# Patient Record
Sex: FEMALE | Race: WHITE | Employment: OTHER | URBAN - METROPOLITAN AREA
[De-identification: names, ages, dates, MRNs, and addresses within clinical notes are randomized per-mention and may not be internally consistent; named-entity substitution may affect disease eponyms.]

---

## 2017-01-03 ENCOUNTER — TELEPHONE (OUTPATIENT)
Dept: INTERNAL MEDICINE | Age: 62
End: 2017-01-03

## 2017-01-13 ENCOUNTER — TELEPHONE (OUTPATIENT)
Dept: INTERNAL MEDICINE | Age: 62
End: 2017-01-13

## 2017-02-08 ENCOUNTER — HOSPITAL ENCOUNTER (OUTPATIENT)
Dept: ENDOSCOPY | Age: 62
Discharge: HOME | End: 2017-02-08
Attending: INTERNAL MEDICINE | Admitting: INTERNAL MEDICINE

## 2017-02-08 VITALS
RESPIRATION RATE: 16 BRPM | SYSTOLIC BLOOD PRESSURE: 106 MMHG | WEIGHT: 173 LBS | HEIGHT: 69 IN | DIASTOLIC BLOOD PRESSURE: 73 MMHG | TEMPERATURE: 97.5 F | OXYGEN SATURATION: 100 % | BODY MASS INDEX: 25.62 KG/M2 | HEART RATE: 72 BPM

## 2017-02-08 RX ORDER — TROSPIUM CHLORIDE ER 60 MG/1
60 CAPSULE ORAL DAILY
Status: ON HOLD | COMMUNITY
End: 2019-12-23 | Stop reason: ALTCHOICE

## 2017-11-03 ENCOUNTER — TELEPHONE (OUTPATIENT)
Dept: ORTHOPEDIC SURGERY | Age: 62
End: 2017-11-03

## 2017-11-03 ENCOUNTER — TELEPHONE (OUTPATIENT)
Dept: INTERNAL MEDICINE | Age: 62
End: 2017-11-03

## 2017-11-03 DIAGNOSIS — M19.079 ARTHRITIS, MIDFOOT: Primary | ICD-10-CM

## 2017-11-03 NOTE — TELEPHONE ENCOUNTER
Pt states that her old pair of orthotics are wearing out and she would like to get a new pair. Order was placed.

## 2017-11-07 ENCOUNTER — TELEPHONE (OUTPATIENT)
Dept: ORTHOPEDIC SURGERY | Age: 62
End: 2017-11-07

## 2017-11-08 ENCOUNTER — ORTHOTIC/BRACE ENCOUNTER (OUTPATIENT)
Dept: ORTHOPEDIC SURGERY | Age: 62
End: 2017-11-08

## 2017-11-08 NOTE — PROGRESS NOTES
Took Bilateral negative impressions for custom foot orthotics to support foot structure and redistribute pressures more appropriately. Advised patient to schedule  for one week.

## 2017-11-17 ENCOUNTER — OFFICE VISIT (OUTPATIENT)
Dept: ORTHOPEDIC SURGERY | Age: 62
End: 2017-11-17

## 2017-11-17 VITALS — WEIGHT: 173.06 LBS | BODY MASS INDEX: 25.63 KG/M2 | HEIGHT: 69 IN

## 2017-11-17 DIAGNOSIS — M25.511 BILATERAL SHOULDER PAIN, UNSPECIFIED CHRONICITY: Primary | ICD-10-CM

## 2017-11-17 DIAGNOSIS — M19.011 PRIMARY OSTEOARTHRITIS OF BOTH SHOULDERS: Chronic | ICD-10-CM

## 2017-11-17 DIAGNOSIS — M19.012 PRIMARY OSTEOARTHRITIS OF BOTH SHOULDERS: Chronic | ICD-10-CM

## 2017-11-17 DIAGNOSIS — M25.512 BILATERAL SHOULDER PAIN, UNSPECIFIED CHRONICITY: Primary | ICD-10-CM

## 2017-11-17 PROCEDURE — 99213 OFFICE O/P EST LOW 20 MIN: CPT | Performed by: ORTHOPAEDIC SURGERY

## 2017-11-17 PROCEDURE — G8484 FLU IMMUNIZE NO ADMIN: HCPCS | Performed by: ORTHOPAEDIC SURGERY

## 2017-11-17 PROCEDURE — G8428 CUR MEDS NOT DOCUMENT: HCPCS | Performed by: ORTHOPAEDIC SURGERY

## 2017-11-17 PROCEDURE — 3014F SCREEN MAMMO DOC REV: CPT | Performed by: ORTHOPAEDIC SURGERY

## 2017-11-17 PROCEDURE — 1036F TOBACCO NON-USER: CPT | Performed by: ORTHOPAEDIC SURGERY

## 2017-11-17 PROCEDURE — 3017F COLORECTAL CA SCREEN DOC REV: CPT | Performed by: ORTHOPAEDIC SURGERY

## 2017-11-17 PROCEDURE — G8419 CALC BMI OUT NRM PARAM NOF/U: HCPCS | Performed by: ORTHOPAEDIC SURGERY

## 2017-11-17 NOTE — PROGRESS NOTES
Subjective: Patient states that she is here for a new problem which is bilateral shoulder arthritis. She has seen Dr. William Noel in the past and was told that she may need shoulder replacement. Aren worse recently. I've seen her for ankle issues in the past and so she came in to see me. She currently states that the left shoulder is worse in the right she is left-hand dominant. She has no numbness or tingling in his had no recent trauma. She just notices that when she sleeps on one side or the other it tends to be more painful than sleeping on her back rates her pain at 5 out of 10. Driving working on the computer too long and not having support makes it worse supporting her arm with a pillow makes it better. She has some stomach issues so has to be careful with NSAIDs she is also been swimming in a pool and that does seem to help as long as she doesn't do too much shoulder motion  Objective: Physical exam shows approximately 120° of left shoulder flexion 90° abduction full internal rotation 0° of external rotation. On the right she has 140° of flexion 110° of abduction full internal rotation and 0° of external rotation. Strength is 3+ to 4 minus over 5 through the available range she has crepitus with motion no tenderness at the a.c. joint. Positive impingement sign bilaterally. Radial ulnar median nerves are intact motor and sensory  Imaging: 3 views of both shoulders shows severe glenohumeral arthritis  Assessment and plan: I would recommend that she see Dr. William Noel again and discuss surgical options.

## 2017-11-22 ENCOUNTER — ORTHOTIC/BRACE ENCOUNTER (OUTPATIENT)
Dept: ORTHOPEDIC SURGERY | Age: 62
End: 2017-11-22

## 2017-11-22 DIAGNOSIS — M19.079 ARTHRITIS, MIDFOOT: Primary | ICD-10-CM

## 2017-11-22 DIAGNOSIS — E11.9 TYPE 2 DIABETES MELLITUS WITHOUT COMPLICATION, WITHOUT LONG-TERM CURRENT USE OF INSULIN (HCC): Chronic | ICD-10-CM

## 2017-11-22 PROCEDURE — L3020 FOOT LONGITUD/METATARSAL SUP: HCPCS | Performed by: PEDORTHIST

## 2017-11-22 NOTE — PROGRESS NOTES
Dispensed bilateral foot orthotics. Wear instructions were given.  Patient was advised to follow up if having problems or if orthotics are not helping

## 2017-12-07 ENCOUNTER — TELEPHONE (OUTPATIENT)
Dept: ORTHOPEDIC SURGERY | Age: 62
End: 2017-12-07

## 2017-12-07 RX ORDER — TRAMADOL HYDROCHLORIDE 50 MG/1
50 TABLET ORAL EVERY 8 HOURS PRN
Qty: 21 TABLET | Refills: 0 | Status: SHIPPED | OUTPATIENT
Start: 2017-12-07 | End: 2017-12-17

## 2017-12-07 NOTE — TELEPHONE ENCOUNTER
Pt saw you last week for bilateral shoulder pain and now has an appt scheduled with PJF but she would like to know if you can call in something to help with the pain until she is seen by him? Her GI doctor would prefer she doesn't take ibuprofen and the tylenol she has been using doesn't help.

## 2017-12-08 RX ORDER — TRAMADOL HYDROCHLORIDE 50 MG/1
50 TABLET ORAL EVERY 8 HOURS PRN
Qty: 21 TABLET | Refills: 0 | Status: SHIPPED | OUTPATIENT
Start: 2017-12-08 | End: 2017-12-18

## 2017-12-08 RX ORDER — TRAMADOL HYDROCHLORIDE 50 MG/1
50 TABLET ORAL EVERY 8 HOURS PRN
Qty: 21 TABLET | Refills: 0 | Status: CANCELLED | OUTPATIENT
Start: 2017-12-08 | End: 2017-12-15

## 2017-12-08 NOTE — TELEPHONE ENCOUNTER
This was printed instead of called in . Could you please send it to the pharmacy? I have teed it up for you to approve.

## 2017-12-12 ENCOUNTER — OFFICE VISIT (OUTPATIENT)
Dept: ORTHOPEDIC SURGERY | Age: 62
End: 2017-12-12

## 2017-12-12 VITALS — WEIGHT: 173.06 LBS | HEIGHT: 69 IN | BODY MASS INDEX: 25.63 KG/M2

## 2017-12-12 DIAGNOSIS — M25.512 BILATERAL SHOULDER PAIN, UNSPECIFIED CHRONICITY: Primary | ICD-10-CM

## 2017-12-12 DIAGNOSIS — M25.511 BILATERAL SHOULDER PAIN, UNSPECIFIED CHRONICITY: Primary | ICD-10-CM

## 2017-12-12 DIAGNOSIS — M19.019 SHOULDER ARTHRITIS: ICD-10-CM

## 2017-12-12 PROCEDURE — G8419 CALC BMI OUT NRM PARAM NOF/U: HCPCS | Performed by: PHYSICIAN ASSISTANT

## 2017-12-12 PROCEDURE — 3017F COLORECTAL CA SCREEN DOC REV: CPT | Performed by: PHYSICIAN ASSISTANT

## 2017-12-12 PROCEDURE — 1036F TOBACCO NON-USER: CPT | Performed by: PHYSICIAN ASSISTANT

## 2017-12-12 PROCEDURE — G8484 FLU IMMUNIZE NO ADMIN: HCPCS | Performed by: PHYSICIAN ASSISTANT

## 2017-12-12 PROCEDURE — 99213 OFFICE O/P EST LOW 20 MIN: CPT | Performed by: PHYSICIAN ASSISTANT

## 2017-12-12 PROCEDURE — 3014F SCREEN MAMMO DOC REV: CPT | Performed by: PHYSICIAN ASSISTANT

## 2017-12-12 PROCEDURE — G8427 DOCREV CUR MEDS BY ELIG CLIN: HCPCS | Performed by: PHYSICIAN ASSISTANT

## 2017-12-12 PROCEDURE — 73020 X-RAY EXAM OF SHOULDER: CPT | Performed by: PHYSICIAN ASSISTANT

## 2017-12-12 NOTE — PROGRESS NOTES
REFERRING PHYSICIAN: Matthias Franco M.D.    CHIEF COMPLAINT:  Bilateral shoulder pain     HISTORY OF PRESENT ILLNESS:  Omar German is a 17-year-old   left-hand-dominant female who presents today at the request of Matthias Franco M.D. for evaluation and consultation of bilateral shoulders. She has a history of a  right shoulder arthroscopy in 2001 with Dr. Tacho Arredondo MD.  She has had continued right shoulder discomfort that is progressively worsening over the last 15 years. She denies any new injuries or problems with her right shoulder. Regarding her left shoulder, she began having significant left shoulder discomfort in mid-October when she slipped in the shower and caught herself. Reports her left shoulder symptoms are worse than her right shoulder symptoms currently. She reports 2 out of 10 pain at rest.  She reports 6 out of 10 pain with activity. She has pain at night, especially when lying on her left shoulder. She's had no treatments for her shoulders recently. REVIEW OF SYSTEMS:  Pertinent items are noted in the HPI. Review of systems was reviewed from Patient History Form dated on December 12, 2017 and available in the patient's chart under the Media tab. CONSTITUTIONAL: Denies unexplained weight loss, fevers, chills or fatigue  NEUROLOGICAL: Denies unsteady gait or progressive weakness  SKIN: Denies skin changes, delayed healing, rash, itching     PHYSICAL EXAMINATION: Inspection of the right shoulder reveals warm, dry, intact skin. There is no adenopathy. The distal neurovascular exam is grossly intact. There is no tenderness over the acromioclavicular joint. Range of motion reveals 95° of active forward elevation. She is 0° of external rotation with her arm at her side. She has 45° of shoulder abduction. She has 5 minus out of 5 strength in forward elevation and external rotation. Inspection of the left shoulder reveals warm, dry, intact skin. There is no adenopathy.  The distal neurovascular exam is grossly intact. There is no tenderness over the acromioclavicular joint. Range of motion reveals 90° of active forward elevation. She is 0° of external rotation with arm at her side. She has 50° of shoulder abduction. She has 5 minus out of 5 strength in forward elevation and external rotation. Cervical spine: The skin is warm and dry. There is no swelling, warmth, or erythema. Range of motion is within normal limits. There is no paraspinal or spinous process tenderness. Spurling's sign is negative and did not produce shoulder pain. The distal neurovascular exam is grossly intact. X-RAYS:   1.  1 view of the right shoulder was obtained in the office and reviewed along with 3 views obtained November 17, 2017. The glenohumeral joint is completely obliterated. There is a large osteophyte visualized in the inferior aspect of humeral head or there is also osteophyte formation visualized inferior aspect of the glenoid. There is a laterally projecting acromion visualized. The acromiohumeral interval severely diminished. There is posterior subluxation of the humeral head with a tremendous amount of posterior glenoid bone erosion. Lung fields are clear with normal pulmonary markings. 2.  1 view of the left shoulder was obtained in the office and reviewed along with 3 views obtained November 17, 2017. The glenohumeral joint is completely obliterated. There is a large osteophyte visualized inferior aspect of the humeral head. There is a laterally projecting acromion visualized. The acromiohumeral interval is severely diminished. There is posterior subluxation of the humeral head with erosion of the proximal 50% of the posterior glenoid. Lung fields are clear with normal pulmonary markings. ASSESSMENT:    1. Right shoulder - severe end-stage glenohumeral osteoarthritis with severe posterior glenoid bone erosion and a possible rotator cuff tear.   2.  Left  shoulder - severe end-stage glenohumeral osteoarthritis with posterior glenoid erosion with possible rotator cuff tear    PLAN: I had detailed discussion with Andres Hauser and her  regarding diagnosis and treatment options. 1.  Regarding the right shoulder - I recommended a CT scan to evaluate the amount and location of posterior glenoid bone erosion. I'll see her back for reevaluation following the CT scan. 2.  Regarding the left shoulder - I recommended a CT scan to evaluate the amount and location the posterior glenoid bone erosion. I'll see her back for reevaluation following the CT scan. She and her  are in agreement with this plan.     Patient seen and examined by Serene Martinez PA-C and Dr. Alfredo Miller MD

## 2017-12-18 ENCOUNTER — TELEPHONE (OUTPATIENT)
Dept: ORTHOPEDIC SURGERY | Age: 62
End: 2017-12-18

## 2017-12-18 NOTE — TELEPHONE ENCOUNTER
Called patient to let them know of CT SCAN approval. Franny Heidi has been faxed auth# and demographic information. Patient was instructed to call the central scheduling department for a follow-up appointment after test is scheduled.

## 2017-12-20 ENCOUNTER — ORTHOTIC/BRACE ENCOUNTER (OUTPATIENT)
Dept: ORTHOPEDIC SURGERY | Age: 62
End: 2017-12-20

## 2018-01-16 ENCOUNTER — OFFICE VISIT (OUTPATIENT)
Dept: ORTHOPEDIC SURGERY | Age: 63
End: 2018-01-16

## 2018-01-16 VITALS — HEIGHT: 67 IN | BODY MASS INDEX: 26.99 KG/M2 | WEIGHT: 171.96 LBS

## 2018-01-16 DIAGNOSIS — M19.019 SHOULDER ARTHRITIS: ICD-10-CM

## 2018-01-16 PROCEDURE — 3014F SCREEN MAMMO DOC REV: CPT | Performed by: ORTHOPAEDIC SURGERY

## 2018-01-16 PROCEDURE — 1036F TOBACCO NON-USER: CPT | Performed by: ORTHOPAEDIC SURGERY

## 2018-01-16 PROCEDURE — 99213 OFFICE O/P EST LOW 20 MIN: CPT | Performed by: ORTHOPAEDIC SURGERY

## 2018-01-16 PROCEDURE — G8428 CUR MEDS NOT DOCUMENT: HCPCS | Performed by: ORTHOPAEDIC SURGERY

## 2018-01-16 PROCEDURE — L3670 SO ACRO/CLAV CAN WEB PRE OTS: HCPCS | Performed by: ORTHOPAEDIC SURGERY

## 2018-01-16 PROCEDURE — G8419 CALC BMI OUT NRM PARAM NOF/U: HCPCS | Performed by: ORTHOPAEDIC SURGERY

## 2018-01-16 PROCEDURE — 3017F COLORECTAL CA SCREEN DOC REV: CPT | Performed by: ORTHOPAEDIC SURGERY

## 2018-01-16 PROCEDURE — G8484 FLU IMMUNIZE NO ADMIN: HCPCS | Performed by: ORTHOPAEDIC SURGERY

## 2018-01-23 ENCOUNTER — TELEPHONE (OUTPATIENT)
Dept: ORTHOPEDIC SURGERY | Age: 63
End: 2018-01-23

## 2018-01-30 ENCOUNTER — TELEPHONE (OUTPATIENT)
Dept: INTERNAL MEDICINE | Age: 63
End: 2018-01-30

## 2018-01-30 LAB
ABO GROUPING: NORMAL
ANION GAP SERPL CALCULATED.3IONS-SCNC: 8 MMOL/L (ref 5–13)
ANTIBODY SCREEN: NEGATIVE
APTT: 34.2 SECONDS (ref 23.1–37.6)
BILIRUBIN URINE: NEGATIVE
BUN / CREAT RATIO: 22
BUN BLDV-MCNC: 14 MG/DL (ref 7–25)
CALCIUM SERPL-MCNC: 9.7 MG/DL (ref 8.4–10.5)
CHLORIDE BLD-SCNC: 106 MMOL/L (ref 98–110)
CLARITY: CLEAR
CO2: 27 MMOL/L (ref 22–29)
COLOR: YELLOW
CREAT SERPL-MCNC: 0.65 MG/DL (ref 0.5–1.2)
EPITHELIAL CELLS, UA: ABNORMAL /HPF (ref 0–5)
ERYTHROCYTES URINE: NEGATIVE
GFR AFRICAN AMERICAN: 111 SEE NOTE
GFR NON-AFRICAN AMERICAN: 92 SEE NOTE
GLUCOSE BLD-MCNC: 92 MG/DL (ref 70–99)
GLUCOSE URINE: NEGATIVE MG/DL
HCT VFR BLD CALC: 37.8 % (ref 35–45)
HEMOGLOBIN: 13.2 G/DL (ref 11.7–15.5)
INR BLD: 1 (ref 0.9–1.1)
KETONES, URINE: NEGATIVE MG/DL
LEUKOCYTE ESTERASE, URINE: NEGATIVE
LEUKOCYTES, UA: ABNORMAL /HPF (ref 0–5)
MCH RBC QN AUTO: 32.4 PG (ref 27–33)
MCHC RBC AUTO-ENTMCNC: 34.9 G/DL (ref 32–36)
MCV RBC AUTO: 93 FL (ref 80–100)
MUCUS: PRESENT /HPF
NITRITE, URINE: NEGATIVE
PDW BLD-RTO: 14.5 % (ref 11–15)
PH UA: 7 (ref 5–8)
PLATELET # BLD: 225 10*3/UL (ref 140–400)
PMV BLD AUTO: 8.5 FL (ref 7.5–11.5)
POTASSIUM SERPL-SCNC: 4.1 MMOL/L (ref 3.5–5.1)
PROTEIN UA: NEGATIVE MG/DL
PROTHROMBIN TIME: 10.4 SECONDS (ref 9.4–12.6)
RBC # BLD: 4.07 10*6/UL (ref 3.8–5.1)
RBC UA: 3 /HPF (ref 0–3)
RH FACTOR: POSITIVE
SODIUM BLD-SCNC: 141 MMOL/L (ref 135–146)
SPECIFIC GRAVITY UA: 1.01 (ref 1–1.03)
STAPH AUREUS SCREEN: NEGATIVE
STAPH AUREUS.METHICILLIN RESISTANT DNA: NEGATIVE
UROBILINOGEN, URINE: <2 MG/DL
WBC # BLD: 5.1 10*3/UL (ref 3.8–10.8)

## 2018-01-31 LAB — BACTERIA IDENTIFIED: NORMAL

## 2018-02-01 ENCOUNTER — TELEPHONE (OUTPATIENT)
Dept: ORTHOPEDIC SURGERY | Age: 63
End: 2018-02-01

## 2018-02-01 LAB
ESTIMATED AVERAGE GLUCOSE: 100 MG/DL (ref 68–114)
HBA1C MFR BLD: 5.1 % (ref 4–5.6)

## 2018-02-09 ENCOUNTER — TELEPHONE (OUTPATIENT)
Dept: ORTHOPEDIC SURGERY | Age: 63
End: 2018-02-09

## 2018-02-19 DIAGNOSIS — Z96.619 STATUS POST REVERSE TOTAL SHOULDER REPLACEMENT, UNSPECIFIED LATERALITY: Primary | ICD-10-CM

## 2018-02-19 RX ORDER — OXYCODONE HYDROCHLORIDE AND ACETAMINOPHEN 5; 325 MG/1; MG/1
1 TABLET ORAL EVERY 8 HOURS PRN
Qty: 21 TABLET | Refills: 0 | Status: SHIPPED | OUTPATIENT
Start: 2018-02-19 | End: 2018-02-26

## 2018-02-23 ENCOUNTER — OFFICE VISIT (OUTPATIENT)
Dept: ORTHOPEDIC SURGERY | Age: 63
End: 2018-02-23

## 2018-02-23 VITALS — BODY MASS INDEX: 26.99 KG/M2 | WEIGHT: 171.96 LBS | HEIGHT: 67 IN

## 2018-02-23 DIAGNOSIS — Z96.612 S/P REVERSE TOTAL SHOULDER ARTHROPLASTY, LEFT: ICD-10-CM

## 2018-02-23 DIAGNOSIS — M25.512 PAIN IN JOINT OF LEFT SHOULDER REGION: Primary | ICD-10-CM

## 2018-02-23 PROCEDURE — 99024 POSTOP FOLLOW-UP VISIT: CPT | Performed by: PHYSICIAN ASSISTANT

## 2018-02-23 RX ORDER — HYDROCODONE BITARTRATE AND ACETAMINOPHEN 5; 325 MG/1; MG/1
TABLET ORAL
Qty: 28 TABLET | Refills: 0 | Status: SHIPPED | OUTPATIENT
Start: 2018-02-23 | End: 2018-02-28

## 2018-02-26 ENCOUNTER — HOSPITAL ENCOUNTER (OUTPATIENT)
Dept: OTHER | Age: 63
Discharge: HOME | End: 2018-02-28
Attending: ORTHOPAEDIC SURGERY | Admitting: ORTHOPAEDIC SURGERY

## 2018-02-26 ASSESSMENT — PAIN DESCRIPTION - DESCRIPTORS: DESCRIPTORS: SHARP;TIGHTNESS;BURNING

## 2018-02-26 ASSESSMENT — PAIN DESCRIPTION - FREQUENCY: FREQUENCY: INTERMITTENT

## 2018-02-26 ASSESSMENT — PAIN DESCRIPTION - DIRECTION: RADIATING_TOWARDS: TO LEFT ELBOW

## 2018-02-26 ASSESSMENT — PAIN DESCRIPTION - ORIENTATION: ORIENTATION: RIGHT;LEFT

## 2018-02-26 ASSESSMENT — PAIN SCALES - GENERAL: PAINLEVEL_OUTOF10: 8

## 2018-02-26 ASSESSMENT — PAIN DESCRIPTION - ONSET: ONSET: GRADUAL

## 2018-02-26 ASSESSMENT — PAIN DESCRIPTION - PROGRESSION: CLINICAL_PROGRESSION: GRADUALLY IMPROVING

## 2018-02-26 ASSESSMENT — PAIN DESCRIPTION - LOCATION: LOCATION: SHOULDER

## 2018-02-26 ASSESSMENT — PAIN DESCRIPTION - PAIN TYPE: TYPE: SURGICAL PAIN

## 2018-02-26 NOTE — PLAN OF CARE
Outpatient Physical Therapy  [] [facility]    Phone: [phone]   Fax: [phone]   [x] [facility]  Phone: [phone]              Fax: [phone]  [] [facility]   Phone: [phone]   Fax: [phone]     To: Referring Practitioner: Dr. Kennedi Sandhu      Patient: Lasha Pinto   : 1955   MRN: [id number]  Evaluation Date: 2018      Diagnosis Information:  · Diagnosis: Glenohumeral arthritis left shoulder, posterior glenoid bone loss, , biceps tenodesis, Reverse left TSR, glenoid bone graft. ·  Treatment Diagnosis: Limited mobility, weakness, decreased ability to use left UE for ADL's, pain on left shoulder michael. but pain on right shoulder as well. Physical Therapy Certification/Re-Certification Form  Dear ,  The following patient has been evaluated for physical therapy services and for therapy to continue, Medicare requires monthly physician review of the treatment plan. Please review the attached evaluation and/or summary of the patient's plan of care, and verify that you agree therapy should continue by signing the attached document and sending it back to our office. Plan of Care/Treatment to date:  [x] Therapeutic Exercise    [x] Modalities:  [x] Therapeutic Activity     [] Ultrasound  [] Electrical Stimulation  [] Gait Training      [] Cervical Traction [] Lumbar Traction  [] Neuromuscular Re-education    [] Cold/hotpack [] Iontophoresis   [x] Instruction in HEP     Other:  [x] Manual Therapy      []             [] Aquatic Therapy      []           ? Frequency/Duration:  # Days per week: [] 1 day # Weeks: [] 1 week [] 5 weeks     [x] 2 days? [] 2 weeks [x] 6 weeks     [] 3 days   [] 3 weeks [] 7 weeks     [] 4 days   [] 4 weeks [] 8 weeks    Rehab Potential: [] Excellent [x] Good [] Fair  [] Poor     DR. Ibrahim:  Please send protocol you would like us to follow if you have a preferred one. Thank you!     Electronically signed by:  Sonja Cotto PT      If you have any questions or concerns, please don't hesitate to call.   Thank you for your referral.      Physician Signature:________________________________Date:__________________  By signing above, therapists plan is approved by physician

## 2018-02-26 NOTE — FLOWSHEET NOTE
Physical Therapy Daily Treatment Note  Date:  2018    Patient Name:  Tobias Long   \"JS\"  :  1955  MRN: [id number]  Restrictions/Precautions:    Pertinent Medical History:  Medical/Treatment Diagnosis Information:  · Diagnosis: Glenohumeral arthritis left shoulder, posterior glenoid bone loss, , biceps tenodesis, Reverse left TSR, glenoid bone graft. · Treatment Diagnosis: Limited mobility, weakness, decreased ability to use left UE for ADL's, pain on left shoulder michael. but pain on right shoulder as well. Insurance/Certification information:  PT Insurance Information: [facility]  Physician Information:  Referring Practitioner: Dr. Cl Eldridge of care signed (Y/N):  Sent to Dr. Delvin Lui [facility] on 18  Visit# / total visits:   Pain level: 5-8/10     G-Code (if applicable):      Date / Visit # G-Code Applied: 18  PT G-Codes  Functional Assessment Tool Used: Calvin Areas  Score: 50  Functional Limitation: Carrying, moving and handling objects  Carrying, Moving and Handling Objects Current Status (): At least 80 percent but less than 100 percent impaired, limited or restricted  Carrying, Moving and Handling Objects Goal Status (): At least 60 percent but less than 80 percent impaired, limited or restricted    Progress Note: [x]  Yes  []  No  Next due by: Visit #10      History of Injury:  Pt has OA in both shoulders and pain increased when she slipped in a tub on 2017. Right was sore since 17 years ago  (worse than left) and left began one year ago. but she decided to have the left one done first .  She had surgery and was told on 18 to avoid the sling.    Pt  has been doig HEP for 2 weeks since dc from hospital.  Subjective:     See above    Objective: See eval  Observation:   Test measurements:      Exercises:  Exercise/Equipment Resistance/Repetitions Other comments   Codman's exercises 30x each    Active left elbow flexion and extension 20X    Wrist

## 2018-03-01 ENCOUNTER — HOSPITAL ENCOUNTER (OUTPATIENT)
Dept: OTHER | Age: 63
Discharge: HOME | End: 2018-03-31
Attending: ORTHOPAEDIC SURGERY | Admitting: ORTHOPAEDIC SURGERY

## 2018-03-05 ENCOUNTER — HOSPITAL ENCOUNTER (OUTPATIENT)
Dept: PHYSICAL THERAPY | Age: 63
Discharge: HOME | End: 2018-03-06
Admitting: ORTHOPAEDIC SURGERY

## 2018-03-05 DIAGNOSIS — M19.011 PRIMARY OSTEOARTHRITIS OF BOTH SHOULDERS: Primary | Chronic | ICD-10-CM

## 2018-03-05 DIAGNOSIS — M19.012 PRIMARY OSTEOARTHRITIS OF BOTH SHOULDERS: Primary | Chronic | ICD-10-CM

## 2018-03-05 RX ORDER — TRAMADOL HYDROCHLORIDE 50 MG/1
50 TABLET ORAL EVERY 8 HOURS PRN
Qty: 28 TABLET | Refills: 0 | OUTPATIENT
Start: 2018-03-05 | End: 2018-03-15

## 2018-03-05 NOTE — FLOWSHEET NOTE
Physical Therapy Daily Treatment Note  Date:  3/5/2018    Patient Name:  Hawa Chaparro   \"JS\"  :  1955  MRN: [id number]  Restrictions/Precautions:    Pertinent Medical History:  Medical/Treatment Diagnosis Information:  · Diagnosis: Glenohumeral arthritis left shoulder, posterior glenoid bone loss, , biceps tenodesis, Reverse left TSR, glenoid bone graft. · Treatment Diagnosis: Limited mobility, weakness, decreased ability to use left UE for ADL's, pain on left shoulder michael. but pain on right shoulder as well. Insurance/Certification information:  PT Insurance Information: [facility]  Physician Information:  Referring Practitioner: Dr. Estefani Nation of care signed (Y/N):  Sent to Dr. Abhijeet Rogers on 18  Visit# / total visits: 3 /12  Pain level: 4-5/10     G-Code (if applicable):      Date / Visit # G-Code Applied: 18  PT G-Codes  Functional Assessment Tool Used: Dominic Estrella  Score: 50  Functional Limitation: Carrying, moving and handling objects  Carrying, Moving and Handling Objects Current Status (): At least 80 percent but less than 100 percent impaired, limited or restricted  Carrying, Moving and Handling Objects Goal Status (): At least 60 percent but less than 80 percent impaired, limited or restricted    Progress Note: [x]  Yes  []  No  Next due by: Visit #10      History of Injury:  Pt has OA in both shoulders and pain increased when she slipped in a tub on 2017. Right was sore since 17 years ago  (worse than left) and left began one year ago. but she decided to have the left one done first .  She had surgery and was told on 18 to avoid the sling. Pt  has been doig HEP for 2 weeks since dc from hospital.  Subjective:     3/1/18: Pt reports that she has been doing the home exercises consistently. Having difficulty with donning the splint. 18: Patient reports shoulder has been sore.     Objective: See eval  Observation:   Test measurements:

## 2018-03-08 ENCOUNTER — HOSPITAL ENCOUNTER (OUTPATIENT)
Dept: PHYSICAL THERAPY | Age: 63
Discharge: HOME | End: 2018-03-09
Admitting: ORTHOPAEDIC SURGERY

## 2018-03-08 NOTE — FLOWSHEET NOTE
Physical Therapy Daily Treatment Note  Date:  3/8/2018    Patient Name:  Aravind Mclaughlin   \"JS\"  :  1955  MRN: [id number]  Restrictions/Precautions:    Pertinent Medical History:  Medical/Treatment Diagnosis Information:  · Diagnosis: Glenohumeral arthritis left shoulder, posterior glenoid bone loss, , biceps tenodesis, Reverse left TSR, glenoid bone graft. · Treatment Diagnosis: Limited mobility, weakness, decreased ability to use left UE for ADL's, pain on left shoulder michael. but pain on right shoulder as well. Insurance/Certification information:  PT Insurance Information: [facility]  Physician Information:  Referring Practitioner: Dr. Blaire Pollard of care signed (Y/N):  Sent to Dr. Claribel Rodriguez on 18  Visit# / total visits:   Pain level: 4/10     G-Code (if applicable):      Date / Visit # G-Code Applied: 18  PT G-Codes  Functional Assessment Tool Used: Mary Porterkenhead  Score: 50  Functional Limitation: Carrying, moving and handling objects  Carrying, Moving and Handling Objects Current Status (): At least 80 percent but less than 100 percent impaired, limited or restricted  Carrying, Moving and Handling Objects Goal Status (): At least 60 percent but less than 80 percent impaired, limited or restricted    Progress Note: [x]  Yes  []  No  Next due by: Visit #10      History of Injury:  Pt has OA in both shoulders and pain increased when she slipped in a tub on 2017. Right was sore since 17 years ago  (worse than left) and left began one year ago. but she decided to have the left one done first .  She had surgery and was told on 18 to avoid the sling. Pt  has been doig HEP for 2 weeks since dc from hospital.  Subjective:     3/1/18: Pt reports that she has been doing the home exercises consistently. Having difficulty with donning the splint. 18: Patient reports shoulder has been sore.   18: Patient reports shoulder has been not as

## 2018-03-15 ENCOUNTER — HOSPITAL ENCOUNTER (OUTPATIENT)
Dept: PHYSICAL THERAPY | Age: 63
Discharge: HOME | End: 2018-03-16
Admitting: ORTHOPAEDIC SURGERY

## 2018-03-15 NOTE — FLOWSHEET NOTE
Physical Therapy Daily Treatment Note  Date:  3/15/2018    Patient Name:  Marlin Wayne   \"JS\"  :  1955  MRN: [id number]  Restrictions/Precautions:    Pertinent Medical History:  Medical/Treatment Diagnosis Information:  · Diagnosis: Glenohumeral arthritis left shoulder, posterior glenoid bone loss, , biceps tenodesis, Reverse left TSR, glenoid bone graft. · Treatment Diagnosis: Limited mobility, weakness, decreased ability to use left UE for ADL's, pain on left shoulder michael. but pain on right shoulder as well. Insurance/Certification information:  PT Insurance Information: [facility]  Physician Information:  Referring Practitioner: Dr. Kamille Welch of care signed (Y/N):  Sent to Dr. Simba Schwab on 18  Visit# / total visits:   Pain level: 3/10     G-Code (if applicable):      Date / Visit # G-Code Applied: 18  PT G-Codes  Functional Assessment Tool Used: Rhoda Reich  Score: 50  Functional Limitation: Carrying, moving and handling objects  Carrying, Moving and Handling Objects Current Status (): At least 80 percent but less than 100 percent impaired, limited or restricted  Carrying, Moving and Handling Objects Goal Status (): At least 60 percent but less than 80 percent impaired, limited or restricted    Progress Note: [x]  Yes  []  No  Next due by: Visit #10      History of Injury:  Pt has OA in both shoulders and pain increased when she slipped in a tub on 2017. Right was sore since 17 years ago  (worse than left) and left began one year ago. but she decided to have the left one done first .  She had surgery and was told on 18 to avoid the sling. Pt  has been doig HEP for 2 weeks since dc from hospital.  Subjective:     3/1/18: Pt reports that she has been doing the home exercises consistently. Having difficulty with donning the splint. 18: Patient reports shoulder has been sore. 18: Patient reports shoulder has been not as sore.   3/12/18: Pt reports that she is able to do more and  having less pain. She is not having as much difficulty with HEP. She is able to do more ADL's such a floss her teeth and curl her hair. 3/15/18: Pt reports that pain is about the same but it is easier to do scapular retraction. Objective: See eval  Observation:   Test measurements:      Exercises:  Exercise/Equipment Resistance/Repetitions Other comments   Codman's exercises 30x each    Active left elbow flexion and extension 20X    Wrist flex. ext pron. sup 20X Has written instructions from in- hospial PT   Table slides  10 x  Small range   Scap retraction 5 sec x 10 3/12/18                                                   Other Therapeutic Activities:    Reviewed Stefania Cane  3/12/18: Discussed use of Vit E oil for scar tissue and incisional healing. 3/15/18:Pt. and PT reviewed how to eat using left arm safely. Home Exercise Program:    Gave booklet of HEP for pt as above, Answered questions and assisted pt with sling. Manual Treatments:    Gentle PROM to left shoulder  And ROM to elbow and wrist, 20 min  Modalities:    IFC with CP x 15 min while seated  Timed Code Treatment Minutes:    45  Total Treatment Minutes:    60    Treatment/Activity Tolerance:  [x] Patient tolerated treatment well [] Patient limited by fatigue  [] Patient limited by pain  [] Patient limited by other medical complications  [] Other:     Prognosis: [x] Good [] Fair  [] Poor    Patient Requires Follow-up: [x] Yes  [] No    Plan:   [] Continue per plan of care [] Alter current plan (see comments)  [x] Plan of care initiated [] Hold pending MD visit [] Discharge  Plan for Next Session:    Follow protocol for reverse TSR, asked  if he has a preferred protocol. Will await response. Assess when pt is ready to drive again.   Electronically signed by:  Macey Bennett, PT

## 2018-03-22 ENCOUNTER — HOSPITAL ENCOUNTER (OUTPATIENT)
Dept: PHYSICAL THERAPY | Age: 63
Discharge: HOME | End: 2018-03-23
Admitting: ORTHOPAEDIC SURGERY

## 2018-03-22 NOTE — FLOWSHEET NOTE
Physical Therapy Daily Treatment Note  Date:  3/22/2018    Patient Name:  Sander Veras   \"JS\"  :  1955  MRN: [id number]  Restrictions/Precautions:    Pertinent Medical History:  Medical/Treatment Diagnosis Information:  · Diagnosis: Glenohumeral arthritis left shoulder, posterior glenoid bone loss, , biceps tenodesis, Reverse left TSR, glenoid bone graft. · Treatment Diagnosis: Limited mobility, weakness, decreased ability to use left UE for ADL's, pain on left shoulder michael. but pain on right shoulder as well. Insurance/Certification information:  PT Insurance Information: [facility]  Physician Information:  Referring Practitioner: Dr. Vicky Rivera of care signed (Y/N):  Sent to Dr. Adam King on 18  Visit# / total visits:   Pain level: 1/10     G-Code (if applicable):      Date / Visit # G-Code Applied: 18  PT G-Codes  Functional Assessment Tool Used: Sudhir Nations  Score: 50  Functional Limitation: Carrying, moving and handling objects  Carrying, Moving and Handling Objects Current Status (): At least 80 percent but less than 100 percent impaired, limited or restricted  Carrying, Moving and Handling Objects Goal Status (): At least 60 percent but less than 80 percent impaired, limited or restricted    Progress Note: [x]  Yes  []  No  Next due by: Visit #10      History of Injury:  Pt has OA in both shoulders and pain increased when she slipped in a tub on 2017. Right was sore since 17 years ago  (worse than left) and left began one year ago. but she decided to have the left one done first .  She had surgery and was told on 18 to avoid the sling. Pt  has been doig HEP for 2 weeks since dc from hospital.  Subjective:     3/1/18: Pt reports that she has been doing the home exercises consistently. Having difficulty with donning the splint. 18: Patient reports shoulder has been sore. 18: Patient reports shoulder has been not as sore.   3/12/18:

## 2018-03-26 ENCOUNTER — HOSPITAL ENCOUNTER (OUTPATIENT)
Dept: PHYSICAL THERAPY | Age: 63
Discharge: HOME | End: 2018-03-27
Admitting: ORTHOPAEDIC SURGERY

## 2018-03-26 NOTE — FLOWSHEET NOTE
Physical Therapy Daily Treatment Note  Date:  3/26/2018    Patient Name:  Severiano Fuelling   \"JS\"  :  1955  MRN: [id number]  Restrictions/Precautions:    Pertinent Medical History:  Medical/Treatment Diagnosis Information:  · Diagnosis: Glenohumeral arthritis left shoulder, posterior glenoid bone loss, , biceps tenodesis, Reverse left TSR, glenoid bone graft. · Treatment Diagnosis: Limited mobility, weakness, decreased ability to use left UE for ADL's, pain on left shoulder michael. but pain on right shoulder as well. Insurance/Certification information:  PT Insurance Information: [facility]  Physician Information:  Referring Practitioner: Dr. Lor Aguilar of care signed (Y/N):  Sent to Dr. Jenny Del Castillo on 18  Visit# / total visits:   Pain level: 1/10     G-Code (if applicable):      Date / Visit # G-Code Applied: 18  PT G-Codes  Functional Assessment Tool Used: Silver Stephanie  Score: 50  Functional Limitation: Carrying, moving and handling objects  Carrying, Moving and Handling Objects Current Status (): At least 80 percent but less than 100 percent impaired, limited or restricted  Carrying, Moving and Handling Objects Goal Status (): At least 60 percent but less than 80 percent impaired, limited or restricted    Progress Note: [x]  Yes  []  No  Next due by: Visit #10      History of Injury:  Pt has OA in both shoulders and pain increased when she slipped in a tub on 2017. Right was sore since 17 years ago  (worse than left) and left began one year ago. but she decided to have the left one done first .  She had surgery and was told on 18 to avoid the sling. Pt  has been doig HEP for 2 weeks since dc from hospital.  Subjective:     3/1/18: Pt reports that she has been doing the home exercises consistently. Having difficulty with donning the splint. 18: Patient reports shoulder has been sore. 18: Patient reports shoulder has been not as sore.   3/12/18: Follow-up: [x] Yes  [] No    Plan:   [] Continue per plan of care [] Alter current plan (see comments)  [x] Plan of care initiated [] Hold pending MD visit [] Discharge  Plan for Next Session:    Follow protocol for reverse TSR, asked  if he has a preferred protocol. Will await response. Assess when pt is ready to drive again. 10th visit questionnaire next time.   Electronically signed by:  Ravin Montenegro PT

## 2018-03-29 ENCOUNTER — HOSPITAL ENCOUNTER (OUTPATIENT)
Dept: PHYSICAL THERAPY | Age: 63
Discharge: HOME | End: 2018-03-30
Admitting: ORTHOPAEDIC SURGERY

## 2018-03-29 NOTE — FLOWSHEET NOTE
Physical Therapy Daily Treatment Note  Date:  3/29/2018    Patient Name:  Kym Loja   \"JS\"  :  1955  MRN: [id number]  Restrictions/Precautions:    Pertinent Medical History:  Medical/Treatment Diagnosis Information:  · Diagnosis: Glenohumeral arthritis left shoulder, posterior glenoid bone loss, , biceps tenodesis, Reverse left TSR, glenoid bone graft. · Treatment Diagnosis: Limited mobility, weakness, decreased ability to use left UE for ADL's, pain on left shoulder michael. but pain on right shoulder as well. Insurance/Certification information:  PT Insurance Information: [facility]  Physician Information:  Referring Practitioner: Dr. Maggie Saavedra of care signed (Y/N):  Sent to Dr. Sandra Eid on 18, sees him again on 2018  Visit# / total visits: 10 /12  Pain level: 1/10     G-Code (if applicable):      Date / Visit # G-Code Applied: 3/29/18 /10th visit  PT G-Codes  Functional Assessment Tool Used: Lauren Math  Score: 35  Functional Limitation: Carrying, moving and handling objects  Carrying, Moving and Handling Objects Current Status (): CK  Carrying, Moving and Handling Objects Goal Status (): CJ    Progress Note: [x]  Yes  []  No  Next due by: Visit #10      History of Injury:  Pt has OA in both shoulders and pain increased when she slipped in a tub on 2017. Right was sore since 17 years ago  (worse than left) and left began one year ago. but she decided to have the left one done first .  She had surgery and was told on 18 to avoid the sling. Pt  has been doig HEP for 2 weeks since dc from hospital.  Subjective:     3/1/18: Pt reports that she has been doing the home exercises consistently. Having difficulty with donning the splint. 18: Patient reports shoulder has been sore. 18: Patient reports shoulder has been not as sore. 3/12/18: Pt reports that she is able to do more and  having less pain.  She is not having as much difficulty with HEP. She is able to do more ADL's such a floss her teeth and curl her hair. 3/15/18: Pt reports that pain is about the same but it is easier to do scapular retraction. 3/19/18: Pt reports that she feels well. Back has been aggravating her.  3/22/18: Pt reports minimal popping but not pain with it.   3/26/18: Pt reports that she blew dried her hair and had no difficulty. 3/29/18: Pt reports that she feels well and was able to shower independently yesterday for the first time. Objective: See eval  Observation:   Test measurements:      Exercises:  Exercise/Equipment Resistance/Repetitions Other comments   Codman's exercises 30x each    Active left elbow flexion and extension 20X    Wrist flex. ext pron. sup 20X Has written instructions from in- hospial PT   Table slides  10 x  Small range   Scap retraction 5 sec x 10 3/12/18   UBE [place] 5,  5 min arms only Painfree ROM, started 3/19   OH pulley 3 min Painfree ROM, started 3/19   Active shoulder flexion and scaption. 10X Added 3/29/18   Deltoid  Isometric  5 sec X 10 Added 3/29/18                               Other Therapeutic Activities:    Reviewed Quick Dash  3/12/18: Discussed use of Vit E oil for scar tissue and incisional healing. 3/15/18:Pt. and PT reviewed how to eat using left arm safely. 3/22/18: Discussed driving and tips for safety such as pulling door nearly closed with right hand and using right hand to buckle seat belt. 3/29/18: Pt and therapist discussed dressing issues and other ADL's in detail, answering all of patient's questions about this. Home Exercise Program:    Gave booklet of HEP for pt as above, Answered questions and assisted pt with sling.  3/29/18: Added above exercises, active flex and abd and deltoid isometrics, and patient demonstrated correctly.     Manual Treatments:    Gentle PROM to left shoulder  And ROM to elbow and wrist, rhythmic stabilization  20 min  Modalities:    IFC with CP x 15 min while seated  Timed Code Treatment

## 2018-04-01 ENCOUNTER — HOSPITAL ENCOUNTER (OUTPATIENT)
Dept: OTHER | Age: 63
Discharge: HOME | End: 2018-04-30
Attending: ORTHOPAEDIC SURGERY | Admitting: ORTHOPAEDIC SURGERY

## 2018-04-02 ENCOUNTER — HOSPITAL ENCOUNTER (OUTPATIENT)
Dept: PHYSICAL THERAPY | Age: 63
Discharge: HOME | End: 2018-04-03
Admitting: ORTHOPAEDIC SURGERY

## 2018-04-02 NOTE — FLOWSHEET NOTE
She is able to do more ADL's such a floss her teeth and curl her hair. 3/15/18: Pt reports that pain is about the same but it is easier to do scapular retraction. 3/19/18: Pt reports that she feels well. Back has been aggravating her.  3/22/18: Pt reports minimal popping but not pain with it.   3/26/18: Pt reports that she blew dried her hair and had no difficulty. 3/29/18: Pt reports that she feels well and was able to shower independently yesterday for the first time. 04/02/18: Patient reports she started driving few days ago without difficulty. Objective: See eval  Observation:   Test measurements:      Exercises:  Exercise/Equipment Resistance/Repetitions Other comments   Codman's exercises 30x each    Active left elbow flexion and extension 20X    Wrist flex. ext pron. sup 20X Has written instructions from in- hospial PT   Table slides  10 x  Small range   Scap retraction 5 sec x 10 3/12/18   UBE [place] 5,  5 min arms only Painfree ROM, started 3/19   OH pulley 3 min Painfree ROM, started 3/19   Active shoulder flexion and scaption. 10X Added 3/29/18   Deltoid  Isometric  5 sec X 10 Added 3/29/18                               Other Therapeutic Activities:    Reviewed Quick Dash  3/12/18: Discussed use of Vit E oil for scar tissue and incisional healing. 3/15/18:Pt. and PT reviewed how to eat using left arm safely. 3/22/18: Discussed driving and tips for safety such as pulling door nearly closed with right hand and using right hand to buckle seat belt. 3/29/18: Pt and therapist discussed dressing issues and other ADL's in detail, answering all of patient's questions about this. Home Exercise Program:    Gave booklet of HEP for pt as above, Answered questions and assisted pt with sling.  3/29/18: Added above exercises, active flex and abd and deltoid isometrics, and patient demonstrated correctly.     Manual Treatments:    Gentle PROM to left shoulder  And ROM to elbow and wrist, rhythmic stabilization  20 min  Modalities:    IFC with CP x 15 min while seated  Timed Code Treatment Minutes:    55  Total Treatment Minutes:    70    Treatment/Activity Tolerance:  [x] Patient tolerated treatment well [] Patient limited by fatigue  [] Patient limited by pain  [] Patient limited by other medical complications  [] Other:     Prognosis: [x] Good [] Fair  [] Poor    Patient Requires Follow-up: [x] Yes  [] No    Plan:   [] Continue per plan of care [] Alter current plan (see comments)  [x] Plan of care initiated [] Hold pending MD visit [] Discharge  Plan for Next Session:    Follow protocol for reverse TSR, asked  if he has a preferred protocol. Will await response. Assess when pt is ready to drive again. Prepare for pt to see Dr. Jeanie Aguiar on 4/6/18.   Electronically signed by:  Graham Landaverde PTA

## 2018-04-06 ENCOUNTER — OFFICE VISIT (OUTPATIENT)
Dept: ORTHOPEDIC SURGERY | Age: 63
End: 2018-04-06

## 2018-04-06 VITALS — BODY MASS INDEX: 26.99 KG/M2 | WEIGHT: 171.96 LBS | HEIGHT: 67 IN

## 2018-04-06 DIAGNOSIS — Z96.612 S/P REVERSE TOTAL SHOULDER ARTHROPLASTY, LEFT: Primary | ICD-10-CM

## 2018-04-06 PROCEDURE — 99024 POSTOP FOLLOW-UP VISIT: CPT | Performed by: PHYSICIAN ASSISTANT

## 2018-04-11 DIAGNOSIS — Z96.612 S/P REVERSE TOTAL SHOULDER ARTHROPLASTY, LEFT: Primary | ICD-10-CM

## 2018-04-11 RX ORDER — AMOXICILLIN 500 MG/1
CAPSULE ORAL
Qty: 4 CAPSULE | Refills: 0 | Status: SHIPPED | OUTPATIENT
Start: 2018-04-11 | End: 2018-06-13 | Stop reason: SDUPTHER

## 2018-04-12 ENCOUNTER — HOSPITAL ENCOUNTER (OUTPATIENT)
Dept: PHYSICAL THERAPY | Age: 63
Discharge: HOME | End: 2018-04-13
Admitting: ORTHOPAEDIC SURGERY

## 2018-04-16 ENCOUNTER — HOSPITAL ENCOUNTER (OUTPATIENT)
Dept: PHYSICAL THERAPY | Age: 63
Discharge: HOME | End: 2018-04-17
Admitting: ORTHOPAEDIC SURGERY

## 2018-04-16 NOTE — FLOWSHEET NOTE
pain. She is not having as much difficulty with HEP. She is able to do more ADL's such a floss her teeth and curl her hair. 3/15/18: Pt reports that pain is about the same but it is easier to do scapular retraction. 3/19/18: Pt reports that she feels well. Back has been aggravating her.  3/22/18: Pt reports minimal popping but not pain with it.   3/26/18: Pt reports that she blew dried her hair and had no difficulty. 3/29/18: Pt reports that she feels well and was able to shower independently yesterday for the first time. 04/02/18: Patient reports she started driving few days ago without difficulty. 4/5/18: Pt reports that she feels very comfortable driving again, she washed her hair and is dressing more independently. 4/9/18: pt REPORTS PAIN ON LOW BACK WHICH INCREASED AFTER cODMAN'S EXERCISES.  04/12/18 Patient reports shoulder is feeling ok,her low back still sore. 04/16/18 Patient reports shoulder is doing ok. 04/16/18 Patient reports she is able to reach into cabinets easier. Objective:   Observation: Palpation: TTP on proximal incision, Sensation: Numbness on left tricep  Test measurements:  4/5/18:PROM  Left shoulder flexion:0-152    Abd:0-120    IR: 0-88  ER:  0-34 with slight discomfort   Strength:  FLex: 4/5  Deltoid: 4/5    IR:  3/5   ER:  3-/5                  Exercises:  Exercise/Equipment Resistance/Repetitions Other comments   30x each    Active left elbow flexion and extension 20X    Wrist flex. ext pron. sup 20X Has written instructions from in- hospial PT   10 x  Small range   5 sec x 10 3/12/18   UBE WL 5,  5 min arms only Painfree ROM, started 3/19   OH pulley 4 min Painfree ROM, started 3/19   Active shoulder flexion and scaption. 10X Added 3/29/18   Deltoid  Isometric  5 sec X 10 Added 3/29/18                               Other Therapeutic Activities:    Reviewed Quick Dash  3/12/18: Discussed use of Vit E oil for scar tissue and incisional healing. 3/15/18:Pt.  and PT reviewed how to

## 2018-04-19 ENCOUNTER — HOSPITAL ENCOUNTER (OUTPATIENT)
Dept: PHYSICAL THERAPY | Age: 63
Discharge: HOME | End: 2018-04-20
Admitting: ORTHOPAEDIC SURGERY

## 2018-04-19 NOTE — FLOWSHEET NOTE
Physical Therapy Daily Treatment Note  Date:  2018    Patient Name:  Marlin Wayne   \"JS\"  :  1955  MRN: [id number]  Restrictions/Precautions:    Pertinent Medical History:  Medical/Treatment Diagnosis Information:  · Diagnosis: Glenohumeral arthritis left shoulder, posterior glenoid bone loss, , biceps tenodesis, Reverse left TSR on 18,, glenoid bone graft. · Treatment Diagnosis: Limited mobility, weakness, decreased ability to use left UE for ADL's, pain on left shoulder michael. but pain on right shoulder as well. Insurance/Certification information:  PT Insurance Information: [facility]  Physician Information:  Referring Practitioner: Dr. Kamille Welch of care signed (Y/N):  Sent to Dr. Simba Schwab on 18, sees him again on 2018  Visit# / total visits: 15 /24  Pain level: 1/10 on both shoulders, and 3/10 on low back     G-Code (if applicable):      Date / Visit # G-Code Applied: 3/29/18 /10th visit  PT G-Codes  Functional Assessment Tool Used: Rhoda Reich  Score: 35  Functional Limitation: Carrying, moving and handling objects  Carrying, Moving and Handling Objects Current Status (): CK  Carrying, Moving and Handling Objects Goal Status (): CJ    Progress Note: [x]  Yes  []  No  Next due by: Visit #10      History of Injury:  Pt has OA in both shoulders and pain increased when she slipped in a tub on 2017. Right was sore since 17 years ago  (worse than left) and left began one year ago. but she decided to have the left one done first .  She had surgery and was told on 18 to avoid the sling. Pt  has been doig HEP for 2 weeks since dc from hospital.  Subjective:     3/1/18: Pt reports that she has been doing the home exercises consistently. Having difficulty with donning the splint. 18: Patient reports shoulder has been sore. 18: Patient reports shoulder has been not as sore.   3/12/18: Pt reports that she is able to do more and  having less Activities:    Reviewed Danisha Potter  3/12/18: Discussed use of Vit E oil for scar tissue and incisional healing. 3/15/18:Pt. and PT reviewed how to eat using left arm safely. 3/22/18: Discussed driving and tips for safety such as pulling door nearly closed with right hand and using right hand to buckle seat belt. 3/29/18: Pt and therapist discussed dressing issues and other ADL's in detail, answering all of patient's questions about this. 4/5/18: Pt and PT practiced donning coat and sweater. Discussed swimming after pt is cleared to get in pool water. Home Exercise Program:    Gave booklet of HEP for pt as above, Answered questions and assisted pt with sling.  3/29/18: Added above exercises, active flex and abd and deltoid isometrics, and patient demonstrated correctly. Manual Treatments:    Gentle PROM to left shoulder  And ROM to elbow and wrist, rhythmic stabilization  10 min  Modalities:    IFC with CP x 15 min while supine, also CP to back and right shoulder. Timed Code Treatment Minutes:    60  Total Treatment Minutes:    75    Treatment/Activity Tolerance:  [x] Patient tolerated treatment well [] Patient limited by fatigue  [] Patient limited by pain  [] Patient limited by other medical complications  [] Other:     Prognosis: [x] Good [] Fair  [] Poor    Patient Requires Follow-up: [x] Yes  [] No    Plan:   [] Continue per plan of care [] Alter current plan (see comments)  [x] Plan of care initiated [] Hold pending MD visit [] Discharge  Plan for Next Session:    Follow protocol for reverse TSR, per .    Electronically signed by:  Kuldeep Kasper PT

## 2018-04-23 ENCOUNTER — HOSPITAL ENCOUNTER (OUTPATIENT)
Dept: PHYSICAL THERAPY | Age: 63
Discharge: HOME | End: 2018-04-24
Admitting: ORTHOPAEDIC SURGERY

## 2018-04-23 NOTE — FLOWSHEET NOTE
Physical Therapy Daily Treatment Note  Date:  2018    Patient Name:  Dominick Alonso   \"JS\"  :  1955  MRN: [id number]  Restrictions/Precautions:    Pertinent Medical History:  Medical/Treatment Diagnosis Information:  · Diagnosis: Glenohumeral arthritis left shoulder, posterior glenoid bone loss, , biceps tenodesis, Reverse left TSR on 18,, glenoid bone graft. · Treatment Diagnosis: Limited mobility, weakness, decreased ability to use left UE for ADL's, pain on left shoulder michael. but pain on right shoulder as well. Insurance/Certification information:  PT Insurance Information: [facility]  Physician Information:  Referring Practitioner: Dr. Cecille Gu of care signed (Y/N):  Sent to Dr. Rahel Valenzuela on 18, sees him again on 2018  Visit# / total visits:   Pain level: 0/10 on left shoulder, 2/15 and clicking on right shoulder and 3/10 on low back     G-Code (if applicable):      Date / Visit # G-Code Applied: 3/29/18 /10th visit  PT G-Codes  Functional Assessment Tool Used: Corazon Srinivasan  Score: 35  Functional Limitation: Carrying, moving and handling objects  Carrying, Moving and Handling Objects Current Status (): CK  Carrying, Moving and Handling Objects Goal Status (): CJ    Progress Note: [x]  Yes  []  No  Next due by: Visit #10      History of Injury:  Pt has OA in both shoulders and pain increased when she slipped in a tub on 2017. Right was sore since 17 years ago  (worse than left) and left began one year ago. but she decided to have the left one done first .  She had surgery and was told on 18 to avoid the sling. Pt  has been doig HEP for 2 weeks since dc from hospital.  Subjective:     3/1/18: Pt reports that she has been doing the home exercises consistently. Having difficulty with donning the splint. 18: Patient reports shoulder has been sore. 18: Patient reports shoulder has been not as sore.   3/12/18: Pt reports that she slides 5 sec x 10 4/19   Rows Yellow 5 sec hold X 10 4/19   UE [place] 10x - shoulder flexion 4/19   Biceps curls 1# x 10 each      Other Therapeutic Activities:    Reviewed Damon Bacon  3/12/18: Discussed use of Vit E oil for scar tissue and incisional healing. 3/15/18:Pt. and PT reviewed how to eat using left arm safely. 3/22/18: Discussed driving and tips for safety such as pulling door nearly closed with right hand and using right hand to buckle seat belt. 3/29/18: Pt and therapist discussed dressing issues and other ADL's in detail, answering all of patient's questions about this. 4/5/18: Pt and PT practiced donning coat and sweater. Discussed swimming after pt is cleared to get in pool water. 4/23/18: Lengthy discussion on which swimming exercises and strokes she is able to perform at this time. Home Exercise Program:    Gave booklet of HEP for pt as above, Answered questions and assisted pt with sling.  3/29/18: Added above exercises, active flex and abd and deltoid isometrics, and patient demonstrated correctly. Manual Treatments:    Gentle PROM to left shoulder  And ROM to elbow and wrist, rhythmic stabilization  10 min  Modalities:    IFC with CP x 15 min while supine, also CP to back and right shoulder. Timed Code Treatment Minutes:    60  Total Treatment Minutes:    75    Treatment/Activity Tolerance:  [x] Patient tolerated treatment well [] Patient limited by fatigue  [] Patient limited by pain  [] Patient limited by other medical complications  [] Other:     Prognosis: [x] Good [] Fair  [] Poor    Patient Requires Follow-up: [x] Yes  [] No    Plan:   [] Continue per plan of care [] Alter current plan (see comments)  [x] Plan of care initiated [] Hold pending MD visit [] Discharge  Plan for Next Session:    Follow protocol for reverse TSR, per .    Electronically signed by:  Brain Panda, PT

## 2018-04-26 ENCOUNTER — HOSPITAL ENCOUNTER (OUTPATIENT)
Dept: PHYSICAL THERAPY | Age: 63
Discharge: HOME | End: 2018-04-27
Admitting: ORTHOPAEDIC SURGERY

## 2018-05-01 ENCOUNTER — HOSPITAL ENCOUNTER (OUTPATIENT)
Dept: OTHER | Age: 63
Discharge: HOME | End: 2018-05-21
Attending: ORTHOPAEDIC SURGERY | Admitting: ORTHOPAEDIC SURGERY

## 2018-05-10 ENCOUNTER — HOSPITAL ENCOUNTER (OUTPATIENT)
Dept: PHYSICAL THERAPY | Age: 63
Discharge: HOME | End: 2018-05-11
Admitting: ORTHOPAEDIC SURGERY

## 2018-05-10 NOTE — FLOWSHEET NOTE
Exercises:  Exercise/Equipment Resistance/Repetitions Other comments   30x each    Active left elbow flexion and scaption 20X 1# wt     20X Has written instructions from in- hospial PT   10 x  Small range   5 sec x 10 3/12/18   UBE WL 5,  5 min arms only Painfree ROM, started 3/19   OH pulley 3 min Painfree ROM, started 3/19   Active shoulder flexion and scaption. 10X with 1# Added 3/29/18   Deltoid  Isometric  5 sec X 10 Added 3/29/18    Ball on the wall 20X CW/CCW 4/19   Wall slides 5 sec x 10 4/19   Rows, IR/ER Green 5 sec hold X 20 Yellow for IR and ER x 10  4/1, 5/3/18   UE [place] 20x - shoulder flexion and abd 4/19   Biceps curls 1# x 10 right and 2# x 10 left    Scap punches                 2-5 sec x 20  Body blade                      2 min                                         5/3/18    Other Therapeutic Activities:    Reviewed Quick Dash  3/12/18: Discussed use of Vit E oil for scar tissue and incisional healing. 3/15/18:Pt. and PT reviewed how to eat using left arm safely. 3/22/18: Discussed driving and tips for safety such as pulling door nearly closed with right hand and using right hand to buckle seat belt. 3/29/18: Pt and therapist discussed dressing issues and other ADL's in detail, answering all of patient's questions about this. 4/5/18: Pt and PT practiced donning coat and sweater. Discussed swimming after pt is cleared to get in pool water. 4/23/18: Lengthy discussion on which swimming exercises and strokes she is able to perform at this time. 5/7/18: Reviewed Danisha Potter for 20th visit    Home Exercise Program:    Gave booklet of HEP for pt as above, Answered questions and assisted pt with sling.  3/29/18: Added above exercises, active flex and abd and deltoid isometrics, and patient demonstrated correctly. 4/26/18: Pt demonstrated exercises she does in pool. All are OK, even with paddles, except for left arm extension.   Added scap punches to HEP - well performed and

## 2018-05-17 ENCOUNTER — HOSPITAL ENCOUNTER (OUTPATIENT)
Dept: PHYSICAL THERAPY | Age: 63
Discharge: HOME | End: 2018-05-18
Admitting: ORTHOPAEDIC SURGERY

## 2018-05-17 NOTE — PROGRESS NOTES
term goal 4: Pt will note decreased pain to 3/10 or less on average/MET  Patient Goals   Patient goals : \"Regain ROM, strength, mobility of left arm/shoulder\"/ MET    Current Frequency/Duration:  # Days per week: [] 1 day # Weeks: [] 1 week [] 4 weeks      [x] 2 days? [] 2 weeks [] 5 weeks **12 weeks     [] 3 days   [] 3 weeks [x] 6 weeks     Rehab Potential: [] Excellent [x] Good [] Fair  [] Poor     Goal Status:  [x] Achieved [] Partially Achieved  [] Not Achieved     Patient Status: [] Continue per initial plan of Care     [x] Patient now discharged - continue HEP and working out at [facility]   [] Additional visits requested, Please re-certify for additional visits:      Requested frequency/duration:   X/week for   weeks    Electronically signed by:  Jessica Gonzáles PT    If you have any questions or concerns, please don't hesitate to call.   Thank you for your referral.    Physician Signature:________________________________Date:__________________  By signing above, therapists plan is approved by physician

## 2018-05-17 NOTE — FLOWSHEET NOTE
shoulder has been not as sore. 3/12/18: Pt reports that she is able to do more and  having less pain. She is not having as much difficulty with HEP. She is able to do more ADL's such a floss her teeth and curl her hair. 3/15/18: Pt reports that pain is about the same but it is easier to do scapular retraction. 3/19/18: Pt reports that she feels well. Back has been aggravating her.  3/22/18: Pt reports minimal popping but not pain with it.   3/26/18: Pt reports that she blew dried her hair and had no difficulty. 3/29/18: Pt reports that she feels well and was able to shower independently yesterday for the first time. 04/02/18: Patient reports she started driving few days ago without difficulty. 4/5/18: Pt reports that she feels very comfortable driving again, she washed her hair and is dressing more independently. 4/9/18: pt REPORTS PAIN ON LOW BACK WHICH INCREASED AFTER cODMAN'S EXERCISES.  04/12/18 Patient reports shoulder is feeling ok,her low back still sore. 04/16/18 Patient reports shoulder is doing ok. 04/16/18 Patient reports she is able to reach into cabinets easier. 4/19/18: \"The back is worse. \"  4/23/18: Pt reports that her left shoulder has no pain, but her right shoulder is clicking. 4/26/18: Pt reports slightly increased pain on the shoulders today. 4/30/18: Pt reports soreness in the back , as she has just come from accupuncture, but her left shoulder is good. 5/3/18: Pt reports that the back is bothering her more today and left shoulder is good. 5/7/18: Pt reports that she was able to swim a little further, but her back is very sore. 05/10-/18  Patient reports shoulder is doing well,back is still sore. 5/14/18: Pt reports less pain. She is doing more at home and slept in a regular bed on her back. 5/17/18: Pt reports that she diced and prepped veggies  for an hour and picked up heavy pan.     Objective:   Observation: Palpation: TTP on proximal incision, Sensation: Numbness on left tricep  Test measurements:  4/5/18:PROM  Left shoulder flexion:0-152    Abd:0-120    IR: 0-88  ER:  0-34 with slight discomfort   Strength:  FLex: 4/5  Deltoid: 4/5    IR:  3/5   ER:  3-/5           5/17/18: ROM left shoulder: Flex: 154  Abd:  0-130 IR: WNL   ER:  0-50                     Strength:  Flex: 4+ /5   Deltoid:   4+/5   IR: 3/5     ER:   3/5                                   Palpation: minimal tenderness on incision    Exercises:  Exercise/Equipment Resistance/Repetitions Other comments   30x each    Active left elbow flexion and scaption 20X 1# wt     20X Has written instructions from in- hospial PT   10 x  Small range   5 sec x 10 3/12/18   UBE WL 5,  5 min arms only Painfree ROM, started 3/19   OH pulley 3 min Painfree ROM, started 3/19   Active shoulder flexion and scaption. 10X with 1# Added 3/29/18   Deltoid  Isometric  5 sec X 10 Added 3/29/18    Ball on the wall 20X CW/CCW 4/19   Wall slides 5 sec x 10 4/19   Rows, IR/ER Green 5 sec hold X 20 Yellow for IR and ER x 10  4/1, 5/3/18   UE [place] 20x - shoulder flexion and abd 4/19   Biceps curls 1# x 10 right and 2# x 10 left    Scap punches                 2-5 sec x 20 with 1# wts  Body blade                      2 min                                         5/3/18  Arm circles                       1# x 20\    Other Therapeutic Activities:    Reviewed Dominic Estrella  3/12/18: Discussed use of Vit E oil for scar tissue and incisional healing. 3/15/18:Pt. and PT reviewed how to eat using left arm safely. 3/22/18: Discussed driving and tips for safety such as pulling door nearly closed with right hand and using right hand to buckle seat belt. 3/29/18: Pt and therapist discussed dressing issues and other ADL's in detail, answering all of patient's questions about this. 4/5/18: Pt and PT practiced donning coat and sweater. Discussed swimming after pt is cleared to get in pool water.   4/23/18: Lengthy discussion on which swimming exercises and strokes

## 2018-05-18 ENCOUNTER — HOSPITAL ENCOUNTER (OUTPATIENT)
Dept: OTHER | Age: 63
Discharge: HOME | End: 2018-05-18
Attending: PHYSICAL MEDICINE & REHABILITATION | Admitting: PHYSICAL MEDICINE & REHABILITATION

## 2018-05-18 DIAGNOSIS — M43.10 SPONDYLOLISTHESIS, UNSPECIFIED SPINAL REGION: ICD-10-CM

## 2018-05-25 ENCOUNTER — HOSPITAL ENCOUNTER (OUTPATIENT)
Dept: OTHER | Age: 63
Discharge: HOME | End: 2018-05-31
Attending: PHYSICAL MEDICINE & REHABILITATION | Admitting: PHYSICAL MEDICINE & REHABILITATION

## 2018-05-25 ASSESSMENT — PAIN DESCRIPTION - PROGRESSION: CLINICAL_PROGRESSION: NOT CHANGED

## 2018-05-25 ASSESSMENT — PAIN DESCRIPTION - PAIN TYPE: TYPE: CHRONIC PAIN

## 2018-05-25 ASSESSMENT — PAIN DESCRIPTION - ORIENTATION: ORIENTATION: LEFT

## 2018-05-25 ASSESSMENT — PAIN SCALES - GENERAL: PAINLEVEL_OUTOF10: 5

## 2018-05-25 ASSESSMENT — PAIN DESCRIPTION - ONSET: ONSET: ON-GOING

## 2018-05-25 ASSESSMENT — PAIN DESCRIPTION - LOCATION: LOCATION: BACK;LEG;FOOT

## 2018-05-25 ASSESSMENT — PAIN DESCRIPTION - FREQUENCY: FREQUENCY: INTERMITTENT

## 2018-05-30 ENCOUNTER — HOSPITAL ENCOUNTER (OUTPATIENT)
Dept: PHYSICAL THERAPY | Age: 63
Discharge: HOME | End: 2018-06-11
Admitting: PHYSICAL MEDICINE & REHABILITATION

## 2018-06-01 ENCOUNTER — OFFICE VISIT (OUTPATIENT)
Dept: ORTHOPEDIC SURGERY | Age: 63
End: 2018-06-01

## 2018-06-01 ENCOUNTER — HOSPITAL ENCOUNTER (OUTPATIENT)
Dept: OTHER | Age: 63
Discharge: HOME | End: 2018-06-01
Attending: PHYSICAL MEDICINE & REHABILITATION | Admitting: PHYSICAL MEDICINE & REHABILITATION

## 2018-06-01 DIAGNOSIS — Z96.612 S/P REVERSE TOTAL SHOULDER ARTHROPLASTY, LEFT: Primary | ICD-10-CM

## 2018-06-01 PROCEDURE — G8428 CUR MEDS NOT DOCUMENT: HCPCS | Performed by: ORTHOPAEDIC SURGERY

## 2018-06-01 PROCEDURE — 4040F PNEUMOC VAC/ADMIN/RCVD: CPT | Performed by: ORTHOPAEDIC SURGERY

## 2018-06-01 PROCEDURE — 99213 OFFICE O/P EST LOW 20 MIN: CPT | Performed by: ORTHOPAEDIC SURGERY

## 2018-06-01 PROCEDURE — G8419 CALC BMI OUT NRM PARAM NOF/U: HCPCS | Performed by: ORTHOPAEDIC SURGERY

## 2018-06-01 PROCEDURE — 3017F COLORECTAL CA SCREEN DOC REV: CPT | Performed by: ORTHOPAEDIC SURGERY

## 2018-06-01 PROCEDURE — G8400 PT W/DXA NO RESULTS DOC: HCPCS | Performed by: ORTHOPAEDIC SURGERY

## 2018-06-01 PROCEDURE — 1123F ACP DISCUSS/DSCN MKR DOCD: CPT | Performed by: ORTHOPAEDIC SURGERY

## 2018-06-01 PROCEDURE — 1090F PRES/ABSN URINE INCON ASSESS: CPT | Performed by: ORTHOPAEDIC SURGERY

## 2018-06-01 PROCEDURE — 1036F TOBACCO NON-USER: CPT | Performed by: ORTHOPAEDIC SURGERY

## 2018-06-01 RX ORDER — CELECOXIB 200 MG/1
200 CAPSULE ORAL 2 TIMES DAILY
COMMUNITY

## 2018-06-05 ENCOUNTER — HOSPITAL ENCOUNTER (OUTPATIENT)
Dept: PHYSICAL THERAPY | Age: 63
Discharge: HOME | End: 2018-06-06
Admitting: PHYSICAL MEDICINE & REHABILITATION

## 2018-06-05 NOTE — FLOWSHEET NOTE
arthropathy and degenerative disc disease most severe at L4-L5 and L5-S1.  No acute fracture  is noted. \" per radiology report. 05/30/18 Patient reports back has been sore on left side after doing  [employer] work. 6/5/18: 10 min late. Pt reports that her back pain is less. Objective:   Observation:   Test measurements:        Exercises:  Exercise/Equipment Resistance/Repetitions Other comments   LTR 3 min     PPT 5 sec x 10 Added 5/30   Bridging 5 sec x 10 Added 6/5   PPT with marching 10 x each Added 5/30   Nu step  L4 5 min Added 5/30   SKTC 3 x 20 sec 6/5   DKTC 3 x 20 sec 6/5                                         Other Therapeutic Activities:  Patient was educated on diagnosis, plan of care and prognosis of their complaint. Also, frequency and duration of treatments was discussed. Patient was informed of the attendance policy and issued a copy for their records. 5/25/18: Reviewed and explained in more detail, using model of spine, results of x ray, which MD reviewed briefly with pt lat week. Reviewed revised oswestry    Home Exercise Program: Patient will be given written instructions for home exercises as above. Will teach over future sessions. Manual Treatments:      Modalities:    US x 8 min while lying prone on distraction table  to low back at 1.5 W/cm2, intermittent pelvic traction x 15 min at 85#/0# /MHP x 15 min to low back while supine.     Timed Code Treatment Minutes:    30  Total Treatment Minutes:    60  Treatment/Activity Tolerance:  [x] Patient tolerated treatment well [] Patient limited by fatigue  [] Patient limited by pain  [] Patient limited by other medical complications  [] Other:     Prognosis: [x] Good [] Fair  [] Poor    Goals:    Short term goals  Time Frame for Short term goals: 6 weeks  Short term goal 1: Pt will increase LE and core strength by at least 1/2 muscle grade  Short term goal 2: Pt will note less tightness in lumbar/buttock musculature  Short term goal 3: Pt will increase lumbar mobility so she can swim, walk and garden without pain  Short term goal 4: Pt will note decrease of pain to 1-3/10 on averge. Patient Requires Follow-up: [x] Yes  [] No    Plan:   [] Continue per plan of care [] Alter current plan (see comments)  [x] Plan of care initiated [] Hold pending MD visit [] Discharge    Plan for Next Session: US and int pelvic traction, progressing up to 110# and up to 20 min . Estim with MHP per Dr. Jacquelin Nuñez orders. Add exercises as time permits. Teach proper body mechanics over course of sessions.   Electronically signed by:  Bran Briceno, PT

## 2018-06-07 ENCOUNTER — HOSPITAL ENCOUNTER (OUTPATIENT)
Dept: PHYSICAL THERAPY | Age: 63
Discharge: HOME | End: 2018-06-08
Admitting: PHYSICAL MEDICINE & REHABILITATION

## 2018-06-07 NOTE — FLOWSHEET NOTE
Physical Therapy Daily Treatment Note  Date:  2018    Patient Name:  Kendrick Cormier    :  1955  MRN: [id number]    Restrictions/Precautions: Position Activity Restriction  Other position/activity restrictions: No falls, so min risk of falls, due to recent shoulder replacement, pt has some difficulty with lying prone and sidelying    Pertinent Medical History: Additional Pertinent Hx: plof: Independent, recent reverse TSR    Medical/Treatment Diagnosis Information:  · Diagnosis: Low back pain  · Treatment Diagnosis: Chronic lumbar pain due to Grade 1 spondylolisthesis and degen disc disease at L4-5 and L5-S1, limited trunk mobility, weakness of core and LE's    Insurance/Certification information:  PT Insurance Information: Medicare  Physician Information:  Referring Practitioner: Dr. Marybeth Sterling of care signed (Y/N):  Sent to Dr. Hernan Peoples on 18    Visit# / total visits:    Pain level:  4/10     G-Code (if applicable):      Date / Visit # G-Code Applied: 18 / visit  PT G-Codes  Functional Assessment Tool Used: Revised Oswestry  Score: 23  Functional Limitation: Mobility: Walking and moving around  Mobility: Walking and Moving Around Current Status (): At least 40 percent but less than 60 percent impaired, limited or restricted  Mobility: Walking and Moving Around Goal Status (): At least 20 percent but less than 40 percent impaired, limited or restricted    Progress Note: [x]  Yes  [x]  No  Next due by: Visit #10      History of Injury: See below  Subjective:  Subjective  Subjective: Pt had no specific incident or injury, but had chronic low back pain with flare up while doing pendulum exercises for shoulder rehab. She started with chiropractic care for her back, including accupuncture and e stim and activator. This was not particularly helpful. She saw Dr. Hernan Peoples last Friday and an x ray was ordered.   X ray revealed \"Grade 1 anterolisthesis of L4 upon L5 with lower lumbar facet 1: Pt will increase LE and core strength by at least 1/2 muscle grade  Short term goal 2: Pt will note less tightness in lumbar/buttock musculature  Short term goal 3: Pt will increase lumbar mobility so she can swim, walk and garden without pain  Short term goal 4: Pt will note decrease of pain to 1-3/10 on averge. Patient Requires Follow-up: [x] Yes  [] No    Plan:   [] Continue per plan of care [] Alter current plan (see comments)  [x] Plan of care initiated [] Hold pending MD visit [] Discharge    Plan for Next Session: US and int pelvic traction, progressing up to 110# and up to 20 min . Estim with MHP per Dr. Jud Elliott orders. Add exercises as time permits. Teach proper body mechanics over course of sessions.   Electronically signed by:  Yair Garcia PT

## 2018-06-12 ENCOUNTER — HOSPITAL ENCOUNTER (OUTPATIENT)
Dept: PHYSICAL THERAPY | Age: 63
Discharge: HOME | End: 2018-06-13
Admitting: PHYSICAL MEDICINE & REHABILITATION

## 2018-06-12 ENCOUNTER — HOSPITAL ENCOUNTER (OUTPATIENT)
Dept: OTHER | Age: 63
Discharge: HOME | End: 2018-06-30
Attending: PHYSICAL MEDICINE & REHABILITATION | Admitting: PHYSICAL MEDICINE & REHABILITATION

## 2018-06-12 NOTE — FLOWSHEET NOTE
Physical Therapy Daily Treatment Note  Date:  2018    Patient Name:  Tone Andrade    :  1955  MRN: [id number]    Restrictions/Precautions: Position Activity Restriction  Other position/activity restrictions: No falls, so min risk of falls, due to recent shoulder replacement, pt has some difficulty with lying prone and sidelying    Pertinent Medical History: Additional Pertinent Hx: plof: Independent, recent reverse TSR    Medical/Treatment Diagnosis Information:  · Diagnosis: Low back pain  · Treatment Diagnosis: Chronic lumbar pain due to Grade 1 spondylolisthesis and degen disc disease at L4-5 and L5-S1, limited trunk mobility, weakness of core and LE's    Insurance/Certification information:  PT Insurance Information: Medicare  Physician Information:  Referring Practitioner: Dr. Iliana Galindo of care signed (Y/N):  Sent to Dr. Rock Early on 18    Visit# / total visits:    Pain level:  3/10     G-Code (if applicable):      Date / Visit # G-Code Applied: 18 / visit  PT G-Codes  Functional Assessment Tool Used: Revised Oswestry  Score: 23  Functional Limitation: Mobility: Walking and moving around  Mobility: Walking and Moving Around Current Status (): At least 40 percent but less than 60 percent impaired, limited or restricted  Mobility: Walking and Moving Around Goal Status (): At least 20 percent but less than 40 percent impaired, limited or restricted    Progress Note: [x]  Yes  [x]  No  Next due by: Visit #10      History of Injury: See below  Subjective:  Subjective  Subjective: Pt had no specific incident or injury, but had chronic low back pain with flare up while doing pendulum exercises for shoulder rehab. She started with chiropractic care for her back, including accupuncture and e stim and activator. This was not particularly helpful. She saw Dr. Rock Early last Friday and an x ray was ordered.   X ray revealed \"Grade 1 anterolisthesis of L4 upon L5 with lower lumbar facet Patient limited by other medical complications  [] Other:     Prognosis: [x] Good [] Fair  [] Poor    Goals:    Short term goals  Time Frame for Short term goals: 6 weeks  Short term goal 1: Pt will increase LE and core strength by at least 1/2 muscle grade  Short term goal 2: Pt will note less tightness in lumbar/buttock musculature  Short term goal 3: Pt will increase lumbar mobility so she can swim, walk and garden without pain  Short term goal 4: Pt will note decrease of pain to 1-3/10 on averge. Patient Requires Follow-up: [x] Yes  [] No    Plan:   [] Continue per plan of care [] Alter current plan (see comments)  [x] Plan of care initiated [] Hold pending MD visit [] Discharge    Plan for Next Session: US and int pelvic traction, progressing up to 110# and up to 20 min . Estim with MHP per Dr. Mojgan Stone orders. Add exercises as time permits. Add manual therapy as time permits. Send note to Dr. Mojgan Stone, whom patient  sees tomorrow. .  Electronically signed by:  Etienne Pugh, PT

## 2018-06-12 NOTE — PROGRESS NOTES
Physical Therapy        Outpatient Physical Therapy  [] [facility]    Phone: [phone]   Fax: [phone]   [x] [facility]  Phone: [phone]   Fax: [phone]  [] [facility]              Phone: [phone]   Fax: [phone]     Physical Therapy Progress/Discharge Note  Date: 2018        Patient Name:  Bonita Bruno    :  1955  MRN: [id number]  Restrictions/Precautions:    · Diagnosis:  Low back pain  · Treatment Diagnosis: Chronic lumbar pain due to Grade 1 spondylolisthesis and degen disc disease at L4-5 and L5-S1, limited trunk mobility, weakness of core and LE's     Insurance/Certification information:  PT Insurance Information: Medicare  Physician Information:  Referring Practitioner: Dr. Mundo Jurado of care signed (Y/N):  Sent to Dr. Martha Wakefield on 18     Visit# / total visits:    Pain level:       3/10        G-Code (if applicable):      Date G-Code Applied:  18 / visit    PT G-Codes  Functional Assessment Tool Used: Revised Oswestry  Score: 23  Functional Limitation: Mobility: Walking and moving around  Mobility: Walking and Moving Around Current Status (): At least 40 percent but less than 60 percent impaired, limited or restricted  Mobility: Walking and Moving Around Goal Status (): At least 20 percent but less than 40 percent impaired, limited or restricted    Time Period for Report:  18-18  Cancels/No-shows to date: 0     Plan of Care/Treatment to date:  [x] Therapeutic Exercise    [x] Modalities:  [x] Therapeutic Activity     [x] Ultrasound  [x] Electrical Stimulation  [] Gait Training      [] Cervical Traction    [x] Lumbar Traction  [] Neuromuscular Re-education  [x] Cold/hotpack [] Iontophoresis  [x] Instruction in HEP      Other:  [x] Manual Therapy       []    [] Aquatic Therapy       []                    ?       Significant Findings At Last Visit/Comments:    Subjective:      Subjective: Pt had no specific incident or injury, but had chronic Frequency/Duration:  # Days per week: [] 1 day # Weeks: [] 1 week [] 4 weeks      [x] 2 days? [] 2 weeks [] 5 weeks      [] 3 days   [] 3 weeks [x] 6 weeks     Rehab Potential: [] Excellent [x] Good [] Fair  [] Poor     Goal Status:  [] Achieved [x] Partially Achieved  [] Not Achieved     Patient Status: [x] Continue per initial plan of Care - 7 more visits on plan     [] Patient now discharged     [] Additional visits requested, Please re-certify for additional visits:      Requested frequency/duration:  X/week for weeks    Electronically signed by:  Yair Garcia PT    If you have any questions or concerns, please don't hesitate to call.   Thank you for your referral.    Physician Signature:________________________________Date:__________________  By signing above, therapists plan is approved by physician

## 2018-06-13 DIAGNOSIS — Z96.612 S/P REVERSE TOTAL SHOULDER ARTHROPLASTY, LEFT: Primary | ICD-10-CM

## 2018-06-13 RX ORDER — AMOXICILLIN 500 MG/1
CAPSULE ORAL
Qty: 4 CAPSULE | Refills: 0 | Status: SHIPPED | OUTPATIENT
Start: 2018-06-13

## 2018-06-14 ENCOUNTER — HOSPITAL ENCOUNTER (OUTPATIENT)
Dept: PHYSICAL THERAPY | Age: 63
Discharge: HOME | End: 2018-06-15
Admitting: PHYSICAL MEDICINE & REHABILITATION

## 2018-06-21 ENCOUNTER — HOSPITAL ENCOUNTER (OUTPATIENT)
Dept: PHYSICAL THERAPY | Age: 63
Discharge: HOME | End: 2018-06-22
Admitting: PHYSICAL MEDICINE & REHABILITATION

## 2018-06-21 NOTE — FLOWSHEET NOTE
Physical Therapy Daily Treatment Note  Date:  2018    Patient Name:  Mathew Rodriges    :  1955  MRN: [id number]    Restrictions/Precautions: Position Activity Restriction  Other position/activity restrictions: No falls, so min risk of falls, due to recent shoulder replacement, pt has some difficulty with lying prone and sidelying    Pertinent Medical History: Additional Pertinent Hx: plof: Independent, recent reverse TSR    Medical/Treatment Diagnosis Information:  · Diagnosis: Low back pain  · Treatment Diagnosis: Chronic lumbar pain due to Grade 1 spondylolisthesis and degen disc disease at L4-5 and L5-S1, limited trunk mobility, weakness of core and LE's    Insurance/Certification information:  PT Insurance Information: Medicare  Physician Information:  Referring Practitioner: Dr. Viridiana Contreras of care signed (Y/N):  Sent to Dr. Maliha Patel on 18    Visit# / total visits:    Pain level:  3/10     G-Code (if applicable):      Date / Visit # G-Code Applied: 18 /1st visit  PT G-Codes  Functional Assessment Tool Used: Revised Oswestry  Score: 23  Functional Limitation: Mobility: Walking and moving around  Mobility: Walking and Moving Around Current Status (): At least 40 percent but less than 60 percent impaired, limited or restricted  Mobility: Walking and Moving Around Goal Status (): At least 20 percent but less than 40 percent impaired, limited or restricted    Progress Note: [x]  Yes  [x]  No  Next due by: Visit #10      History of Injury: See below  Subjective:  Subjective  Subjective: Pt had no specific incident or injury, but had chronic low back pain with flare up while doing pendulum exercises for shoulder rehab. She started with chiropractic care for her back, including accupuncture and e stim and activator. This was not particularly helpful. She saw Dr. Maliha Patel last Friday and an x ray was ordered.   X ray revealed \"Grade 1 anterolisthesis of L4 upon L5 with lower lumbar facet arthropathy and degenerative disc disease most severe at L4-L5 and L5-S1.  No acute fracture  is noted. \" per radiology report. 05/30/18 Patient reports back has been sore on left side after doing  [employer] work. 6/5/18: 10 min late. Pt reports that her back pain is less. 6/7/18: Pt is very sore today due to doing 4.5 hours of yard work yesterday. 6/12/18: Pt reports that she was feeling better until she did her exercises today. 6/14/18: Pt reports that she swam 9 laps yesterday. 6/19/18: Pt reports that the soreness form traction last time has diminished and she feels better. 6/21/18: \"Better after last session. \"    Objective:   Observation:   Test measurements: 6/12/18:  Trunk flexion: 113  Ext: 26  SB L:34   SB R:  36      Exercises:  Exercise/Equipment Resistance/Repetitions Other comments   3 min     5 sec x 10 Added 5/30   5 sec x 10 Added 6/5   10 x each Added 5/30   Nu step  L4 5 min Added 5/30   3 x 20 sec 6/5   3 x 20 sec 6/5                                         Other Therapeutic Activities:  Patient was educated on diagnosis, plan of care and prognosis of their complaint. Also, frequency and duration of treatments was discussed. Patient was informed of the attendance policy and issued a copy for their records. 5/25/18: Reviewed and explained in more detail, using model of spine, results of x ray, which MD reviewed briefly with pt last week. Reviewed revised oswestry. 6/12/18: Reviewed in detail the Body Mechanics booklet with patient and related it to ADL's and transition from supine to sit. ( 30 min)    Home Exercise Program: Patient will be given written instructions for home exercises as above. Will teach over future sessions. 6/13/18: Reviewed all HEP and offered suggestions for better performance where appropriate.  (30 min)    Manual Treatments:    Right sidelying for DTM, especially focusing on left low back on paraspinals x 15 min with Emile    Modalities:    US x 8 min while seated to low back at 1.7 W/cm2,    IFC with heat to back while supine  X 15 min    Timed Code Treatment Minutes:    45  Total Treatment Minutes:    60  Treatment/Activity Tolerance:  [x] Patient tolerated treatment well [] Patient limited by fatigue  [] Patient limited by pain  [] Patient limited by other medical complications  [] Other:     Prognosis: [x] Good [] Fair  [] Poor    Goals:    Short term goals  Time Frame for Short term goals: 6 weeks  Short term goal 1: Pt will increase LE and core strength by at least 1/2 muscle grade  Short term goal 2: Pt will note less tightness in lumbar/buttock musculature  Short term goal 3: Pt will increase lumbar mobility so she can swim, walk and garden without pain  Short term goal 4: Pt will note decrease of pain to 1-3/10 on averge. Patient Requires Follow-up: [x] Yes  [] No    Plan:   [] Continue per plan of care [] Alter current plan (see comments)  [x] Plan of care initiated [] Hold pending MD visit [] Discharge    Plan for Next Session: US and int pelvic traction, progressing up to 110# and up to 20 min . Estim with MHP per Dr. Sterling Baird orders. Add exercises as time permits. Add manual therapy as time permits. Monitor soreness after traction. May hold on traction.   Electronically signed by:  Gonzalo Rivers PT

## 2018-06-26 ENCOUNTER — HOSPITAL ENCOUNTER (OUTPATIENT)
Dept: PHYSICAL THERAPY | Age: 63
Discharge: HOME | End: 2018-06-27
Admitting: PHYSICAL MEDICINE & REHABILITATION

## 2018-07-01 ENCOUNTER — HOSPITAL ENCOUNTER (OUTPATIENT)
Dept: OTHER | Age: 63
Discharge: HOME | End: 2018-07-31
Attending: PHYSICAL MEDICINE & REHABILITATION | Admitting: PHYSICAL MEDICINE & REHABILITATION

## 2018-07-05 ENCOUNTER — HOSPITAL ENCOUNTER (OUTPATIENT)
Dept: PHYSICAL THERAPY | Age: 63
Discharge: HOME | End: 2018-07-06
Admitting: PHYSICAL MEDICINE & REHABILITATION

## 2018-07-05 NOTE — PROGRESS NOTES
have no social life because of the pain    SECTION 5 - Sitting   [X ]A. I can sit in any chair as long as I like  [ ]B. I can only sit in my favorite chair as long as I like  [ [place]. Pain prevents me from sitting more than 1 hour  [ ]D. Pain prevents me from sitting more than 1/2 hour  [ [place]. Pain prevents me from sitting more than ten minutes  [ ]F. Pain prevents me from sitting at all  SECTION 10 - Traveling   [ ]A. I get no pain while traveling. [x ]B. I get some pain while traveling, but none of my usual forms of travel make it any worse.  [ [place]. I get extra pain while traveling, but it does not compel me to seek alternative forms of travel.   [ ]D. I get extra pain while traveling, which compels me to seek alternative forms of travel.   [ [place]. Pain restricts all forms of travel  [ ]F. Pain prevents all forms of travel except that done lying down     COMMENTS:     Patient Score: 13      Scoring Method for the Oswestry Low Back Disability Questionnaire      1. Each of the 10 sections is scored separately (0 to 5 points each) then added up (max. Total = 50). EXAMPLE:  Section 1. Pain Intensity  Item Score  Item Description  Point Value    A  I have no pain at the moment  0    B  The pain is very mild at the moment  1    C  The pain is moderate at the moment  2    D  The pain is fairly severe at the moment  3    E  The pain is very severe at the moment  4    F  The pain is the worst imaginable  5      2. If all 10 sections are completed, simply double the patient's score. 15    3. If a section is omitted, divide the patient's total score by the number of sections completed times 5. FORMULA: [(Patient's score) / (# Sections Completed X 5)] X 100 = __30__% Disability    EXAMPLE:  If number of sections completed = 9  If patient's score = 22  The equation = [22 / (9 X 5)] X 100 = 48.9% Disability    4. Interpretation of disability scores:  SCORE  SCORE    0-20% Minimal Disability  Can cope with most ADL's.  Usually

## 2018-07-05 NOTE — DISCHARGE SUMMARY
her to use for self mobs of her soft tissue tightness on lower lumbar spine. Patient's response to treatment: Favorable    Progress towards goals:    Short term goals  Time Frame for Short term goals: 6 weeks  Short term goal 1: Pt will increase LE and core strength by at least 1/2 muscle grade/MET  Short term goal 2: Pt will note less tightness in lumbar/buttock musculature/MET  Short term goal 3: Pt will increase lumbar mobility so she can swim, walk and garden without pain/MET  Short term goal 4: Pt will note decrease of pain to 1-3/10 on averge. /MET  Patient Goals   Patient goals : \"Get lumbar spine back in alignment, less pain, greater mobility, flexibility, strengthen core, able to walk, swim, garden without pain\"/Mostly Met     Current Frequency/Duration:  # Days per week: [] 1 day # Weeks: [] 1 week [] 4 weeks      [x] 2 days? [] 2 weeks [] 5 weeks      [] 3 days   [] 3 weeks [x] 6 weeks     Rehab Potential: [] Excellent [x] Good [] Fair  [] Poor     Goal Status:  [] Achieved [x] Partially Achieved  [] Not Achieved     Patient Status: [] Continue per initial plan of Care     [x] Patient now discharged     [] Additional visits requested, Please re-certify for additional visits:      Requested frequency/duration:  X/week for weeks    Electronically signed by:  Kristi Cornejo PT    If you have any questions or concerns, please don't hesitate to call.   Thank you for your referral.    Physician Signature:________________________________Date:__________________  By signing above, therapists plan is approved by physician

## 2018-07-05 NOTE — FLOWSHEET NOTE
No acute fracture  is noted. \" per radiology report. 05/30/18 Patient reports back has been sore on left side after doing  [employer] work. 6/5/18: 10 min late. Pt reports that her back pain is less. 6/7/18: Pt is very sore today due to doing 4.5 hours of yard work yesterday. 6/12/18: Pt reports that she was feeling better until she did her exercises today. 6/14/18: Pt reports that she swam 9 laps yesterday. 6/19/18: Pt reports that the soreness form traction last time has diminished and she feels better. 6/21/18: \"Better after last session. \"  6/26/18: Pt reports that she swam 12 laps yesterday and her back feels better. 6/28/18: Pt reports that she still feels pretty well. 7/2/18: Pt reports that she feels well. 7/5/18: Pt reports that she has both feet and back pain today. She thinks that wearing sandals and doing HR/TR in pool contributed to her pain level . Objective:   Observation:   Test measurements: 6/12/18:  Trunk flexion: 113  Ext: 26  SB L:34   SB R:  36                                     7/5/18: Trunk flexion: 116  EXT: 28   SB L: 40   SB R:   38                                                  Core strength:  4-/5   LE: R and L : 5/5        Exercises:  Exercise/Equipment Resistance/Repetitions Other comments   LTR 3 min     PPT/Glut sets with 1# wts in hands 5 sec x 10 Added 5/30   Bridging 5 sec x 10 Added 6/5   PPT with marching 10 x each Added 5/30   Nu step  L4 5 min Added 5/30   SKTC 3 x 20 sec 6/5   DKTC 3 x 20 sec 6/5        Aquatic exercises - explained using written instructions which she will try in the pool at [facility] and walking in water, shoulder, core, hip and foot and ankle exercises using the water as resistance   6/26/18                               Other Therapeutic Activities:  Patient was educated on diagnosis, plan of care and prognosis of their complaint. Also, frequency and duration of treatments was discussed.  Patient was informed of the attendance policy and issued a copy for their records. 5/25/18: Reviewed and explained in more detail, using model of spine, results of x ray, which MD reviewed briefly with pt last week. Reviewed revised oswestry. 6/12/18: Reviewed in detail the Body Mechanics booklet with patient and related it to ADL's and transition from supine to sit. ( 30 min)    6/28/18: Reviewed revised Oswestry  7/5/18: Reviewed  Revised Oswestry. Discussed and demonstrated use of theracane for self mobs of \"knots\" on lumbar area. Gave pt information on purchasing one. Home Exercise Program: Patient will be given written instructions for home exercises as above. Will teach over future sessions. 6/13/18: Reviewed all HEP and offered suggestions for better performance where appropriate. (30 min)  7/5/18: Reviewed all exercises and gave tips on safer and better performance. Manual Treatments:    Right sidelying for DTM, especially focusing on left low back on paraspinals x 14 min with Emile    Modalities:    US x 8 min while seated to low back at 1.7 W/cm2,    IFC with heat to back while supine  X 15 min    Timed Code Treatment Minutes:    60  Total Treatment Minutes:    80  Treatment/Activity Tolerance:  [x] Patient tolerated treatment well [] Patient limited by fatigue  [] Patient limited by pain  [] Patient limited by other medical complications  [] Other:     Prognosis: [x] Good [] Fair  [] Poor    Goals:    Short term goals  Time Frame for Short term goals: 6 weeks  Short term goal 1: Pt will increase LE and core strength by at least 1/2 muscle grade  Short term goal 2: Pt will note less tightness in lumbar/buttock musculature  Short term goal 3: Pt will increase lumbar mobility so she can swim, walk and garden without pain  Short term goal 4: Pt will note decrease of pain to 1-3/10 on averge.               Patient Requires Follow-up: [x] Yes  [] No    Plan:   [] Continue per plan of care [] Alter current plan (see

## 2018-07-24 ENCOUNTER — OFFICE VISIT (OUTPATIENT)
Dept: ORTHOPEDIC SURGERY | Age: 63
End: 2018-07-24

## 2018-07-24 DIAGNOSIS — M19.079 ARTHRITIS, MIDFOOT: Primary | ICD-10-CM

## 2018-07-24 PROCEDURE — 99213 OFFICE O/P EST LOW 20 MIN: CPT | Performed by: ORTHOPAEDIC SURGERY

## 2018-07-24 PROCEDURE — G8428 CUR MEDS NOT DOCUMENT: HCPCS | Performed by: ORTHOPAEDIC SURGERY

## 2018-07-24 PROCEDURE — 1090F PRES/ABSN URINE INCON ASSESS: CPT | Performed by: ORTHOPAEDIC SURGERY

## 2018-07-24 PROCEDURE — 4040F PNEUMOC VAC/ADMIN/RCVD: CPT | Performed by: ORTHOPAEDIC SURGERY

## 2018-07-24 PROCEDURE — 1101F PT FALLS ASSESS-DOCD LE1/YR: CPT | Performed by: ORTHOPAEDIC SURGERY

## 2018-07-24 PROCEDURE — 1036F TOBACCO NON-USER: CPT | Performed by: ORTHOPAEDIC SURGERY

## 2018-07-24 PROCEDURE — 3017F COLORECTAL CA SCREEN DOC REV: CPT | Performed by: ORTHOPAEDIC SURGERY

## 2018-07-24 PROCEDURE — 1123F ACP DISCUSS/DSCN MKR DOCD: CPT | Performed by: ORTHOPAEDIC SURGERY

## 2018-07-24 PROCEDURE — G8400 PT W/DXA NO RESULTS DOC: HCPCS | Performed by: ORTHOPAEDIC SURGERY

## 2018-07-24 PROCEDURE — G8419 CALC BMI OUT NRM PARAM NOF/U: HCPCS | Performed by: ORTHOPAEDIC SURGERY

## 2018-07-24 RX ORDER — DEXAMETHASONE SODIUM PHOSPHATE 4 MG/ML
4 INJECTION, SOLUTION INTRA-ARTICULAR; INTRALESIONAL; INTRAMUSCULAR; INTRAVENOUS; SOFT TISSUE SEE ADMIN INSTRUCTIONS
Qty: 30 ML | Refills: 0 | Status: SHIPPED | OUTPATIENT
Start: 2018-07-24 | End: 2018-08-09 | Stop reason: SDUPTHER

## 2018-08-01 ENCOUNTER — ORTHOTIC/BRACE ENCOUNTER (OUTPATIENT)
Dept: ORTHOPEDIC SURGERY | Age: 63
End: 2018-08-01

## 2018-08-07 ENCOUNTER — HOSPITAL ENCOUNTER (OUTPATIENT)
Dept: OTHER | Age: 63
Discharge: HOME | End: 2018-08-31
Attending: ORTHOPAEDIC SURGERY | Admitting: ORTHOPAEDIC SURGERY

## 2018-08-07 ASSESSMENT — PAIN DESCRIPTION - ONSET: ONSET: SUDDEN

## 2018-08-07 ASSESSMENT — PAIN DESCRIPTION - PROGRESSION: CLINICAL_PROGRESSION: NOT CHANGED

## 2018-08-07 ASSESSMENT — PAIN DESCRIPTION - PAIN TYPE: TYPE: CHRONIC PAIN

## 2018-08-07 ASSESSMENT — PAIN DESCRIPTION - FREQUENCY: FREQUENCY: INTERMITTENT

## 2018-08-07 ASSESSMENT — PAIN DESCRIPTION - ORIENTATION: ORIENTATION: LEFT

## 2018-08-07 ASSESSMENT — ACTIVITIES OF DAILY LIVING (ADL): EFFECT OF PAIN ON DAILY ACTIVITIES: STANDING

## 2018-08-07 ASSESSMENT — PAIN SCALES - GENERAL: PAINLEVEL_OUTOF10: 5

## 2018-08-07 ASSESSMENT — PAIN DESCRIPTION - DESCRIPTORS: DESCRIPTORS: BURNING

## 2018-08-07 ASSESSMENT — PAIN DESCRIPTION - LOCATION: LOCATION: LEG;FOOT

## 2018-08-07 NOTE — PLAN OF CARE
Signature:________________________________Date:__________________  By signing above, therapists plan is approved by physician

## 2018-08-07 NOTE — PROGRESS NOTES
Observation/Palpation  Posture: Fair (Pronated feet, genu valgus, pes planus)  Palpation: Tenderness on posterior calf and surface of feet  Edema: Minimal edema at end of day on medial left foot    AROM RLE (degrees)  RLE AROM: Exceptions  R Ankle Dorsiflexion 0-20: 0-10  R Ankle Plantar Flexion 0-45: 0-75  R Ankle Forefoot Inversion 0-40: 0-32  R Ankle Forefoot Eversion 0-20: 0-30  AROM LLE (degrees)  LLE AROM : Exceptions  L Ankle Dorsiflexion 0-20: 20  L Ankle Plantar Flexion 0-45: 0-65  L Ankle Forefoot Inversion 0-40: 0-28  L Ankle Forefoot Eversion 0-20: 0-25    Strength RLE  Strength RLE: WNL  Strength LLE  Strength LLE: WNL        Sensation  Overall Sensation Status: WFL        Ambulation  Ambulation?: Yes  Ambulation 1  Surface: carpet  Device: No Device  Assistance: Independent  Quality of Gait: Pes planus, pronated feet, genu valgus and possible leg length discrepancy   Stairs/Curb  Stairs?: Yes (Non reciprocal pattern)           Assessment   Conditions Requiring Skilled Therapeutic Intervention  Body structures, Functions, Activity limitations: Decreased functional mobility ; Decreased ADL status; Decreased endurance;Decreased strength  Assessment: PLOF: Independent. Pt is a 71 yo female with complaint of left foot and distal Left LE pain. Referring diganosis is arthritis, midfoot and tendonitis. 7/24/18 reveal the following:  3 views of the left foot show mild degenerative changes in the 4th 5th TMT joints. PT assessment shows mild restriction of left inversion, mild weakness of inversion on left foot, limited walking and standing tolerance, mild tenderness to palpation and mild tightness of calf. . She will benefit from PT  to decrease inflammation, increase ROM, increase strength  and improve walking tolerance. .  Treatment Diagnosis: Pain on left foot and distal left LE due to tendonitis and arthritis, mild limitation of range and strength of left foot inversion, limited walking and standing tolerance tdue to pain. Prognosis: Good  Decision Making: High Complexity  REQUIRES PT FOLLOW UP: Yes         Plan   Plan  Times per week: 2  Plan weeks: 4-6  Current Treatment Recommendations: Strengthening, ROM, Gait Training, Home Exercise Program, Modalities, Manual Therapy - Soft Tissue Mobilization    G-Code  PT G-Codes  Functional Assessment Tool Used: LEFSTOOL  Score: 38  Functional Limitation: Mobility: Walking and moving around  Mobility: Walking and Moving Around Current Status (): At least 40 percent but less than 60 percent impaired, limited or restricted  Mobility: Walking and Moving Around Goal Status (): At least 20 percent but less than 40 percent impaired, limited or restricted    OutComes Score  LEFS Total Score: 38                                        Goals  Short term goals  Time Frame for Short term goals: 4 weeks  Short term goal 1: Pt will note less pain (2/10 on average) on posterior tibial tendon while walking and standing  Short term goal 2: Pt will be able to show increased left foot inversion range  Short term goal 3: Pt will note decreased edema over posterior medial aspect of left ankle  Short term goal 4: Pt will show increase strength of left inversion (to 4+/5)  Patient Goals   Patient goals : \"Decrease pain/swelling, increase strength and endurance, increase ROM and flexibility, ability to stand longer, leisurely walk in neighborhood or brown. \"       Therapy Time   Individual Concurrent Group Co-treatment   Time In 8633         Time Out 0320         Minutes 60         Timed Code Treatment Minutes: [id number] [facility], PT

## 2018-08-07 NOTE — FLOWSHEET NOTE
Physical Therapy Daily Treatment Note  Date:  2018    Patient Name:  Mathew Rodriges    :  1955  MRN: [id number]    Restrictions/Precautions: Position Activity Restriction  Other position/activity restrictions: Min risk of falls    Pertinent Medical History: Additional Pertinent Hx: PLOF: Independent    Medical/Treatment Diagnosis Information:  · Diagnosis: Arthritis, midfoot and tendonitis  · Treatment Diagnosis: Pain on left foot and distal left LE due to tendonitis and arthritis, mild limitation of range and strength of left foot inversion, limited walking and standing tolerance tdue to pain. Insurance/Certification information:  PT Insurance Information: Medicare  Physician Information:  Referring Practitioner: Dr. Christopher Rodríguez of care signed (Y/N):  Sent to Dr. Vimal Archer 18    Visit# / total visits:  -  Pain level:  -5/10     G-Code (if applicable):      Date / Visit # G-Code Applied:  18/ visit  PT G-Codes  Functional Assessment Tool Used: LEFSTOOL  Score: 38  Functional Limitation: Mobility: Walking and moving around  Mobility: Walking and Moving Around Current Status (): At least 40 percent but less than 60 percent impaired, limited or restricted  Mobility: Walking and Moving Around Goal Status (): At least 20 percent but less than 40 percent impaired, limited or restricted    Progress Note: [x]  Yes  [x]  No  Next due by: Visit #10      History of Injury:  See below  Subjective:  Subjective  Subjective: In the winter of , pt began to walk for exercise and the foot was aggravated to the point where she could not walk well. She wore a boot  and got a custom brace, got orthotics and then began PT pool exercises. She never had resolution of the pain and had a flareup in . The combination of the inactivity, barely walking and use of hospital  bed, led to the increased pain.     Objective: See eval  Observation:   Test measurements: tendon. Secondly, will then teach exercises for increasing inversion range and strength. Thirdly, will add retrograde STM on calf to decrease mild edema.     Electronically signed by:  Lyndsey Melgar PT

## 2018-08-08 ENCOUNTER — ORTHOTIC/BRACE ENCOUNTER (OUTPATIENT)
Dept: ORTHOPEDIC SURGERY | Age: 63
End: 2018-08-08

## 2018-08-08 DIAGNOSIS — E11.9 TYPE 2 DIABETES MELLITUS WITHOUT COMPLICATION, WITHOUT LONG-TERM CURRENT USE OF INSULIN (HCC): Primary | Chronic | ICD-10-CM

## 2018-08-08 PROCEDURE — L3020 FOOT LONGITUD/METATARSAL SUP: HCPCS | Performed by: PEDORTHIST

## 2018-08-09 ENCOUNTER — HOSPITAL ENCOUNTER (OUTPATIENT)
Dept: PHYSICAL THERAPY | Age: 63
Discharge: HOME | End: 2018-08-10
Admitting: ORTHOPAEDIC SURGERY

## 2018-08-09 DIAGNOSIS — M19.079 ARTHRITIS, MIDFOOT: Primary | ICD-10-CM

## 2018-08-09 RX ORDER — DEXAMETHASONE SODIUM PHOSPHATE 4 MG/ML
4 INJECTION, SOLUTION INTRA-ARTICULAR; INTRALESIONAL; INTRAMUSCULAR; INTRAVENOUS; SOFT TISSUE SEE ADMIN INSTRUCTIONS
Qty: 30 ML | Refills: 0 | Status: SHIPPED | OUTPATIENT
Start: 2018-08-09

## 2018-08-09 NOTE — FLOWSHEET NOTE
Physical Therapy Daily Treatment Note  Date:  2018    Patient Name:  Marlin [place]    :  1955  MRN: [id number]    Restrictions/Precautions:      Pertinent Medical History:      Medical/Treatment Diagnosis Information:          Insurance/Certification information:     Physician Information:     Plan of care signed (Y/N):  Sent to Dr. Laurie Valdez 18    Visit# / total visits:  -  Pain level:  1-5/10     G-Code (if applicable):      Date / Visit # G-Code Applied:  18/ visit  PT G-Codes  Functional Assessment Tool Used: LEFSTOOL  Score: 38  Functional Limitation: Mobility: Walking and moving around  Mobility: Walking and Moving Around Current Status (): At least 40 percent but less than 60 percent impaired, limited or restricted  Mobility: Walking and Moving Around Goal Status (): At least 20 percent but less than 40 percent impaired, limited or restricted    Progress Note: [x]  Yes  [x]  No  Next due by: Visit #10      History of Injury:  See below  Subjective:  Subjective  Subjective: In the winter of , pt began to walk for exercise and the foot was aggravated to the point where she could not walk well. She wore a boot  and got a custom brace, got orthotics and then began PT pool exercises. She never had resolution of the pain and had a flareup in . The combination of the inactivity, barely walking and use of hospital  bed, led to the increased pain. 18 Patient reports soreness on medial ankle. Objective: See eval  Observation:   Test measurements:        Exercises:  Exercise/Equipment Resistance/Repetitions Other comments   Calf stretching with towel 10 sec x 10    Active inversion 20 x  Added    T band resisted inversion  Add                                                             Other Therapeutic Activities:  Patient was educated on diagnosis, plan of care and prognosis of their complaint. Also, frequency and duration of treatments was discussed.

## 2018-08-09 NOTE — TELEPHONE ENCOUNTER
Pt states that her vial of dexamethasone was thrown away by accident and would like a new order sent to her pharmacy. RX was sent by [facility].

## 2018-08-17 ENCOUNTER — HOSPITAL ENCOUNTER (OUTPATIENT)
Dept: PHYSICAL THERAPY | Age: 63
Discharge: HOME | End: 2018-08-18
Admitting: ORTHOPAEDIC SURGERY

## 2018-08-17 NOTE — FLOWSHEET NOTE
Thursday. 08/17/18 Patient reports she was on her feet a lot yesterday which slightly increased her swelling. Objective: See eval  Observation:   Test measurements:        Exercises:  Exercise/Equipment Resistance/Repetitions Other comments   Calf stretching with towel    Active inversion Added 08/09   T band resisted inversion Added 8/14/18                                                             Other Therapeutic Activities:  Patient was educated on diagnosis, plan of care and prognosis of their complaint. Also, frequency and duration of treatments was discussed. Patient was informed of the attendance policy and issued a copy for their records. 8/14/18: Discussed and showed diagrams of the anatomy of the posterior tibial tendon on computer. Also looked at inserts at patient's request. She was concerned about trim on right insert not being long enough. Advised repositioning the insert and using velcro to secure it in place before going back to the person who fabricated the insert. .    Home Exercise Program: 8/14/18: Patient was given written instructions for home exercises as above. Patient performs them correctly and understands purpose.     Manual Treatments:  STM to gastroc,soleus peroneals,retrograde massage x 15 min  Modalities:  US 50 % to left post tib x 10 min   Iontophoresis x 15 min to sole of left mid-foot with ground pad on posterior mid calf     Timed Code Treatment Minutes:    40  Total Treatment Minutes:    40  Treatment/Activity Tolerance:  [x] Patient tolerated treatment well [] Patient limited by fatigue  [] Patient limited by pain  [] Patient limited by other medical complications  [] Other:     Prognosis: [x] Good [] Fair  [] Poor    Goals:    Short term goals  Time Frame for Short term goals: 4 weeks  Short term goal 1: Pt will note less pain (2/10 on average) on posterior tibial tendon while walking and standing  Short term goal 2: Pt will be able to show increased left foot inversion range  Short term goal 3: Pt will note decreased edema over posterior medial aspect of left ankle  Short term goal 4: Pt will show increase strength of left inversion (to 4+/5)              Patient Requires Follow-up: [x] Yes  [] No    Plan:   [] Continue per plan of care [] Alter current plan (see comments)  [x] Plan of care initiated [] Hold pending MD visit [] Discharge    Plan for Next Session: Patient will obtain custom insert for both feet. (Has already been fitted for this by orthotist.)  In therapy, will focus first on decreasing inflammation with iontophoresis and ultrasound to left posterior tibial tendon. Secondly, will then teach exercises for increasing inversion range and strength. Thirdly, will add retrograde STM on calf to decrease mild edema.     Electronically signed by:  Abigail Patterson PTA

## 2018-08-21 ENCOUNTER — HOSPITAL ENCOUNTER (OUTPATIENT)
Dept: PHYSICAL THERAPY | Age: 63
Discharge: HOME | End: 2018-08-22
Admitting: ORTHOPAEDIC SURGERY

## 2018-08-21 NOTE — FLOWSHEET NOTE
Physical Therapy Daily Treatment Note  Date:  2018    Patient Name:  Lázaro Valle    :  1955  MRN: [id number]    Restrictions/Precautions:      Pertinent Medical History:      Medical/Treatment Diagnosis Information:  ·   Diagnosis: Arthritis, midfoot and tendonitis  · Treatment Diagnosis: Pain on left foot and distal left LE due to tendonitis and arthritis, mild limitation of range and strength of left foot inversion, limited walking and standing tolerance tdue to pain.          Insurance/Certification information:      PT Insurance Information: Medicare  Physician Information:  Referring Practitioner: Dr. Vernadine Eisenmenger of care signed (Y/N):  Sent to Dr. Romeo Beach 18    Visit# / total visits:  -  Pain level:  5/10     G-Code (if applicable):      Date / Visit # G-Code Applied:  18/ visit Kx mod applied on 18   PT G-Codes  Functional Assessment Tool Used: LEFSTOOL  Score: 38  Functional Limitation: Mobility: Walking and moving around  Mobility: Walking and Moving Around Current Status (): At least 40 percent but less than 60 percent impaired, limited or restricted  Mobility: Walking and Moving Around Goal Status (): At least 20 percent but less than 40 percent impaired, limited or restricted    Progress Note: [x]  Yes  [x]  No  Next due by: Visit #10      History of Injury:  See below  Subjective:  Subjective  Subjective: In the winter of , pt began to walk for exercise and the foot was aggravated to the point where she could not walk well. She wore a boot  and got a custom brace, got orthotics and then began PT pool exercises. She never had resolution of the pain and had a flareup in . The combination of the inactivity, barely walking and use of hospital  bed, led to the increased pain. 18 Patient reports soreness on medial ankle. 18: Pt was on her feet a lot earlier today.  She will not able to fill her prescription for dexamethaone until walking and standing  Short term goal 2: Pt will be able to show increased left foot inversion range  Short term goal 3: Pt will note decreased edema over posterior medial aspect of left ankle  Short term goal 4: Pt will show increase strength of left inversion (to 4+/5)              Patient Requires Follow-up: [x] Yes  [] No    Plan:   [] Continue per plan of care [] Alter current plan (see comments)  [x] Plan of care initiated [] Hold pending MD visit [] Discharge    Plan for Next Session: Patient will obtain custom insert for both feet. (Has already been fitted for this by orthotist.)  In therapy, will focus first on decreasing inflammation with iontophoresis and ultrasound to left posterior tibial tendon. Secondly, will then teach exercises for increasing inversion range and strength. Thirdly, will add retrograde STM on calf to decrease mild edema.     Electronically signed by:  Abigail Patterson PTA

## 2018-08-23 ENCOUNTER — HOSPITAL ENCOUNTER (OUTPATIENT)
Dept: PHYSICAL THERAPY | Age: 63
Discharge: HOME | End: 2018-08-24
Admitting: ORTHOPAEDIC SURGERY

## 2018-08-28 ENCOUNTER — HOSPITAL ENCOUNTER (OUTPATIENT)
Dept: PHYSICAL THERAPY | Age: 63
Discharge: HOME | End: 2018-08-29
Admitting: ORTHOPAEDIC SURGERY

## 2018-08-28 NOTE — FLOWSHEET NOTE
Physical Therapy Daily Treatment Note  Date:  2018    Patient Name:  Marlin [place]    :  1955  MRN: [id number]    Restrictions/Precautions:      Pertinent Medical History:      Medical/Treatment Diagnosis Information:  ·   Diagnosis: Arthritis, midfoot and tendonitis  · Treatment Diagnosis: Pain on left foot and distal left LE due to tendonitis and arthritis, mild limitation of range and strength of left foot inversion, limited walking and standing tolerance tdue to pain.          Insurance/Certification information:      PT Insurance Information: Medicare  Physician Information:  Referring Practitioner: Dr. Gilbert Gan of care signed (Y/N):  Sent to Dr. Laurie Valdez 18    Visit# / total visits:  -  Pain level:  4-5/10     G-Code (if applicable):      Date / Visit # G-Code Applied:  18/ visit Kx mod applied on 18   PT G-Codes  Functional Assessment Tool Used: LEFSTOOL  Score: 38  Functional Limitation: Mobility: Walking and moving around  Mobility: Walking and Moving Around Current Status (): At least 40 percent but less than 60 percent impaired, limited or restricted  Mobility: Walking and Moving Around Goal Status (): At least 20 percent but less than 40 percent impaired, limited or restricted    Progress Note: [x]  Yes  [x]  No  Next due by: Visit #10      History of Injury:  See below  Subjective:  Subjective  Subjective: In the winter of , pt began to walk for exercise and the foot was aggravated to the point where she could not walk well. She wore a boot  and got a custom brace, got orthotics and then began PT pool exercises. She never had resolution of the pain and had a flareup in . The combination of the inactivity, barely walking and use of hospital  bed, led to the increased pain. 18 Patient reports soreness on medial ankle. 18: Pt was on her feet a lot earlier today.  She will not able to fill her prescription for dexamethaone until

## 2018-08-30 ENCOUNTER — HOSPITAL ENCOUNTER (OUTPATIENT)
Dept: PHYSICAL THERAPY | Age: 63
Discharge: HOME | End: 2018-08-31
Admitting: ORTHOPAEDIC SURGERY

## 2018-08-30 NOTE — FLOWSHEET NOTE
Physical Therapy Daily Treatment Note  Date:  2018    Patient Name:  Mathew Rodriges    :  1955  MRN: [id number]    Restrictions/Precautions:      Pertinent Medical History:      Medical/Treatment Diagnosis Information:  ·   Diagnosis: Arthritis, midfoot and tendonitis  · Treatment Diagnosis: Pain on left foot and distal left LE due to tendonitis and arthritis, mild limitation of range and strength of left foot inversion, limited walking and standing tolerance tdue to pain.          Insurance/Certification information:      PT Insurance Information: Medicare  Physician Information:  Referring Practitioner: Dr. Christopher Rodríguez of care signed (Y/N):  Sent to Dr. Vimal Archer 18    Visit# / total visits:  -  Pain level:  3-4/10     G-Code (if applicable):      Date / Visit # G-Code Applied:   visit Kx mod applied on 18   PT G-Codes  Functional Assessment Tool Used: LEFSTOOL  Score: 38  Functional Limitation: Mobility: Walking and moving around  Mobility: Walking and Moving Around Current Status (): At least 40 percent but less than 60 percent impaired, limited or restricted  Mobility: Walking and Moving Around Goal Status (): At least 20 percent but less than 40 percent impaired, limited or restricted    Progress Note: [x]  Yes  [x]  No  Next due by: Visit #10      History of Injury:  See below  Subjective:  Subjective  Subjective: In the winter of , pt began to walk for exercise and the foot was aggravated to the point where she could not walk well. She wore a boot  and got a custom brace, got orthotics and then began PT pool exercises. She never had resolution of the pain and had a flareup in . The combination of the inactivity, barely walking and use of hospital  bed, led to the increased pain. 18 Patient reports soreness on medial ankle. 18: Pt was on her feet a lot earlier today.  She will not able to fill her prescription for dexamethaone until

## 2018-09-01 ENCOUNTER — HOSPITAL ENCOUNTER (OUTPATIENT)
Dept: OTHER | Age: 63
Discharge: HOME | End: 2018-09-01
Attending: ORTHOPAEDIC SURGERY | Admitting: ORTHOPAEDIC SURGERY

## 2018-09-11 ENCOUNTER — HOSPITAL ENCOUNTER (OUTPATIENT)
Dept: PHYSICAL THERAPY | Age: 63
Discharge: HOME | End: 2018-09-12
Admitting: ORTHOPAEDIC SURGERY

## 2018-09-11 NOTE — FLOWSHEET NOTE
Physical Therapy Daily Treatment Note  Date:  2018    Patient Name:  Carmen Stearns    :  1955  MRN: [id number]    Restrictions/Precautions:      Pertinent Medical History:      Medical/Treatment Diagnosis Information:  ·   Diagnosis: Arthritis, midfoot and tendonitis  · Treatment Diagnosis: Pain on left foot and distal left LE due to tendonitis and arthritis, mild limitation of range and strength of left foot inversion, limited walking and standing tolerance tdue to pain.          Insurance/Certification information:      PT Insurance Information: Medicare  Physician Information:  Referring Practitioner: Dr. eJnifer Goodwin of care signed (Y/N):  Sent to Dr. Cathie Angela 18    Visit# / total visits:    Pain level:  2/10     G-Code (if applicable):      Date / Visit # G-Code Applied:   visit Kx mod applied on 18   PT G-Codes  Functional Assessment Tool Used: LEFSTOOL  Score: 41  Functional Limitation: Mobility: Walking and moving around  Mobility: Walking and Moving Around Current Status (): CK  Mobility: Walking and Moving Around Goal Status (): At least 20 percent but less than 40 percent impaired, limited or restricted    Progress Note: [x]  Yes  [x]  No  Next due by: Visit #10      History of Injury:  See below  Subjective:  Subjective  Subjective: In the winter of , pt began to walk for exercise and the foot was aggravated to the point where she could not walk well. She wore a boot  and got a custom brace, got orthotics and then began PT pool exercises. She never had resolution of the pain and had a flareup in . The combination of the inactivity, barely walking and use of hospital  bed, led to the increased pain. 18 Patient reports soreness on medial ankle. 18: Pt was on her feet a lot earlier today. She will not able to fill her prescription for dexamethaone until Thursday.   18 Patient reports she was on her feet a lot yesterday which slightly increased her swelling. 08/21/18 Patient reports her back and her foot has been more sore today due to being on it.  08/23/18 Patient reports ankle is not as bad today. 08/28/18 Patient reports she has been a little more sore from wearing different shoes yesterday. 08/30/18 Patient reports ankle has been feeling better last few days. 9/5/18: Pt reports right shoulder and back pain are elevated, but foot is better. 9/6/18:Pt reports that the tape helps and she feels about the same pain level as yesterday. 09/111/8: Patient reports foot is feeling better. Objective: See eval  Observation:   Test measurements:        Exercises:  Exercise/Equipment Resistance/Repetitions Other comments   Calf stretching with towel    Active inversion Added 08/09   T band resisted inversion Added 8/14/18                                                             Other Therapeutic Activities:  Patient was educated on diagnosis, plan of care and prognosis of their complaint. Also, frequency and duration of treatments was discussed. Patient was informed of the attendance policy and issued a copy for their records. 8/14/18: Discussed and showed diagrams of the anatomy of the posterior tibial tendon on computer. Also looked at inserts at patient's request. She was concerned about trim on right insert not being long enough. Advised repositioning the insert and using velcro to secure it in place before going back to the person who fabricated the insert. .    Home Exercise Program: 8/14/18: Patient was given written instructions for home exercises as above. Patient performs them correctly and understands purpose. Manual Treatments:  STM to gastroc,soleus peroneals,retrograde massage x 15 min  Modalities:  US 50 % to left post tib x 10 min   Iontophoresis x 20 min to sole of left mid-foot with ground pad on posterior mid calf  kinesio tape. to post tib and 2nd and 3 rd phalange.   9/6/18: Gave info about taping today.    MHP to low back during iontophoresis    Timed Code Treatment Minutes:    60  Total Treatment Minutes:    60  Treatment/Activity Tolerance:  [x] Patient tolerated treatment well [] Patient limited by fatigue  [] Patient limited by pain  [] Patient limited by other medical complications  [] Other:     Prognosis: [x] Good [] Fair  [] Poor    Goals:    Short term goals  Time Frame for Short term goals: 4 weeks  Short term goal 1: Pt will note less pain (2/10 on average) on posterior tibial tendon while walking and standing  Short term goal 2: Pt will be able to show increased left foot inversion range  Short term goal 3: Pt will note decreased edema over posterior medial aspect of left ankle  Short term goal 4: Pt will show increase strength of left inversion (to 4+/5)              Patient Requires Follow-up: [x] Yes  [] No    Plan:   [] Continue per plan of care [] Alter current plan (see comments)  [x] Plan of care initiated [] Hold pending MD visit [] Discharge    Plan for Next Session: Patient will obtain custom insert for both feet. (Has already been fitted for this by orthotist.)  In therapy, will focus first on decreasing inflammation with iontophoresis and ultrasound to left posterior tibial tendon. Secondly, will then teach exercises for increasing inversion range and strength. Thirdly, will add retrograde STM on calf to decrease mild edema. 2 more sessions.     Electronically signed by:  Santy Canales PTA

## 2018-10-26 ENCOUNTER — TELEPHONE (OUTPATIENT)
Dept: ORTHOPEDIC SURGERY | Age: 63
End: 2018-10-26

## 2018-10-30 ENCOUNTER — TELEPHONE (OUTPATIENT)
Dept: ORTHOPEDIC SURGERY | Age: 63
End: 2018-10-30

## 2018-10-30 NOTE — TELEPHONE ENCOUNTER
\"PUSHED\" THE X-RAYS FROM 12/12/17 AND 6/1/18 TO [facility] FOR CONTINUATION OF CARE.   THERE WAS NO 6/29/17 X-RAYS

## 2018-11-07 ENCOUNTER — HOSPITAL ENCOUNTER (OUTPATIENT)
Age: 63
Setting detail: OUTPATIENT SURGERY
Discharge: HOME HEALTH | End: 2018-11-07
Attending: INTERNAL MEDICINE | Admitting: INTERNAL MEDICINE
Payer: MEDICARE

## 2018-11-07 VITALS
WEIGHT: 195 LBS | SYSTOLIC BLOOD PRESSURE: 117 MMHG | HEART RATE: 59 BPM | OXYGEN SATURATION: 99 % | BODY MASS INDEX: 28.88 KG/M2 | RESPIRATION RATE: 16 BRPM | TEMPERATURE: 97.9 F | DIASTOLIC BLOOD PRESSURE: 80 MMHG | HEIGHT: 69 IN

## 2018-11-07 PROCEDURE — 88312 SPECIAL STAINS GROUP 1: CPT

## 2018-11-07 PROCEDURE — 99152 MOD SED SAME PHYS/QHP 5/>YRS: CPT | Performed by: INTERNAL MEDICINE

## 2018-11-07 PROCEDURE — 7100000011 HC PHASE II RECOVERY - ADDTL 15 MIN: Performed by: INTERNAL MEDICINE

## 2018-11-07 PROCEDURE — 6370000000 HC RX 637 (ALT 250 FOR IP): Performed by: INTERNAL MEDICINE

## 2018-11-07 PROCEDURE — 2709999900 HC NON-CHARGEABLE SUPPLY: Performed by: INTERNAL MEDICINE

## 2018-11-07 PROCEDURE — 6360000002 HC RX W HCPCS: Performed by: INTERNAL MEDICINE

## 2018-11-07 PROCEDURE — 7100000010 HC PHASE II RECOVERY - FIRST 15 MIN: Performed by: INTERNAL MEDICINE

## 2018-11-07 PROCEDURE — 3609012400 HC EGD TRANSORAL BIOPSY SINGLE/MULTIPLE: Performed by: INTERNAL MEDICINE

## 2018-11-07 PROCEDURE — 99153 MOD SED SAME PHYS/QHP EA: CPT | Performed by: INTERNAL MEDICINE

## 2018-11-07 PROCEDURE — 88305 TISSUE EXAM BY PATHOLOGIST: CPT

## 2018-11-07 PROCEDURE — 3609010300 HC COLONOSCOPY W/BIOPSY SINGLE/MULTIPLE: Performed by: INTERNAL MEDICINE

## 2018-11-07 RX ORDER — DIPHENHYDRAMINE HYDROCHLORIDE 50 MG/ML
INJECTION INTRAMUSCULAR; INTRAVENOUS PRN
Status: DISCONTINUED | OUTPATIENT
Start: 2018-11-07 | End: 2018-11-07 | Stop reason: HOSPADM

## 2018-11-07 RX ORDER — PANTOPRAZOLE SODIUM 40 MG/1
40 TABLET, DELAYED RELEASE ORAL DAILY
COMMUNITY

## 2018-11-07 RX ORDER — MIDAZOLAM HYDROCHLORIDE 1 MG/ML
INJECTION INTRAMUSCULAR; INTRAVENOUS PRN
Status: DISCONTINUED | OUTPATIENT
Start: 2018-11-07 | End: 2018-11-07 | Stop reason: HOSPADM

## 2018-11-07 RX ORDER — MEPERIDINE HYDROCHLORIDE 50 MG/ML
INJECTION INTRAMUSCULAR; INTRAVENOUS; SUBCUTANEOUS PRN
Status: DISCONTINUED | OUTPATIENT
Start: 2018-11-07 | End: 2018-11-07 | Stop reason: HOSPADM

## 2018-11-07 ASSESSMENT — PAIN - FUNCTIONAL ASSESSMENT: PAIN_FUNCTIONAL_ASSESSMENT: 0-10

## 2018-11-07 NOTE — PROCEDURES
[facility] [address]                                 PROCEDURE NOTE    PATIENT NAME: Nikki Marrero                      :        1955  MED REC NO:   [id number]                          ROOM:  ACCOUNT NO:   [de-identified]                           ADMIT DATE: 2018  PROVIDER:     Jim Benavidez MD    DATE OF PROCEDURE:  2018    PREOPERATIVE DIAGNOSIS:  Evaluation for gastroesophageal reflux disease. POSTOPERATIVE DIAGNOSES:  1. History of gastroesophageal reflux disease. 2.  Rule out Candida esophagitis. PROCEDURE:  Esophagogastroduodenoscopy with biopsy. ANESTHESIA:  Demerol 50 mg, Versed 5 mg, Benadryl 50 mg.    COMPLICATIONS:  None. DESCRIPTION OF PROCEDURE:  Informed consent was obtained after  explaining the risks of bleeding, infection, allergy, perforation,  medical and surgical management. Forward-viewing endoscope to  oropharynx under direct visualization down to second portion of the  duodenum, normal.  Pylorus normal.  Antrum, angularis, corpus, fundus,  and cardia were normal.  GE junction widely patent. Esophagus, biopsies  obtained to rule out Candida esophagitis. No signs of reflux, Lind's  strictures. Endoscope withdrawn in stable condition. IMPRESSION AND PLAN:  The patient does have a prior history of reported  esophageal dysmotility disorder. Currently on PPI. No complications of  reflux disease, exclude Candida esophagitis. We will call with biopsy  results and recommendations. Arrange for clinical followup. See  colonoscopy report dictated the same day.         Aly Clinton MD    D: 2018 11:30:48       T: 2018 14:18:19     [email or web address]  Job#: [id number]     Doc#: [id number]    CC:  Govind Paredes MD

## 2018-11-07 NOTE — PROCEDURES
[address] [address]                                 PROCEDURE NOTE    PATIENT NAME: Peter Loomis                      :        1955  MED REC NO:   [id number]                          ROOM:  ACCOUNT NO:   [de-identified]                           ADMIT DATE: 2018  PROVIDER:     Marbella Valero MD    DATE OF PROCEDURE:  2018    PREOPERATIVE DIAGNOSIS:  Evaluation for history of colon polyps. POSTOPERATIVE DIAGNOSES:  1.  Small internal hemorrhoids. 2.  Transverse colon polyps. PROCEDURE:  Colonoscopy with biopsy. SURGEON:  Marbella Valero MD    ANESTHESIA:  See EGD report dictated same day. COMPLICATIONS:  None. DESCRIPTION OF PROCEDURE:  Informed consent was obtained after  explaining the risks of bleeding, infection, allergy, perforation,  medical and surgical management as well as non-detection of any colonic  neoplasia. Colonoscope through the anus advanced to the cecum, was  normal.  Ascending, transverse, descending, and sigmoid colon  demonstrated few diminutive transverse colon polyps removed with forceps  technique. Rectum forward and retroflexed views revealed hemorrhoids. Recovery room in stable condition. IMPRESSION AND PLAN:  Dr. Myles Jean will notify the patient of the  biopsy results. Adequate bowel prep at this point. Future surveillance  colonoscopy and recommendations after pathological correlation.         Ying Marino MD    D: 2018 11:32:04       T: 2018 14:32:33     [email or web address]  Job#: [id number]     Doc#: [id number]    CC:  Willie Zaidi MD

## 2019-01-08 NOTE — PROGRESS NOTES
office immediately should the brace result in increased pain, decreased sensation, increased swelling or worsening of the condition. Transposition Flap Text: The defect edges were debeveled with a #15 scalpel blade.  Given the location of the defect and the proximity to free margins a transposition flap was deemed most appropriate.  Using a sterile surgical marker, an appropriate transposition flap was drawn incorporating the defect.    The area thus outlined was incised deep to adipose tissue with a #15 scalpel blade.  The skin margins were undermined to an appropriate distance in all directions utilizing iris scissors.

## 2019-03-12 ENCOUNTER — HOSPITAL ENCOUNTER (OUTPATIENT)
Dept: PHYSICAL THERAPY | Age: 64
Setting detail: THERAPIES SERIES
Discharge: HOME | End: 2019-03-12
Payer: MEDICARE

## 2019-03-12 PROCEDURE — 97162 PT EVAL MOD COMPLEX 30 MIN: CPT

## 2019-03-12 PROCEDURE — 97110 THERAPEUTIC EXERCISES: CPT

## 2019-03-12 PROCEDURE — G0283 ELEC STIM OTHER THAN WOUND: HCPCS

## 2019-03-12 ASSESSMENT — PAIN DESCRIPTION - PAIN TYPE: TYPE: SURGICAL PAIN

## 2019-03-12 ASSESSMENT — PAIN DESCRIPTION - PROGRESSION: CLINICAL_PROGRESSION: NOT CHANGED

## 2019-03-12 ASSESSMENT — PAIN DESCRIPTION - DIRECTION: RADIATING_TOWARDS: TO HAND

## 2019-03-12 ASSESSMENT — PAIN DESCRIPTION - FREQUENCY: FREQUENCY: CONTINUOUS

## 2019-03-12 ASSESSMENT — PAIN DESCRIPTION - ORIENTATION: ORIENTATION: RIGHT

## 2019-03-12 ASSESSMENT — PAIN DESCRIPTION - LOCATION: LOCATION: SHOULDER

## 2019-03-12 ASSESSMENT — PAIN SCALES - GENERAL: PAINLEVEL_OUTOF10: 10

## 2019-03-12 ASSESSMENT — PAIN DESCRIPTION - ONSET: ONSET: SUDDEN

## 2019-03-12 ASSESSMENT — PAIN DESCRIPTION - DESCRIPTORS: DESCRIPTORS: SHARP;SHOOTING

## 2019-03-15 ENCOUNTER — HOSPITAL ENCOUNTER (OUTPATIENT)
Dept: PHYSICAL THERAPY | Age: 64
Setting detail: THERAPIES SERIES
Discharge: HOME | End: 2019-03-15
Payer: MEDICARE

## 2019-03-15 PROCEDURE — 97110 THERAPEUTIC EXERCISES: CPT

## 2019-03-15 PROCEDURE — G0283 ELEC STIM OTHER THAN WOUND: HCPCS

## 2019-03-19 ENCOUNTER — HOSPITAL ENCOUNTER (OUTPATIENT)
Dept: PHYSICAL THERAPY | Age: 64
Setting detail: THERAPIES SERIES
Discharge: HOME | End: 2019-03-19
Payer: MEDICARE

## 2019-03-19 PROCEDURE — 97140 MANUAL THERAPY 1/> REGIONS: CPT

## 2019-03-19 PROCEDURE — G0283 ELEC STIM OTHER THAN WOUND: HCPCS

## 2019-03-19 PROCEDURE — 97110 THERAPEUTIC EXERCISES: CPT

## 2019-03-22 ENCOUNTER — HOSPITAL ENCOUNTER (OUTPATIENT)
Dept: PHYSICAL THERAPY | Age: 64
Setting detail: THERAPIES SERIES
Discharge: HOME | End: 2019-03-22
Payer: MEDICARE

## 2019-03-22 PROCEDURE — 97140 MANUAL THERAPY 1/> REGIONS: CPT

## 2019-03-22 PROCEDURE — G0283 ELEC STIM OTHER THAN WOUND: HCPCS

## 2019-03-22 PROCEDURE — 97110 THERAPEUTIC EXERCISES: CPT

## 2019-03-22 PROCEDURE — 97530 THERAPEUTIC ACTIVITIES: CPT

## 2019-03-25 ENCOUNTER — HOSPITAL ENCOUNTER (OUTPATIENT)
Dept: PHYSICAL THERAPY | Age: 64
Setting detail: THERAPIES SERIES
Discharge: HOME | End: 2019-03-25
Payer: MEDICARE

## 2019-03-25 PROCEDURE — 97110 THERAPEUTIC EXERCISES: CPT

## 2019-03-25 PROCEDURE — 97140 MANUAL THERAPY 1/> REGIONS: CPT

## 2019-03-25 PROCEDURE — G0283 ELEC STIM OTHER THAN WOUND: HCPCS

## 2019-03-28 ENCOUNTER — HOSPITAL ENCOUNTER (OUTPATIENT)
Dept: PHYSICAL THERAPY | Age: 64
Setting detail: THERAPIES SERIES
Discharge: HOME | End: 2019-03-28
Payer: MEDICARE

## 2019-03-28 PROCEDURE — 97140 MANUAL THERAPY 1/> REGIONS: CPT

## 2019-03-28 PROCEDURE — G0283 ELEC STIM OTHER THAN WOUND: HCPCS

## 2019-03-28 PROCEDURE — 97110 THERAPEUTIC EXERCISES: CPT

## 2019-04-01 ENCOUNTER — HOSPITAL ENCOUNTER (OUTPATIENT)
Dept: PHYSICAL THERAPY | Age: 64
Setting detail: THERAPIES SERIES
Discharge: HOME | End: 2019-04-01
Payer: MEDICARE

## 2019-04-01 PROCEDURE — 97110 THERAPEUTIC EXERCISES: CPT

## 2019-04-01 PROCEDURE — G0283 ELEC STIM OTHER THAN WOUND: HCPCS

## 2019-04-01 PROCEDURE — 97140 MANUAL THERAPY 1/> REGIONS: CPT

## 2019-04-01 NOTE — FLOWSHEET NOTE
has not used sling in a couple of days. 3/28/19: Pt reports that the forearm is a little better since she stopped wearing the sling. 4/1/19; Pt reports that she was able to \"get out\" yesterday. She was able to take a shower independently and could  put on her seat belt and she was able to use blow dryer and  curlers for her hair. Objective: See eval  Observation:   Test measurements:        Exercises:  Exercise/Equipment Resistance/Repetitions Other comments   Shoulder shrugs, 10X    Scap retraction 10X    Table slides 10X 10 sec    Wrist flex/ext 10X    Elbow flex/ext 10X    Ext rot to neutral 10X    PROM on right shoulder 10X To 30 deg of ER   Sub max painfree isometrics 5 sec x 10     delete for now due to pain onknee   Supine for cane exercises into flexion 5 sec x 10 4/1   Right elbow extension (by her side while sitting) for right wrist ext and flex  into extension 20X 4/1                    Other Therapeutic Activities:  Patient was educated on diagnosis, plan of care and prognosis of their complaint. Also, frequency and duration of treatments was discussed. Patient was informed of the attendance policy and issued a copy for their records. 3/22/19: Reviewed with patient the protocol for Phase 2 (3-6 weeks). Advised her to wean off sling at home due to forearm pain  - wear it only in car and in congested areas and in bed. ) Reminded her to avoid lifting more that a coffee cup. Home Exercise Program: Patient was given written instructions for home exercises as above. Patient performs them correctly and understands purpose. Reviewed program given to her by [facility] PT , all of which are still appropriate. Added codman's exercises for home. 3/28/19: Gave pt written instructions for isometrics to add to HEP. 4/1/19:  Added cane exercises into flexion while supine for HEP  Manual Treatments:  STM to scar and shoulder, , PROM per protocol x 15 min    Modalities:    IFC with CP to right shoulder and

## 2019-04-04 ENCOUNTER — HOSPITAL ENCOUNTER (OUTPATIENT)
Dept: PHYSICAL THERAPY | Age: 64
Setting detail: THERAPIES SERIES
Discharge: HOME | End: 2019-04-04
Payer: MEDICARE

## 2019-04-04 PROCEDURE — 97110 THERAPEUTIC EXERCISES: CPT

## 2019-04-04 PROCEDURE — G0283 ELEC STIM OTHER THAN WOUND: HCPCS

## 2019-04-04 PROCEDURE — 97140 MANUAL THERAPY 1/> REGIONS: CPT

## 2019-04-04 NOTE — FLOWSHEET NOTE
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 Haig Gitelman 3/12/19    Visit# / total visits:    Pain level:  4/10    G-Code (if applicable):      Date / Visit # G-Code Applied:  /       Progress Note: [x]  Yes  [x]  No  Next due by: Visit #10      History of Injury: See below  Subjective:  Subjective  Subjective: Patient had pain since  in the right shoulder,  since a fall in . Pt has arthritis which progressed over time. She continued to exercise and do pool exercises. Patient had reverse TSR in left arm last year and she is left handed. She had right  reverse TSR on 19. She reports more intense pain on right shoulder post op than on left shoulder post op. She had x ray this morning and so pain is worse since then as well due to postitions for x ray. She had an arthroscopic surgery on right shoulder in  . She did not have injections in the right shoulder. She had PT before and after  arthroscopic surgery. 03/15/19 Patient reports shoulder has been very sore,she is unable to sleep at all at night.  19 Patient reports shoulder has been feeling a little better,MD put her on muscle relaxers which has been helping some. 3/22/19: Pt reports that her R forearm is more painful than the shoulder. 19 Patient reports shoulder is feeling better,forearm still sore. States she has not used sling in a couple of days.  3/28/19: Pt reports that the forearm is a little better since she stopped wearing the sling. 4/1/19; Pt reports that she was able to \"get out\" yesterday. She was able to take a shower independently and could  put on her seat belt and she was able to use blow dryer and  curlers for her hair. 04/04/19; Patient reports her shoulder and forearm feels better she is able to use her left arm to cut food. States her back and knee has been hurting a lot today. Objective: See eval  Observation:   Test measurements:        Exercises:  Exercise/Equipment Resistance/Repetitions Other comments   Shoulder shrugs, 10X    Scap retraction 10X    Table slides 10X 10 sec    Wrist flex/ext 10X    Elbow flex/ext 10X    Ext rot to neutral 10X    PROM on right shoulder 10X To 30 deg of ER   Sub max painfree isometrics 5 sec x 10     delete for now due to pain onknee   Supine for cane exercises into flexion  4/1 held on 4/04 due to back pain laying supine   Right elbow extension (by her side while sitting) for right wrist ext and flex  into extension 20X 4/1   UE ranger seated add                Other Therapeutic Activities:  Patient was educated on diagnosis, plan of care and prognosis of their complaint. Also, frequency and duration of treatments was discussed. Patient was informed of the attendance policy and issued a copy for their records. 3/22/19: Reviewed with patient the protocol for Phase 2 (3-6 weeks). Advised her to wean off sling at home due to forearm pain  - wear it only in car and in congested areas and in bed. ) Reminded her to avoid lifting more that a coffee cup. Home Exercise Program: Patient was given written instructions for home exercises as above. Patient performs them correctly and understands purpose. Reviewed program given to her by [facility] PT , all of which are still appropriate. Added codman's exercises for home. 3/28/19: Gave pt written instructions for isometrics to add to HEP. 4/1/19:

## 2019-04-08 ENCOUNTER — HOSPITAL ENCOUNTER (OUTPATIENT)
Dept: PHYSICAL THERAPY | Age: 64
Setting detail: THERAPIES SERIES
Discharge: HOME | End: 2019-04-08
Payer: MEDICARE

## 2019-04-08 PROCEDURE — 97140 MANUAL THERAPY 1/> REGIONS: CPT

## 2019-04-08 PROCEDURE — G0283 ELEC STIM OTHER THAN WOUND: HCPCS

## 2019-04-08 PROCEDURE — 97110 THERAPEUTIC EXERCISES: CPT

## 2019-04-08 NOTE — FLOWSHEET NOTE
days.  3/28/19: Pt reports that the forearm is a little better since she stopped wearing the sling. 4/1/19; Pt reports that she was able to \"get out\" yesterday. She was able to take a shower independently and could  put on her seat belt and she was able to use blow dryer and  curlers for her hair. 04/04/19; Patient reports her shoulder and forearm feels better she is able to use her left arm to cut food. States her back and knee has been hurting a lot today. 4/8/19:\"My back is acting up again. \" Pt was able to use light towels to dry after shower. Pt was able to get on pants today. Objective: See eval  Observation:   Test measurements:        Exercises:  Exercise/Equipment Resistance/Repetitions Other comments   Shoulder shrugs, 10X    Scap retraction 10X    Table slides 10X 10 sec    Wrist flex/ext 10X    Elbow flex/ext 10X    Ext rot to neutral 10X    PROM on right shoulder 10X To 30 deg of ER   Sub max painfree isometrics 5 sec x 10     delete for now due to pain onknee   Supine for cane exercises into flexion 5 sec x 10 4/1   Right elbow extension (by her side while sitting) for right wrist ext and flex  into extension 20X 4/1   UE ranger seated Added 4/8/19  X 20     Gripper  7.0 # x 20           Other Therapeutic Activities:  Patient was educated on diagnosis, plan of care and prognosis of their complaint. Also, frequency and duration of treatments was discussed. Patient was informed of the attendance policy and issued a copy for their records. 3/22/19: Reviewed with patient the protocol for Phase 2 (3-6 weeks). Advised her to wean off sling at home due to forearm pain  - wear it only in car and in congested areas and in bed. ) Reminded her to avoid lifting more that a coffee cup. Home Exercise Program: Patient was given written instructions for home exercises as above. Patient performs them correctly and understands purpose.  Reviewed program given to her by [facility] PT , all of which are still appropriate. Added codman's exercises for home. 3/28/19: Gave pt written instructions for isometrics to add to HEP. 4/1/19:  Added cane exercises into flexion while supine for HEP  Manual Treatments:  STM to scar and shoulder, , PROM per protocol seated  x 15 min    Modalities:    IFC with CP to right shoulder and MHP to right forearm and LB x 15 min  Timed Code Treatment Minutes:    50  Total Treatment Minutes:    70  Treatment/Activity Tolerance:  [x] Patient tolerated treatment well [] Patient limited by fatigue  [x] Patient limited by pain  [] Patient limited by other medical complications  [x] Other: 4/1/19: Going forward ,treat in seated position for all manual therapy and exercises, except for supine cane flexion ex. Prognosis: [x] Good [] Fair  [] Poor    Goals:    Short term goals  Time Frame for Short term goals: 6 weeks  Short term goal 1: Pt will be independent in HEP for right shoulder  Short term goal 2: Pt will increase right shoulder ROM  by at least 20 degrees  Short term goal 3: Pt will begin to use right UE for ADL's      Long term goals  Time Frame for Long term goals : 12 weeks  Long term goal 1: Pt will increase strength of right shoulder to 3+/5 - 4/5  Long term goal 2: Pt will use right UE for most ADL's without pain       Patient Requires Follow-up: [x] Yes  [] No    Plan:   [] Continue per plan of care [] Alter current plan (see comments)  [x] Plan of care initiated [] Hold pending MD visit [] Discharge    Plan for Next Session: Follow protocol for TRS per Dr. Bhakti Augustin. Now in Phase 2. Add isometrics (submaximal, painfree) for HEP next time if OK with doing them. Assess benefit from cane exercises. Discuss and assess for driving.   Electronically signed by:  Gutierrez Olivera, PT,

## 2019-04-11 ENCOUNTER — HOSPITAL ENCOUNTER (OUTPATIENT)
Dept: PHYSICAL THERAPY | Age: 64
Setting detail: THERAPIES SERIES
Discharge: HOME | End: 2019-04-11
Payer: MEDICARE

## 2019-04-11 PROCEDURE — 97110 THERAPEUTIC EXERCISES: CPT

## 2019-04-11 PROCEDURE — G0283 ELEC STIM OTHER THAN WOUND: HCPCS

## 2019-04-11 PROCEDURE — 97140 MANUAL THERAPY 1/> REGIONS: CPT

## 2019-04-15 ENCOUNTER — HOSPITAL ENCOUNTER (OUTPATIENT)
Dept: PHYSICAL THERAPY | Age: 64
Setting detail: THERAPIES SERIES
Discharge: HOME | End: 2019-04-15
Payer: MEDICARE

## 2019-04-15 PROCEDURE — 97140 MANUAL THERAPY 1/> REGIONS: CPT

## 2019-04-15 PROCEDURE — 97110 THERAPEUTIC EXERCISES: CPT

## 2019-04-15 PROCEDURE — G0283 ELEC STIM OTHER THAN WOUND: HCPCS

## 2019-04-15 NOTE — FLOWSHEET NOTE
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 Haig Gitelman 3/12/19    Visit# / total visits:    Pain level:  3/10    G-Code (if applicable):      Date / Visit # G-Code Applied:  /       Progress Note: [x]  Yes  [x]  No  Next due by: Visit #10      History of Injury: See below  Subjective:  Subjective  Subjective: Patient had pain since  in the right shoulder,  since a fall in . Pt has arthritis which progressed over time. She continued to exercise and do pool exercises. Patient had reverse TSR in left arm last year and she is left handed. She had right  reverse TSR on 19. She reports more intense pain on right shoulder post op than on left shoulder post op. She had x ray this morning and so pain is worse since then as well due to postitions for x ray. She had an arthroscopic surgery on right shoulder in  . She did not have injections in the right shoulder. She had PT before and after  arthroscopic surgery. 03/15/19 Patient reports shoulder has been very sore,she is unable to sleep at all at night.  19 Patient reports shoulder has been feeling a little better,MD put her on muscle relaxers which has been helping some. 3/22/19: Pt reports that her R forearm is more painful than the shoulder. 19 Patient reports shoulder is feeling better,forearm still sore. States she has not used sling in a couple of days.  3/28/19: Pt reports that the forearm is a little better since she stopped wearing the sling. 4/1/19; Pt reports that she was able to \"get out\" yesterday. She was able to take a shower independently and could  put on her seat belt and she was able to use blow dryer and  curlers for her hair. 04/04/19; Patient reports her shoulder and forearm feels better she is able to use her left arm to cut food. States her back and knee has been hurting a lot today. 4/8/19:\"My back is acting up again. \" Pt was able to use light towels to dry after shower. Pt was able to get on pants today. 04/11/19; Patient reports she drove today for the first time since surgery. States shoulder has been feeling better overall. 04/15/19: Patient reports she is able to get dressed easier and driving now. Objective: See eval  Observation:   Test measurements:        Exercises:  Exercise/Equipment Resistance/Repetitions Other comments   Shoulder shrugs, 10X    Scap retraction 10X    Table slides 10X 10 sec    Wrist flex/ext     Elbow flex/ext 10X    Ext rot to neutral 10X    PROM on right shoulder 10X To 30 deg of ER   Sub max painfree isometrics 5 sec x 10 manual    delete for now due to pain onknee   Supine for cane exercises into flexion 5 sec x 10 4/1   Right elbow extension (by her side while sitting) for right wrist ext and flex  into extension 20X 4/1   UE ranger seated Added 4/8/19  X 20     Gripper  7.0 # x 20    nustep ue  L1 x 5 min Added 4/15   OH pulleys 4 min Added 4/15     Other Therapeutic Activities:  Patient was educated on diagnosis, plan of care and prognosis of their complaint. Also, frequency and duration of treatments was discussed. Patient was informed of the attendance policy and issued a copy for their records. 3/22/19: Reviewed with patient the protocol for Phase 2 (3-6 weeks).  Advised her to wean off sling at home due to forearm pain  - wear it only in car and in congested areas and in bed. ) Reminded her driving.   Electronically signed by:  Yuliet López, PTA,

## 2019-04-18 ENCOUNTER — HOSPITAL ENCOUNTER (OUTPATIENT)
Dept: PHYSICAL THERAPY | Age: 64
Setting detail: THERAPIES SERIES
Discharge: HOME | End: 2019-04-18
Payer: MEDICARE

## 2019-04-18 PROCEDURE — G0283 ELEC STIM OTHER THAN WOUND: HCPCS

## 2019-04-18 PROCEDURE — 97140 MANUAL THERAPY 1/> REGIONS: CPT

## 2019-04-18 PROCEDURE — 97110 THERAPEUTIC EXERCISES: CPT

## 2019-04-18 NOTE — FLOWSHEET NOTE
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 Rosalino Bradley 3/12/19    Visit# / total visits:    Pain level:  2/10    G-Code (if applicable):      Date / Visit # G-Code Applied:  /       Progress Note: [x]  Yes  [x]  No  Next due by: Visit #10      History of Injury: See below  Subjective:  Subjective  Subjective: Patient had pain since  in the right shoulder,  since a fall in . Pt has arthritis which progressed over time. She continued to exercise and do pool exercises. Patient had reverse TSR in left arm last year and she is left handed. She had right  reverse TSR on 19. She reports more intense pain on right shoulder post op than on left shoulder post op. She had x ray this morning and so pain is worse since then as well due to postitions for x ray. She had an arthroscopic surgery on right shoulder in  . She did not have injections in the right shoulder. She had PT before and after  arthroscopic surgery. 03/15/19 Patient reports shoulder has been very sore,she is unable to sleep at all at night.  19 Patient reports shoulder has been feeling a little better,MD put her on muscle relaxers which has been helping some. 3/22/19: Pt reports that her R forearm is more painful than the shoulder. 19 Patient reports shoulder is feeling better,forearm still sore. States she has not used sling in a couple of days.  3/28/19: Pt reports that the forearm is a little better since she stopped wearing the sling. 4/1/19; Pt reports that she was able to \"get out\" yesterday. She was able to take a shower independently and could  put on her seat belt and she was able to use blow dryer and  curlers for her hair. 04/04/19; Patient reports her shoulder and forearm feels better she is able to use her left arm to cut food. States her back and knee has been hurting a lot today. 4/8/19:\"My back is acting up again. \" Pt was able to use light towels to dry after shower. Pt was able to get on pants today. 04/11/19; Patient reports she drove today for the first time since surgery. States shoulder has been feeling better overall. 04/15/19: Patient reports she is able to get dressed easier and driving now. 4/18/19: Pt reports that her shoulder is \"not too bad,\" Pt is able to do more ADL's. Objective:   Observation:   Test measurements:  Shoulder flexion: 130 , Shoulder scaption: 80, Int ROT: 30  Ext Rot: -20 deg of neutral       Exercises:  Exercise/Equipment Resistance/Repetitions Other comments   Shoulder shrugs, 10X    Scap retraction 10X    Table slides 10X 10 sec Flex and scaption   Wrist flex/ext     Elbow flex/ext 10X    Ext rot to neutral 10X    PROM on right shoulder 10X To 30 deg of ER   Sub max painfree isometrics 5 sec x 10 manual    delete for now due to pain onknee    for cane exercises into flexion 5 sec x 10 4/1   Right elbow extension (by her side while sitting) for right wrist ext and flex  into extension 20X 4/1   UE ranger seated Added 4/8/19  X 20     Gripper  7.0 # x 20    nustep ue  L1 x 5 min Added 4/15   OH pulleys 4 min Added 4/15     Other Therapeutic Activities:  Patient was educated on diagnosis, plan of care and prognosis of their complaint. Also, frequency and duration of treatments was discussed. Patient was informed of the attendance policy and issued a copy for their records.   3/22/19: Reviewed with patient the protocol for Phase 2 (3-6 weeks). Advised her to wean off sling at home due to forearm pain  - wear it only in car and in congested areas and in bed. ) Reminded her to avoid lifting more that a coffee cup. Home Exercise Program: Patient was given written instructions for home exercises as above. Patient performs them correctly and understands purpose. Reviewed program given to her by [facility] , all of which are still appropriate. Added codman's exercises for home. 3/28/19: Gave pt written instructions for isometrics to add to HEP. 4/1/19:  Added cane exercises into flexion while supine for HEP  Manual Treatments:  STM to scar and shoulder, , PROM per protocol seated  x 15 min    Modalities:    IFC with CP to right shoulder and MHP to right forearm and LB x 15 min  Timed Code Treatment Minutes:    50  Total Treatment Minutes:    70  Treatment/Activity Tolerance:  [x] Patient tolerated treatment well [] Patient limited by fatigue  [x] Patient limited by pain  [] Patient limited by other medical complications  [x] Other: 4/1/19: Going forward ,treat in seated position for all manual therapy and exercises, except for supine cane flexion ex.       Prognosis: [x] Good [] Fair  [] Poor    Goals:    Short term goals  Time Frame for Short term goals: 6 weeks  Short term goal 1: Pt will be independent in HEP for right shoulder  Short term goal 2: Pt will increase right shoulder ROM  by at least 20 degrees  Short term goal 3: Pt will begin to use right UE for ADL's      Long term goals  Time Frame for Long term goals : 12 weeks  Long term goal 1: Pt will increase strength of right shoulder to 3+/5 - 4/5  Long term goal 2: Pt will use right UE for most ADL's without pain       Patient Requires Follow-up: [x] Yes  [] No    Plan:   [x] Continue per plan of care [] Alter current plan (see comments)  [] Plan of care initiated [] Hold pending MD visit [] Discharge    Plan for Next Session: Follow protocol for TRS per Dr. Wallace Padron. Now in Phase 2. Add isometrics (submaximal, painfree) for HEP next time if OK with doing them. Assess benefit from cane exercises. Pt will see Dr. Ronit Estrada on Tuesday 4/23/19.     Electronically signed by:  Nellie Lennox, PT,

## 2019-04-22 ENCOUNTER — HOSPITAL ENCOUNTER (OUTPATIENT)
Dept: PHYSICAL THERAPY | Age: 64
Setting detail: THERAPIES SERIES
Discharge: HOME | End: 2019-04-22
Payer: MEDICARE

## 2019-04-22 PROCEDURE — 97140 MANUAL THERAPY 1/> REGIONS: CPT

## 2019-04-22 PROCEDURE — 97110 THERAPEUTIC EXERCISES: CPT

## 2019-04-22 PROCEDURE — G0283 ELEC STIM OTHER THAN WOUND: HCPCS

## 2019-04-22 NOTE — FLOWSHEET NOTE
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 Neema Garcai 3/12/19    Visit# / total visits:    Pain level:  2/10    G-Code (if applicable):      Date / Visit # G-Code Applied:  /       Progress Note: [x]  Yes  [x]  No  Next due by: Visit #10      History of Injury: See below  Subjective:  Subjective  Subjective: Patient had pain since  in the right shoulder,  since a fall in . Pt has arthritis which progressed over time. She continued to exercise and do pool exercises. Patient had reverse TSR in left arm last year and she is left handed. She had right  reverse TSR on 19. She reports more intense pain on right shoulder post op than on left shoulder post op. She had x ray this morning and so pain is worse since then as well due to postitions for x ray. She had an arthroscopic surgery on right shoulder in  . She did not have injections in the right shoulder. She had PT before and after  arthroscopic surgery. 03/15/19 Patient reports shoulder has been very sore,she is unable to sleep at all at night.  19 Patient reports shoulder has been feeling a little better,MD put her on muscle relaxers which has been helping some. 3/22/19: Pt reports that her R forearm is more painful than the shoulder. 19 Patient reports shoulder is feeling better,forearm still sore. States she has not used sling in a couple of policy and issued a copy for their records. 3/22/19: Reviewed with patient the protocol for Phase 2 (3-6 weeks). Advised her to wean off sling at home due to forearm pain  - wear it only in car and in congested areas and in bed. ) Reminded her to avoid lifting more that a coffee cup. Home Exercise Program: Patient was given written instructions for home exercises as above. Patient performs them correctly and understands purpose. Reviewed program given to her by [facility] PT , all of which are still appropriate. Added codman's exercises for home. 3/28/19: Gave pt written instructions for isometrics to add to HEP. 4/1/19:  Added cane exercises into flexion while supine for HEP  Manual Treatments:  STM to scar and shoulder, , PROM per protocol seated  x 15 min    Modalities:    IFC with CP to right shoulder and MHP to right forearm and LB x 15 min  Timed Code Treatment Minutes:    50  Total Treatment Minutes:    70  Treatment/Activity Tolerance:  [x] Patient tolerated treatment well [] Patient limited by fatigue  [x] Patient limited by pain  [] Patient limited by other medical complications  [x] Other: 4/1/19: Going forward ,treat in seated position for all manual therapy and exercises, except for supine cane flexion ex.       Prognosis: [x] Good [] Fair  [] Poor    Goals:    Short term goals  Time Frame for Short term goals: 6 weeks  Short term goal 1: Pt will be independent in HEP for right shoulder  Short term goal 2: Pt will increase right shoulder ROM  by at least 20 degrees  Short term goal 3: Pt will begin to use right UE for ADL's      Long term goals  Time Frame for Long term goals : 12 weeks  Long term goal 1: Pt will increase strength of right shoulder to 3+/5 - 4/5  Long term goal 2: Pt will use right UE for most ADL's without pain       Patient Requires Follow-up: [x] Yes  [] No    Plan:   [x] Continue per plan of care [] Alter current plan (see comments)  [] Plan of care initiated [] Hold pending MD visit [] Discharge    Plan for Next Session: Follow protocol for TRS per Dr. Willie Childress. Now in Phase 2. Add isometrics (submaximal, painfree) for HEP next time if OK with doing them. Assess benefit from cane exercises. Pt will see Dr. Van Post on Tuesday 4/23/19.     Electronically signed by:  Kayla Isabel PTA,

## 2019-04-26 ENCOUNTER — HOSPITAL ENCOUNTER (OUTPATIENT)
Dept: PHYSICAL THERAPY | Age: 64
Setting detail: THERAPIES SERIES
Discharge: HOME | End: 2019-04-26
Payer: MEDICARE

## 2019-04-26 PROCEDURE — 97110 THERAPEUTIC EXERCISES: CPT

## 2019-04-26 PROCEDURE — G0283 ELEC STIM OTHER THAN WOUND: HCPCS

## 2019-04-26 PROCEDURE — 97140 MANUAL THERAPY 1/> REGIONS: CPT

## 2019-04-26 NOTE — FLOWSHEET NOTE
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 Preeti Valero 3/12/19    Visit# / total visits:    Pain level:  1/10    G-Code (if applicable):      Date / Visit # G-Code Applied:  /       Progress Note: [x]  Yes  [x]  No  Next due by: Visit #10      History of Injury: See below  Subjective:  Subjective  Subjective: Patient had pain since  in the right shoulder,  since a fall in . Pt has arthritis which progressed over time. She continued to exercise and do pool exercises. Patient had reverse TSR in left arm last year and she is left handed. She had right  reverse TSR on 19. She reports more intense pain on right shoulder post op than on left shoulder post op. She had x ray this morning and so pain is worse since then as well due to postitions for x ray. She had an arthroscopic surgery on right shoulder in  . She did not have injections in the right shoulder. She had PT before and after  arthroscopic surgery. 03/15/19 Patient reports shoulder has been very sore,she is unable to sleep at all at night.  19 Patient reports shoulder has been feeling a little better,MD put her on muscle relaxers which has been helping some. 3/22/19: Pt reports that her R forearm is more painful than the shoulder. 19 Patient reports shoulder is feeling better,forearm still sore. States she has not used sling in a couple of days.  3/28/19: Pt reports that the forearm is a little better since she stopped wearing the sling. 4/1/19; Pt reports that she was able to \"get out\" yesterday. She was able to take a shower independently and could  put on her seat belt and she was able to use blow dryer and  curlers for her hair. 04/04/19; Patient reports her shoulder and forearm feels better she is able to use her left arm to cut food. States her back and knee has been hurting a lot today. 4/8/19:\"My back is acting up again. \" Pt was able to use light towels to dry after shower. Pt was able to get on pants today. 04/11/19; Patient reports she drove today for the first time since surgery. States shoulder has been feeling better overall. 04/15/19: Patient reports she is able to get dressed easier and driving now. 4/18/19: Pt reports that her shoulder is \"not too bad,\" Pt is able to do more ADL's.  04/22/19: Patient reports she has been using her shoulder more. 4/26/19: Pt reports that her ortho doctor told her that she had no restrictions and could use her right arm to \"do anything. \" Her shoulder is feeling well.     Objective:   Observation:   Test measurements:  Shoulder flexion: 130 , Shoulder scaption: 80, Int ROT: 30  Ext Rot: -20 deg of neutral       Exercises:  Exercise/Equipment Resistance/Repetitions Other comments   Shoulder shrugs, 10X    Scap retraction 10X    Table slides 10X 10 sec Flex and scaption   Wrist flex/ext     Elbow flex/ext 10X    Ext rot to neutral 10X    PROM on right shoulder 10X To 30 deg of ER   Sub max painfree isometrics 5 sec x 10 manual    delete for now due to pain onknee    for cane exercises into flexion 5 sec x 10 4/1   Right elbow extension (by her side while sitting) for right wrist ext and flex  into extension 20X 4/1   UE ranger seated Added 4/8/19  X 20     Gripper  7.0 # x 20    nustep ue  L1 x 5 min Added 4/15   OH pulleys 4 min Added 4/15     Other Therapeutic Activities:  Patient was educated on diagnosis, plan of care and prognosis of their complaint. Also, frequency and duration of treatments was discussed. Patient was informed of the attendance policy and issued a copy for their records. 3/22/19: Reviewed with patient the protocol for Phase 2 (3-6 weeks). Advised her to wean off sling at home due to forearm pain  - wear it only in car and in congested areas and in bed. ) Reminded her to avoid lifting more that a coffee cup. Home Exercise Program: Patient was given written instructions for home exercises as above. Patient performs them correctly and understands purpose. Reviewed program given to her by [facility] PT , all of which are still appropriate. Added codman's exercises for home. 3/28/19: Gave pt written instructions for isometrics to add to HEP. 4/1/19:  Added cane exercises into flexion while supine for HEP  Manual Treatments:  STM to scar and shoulder, , PROM per protocol seated  x 15 min    Modalities:    IFC with CP to right shoulder and MHP to right forearm and LB x 15 min  Timed Code Treatment Minutes:    50  Total Treatment Minutes:    70  Treatment/Activity Tolerance:  [x] Patient tolerated treatment well [] Patient limited by fatigue  [x] Patient limited by pain  [] Patient limited by other medical complications  [x] Other: 4/1/19: Going forward ,treat in seated position for all manual therapy and exercises, except for supine cane flexion ex.       Prognosis: [x] Good [] Fair  [] Poor    Goals:    Short term goals  Time Frame for Short term goals: 6 weeks  Short term goal 1: Pt will be independent in HEP for right shoulder  Short term goal 2: Pt will increase right shoulder ROM  by at least 20 degrees  Short term goal 3: Pt will begin to use right UE for ADL's      Long term goals  Time Frame for Long term goals : 12 weeks  Long term goal 1: Pt will increase strength of right shoulder to 3+/5 - 4/5  Long term goal 2: Pt will use right UE for most ADL's without pain       Patient

## 2019-04-30 ENCOUNTER — HOSPITAL ENCOUNTER (OUTPATIENT)
Dept: PHYSICAL THERAPY | Age: 64
Setting detail: THERAPIES SERIES
Discharge: HOME | End: 2019-04-30
Payer: MEDICARE

## 2019-04-30 PROCEDURE — 97140 MANUAL THERAPY 1/> REGIONS: CPT

## 2019-04-30 PROCEDURE — 97110 THERAPEUTIC EXERCISES: CPT

## 2019-04-30 PROCEDURE — G0283 ELEC STIM OTHER THAN WOUND: HCPCS

## 2019-04-30 NOTE — FLOWSHEET NOTE
days.  3/28/19: Pt reports that the forearm is a little better since she stopped wearing the sling. 4/1/19; Pt reports that she was able to \"get out\" yesterday. She was able to take a shower independently and could  put on her seat belt and she was able to use blow dryer and  curlers for her hair. 04/04/19; Patient reports her shoulder and forearm feels better she is able to use her left arm to cut food. States her back and knee has been hurting a lot today. 4/8/19:\"My back is acting up again. \" Pt was able to use light towels to dry after shower. Pt was able to get on pants today. 04/11/19; Patient reports she drove today for the first time since surgery. States shoulder has been feeling better overall. 04/15/19: Patient reports she is able to get dressed easier and driving now. 4/18/19: Pt reports that her shoulder is \"not too bad,\" Pt is able to do more ADL's.  04/22/19: Patient reports she has been using her shoulder more. 4/26/19: Pt reports that her ortho doctor told her that she had no restrictions and could use her right arm to \"do anything. \" Her shoulder is feeling well. 4/30/19: Pt reports minimal pain on shoulder.      Objective:   Observation:   Test measurements:  Shoulder flexion: 130 , Shoulder scaption: 80, Int ROT: 30  Ext Rot: -20 deg of neutral       Exercises:  Exercise/Equipment Resistance/Repetitions Other comments   Shoulder shrugs, 10X    Scap retraction 10X    Table slides 10X 10 sec Flex and scaption   Wrist flex/ext     Elbow flex/ext 10X    Ext rot to neutral 10X    PROM on right shoulder 10X To 30 deg of ER   Sub max painfree isometrics 5 sec x 10 manual    delete for now due to pain onknee    for cane exercises into flexion 5 sec x 10 4/1   Right elbow extension (by her side while sitting) for right wrist ext and flex  into extension 20X 4/1   UE ranger seated Added 4/8/19  X 20     Gripper  7.0 # x 20    nustep ue  L1 x 5 min Added 4/15   OH pulleys 4 min Added 4/15     Other right UE for most ADL's without pain       Patient Requires Follow-up: [x] Yes  [] No    Plan:   [x] Continue per plan of care [] Alter current plan (see comments)  [] Plan of care initiated [] Hold pending MD visit [] Discharge    Plan for Next Session: Follow protocol for TRS per Dr. Crystal Viera. Now in Phase 2. Add isometrics (submaximal, painfree) for HEP next time if OK with doing them. Assess benefit from cane exercises. Focus on getting full range, then strength.    Electronically signed by:  Jose Ureña PT,

## 2019-05-02 ENCOUNTER — HOSPITAL ENCOUNTER (OUTPATIENT)
Dept: PHYSICAL THERAPY | Age: 64
Setting detail: THERAPIES SERIES
Discharge: HOME | End: 2019-05-02
Payer: MEDICARE

## 2019-05-02 PROCEDURE — 97140 MANUAL THERAPY 1/> REGIONS: CPT

## 2019-05-02 PROCEDURE — G0283 ELEC STIM OTHER THAN WOUND: HCPCS

## 2019-05-02 PROCEDURE — 97110 THERAPEUTIC EXERCISES: CPT

## 2019-05-02 NOTE — FLOWSHEET NOTE
days.  3/28/19: Pt reports that the forearm is a little better since she stopped wearing the sling. 4/1/19; Pt reports that she was able to \"get out\" yesterday. She was able to take a shower independently and could  put on her seat belt and she was able to use blow dryer and  curlers for her hair. 04/04/19; Patient reports her shoulder and forearm feels better she is able to use her left arm to cut food. States her back and knee has been hurting a lot today. 4/8/19:\"My back is acting up again. \" Pt was able to use light towels to dry after shower. Pt was able to get on pants today. 04/11/19; Patient reports she drove today for the first time since surgery. States shoulder has been feeling better overall. 04/15/19: Patient reports she is able to get dressed easier and driving now. 4/18/19: Pt reports that her shoulder is \"not too bad,\" Pt is able to do more ADL's.  04/22/19: Patient reports she has been using her shoulder more. 4/26/19: Pt reports that her ortho doctor told her that she had no restrictions and could use her right arm to \"do anything. \" Her shoulder is feeling well. 4/30/19: Pt reports minimal pain on shoulder. 5/2/19: Pt reports very minimal pain.   Objective:   Observation:   Test measurements:  Shoulder flexion: 130 , Shoulder scaption: 80, Int ROT: 30  Ext Rot: -20 deg of neutral       Exercises:  Exercise/Equipment Resistance/Repetitions Other comments   Shoulder shrugs, 10X    Scap retraction 10X    Table slides 10X 10 sec Flex and scaption   Wrist flex/ext     Elbow flex/ext 10X    Ext rot to neutral 10X    PROM on right shoulder 10X To 30 deg of ER   Sub max painfree isometrics 5 sec x 10 manual    delete for now due to pain onknee    for cane exercises into flexion 5 sec x 10 4/1   Right elbow extension (by her side while sitting) for right wrist ext and flex  into extension 20X 4/1   UE ranger seated Added 4/8/19  X 20     Gripper  7.0 # x 20    nustep ue  L3 x 6 min Added 4/15   [facility] pulleys 4 min Added 4/15   Ball rolls 20X 5/2/19   Ball on the wall  Add     Other Therapeutic Activities:  Patient was educated on diagnosis, plan of care and prognosis of their complaint. Also, frequency and duration of treatments was discussed. Patient was informed of the attendance policy and issued a copy for their records. 3/22/19: Reviewed with patient the protocol for Phase 2 (3-6 weeks). Advised her to wean off sling at home due to forearm pain  - wear it only in car and in congested areas and in bed. ) Reminded her to avoid lifting more that a coffee cup. Home Exercise Program: Patient was given written instructions for home exercises as above. Patient performs them correctly and understands purpose. Reviewed program given to her by [facility] PT , all of which are still appropriate. Added codman's exercises for home. 3/28/19: Gave pt written instructions for isometrics to add to HEP. 4/1/19:  Added cane exercises into flexion while supine for HEP  Manual Treatments:  STM to scar and shoulder, , PROM per protocol seated  x 15 min    Modalities:    IFC with CP to right shoulder and MHP to right forearm and LB x 15 min  Timed Code Treatment Minutes:    60  Total Treatment Minutes:    75  Treatment/Activity Tolerance:  [x] Patient tolerated treatment well [] Patient limited by fatigue  [x] Patient limited by pain  [] Patient limited by other medical complications  [x] Other: 4/1/19: Going forward ,treat in seated position for all manual therapy and exercises, except for supine cane flexion ex.       Prognosis: [x] Good [] Fair  [] Poor    Goals:    Short term goals  Time Frame for Short term goals: 6 weeks  Short term goal 1: Pt will be independent in HEP for right shoulder  Short term goal 2: Pt will increase right shoulder ROM  by at least 20 degrees  Short term goal 3: Pt will begin to use right UE for ADL's      Long term goals  Time Frame for Long term goals : 12 weeks  Long term goal 1: Pt will increase strength of right shoulder to 3+/5 - 4/5  Long term goal 2: Pt will use right UE for most ADL's without pain       Patient Requires Follow-up: [x] Yes  [] No    Plan:   [x] Continue per plan of care [] Alter current plan (see comments)  [] Plan of care initiated [] Hold pending MD visit [] Discharge    Plan for Next Session: Follow protocol for TRS per Dr. Faiza Delgado. Now in Phase 2. Add isometrics (submaximal, painfree) for HEP next time if OK with doing them. Assess benefit from cane exercises. Focus on getting full range, then strength.    Electronically signed by:  Babar Bains, PT,

## 2019-05-07 ENCOUNTER — HOSPITAL ENCOUNTER (OUTPATIENT)
Dept: PHYSICAL THERAPY | Age: 64
Setting detail: THERAPIES SERIES
Discharge: HOME | End: 2019-05-07
Payer: MEDICARE

## 2019-05-07 PROCEDURE — 97140 MANUAL THERAPY 1/> REGIONS: CPT

## 2019-05-07 PROCEDURE — G0283 ELEC STIM OTHER THAN WOUND: HCPCS

## 2019-05-07 PROCEDURE — 97110 THERAPEUTIC EXERCISES: CPT

## 2019-05-07 NOTE — FLOWSHEET NOTE
Physical Therapy Daily Treatment Note  Date:  2019    Patient Name:  Iris Pinto    :  1955  MRN: [id number]    Restrictions/Precautions:      Pertinent Medical History:  , had reverse left TSR last     Medical/Treatment Diagnosis Information:  · Diagnosis: Reverse right TSR 19  · Treatment Diagnosis: S/P reverse right TSR, surgical pain, limited ROM and weakness of Right shoulder and UE    Insurance/Certification information:  PT Insurance Information: Medicare  Physician Information:  Referring Practitioner: SRIKANTH Burton  Plan of care signed (Y/N):  Sent to SRIKANTH Burton/Dr. Leta Ortega 3/12/19    Visit# / total visits:    Pain level:  0/10    G-Code (if applicable):      Date / Visit # G-Code Applied:  /       Progress Note: [x]  Yes  [x]  No  Next due by: Visit #10      History of Injury: See below  Subjective:  Subjective  Subjective: Patient had pain since  in the right shoulder,  since a fall in . Pt has arthritis which progressed over time. She continued to exercise and do pool exercises. Patient had reverse TSR in left arm last year and she is left handed. She had right  reverse TSR on 19. She reports more intense pain on right shoulder post op than on left shoulder post op. She had x ray this morning and so pain is worse since then as well due to postitions for x ray. She had an arthroscopic surgery on right shoulder in  . She did not have injections in the right shoulder. She had PT before and after  arthroscopic surgery. 03/15/19 Patient reports shoulder has been very sore,she is unable to sleep at all at night.  19 Patient reports shoulder has been feeling a little better,MD put her on muscle relaxers which has been helping some. 3/22/19: Pt reports that her R forearm is more painful than the shoulder. 19 Patient reports shoulder is feeling better,forearm still sore. States she has not used sling in a couple of days.  3/28/19: Pt reports that the forearm is a little better since she stopped wearing the sling. 4/1/19; Pt reports that she was able to \"get out\" yesterday. She was able to take a shower independently and could  put on her seat belt and she was able to use blow dryer and  curlers for her hair. 04/04/19; Patient reports her shoulder and forearm feels better she is able to use her left arm to cut food. States her back and knee has been hurting a lot today. 4/8/19:\"My back is acting up again. \" Pt was able to use light towels to dry after shower. Pt was able to get on pants today. 04/11/19; Patient reports she drove today for the first time since surgery. States shoulder has been feeling better overall. 04/15/19: Patient reports she is able to get dressed easier and driving now. 4/18/19: Pt reports that her shoulder is \"not too bad,\" Pt is able to do more ADL's.  04/22/19: Patient reports she has been using her shoulder more. 4/26/19: Pt reports that her ortho doctor told her that she had no restrictions and could use her right arm to \"do anything. \" Her shoulder is feeling well. 4/30/19: Pt reports minimal pain on shoulder. 5/2/19: Pt reports very minimal pain. 5/7/19: Pt reports no pain.     Objective:   Observation:   Test measurements:  Shoulder flexion: 130 , Shoulder scaption: 80, Int ROT: 30  Ext Rot: -20 deg of neutral          5/7/19: Flex:  135  Abd: 75  IR:  45  ER: 5       Exercises:  Exercise/Equipment Resistance/Repetitions Other comments   Shoulder shrugs, 10X    Scap retraction 10X    Table slides 10X 10 sec Flex and scaption   Wrist flex/ext     Elbow flex/ext 10X    Ext rot to neutral 10X    PROM on right shoulder 10X To 30 deg of ER   Sub max painfree isometrics 5 sec x 10 manual    delete for now due to pain onknee    for cane exercises into flexion 5 sec x 10 4/1   Right elbow extension (by her side while sitting) for right wrist ext and flex  into extension 20X 4/1   UE ranger seated/standing Added 4/8/19  X 20  Added standing 5/7/19   Gripper  7.0 # x 20    nustep ue  L3 x 6 min Added 4/15   OH pulleys 4 min Added 4/15   Ball rolls 20X 5/2/19   Ball on the wall 20X each Added 5/7/19   Arm circles - CW and CCW 2.0 # x 5 each way 5/7/19     Other Therapeutic Activities:  Patient was educated on diagnosis, plan of care and prognosis of their complaint. Also, frequency and duration of treatments was discussed. Patient was informed of the attendance policy and issued a copy for their records. 3/22/19: Reviewed with patient the protocol for Phase 2 (3-6 weeks). Advised her to wean off sling at home due to forearm pain  - wear it only in car and in congested areas and in bed. ) Reminded her to avoid lifting more that a coffee cup. Home Exercise Program: Patient was given written instructions for home exercises as above. Patient performs them correctly and understands purpose. Reviewed program given to her by [facility] PT , all of which are still appropriate. Added codman's exercises for home. 3/28/19: Gave pt written instructions for isometrics to add to HEP. 4/1/19:  Added cane exercises into flexion while supine for HEP  Manual Treatments:  STM to scar and shoulder, , PROM per protocol seated  x 15 min    Modalities:    IFC with CP to right shoulder and MHP to right forearm and LB x 15 min  Timed Code Treatment Minutes:    65  Total Treatment Minutes:    80  Treatment/Activity Tolerance:  [x] Patient tolerated treatment well [] Patient limited by fatigue  [] Patient limited by pain  [] Patient limited by other medical complications  [x] Other: 5/7/19: Better ROM      Prognosis: [x] Good [] Fair  [] Poor    Goals:    Short term goals  Time Frame for Short term goals: 6 weeks  Short term goal 1: Pt will be independent in HEP for right shoulder/MET  Short term goal 2: Pt will increase right shoulder ROM  by at least 20 degrees/Partially MET for flex and Int Rot  Short term goal 3: Pt

## 2019-05-10 ENCOUNTER — HOSPITAL ENCOUNTER (OUTPATIENT)
Dept: PHYSICAL THERAPY | Age: 64
Setting detail: THERAPIES SERIES
Discharge: HOME | End: 2019-05-10
Payer: MEDICARE

## 2019-05-10 PROCEDURE — G0283 ELEC STIM OTHER THAN WOUND: HCPCS

## 2019-05-10 PROCEDURE — 97140 MANUAL THERAPY 1/> REGIONS: CPT

## 2019-05-10 PROCEDURE — 97110 THERAPEUTIC EXERCISES: CPT

## 2019-05-10 NOTE — FLOWSHEET NOTE
days.  3/28/19: Pt reports that the forearm is a little better since she stopped wearing the sling. 4/1/19; Pt reports that she was able to \"get out\" yesterday. She was able to take a shower independently and could  put on her seat belt and she was able to use blow dryer and  curlers for her hair. 04/04/19; Patient reports her shoulder and forearm feels better she is able to use her left arm to cut food. States her back and knee has been hurting a lot today. 4/8/19:\"My back is acting up again. \" Pt was able to use light towels to dry after shower. Pt was able to get on pants today. 04/11/19; Patient reports she drove today for the first time since surgery. States shoulder has been feeling better overall. 04/15/19: Patient reports she is able to get dressed easier and driving now. 4/18/19: Pt reports that her shoulder is \"not too bad,\" Pt is able to do more ADL's.  04/22/19: Patient reports she has been using her shoulder more. 4/26/19: Pt reports that her ortho doctor told her that she had no restrictions and could use her right arm to \"do anything. \" Her shoulder is feeling well. 4/30/19: Pt reports minimal pain on shoulder. 5/2/19: Pt reports very minimal pain. 5/7/19: Pt reports no pain. 5/10/19: Pt reports no pain.     Objective:   Observation:   Test measurements:  Shoulder flexion: 130 , Shoulder scaption: 80, Int ROT: 30  Ext Rot: -20 deg of neutral          5/7/19: Flex:  135  Abd: 75  IR:  45  ER: 5       Exercises:  Exercise/Equipment Resistance/Repetitions Other comments   Shoulder shrugs, 10X    Scap retraction 10X    Table slides 10X 10 sec Flex and scaption   Wrist flex/ext     Elbow flex/ext 10X 1# each    Ext rot to neutral 10X    PROM on right shoulder 10X To 30 deg of ER   Sub max painfree isometrics 5 sec x 10 manual    delete for now due to pain onknee    for cane exercises into flexion 5 sec x 10, with 1# 4/1, 5/101/9 Add 2# wt next time   Right elbow extension (by her side while weeks  Short term goal 1: Pt will be independent in HEP for right shoulder/MET  Short term goal 2: Pt will increase right shoulder ROM  by at least 20 degrees/Partially MET for flex and Int Rot  Short term goal 3: Pt will begin to use right UE for ADL's /MET     Long term goals  Time Frame for Long term goals : 12 weeks  Long term goal 1: Pt will increase strength of right shoulder to 3+/5 - 4/5  Long term goal 2: Pt will use right UE for most ADL's without pain       Patient Requires Follow-up: [x] Yes  [] No    Plan:   [x] Continue per plan of care [] Alter current plan (see comments)  [] Plan of care initiated [] Hold pending MD visit [] Discharge    Plan for Next Session: Follow protocol for TRS per Dr. Jacklyn Au. Focus on getting full range, and strength.    Electronically signed by:  Ruth Hawkins, PT,

## 2019-05-14 ENCOUNTER — HOSPITAL ENCOUNTER (OUTPATIENT)
Dept: PHYSICAL THERAPY | Age: 64
Setting detail: THERAPIES SERIES
Discharge: HOME | End: 2019-05-14
Payer: MEDICARE

## 2019-05-14 PROCEDURE — G0283 ELEC STIM OTHER THAN WOUND: HCPCS

## 2019-05-14 PROCEDURE — 97110 THERAPEUTIC EXERCISES: CPT

## 2019-05-14 PROCEDURE — 97140 MANUAL THERAPY 1/> REGIONS: CPT

## 2019-05-14 NOTE — FLOWSHEET NOTE
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 Viraj Child 3/12/19    Visit# / total visits:    Pain level:  1/10    G-Code (if applicable):      Date / Visit # G-Code Applied:  /       Progress Note: [x]  Yes  [x]  No  Next due by: Visit #10      History of Injury: See below  Subjective:  Subjective  Subjective: Patient had pain since  in the right shoulder,  since a fall in . Pt has arthritis which progressed over time. She continued to exercise and do pool exercises. Patient had reverse TSR in left arm last year and she is left handed. She had right  reverse TSR on 19. She reports more intense pain on right shoulder post op than on left shoulder post op. She had x ray this morning and so pain is worse since then as well due to postitions for x ray. She had an arthroscopic surgery on right shoulder in  . She did not have injections in the right shoulder. She had PT before and after  arthroscopic surgery. 03/15/19 Patient reports shoulder has been very sore,she is unable to sleep at all at night.  19 Patient reports shoulder has been feeling a little better,MD put her on muscle relaxers which has been helping some. 3/22/19: Pt reports that her R forearm is more painful than the shoulder. 19 Patient reports shoulder is feeling better,forearm still sore. States she has not used sling in a couple of days.  3/28/19: Pt reports that the forearm is a little better since she stopped wearing the sling. 4/1/19; Pt reports that she was able to \"get out\" yesterday. She was able to take a shower independently and could  put on her seat belt and she was able to use blow dryer and  curlers for her hair. 04/04/19; Patient reports her shoulder and forearm feels better she is able to use her left arm to cut food. States her back and knee has been hurting a lot today. 4/8/19:\"My back is acting up again. \" Pt was able to use light towels to dry after shower. Pt was able to get on pants today. 04/11/19; Patient reports she drove today for the first time since surgery. States shoulder has been feeling better overall. 04/15/19: Patient reports she is able to get dressed easier and driving now. 4/18/19: Pt reports that her shoulder is \"not too bad,\" Pt is able to do more ADL's.  04/22/19: Patient reports she has been using her shoulder more. 4/26/19: Pt reports that her ortho doctor told her that she had no restrictions and could use her right arm to \"do anything. \" Her shoulder is feeling well. 4/30/19: Pt reports minimal pain on shoulder. 5/2/19: Pt reports very minimal pain. 5/7/19: Pt reports no pain. 5/10/19: Pt reports no pain. 5/14/19: Pt reports that she has been doing a lot of work at home, so pain is a little more from lifting a lot.     Objective:   Observation:   Test measurements:  Shoulder flexion: 130 , Shoulder scaption: 80, Int ROT: 30  Ext Rot: -20 deg of neutral          5/7/19: Flex:  135  Abd: 75  IR:  45  ER: 5       Exercises:  Exercise/Equipment Resistance/Repetitions Other comments   Shoulder shrugs, 10X    Scap retraction 10X    Table slides 10X 10 sec Flex and scaption   Wrist flex/ext     Elbow flex/ext 10X 1# each    Ext rot to neutral 10X    PROM on right shoulder 10X To 30 deg of ER   Sub max painfree isometrics 5 sec x 10 manual    delete for now due to pain onknee    for cane exercises into flexion 5 sec x 10, with 2# as of 5/14/19 4/1, 5/101/9 Add 2# wt next time   Right elbow extension (by her side while sitting) for right wrist ext and flex  into extension 20X 4/1   UE ranger seated/standing Added 4/8/19  X 20  Added standing 5/7/19      nustep ue  L3 x 6 min Added 4/15   OH pulleys 4 min Added 4/15   Ball rolls 20X 5/2/19   Ball on the wall 20X each Added 5/7/19   Arm circles - CW and CCW 2.0 # x 5 each way 5/7/19   Shoulder Flex/Scaption 1# on each 2x 10 5/10/19   EXT rot stretch 5 sec x 10, 5/14/19: Tried door stretch with success    Wall slides 5 sec x 10    T bands for rows, IR/ER Rows green x 20  Added 5/14/19     Other Therapeutic Activities:  Patient was educated on diagnosis, plan of care and prognosis of their complaint. Also, frequency and duration of treatments was discussed. Patient was informed of the attendance policy and issued a copy for their records. 3/22/19: Reviewed with patient the protocol for Phase 2 (3-6 weeks). Advised her to wean off sling at home due to forearm pain  - wear it only in car and in congested areas and in bed. ) Reminded her to avoid lifting more that a coffee cup. Home Exercise Program: Patient was given written instructions for home exercises as above. Patient performs them correctly and understands purpose. Reviewed program given to her by [facility] PT , all of which are still appropriate. Added codman's exercises for home. 3/28/19: Gave pt written instructions for isometrics to add to HEP. 4/1/19:  Added cane exercises into flexion while supine for HEP  Manual Treatments:  STM to scar and shoulder, , PROM per protocol seated  x 15 min    Modalities:    IFC with CP to right shoulder and MHP to  LB x 15 min  Timed Code Treatment Minutes:    65  Total Treatment Minutes:    80  Treatment/Activity Tolerance:  [x] Patient tolerated treatment well [] Patient limited by fatigue  [] Patient limited by pain  [] Patient limited by other medical complications  [x] Other: 5/10/19: Started strengthening exercises. 5/14/19: KX modifier applied . Pt will benefit from more PT for strengthening, following her reverse TSR on 2/26/19. .    Prognosis: [x] Good [] Fair  [] Poor    Goals:    Short term goals  Time Frame for Short term goals: 6 weeks  Short term goal 1: Pt will be independent in HEP for right shoulder/MET  Short term goal 2: Pt will increase right shoulder ROM  by at least 20 degrees/Partially MET for flex and Int Rot  Short term goal 3: Pt will begin to use right UE for ADL's /MET     Long term goals  Time Frame for Long term goals : 12 weeks  Long term goal 1: Pt will increase strength of right shoulder to 3+/5 - 4/5  Long term goal 2: Pt will use right UE for most ADL's without pain       Patient Requires Follow-up: [x] Yes  [] No    Plan:   [x] Continue per plan of care [] Alter current plan (see comments)  [] Plan of care initiated [] Hold pending MD visit [] Discharge    Plan for Next Session: Follow protocol for TRS per Dr. Lazara Thorne. Focus on getting full range, and strength.    Electronically signed by:  Gogo Tay, PT,

## 2019-05-17 ENCOUNTER — HOSPITAL ENCOUNTER (OUTPATIENT)
Dept: PHYSICAL THERAPY | Age: 64
Setting detail: THERAPIES SERIES
Discharge: HOME | End: 2019-05-17
Payer: MEDICARE

## 2019-05-17 PROCEDURE — G0283 ELEC STIM OTHER THAN WOUND: HCPCS

## 2019-05-17 PROCEDURE — 97110 THERAPEUTIC EXERCISES: CPT

## 2019-05-17 PROCEDURE — 97140 MANUAL THERAPY 1/> REGIONS: CPT

## 2019-05-17 NOTE — FLOWSHEET NOTE
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 Karen Ruiz 3/12/19    Visit# / total visits:    Pain level:  1/10    G-Code (if applicable):      Date / Visit # G-Code Applied:  /       Progress Note: [x]  Yes  [x]  No  Next due by: Visit #10      History of Injury: See below  Subjective:  Subjective  Subjective: Patient had pain since  in the right shoulder,  since a fall in . Pt has arthritis which progressed over time. She continued to exercise and do pool exercises. Patient had reverse TSR in left arm last year and she is left handed. She had right  reverse TSR on 19. She reports more intense pain on right shoulder post op than on left shoulder post op. She had x ray this morning and so pain is worse since then as well due to postitions for x ray. She had an arthroscopic surgery on right shoulder in  . She did not have injections in the right shoulder. She had PT before and after  arthroscopic surgery. 03/15/19 Patient reports shoulder has been very sore,she is unable to sleep at all at night.  19 Patient reports shoulder has been feeling a little better,MD put her on muscle relaxers which has been helping some. 3/22/19: Pt reports that her R forearm is more painful than the shoulder. 19 Patient reports shoulder is feeling better,forearm still sore. States she has not used sling in a couple of days.  3/28/19: Pt reports that the forearm is a little better since she stopped wearing the sling. 4/1/19; Pt reports that she was able to \"get out\" yesterday. She was able to take a shower independently and could  put on her seat belt and she was able to use blow dryer and  curlers for her hair. 04/04/19; Patient reports her shoulder and forearm feels better she is able to use her left arm to cut food. States her back and knee has been hurting a lot today. 4/8/19:\"My back is acting up again. \" Pt was able to use light towels to dry after shower. Pt was able to get on pants today. 04/11/19; Patient reports she drove today for the first time since surgery. States shoulder has been feeling better overall. 04/15/19: Patient reports she is able to get dressed easier and driving now. 4/18/19: Pt reports that her shoulder is \"not too bad,\" Pt is able to do more ADL's.  04/22/19: Patient reports she has been using her shoulder more. 4/26/19: Pt reports that her ortho doctor told her that she had no restrictions and could use her right arm to \"do anything. \" Her shoulder is feeling well. 4/30/19: Pt reports minimal pain on shoulder. 5/2/19: Pt reports very minimal pain. 5/7/19: Pt reports no pain. 5/10/19: Pt reports no pain. 5/14/19: Pt reports that she has been doing a lot of work at home, so pain is a little more from lifting a lot. 05/17/19: Patient reports shoulder is doing ok,using shoulder more.     Objective:   Observation:   Test measurements:  Shoulder flexion: 130 , Shoulder scaption: 80, Int ROT: 30  Ext Rot: -20 deg of neutral          5/7/19: Flex:  135  Abd: 75  IR:  45  ER: 5       Exercises:  Exercise/Equipment Resistance/Repetitions Other comments   Shoulder shrugs, 10X    Scap retraction 10X    Table slides 10X 10 sec Flex and scaption   Wrist flex/ext     Elbow flex/ext 10X 1# each    Ext rot to neutral 10X    PROM on right shoulder 10X To 30 deg of ER   Sub max painfree isometrics 5 sec x 10 manual    delete for now due to pain onknee    for cane exercises into flexion 5 sec x 10, with 2# as of 5/14/19 4/1, 5/101/9 Add 2# wt next time   Right elbow extension (by her side while sitting) for right wrist ext and flex  into extension 20X 4/1   UE ranger seated/standing Added 4/8/19  X 20  Added standing 5/7/19      nustep ue  L3 x 6 min Added 4/15   OH pulleys 4 min Added 4/15   Ball rolls 20X 5/2/19   Ball on the wall 20X each Added 5/7/19   Arm circles - CW and CCW 2.0 # x 5 each way 5/7/19   Shoulder Flex/Scaption 1# on each 2x 10 5/10/19   EXT rot stretch 5 sec x 10, 5/14/19: Tried door stretch with success    Wall slides 5 sec x 10    T bands for rows, IR/ER Rows green x 20   Yellow 20 x each Added 5/14/19     Other Therapeutic Activities:  Patient was educated on diagnosis, plan of care and prognosis of their complaint. Also, frequency and duration of treatments was discussed. Patient was informed of the attendance policy and issued a copy for their records. 3/22/19: Reviewed with patient the protocol for Phase 2 (3-6 weeks). Advised her to wean off sling at home due to forearm pain  - wear it only in car and in congested areas and in bed. ) Reminded her to avoid lifting more that a coffee cup. Home Exercise Program: Patient was given written instructions for home exercises as above. Patient performs them correctly and understands purpose. Reviewed program given to her by [facility] PT , all of which are still appropriate. Added codman's exercises for home. 3/28/19: Gave pt written instructions for isometrics to add to HEP. 4/1/19:  Added cane exercises into flexion while supine for HEP  Manual Treatments:  STM to scar and shoulder, , PROM per protocol seated  x 15 min    Modalities:    IFC with CP to right shoulder and MHP to  LB x 15 min  Timed Code Treatment Minutes:    65  Total Treatment Minutes:    80  Treatment/Activity Tolerance:  [x] Patient tolerated treatment well [] Patient

## 2019-05-22 ENCOUNTER — HOSPITAL ENCOUNTER (OUTPATIENT)
Dept: PHYSICAL THERAPY | Age: 64
Setting detail: THERAPIES SERIES
Discharge: HOME | End: 2019-05-22
Payer: MEDICARE

## 2019-05-22 PROCEDURE — 97110 THERAPEUTIC EXERCISES: CPT

## 2019-05-22 PROCEDURE — G0283 ELEC STIM OTHER THAN WOUND: HCPCS

## 2019-05-22 PROCEDURE — 97140 MANUAL THERAPY 1/> REGIONS: CPT

## 2019-05-22 NOTE — FLOWSHEET NOTE
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 Bhakti Augustin 3/12/19    Visit# / total visits:    Pain level:  1/10    G-Code (if applicable):      Date / Visit # G-Code Applied:  /       Progress Note: [x]  Yes  [x]  No  Next due by: Visit #10      History of Injury: See below  Subjective:  Subjective  Subjective: Patient had pain since  in the right shoulder,  since a fall in . Pt has arthritis which progressed over time. She continued to exercise and do pool exercises. Patient had reverse TSR in left arm last year and she is left handed. She had right  reverse TSR on 19. She reports more intense pain on right shoulder post op than on left shoulder post op. She had x ray this morning and so pain is worse since then as well due to postitions for x ray. She had an arthroscopic surgery on right shoulder in  . She did not have injections in the right shoulder. She had PT before and after  arthroscopic surgery. 03/15/19 Patient reports shoulder has been very sore,she is unable to sleep at all at night.  19 Patient reports shoulder has been feeling a little better,MD put her on muscle relaxers which has been helping some. 3/22/19: Pt reports that her R forearm is more painful than the shoulder. 19 Patient reports shoulder is feeling better,forearm still sore. States she has not used sling in a couple of days.  3/28/19: Pt reports that the forearm is a little better since she stopped wearing the sling. 4/1/19; Pt reports that she was able to \"get out\" yesterday. She was able to take a shower independently and could  put on her seat belt and she was able to use blow dryer and  curlers for her hair. 04/04/19; Patient reports her shoulder and forearm feels better she is able to use her left arm to cut food. States her back and knee has been hurting a lot today. 4/8/19:\"My back is acting up again. \" Pt was able to use light towels to dry after shower. Pt was able to get on pants today. 04/11/19; Patient reports she drove today for the first time since surgery. States shoulder has been feeling better overall. 04/15/19: Patient reports she is able to get dressed easier and driving now. 4/18/19: Pt reports that her shoulder is \"not too bad,\" Pt is able to do more ADL's.  04/22/19: Patient reports she has been using her shoulder more. 4/26/19: Pt reports that her ortho doctor told her that she had no restrictions and could use her right arm to \"do anything. \" Her shoulder is feeling well. 4/30/19: Pt reports minimal pain on shoulder. 5/2/19: Pt reports very minimal pain. 5/7/19: Pt reports no pain. 5/10/19: Pt reports no pain. 5/14/19: Pt reports that she has been doing a lot of work at home, so pain is a little more from lifting a lot. 05/17/19: Patient reports shoulder is doing ok,using shoulder more. 5/22/19: Pt reports that her shoulder increased in pain to 4-5/10 but now it is OK again.     Objective:   Observation:   Test measurements:  Shoulder flexion: 130 , Shoulder scaption: 80, Int ROT: 30  Ext Rot: -20 deg of neutral          5/7/19: Flex:  135  Abd: 75  IR:  45  ER: 5       Exercises:  Exercise/Equipment Resistance/Repetitions Other comments   Shoulder shrugs, 10X    Scap retraction 10X    Table slides 10X 10 sec Flex and scaption   Wrist flex/ext     Elbow flex/ext 10X 1# each    Ext rot to neutral 10X    PROM on right shoulder 10X To 30 deg of ER   Sub max painfree isometrics 5 sec x 10 manual    delete for now due to pain onknee    for cane exercises into flexion 5 sec x 20, with 2# as of 5/14/19 4/1, 5/101/9 Add 2# wt next time   Right elbow extension (by her side while sitting) for right wrist ext and flex  into extension 20X 4/1   UE ranger seated/standing Added 4/8/19  X 20  Added standing 5/7/19      nustep ue  L3 x 6 min Added 4/15   OH pulleys 4 min Added 4/15   Ball rolls 20X 5/2/19   Ball on the wall 20X each Added 5/7/19   Arm circles - CW and CCW 2.0 # x 5 each way 5/7/19   Shoulder Flex/Scaption 1# on each 2x 10 5/10/19   EXT rot stretch 5 sec x 10, 5/14/19: Tried door stretch with success    Wall slides 5 sec x 10    T bands for rows, IR/ER Rows green x 20   Yellow 20 x each Added 5/14/19     Other Therapeutic Activities:  Patient was educated on diagnosis, plan of care and prognosis of their complaint. Also, frequency and duration of treatments was discussed. Patient was informed of the attendance policy and issued a copy for their records. 3/22/19: Reviewed with patient the protocol for Phase 2 (3-6 weeks). Advised her to wean off sling at home due to forearm pain  - wear it only in car and in congested areas and in bed. ) Reminded her to avoid lifting more that a coffee cup. Home Exercise Program: Patient was given written instructions for home exercises as above. Patient performs them correctly and understands purpose. Reviewed program given to her by [facility] PT , all of which are still appropriate. Added codman's exercises for home. 3/28/19: Gave pt written instructions for isometrics to add to HEP. 4/1/19:  Added cane exercises into flexion while supine for HEP  Manual Treatments:  STM to scar and shoulder, , PROM per protocol seated  x 12 min    Modalities:    IFC with CP to right shoulder and MHP to  LB x 15 min  Timed Code Treatment Minutes:    60  Total Treatment Minutes: 80  Treatment/Activity Tolerance:  [x] Patient tolerated treatment well [] Patient limited by fatigue  [] Patient limited by pain  [] Patient limited by other medical complications  [x] Other: 5/10/19: Started strengthening exercises. 5/14/19: KX modifier applied . Pt will benefit from more PT for strengthening, following her reverse TSR on 2/26/19. .    Prognosis: [x] Good [] Fair  [] Poor    Goals:    Short term goals  Time Frame for Short term goals: 6 weeks  Short term goal 1: Pt will be independent in HEP for right shoulder/MET  Short term goal 2: Pt will increase right shoulder ROM  by at least 20 degrees/Partially MET for flex and Int Rot  Short term goal 3: Pt will begin to use right UE for ADL's /MET     Long term goals  Time Frame for Long term goals : 12 weeks  Long term goal 1: Pt will increase strength of right shoulder to 3+/5 - 4/5  Long term goal 2: Pt will use right UE for most ADL's without pain       Patient Requires Follow-up: [x] Yes  [] No    Plan:   [x] Continue per plan of care [] Alter current plan (see comments)  [] Plan of care initiated [] Hold pending MD visit [] Discharge    Plan for Next Session: Follow protocol for TRS per Dr. Jacklyn Au. Focus on getting full range, and strength.    Electronically signed by:  Ruth Hawkins, PT,

## 2019-05-24 ENCOUNTER — APPOINTMENT (OUTPATIENT)
Dept: PHYSICAL THERAPY | Age: 64
End: 2019-05-24
Payer: MEDICARE

## 2019-05-28 ENCOUNTER — HOSPITAL ENCOUNTER (OUTPATIENT)
Dept: PHYSICAL THERAPY | Age: 64
Setting detail: THERAPIES SERIES
Discharge: HOME | End: 2019-05-28
Payer: MEDICARE

## 2019-05-28 PROCEDURE — 97140 MANUAL THERAPY 1/> REGIONS: CPT

## 2019-05-28 PROCEDURE — G0283 ELEC STIM OTHER THAN WOUND: HCPCS

## 2019-05-28 PROCEDURE — 97110 THERAPEUTIC EXERCISES: CPT

## 2019-05-28 NOTE — FLOWSHEET NOTE
Physical Therapy Daily Treatment Note  Date:  2019    Patient Name:  Tito Lzoano    :  1955  MRN: [id number]    Restrictions/Precautions:      Pertinent Medical History:  , had reverse left TSR last     Medical/Treatment Diagnosis Information:  · Diagnosis: Reverse right TSR 19  · Treatment Diagnosis: S/P reverse right TSR, surgical pain, limited ROM and weakness of Right shoulder and UE    Insurance/Certification information:  PT Insurance Information: Medicare  Physician Information:  Referring Practitioner: SRIKANTH Carlos  Plan of care signed (Y/N):  Sent to SRIKANTH Carlos/Dr. Rosalino Bradley 3/12/19    Visit# / total visits:    Pain level:  0/10    G-Code (if applicable):      Date / Visit # G-Code Applied:  /       Progress Note: [x]  Yes  [x]  No  Next due by: Visit #10      History of Injury: See below  Subjective:  Subjective  Subjective: Patient had pain since  in the right shoulder,  since a fall in . Pt has arthritis which progressed over time. She continued to exercise and do pool exercises. Patient had reverse TSR in left arm last year and she is left handed. She had right  reverse TSR on 19. She reports more intense pain on right shoulder post op than on left shoulder post op. She had x ray this morning and so pain is worse since then as well due to postitions for x ray. She had an arthroscopic surgery on right shoulder in  . She did not have injections in the right shoulder. She had PT before and after  arthroscopic surgery. 03/15/19 Patient reports shoulder has been very sore,she is unable to sleep at all at night.  19 Patient reports shoulder has been feeling a little better,MD put her on muscle relaxers which has been helping some. 3/22/19: Pt reports that her R forearm is more painful than the shoulder. 19 Patient reports shoulder is feeling better,forearm still sore. States she has not used sling in a couple of days.  3/28/19: Pt reports that the forearm is a little better since she stopped wearing the sling. 4/1/19; Pt reports that she was able to \"get out\" yesterday. She was able to take a shower independently and could  put on her seat belt and she was able to use blow dryer and  curlers for her hair. 04/04/19; Patient reports her shoulder and forearm feels better she is able to use her left arm to cut food. States her back and knee has been hurting a lot today. 4/8/19:\"My back is acting up again. \" Pt was able to use light towels to dry after shower. Pt was able to get on pants today. 04/11/19; Patient reports she drove today for the first time since surgery. States shoulder has been feeling better overall. 04/15/19: Patient reports she is able to get dressed easier and driving now. 4/18/19: Pt reports that her shoulder is \"not too bad,\" Pt is able to do more ADL's.  04/22/19: Patient reports she has been using her shoulder more. 4/26/19: Pt reports that her ortho doctor told her that she had no restrictions and could use her right arm to \"do anything. \" Her shoulder is feeling well. 4/30/19: Pt reports minimal pain on shoulder. 5/2/19: Pt reports very minimal pain. 5/7/19: Pt reports no pain. 5/10/19: Pt reports no pain. 5/14/19: Pt reports that she has been doing a lot of work at home, so pain is a little more from lifting a lot. 05/17/19: Patient reports shoulder is doing ok,using shoulder more. 5/22/19: Pt reports that her shoulder increased in pain to 4-5/10 but now it is OK again. 5/28/19: Pt reports that her shoulder feels well today.     Objective:   Observation:   Test measurements:  Shoulder flexion: 130 , Shoulder scaption: 80, Int ROT: 30  Ext Rot: -20 deg of neutral          5/7/19: Flex:  135  Abd: 75  IR:  45  ER: 5       Exercises:  Exercise/Equipment Resistance/Repetitions Other comments   Shoulder shrugs, 10X    Scap retraction 10X    Table slides 10X 10 sec Flex and scaption   Wrist flex/ext     Elbow flex/ext 10X 1# each    Ext rot to neutral 10X    PROM on right shoulder 10X To 30 deg of ER   Sub max painfree isometrics 5 sec x 10 manual    delete for now due to pain onknee    for cane exercises into flexion 5 sec x 20, with 2# as of 5/14/19 4/1, 5/101/9 Add 2# wt next time   Right elbow extension (by her side while sitting) for right wrist ext and flex  into extension 20X 4/1   UE ranger seated/standing Added 4/8/19  X 20  Added standing 5/7/19      nustep ue  L3 x 6 min Added 4/15   OH pulleys 4 min Added 4/15   Ball rolls 20X 5/2/19   Ball on the wall 20X each Added 5/7/19   Arm circles - CW and CCW 2.0 # x 5 each way 5/7/19   Shoulder Flex/Scaption 1# on each 2x 10 5/10/19, progress to 2#   EXT rot stretch 5 sec x 10, 5/14/19: Tried door stretch with success    Wall slides 5 sec x 20    T bands for rows, IR/ER Rows green x 20   Yellow 20 x each Added 5/14/19   Wall push ups 10X 5/28/19     Other Therapeutic Activities:  Patient was educated on diagnosis, plan of care and prognosis of their complaint. Also, frequency and duration of treatments was discussed. Patient was informed of the attendance policy and issued a copy for their records. 3/22/19: Reviewed with patient the protocol for Phase 2 (3-6 weeks). Advised her to wean off sling at home due to forearm pain  - wear it only in car and in congested areas and in bed. ) Reminded her to avoid lifting more that a coffee cup. Home Exercise Program: Patient was given written instructions for home exercises as above. Patient performs them correctly and understands purpose. Reviewed program given to her by [facility] , all of which are still appropriate. Added codman's exercises for home. 3/28/19: Gave pt written instructions for isometrics to add to HEP. 4/1/19:  Added cane exercises into flexion while supine for HEP  Manual Treatments:  STM to scar and shoulder, , PROM per protocol seated  x 15 min    Modalities:    IFC with CP to right shoulder and MHP to  LB x 15 min  Timed Code Treatment Minutes:    60  Total Treatment Minutes:    80  Treatment/Activity Tolerance:  [x] Patient tolerated treatment well [] Patient limited by fatigue  [] Patient limited by pain  [] Patient limited by other medical complications  [x] Other: 5/10/19: Started strengthening exercises. 5/14/19: KX modifier applied . Pt will benefit from more PT for strengthening, following her reverse TSR on 2/26/19. .    Prognosis: [x] Good [] Fair  [] Poor    Goals:    Short term goals  Time Frame for Short term goals: 6 weeks  Short term goal 1: Pt will be independent in HEP for right shoulder/MET  Short term goal 2: Pt will increase right shoulder ROM  by at least 20 degrees/Partially MET for flex and Int Rot  Short term goal 3: Pt will begin to use right UE for ADL's /MET     Long term goals  Time Frame for Long term goals : 12 weeks  Long term goal 1: Pt will increase strength of right shoulder to 3+/5 - 4/5  Long term goal 2: Pt will use right UE for most ADL's without pain       Patient Requires Follow-up: [x] Yes  [] No    Plan:   [x] Continue per plan of care [] Alter current plan (see comments)  [] Plan of care initiated [] Hold pending MD visit [] Discharge    Plan for Next Session: Follow protocol for TRS per Dr. Crystal Viera. Focus on getting full range, and strength.    Electronically signed by:  Jose Ureña, PT,

## 2019-06-03 ENCOUNTER — APPOINTMENT (OUTPATIENT)
Dept: PHYSICAL THERAPY | Age: 64
End: 2019-06-03
Payer: MEDICARE

## 2019-06-04 ENCOUNTER — HOSPITAL ENCOUNTER (OUTPATIENT)
Dept: PHYSICAL THERAPY | Age: 64
Setting detail: THERAPIES SERIES
Discharge: HOME | End: 2019-06-04
Payer: MEDICARE

## 2019-06-04 PROCEDURE — 97110 THERAPEUTIC EXERCISES: CPT

## 2019-06-04 PROCEDURE — 97140 MANUAL THERAPY 1/> REGIONS: CPT

## 2019-06-04 PROCEDURE — G0283 ELEC STIM OTHER THAN WOUND: HCPCS

## 2019-06-04 NOTE — FLOWSHEET NOTE
Physical Therapy Daily Treatment Note  Date:  2019    Patient Name:  Katelyn Goodwin    :  1955  MRN: [id number]    Restrictions/Precautions:      Pertinent Medical History:  , had reverse left TSR last     Medical/Treatment Diagnosis Information:  · Diagnosis: Reverse right TSR 19  · Treatment Diagnosis: S/P reverse right TSR, surgical pain, limited ROM and weakness of Right shoulder and UE    Insurance/Certification information:  PT Insurance Information: Medicare  Physician Information:  Referring Practitioner: SRIKANTH Craig  Plan of care signed (Y/N):  Sent to SRIKANTH Craig/Dr. Virginia Dumont 3/12/19    Visit# / total visits:    Pain level:  0/10    G-Code (if applicable):      Date / Visit # G-Code Applied:  /       Progress Note: [x]  Yes  [x]  No  Next due by: Visit #10      History of Injury: See below  Subjective:  Subjective  Subjective: Patient had pain since  in the right shoulder,  since a fall in . Pt has arthritis which progressed over time. She continued to exercise and do pool exercises. Patient had reverse TSR in left arm last year and she is left handed. She had right  reverse TSR on 19. She reports more intense pain on right shoulder post op than on left shoulder post op. She had x ray this morning and so pain is worse since then as well due to postitions for x ray. She had an arthroscopic surgery on right shoulder in  . She did not have injections in the right shoulder. She had PT before and after  arthroscopic surgery. 03/15/19 Patient reports shoulder has been very sore,she is unable to sleep at all at night.  19 Patient reports shoulder has been feeling a little better,MD put her on muscle relaxers which has been helping some. 3/22/19: Pt reports that her R forearm is more painful than the shoulder. 19 Patient reports shoulder is feeling better,forearm still sore. States she has not used sling in a couple of days.  3/28/19: Pt reports that the forearm is a little better since she stopped wearing the sling. 4/1/19; Pt reports that she was able to \"get out\" yesterday. She was able to take a shower independently and could  put on her seat belt and she was able to use blow dryer and  curlers for her hair. 04/04/19; Patient reports her shoulder and forearm feels better she is able to use her left arm to cut food. States her back and knee has been hurting a lot today. 4/8/19:\"My back is acting up again. \" Pt was able to use light towels to dry after shower. Pt was able to get on pants today. 04/11/19; Patient reports she drove today for the first time since surgery. States shoulder has been feeling better overall. 04/15/19: Patient reports she is able to get dressed easier and driving now. 4/18/19: Pt reports that her shoulder is \"not too bad,\" Pt is able to do more ADL's.  04/22/19: Patient reports she has been using her shoulder more. 4/26/19: Pt reports that her ortho doctor told her that she had no restrictions and could use her right arm to \"do anything. \" Her shoulder is feeling well. 4/30/19: Pt reports minimal pain on shoulder. 5/2/19: Pt reports very minimal pain. 5/7/19: Pt reports no pain. 5/10/19: Pt reports no pain. 5/14/19: Pt reports that she has been doing a lot of work at home, so pain is a little more from lifting a lot. 05/17/19: Patient reports shoulder is doing ok,using shoulder more. 5/22/19: Pt reports that her shoulder increased in pain to 4-5/10 but now it is OK again. 5/28/19: Pt reports that her shoulder feels well today. 6/4/19: Pt reports no pain .     Objective:   Observation:   Test measurements:  Shoulder flexion: 130 , Shoulder scaption: 80, Int ROT: 30  Ext Rot: -20 deg of neutral          5/7/19: Flex:  135  Abd: 75  IR:  45  ER: 5       Exercises:  Exercise/Equipment Resistance/Repetitions Other comments   Shoulder shrugs, 10X    Scap retraction 10X    Table slides 10X 10 sec Flex and scaption   Wrist flex/ext     Elbow flex/ext 10X 1# each    Ext rot to neutral 10X    PROM on right shoulder 10X To 30 deg of ER   Sub max painfree isometrics 5 sec x 10 manual    delete for now due to pain onknee    for cane exercises into flexion 5 sec x 20, with 2# as of 5/14/19 4/1, 5/101/9 Add 2# wt next time   Right elbow extension (by her side while sitting) for right wrist ext and flex  into extension 20X 4/1   UE ranger seated/standing Added 4/8/19  X 20  Added standing 5/7/19      nustep ue  L3 x 6 min Added 4/15   OH pulleys 4 min Added 4/15   Ball rolls 20X 5/2/19   Ball on the wall 20X each Added 5/7/19   Arm circles - CW and CCW 2.0 # x 5 each way 5/7/19   Shoulder Flex/Scaption 1# on each 2x 10 5/10/19, progress to 2#   EXT rot stretch 5 sec x 10, 5/14/19: Tried door stretch with success    Wall slides 5 sec x 20    T bands for rows, IR/ER Rows orange x 20   Yellow 20 x each Added 5/14/19   Wall push ups 10X 5/28/19     Other Therapeutic Activities:  Patient was educated on diagnosis, plan of care and prognosis of their complaint. Also, frequency and duration of treatments was discussed. Patient was informed of the attendance policy and issued a copy for their records. 3/22/19: Reviewed with patient the protocol for Phase 2 (3-6 weeks). Advised her to wean off sling at home due to forearm pain  - wear it only in car and in congested areas and in bed. ) Reminded her to avoid lifting more that a coffee cup. Home Exercise Program: Patient was given written instructions for home exercises as above. Patient performs them correctly and understands purpose. Reviewed program given to her by [facility] PT , all of which are still appropriate. Added codman's exercises for home. 3/28/19: Gave pt written instructions for isometrics to add to HEP. 4/1/19:  Added cane exercises into flexion while supine for HEP  Manual Treatments:  STM to scar and shoulder, , PROM per protocol seated  x 15 min    Modalities:    IFC with CP to right shoulder and MHP to  LB and CP to right knee x 15 min  Timed Code Treatment Minutes:    60  Total Treatment Minutes:    80  Treatment/Activity Tolerance:  [x] Patient tolerated treatment well [] Patient limited by fatigue  [] Patient limited by pain  [] Patient limited by other medical complications  [x] Other: 5/10/19: Started strengthening exercises. 5/14/19: KX modifier applied . Pt will benefit from more PT for strengthening, following her reverse TSR on 2/26/19. .    Prognosis: [x] Good [] Fair  [] Poor    Goals:    Short term goals  Time Frame for Short term goals: 6 weeks  Short term goal 1: Pt will be independent in HEP for right shoulder/MET  Short term goal 2: Pt will increase right shoulder ROM  by at least 20 degrees/Partially MET for flex and Int Rot  Short term goal 3: Pt will begin to use right UE for ADL's /MET     Long term goals  Time Frame for Long term goals : 12 weeks  Long term goal 1: Pt will increase strength of right shoulder to 3+/5 - 4/5  Long term goal 2: Pt will use right UE for most ADL's without pain       Patient Requires Follow-up: [x] Yes  [] No    Plan:   [x] Continue per plan of care [] Alter current plan (see comments)  [] Plan of care initiated [] Hold pending MD visit [] Discharge    Plan for Next Session: Prepare for DC.   Electronically signed by:  Vira Cooks, PT,

## 2019-06-11 ENCOUNTER — HOSPITAL ENCOUNTER (OUTPATIENT)
Dept: PHYSICAL THERAPY | Age: 64
Setting detail: THERAPIES SERIES
Discharge: HOME | End: 2019-06-11
Payer: MEDICARE

## 2019-06-11 PROCEDURE — 97530 THERAPEUTIC ACTIVITIES: CPT

## 2019-06-11 PROCEDURE — 97110 THERAPEUTIC EXERCISES: CPT

## 2019-06-11 PROCEDURE — G0283 ELEC STIM OTHER THAN WOUND: HCPCS

## 2019-06-11 NOTE — FLOWSHEET NOTE
Shoulder scaption: 80, Int ROT: 30  Ext Rot: -20 deg of neutral          5/7/19: Flex:  135  Abd: 75  IR:  45  ER: 5           6/11/19: ROM: Flex: 135   ABD: 71   IR: 0-91  ER: 0-20                      Strength:  Flex: 4-/5   Deltoid: 4-/5   IR:3+/5   ER:  3/5     Exercises:  Exercise/Equipment Resistance/Repetitions Other comments   Shoulder shrugs, 10X    Scap retraction 10X     10X 10 sec Flex and scaption         10X 1# each    Ext rot to neutral 10X    PROM on right shoulder 10X To 30 deg of ER   Sub max painfree isometrics 5 sec x 10 manual    delete for now due to pain onknee    for cane exercises into flexion 5 sec x 20, with 2# as of 5/14/19 4/1, 5/101/9 Add 2# wt next time    20X 4/1   UE ranger seated/standing Added 4/8/19  X 20  Added standing 5/7/19      nustep ue  L3 x 6 min Added 4/15   OH pulleys 4 min Added 4/15   Ball rolls 20X 5/2/19   Ball on the wall 20X each Added 5/7/19   Arm circles - CW and CCW 2.0 # x 5 each way 5/7/19   Shoulder Flex/Scaption 1# on each 2x 10 5/10/19, progress to 2#   EXT rot stretch 5 sec x 10, 5/14/19: Tried door stretch with success    Wall slides 5 sec x 20    T bands for rows, IR/ER Rows orange x 20   Yellow 20 x each Added 5/14/19   Wall push ups 20X 5/28/19   Hands behind head  Add     Other Therapeutic Activities:  Patient was educated on diagnosis, plan of care and prognosis of their complaint. Also, frequency and duration of treatments was discussed. Patient was informed of the attendance policy and issued a copy for their records. 3/22/19: Reviewed with patient the protocol for Phase 2 (3-6 weeks). Advised her to wean off sling at home due to forearm pain  - wear it only in car and in congested areas and in bed. ) Reminded her to avoid lifting more that a coffee cup. 6/11/19: Reviewed Quick Dash and took measurements. Discussed goals. Home Exercise Program: Patient was given written instructions for home exercises as above.  Patient performs them correctly and understands purpose. Reviewed program given to her by [facility] PT , all of which are still appropriate. Added codman's exercises for home. 3/28/19: Gave pt written instructions for isometrics to add to HEP. 4/1/19:  Added cane exercises into flexion while supine for HEP 6/11/19: Gave blue and plum bands last session for progression with HEP. Reviewed all HEP. Manual Treatments:      Modalities:    IFC with CP to right shoulder and MHP to  LB  x 15 min  Timed Code Treatment Minutes:    60  Total Treatment Minutes:    80  Treatment/Activity Tolerance:  [x] Patient tolerated treatment well [] Patient limited by fatigue  [] Patient limited by pain  [] Patient limited by other medical complications  [x] Other: 6/11/19: After review of goals and discussion with patient about what she is unable to do functionally, PT offered pt the option to continue therapy once every two weeks for 4 sessions. ; Called Dr. Lencho Rojo office and asked for approval to continue. Will await call back. Reassured her that time will help her to improve in mobility and that she may not improve on right shoulder as much as her left. Pt will benefit from more PT for strengthening, following her reverse TSR on 2/26/19. .    Prognosis: [x] Good [] Fair  [] Poor    Goals:    Short term goals  Time Frame for Short term goals: 6 weeks  Short term goal 1: Pt will be independent in HEP for right shoulder/MET  Short term goal 2: Pt will increase right shoulder ROM  by at least 20 degrees/MET  Short term goal 3: Pt will begin to use right UE for ADL's /MET     Long term goals  Time Frame for Long term goals : 12 weeks  Long term goal 1: Pt will increase strength of right shoulder to 3+/5 - 4/5/Partially MET  Long term goal 2: Pt will use right UE for most ADL's without pain/ Not Met       Patient Requires Follow-up: [x] Yes  [] No    Plan:   [x] Continue per plan of care [] Alter current plan (see comments)  [] Plan of care initiated [] Hold pending MD visit [] Discharge    Plan for Next Session: Await Dr. Charito Young approval of extension of PT for 4 more sessions, once every two weeks.   Electronically signed by:  Danielle Valero PT,

## 2019-06-27 ENCOUNTER — HOSPITAL ENCOUNTER (OUTPATIENT)
Dept: PHYSICAL THERAPY | Age: 64
Setting detail: THERAPIES SERIES
Discharge: HOME | End: 2019-06-27
Payer: MEDICARE

## 2019-06-27 PROCEDURE — 97110 THERAPEUTIC EXERCISES: CPT

## 2019-06-27 PROCEDURE — G0283 ELEC STIM OTHER THAN WOUND: HCPCS

## 2019-06-27 NOTE — FLOWSHEET NOTE
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 Ovalle Self 3/12/19    Visit# / total visits:    Pain level:  0/10    G-Code (if applicable):      Date / Visit # G-Code Applied:  / 19   Quick Dash score = 31/CK    Progress Note: [x]  Yes  [x]  No  Next due by: Visit #10      History of Injury: See below  Subjective:  Subjective  Subjective: Patient had pain since  in the right shoulder,  since a fall in . Pt has arthritis which progressed over time. She continued to exercise and do pool exercises. Patient had reverse TSR in left arm last year and she is left handed. She had right  reverse TSR on 19. She reports more intense pain on right shoulder post op than on left shoulder post op. She had x ray this morning and so pain is worse since then as well due to postitions for x ray. She had an arthroscopic surgery on right shoulder in  . She did not have injections in the right shoulder. She had PT before and after  arthroscopic surgery. 03/15/19 Patient reports shoulder has been very sore,she is unable to sleep at all at night.  19 Patient reports shoulder has been feeling a little better,MD put her on muscle relaxers which has been helping some. 3/22/19: Pt reports that her R forearm is more painful than the shoulder. 19 Patient reports shoulder is feeling better,forearm still sore. States she has not used sling in a couple of days. 3/28/19: Pt reports that the forearm is a little better since she stopped wearing the sling. 4/1/19; Pt reports that she was able to \"get out\" yesterday. She was able to take a shower independently and could  put on her seat belt and she was able to use blow dryer and  curlers for her hair. 04/04/19; Patient reports her shoulder and forearm feels better she is able to use her left arm to cut food. States her back and knee has been hurting a lot today. 4/8/19:\"My back is acting up again. \" Pt was able to use light towels to dry after shower. Pt was able to get on pants today. 04/11/19; Patient reports she drove today for the first time since surgery. States shoulder has been feeling better overall. 04/15/19: Patient reports she is able to get dressed easier and driving now. 4/18/19: Pt reports that her shoulder is \"not too bad,\" Pt is able to do more ADL's.  04/22/19: Patient reports she has been using her shoulder more. 4/26/19: Pt reports that her ortho doctor told her that she had no restrictions and could use her right arm to \"do anything. \" Her shoulder is feeling well. 4/30/19: Pt reports minimal pain on shoulder. 5/2/19: Pt reports very minimal pain. 5/7/19: Pt reports no pain. 5/10/19: Pt reports no pain. 5/14/19: Pt reports that she has been doing a lot of work at home, so pain is a little more from lifting a lot. 05/17/19: Patient reports shoulder is doing ok,using shoulder more. 5/22/19: Pt reports that her shoulder increased in pain to 4-5/10 but now it is OK again. 5/28/19: Pt reports that her shoulder feels well today. 6/4/19: Pt reports no pain . 6/11/19: Pt reports a little pain and hears the popping noises in her right shoulder. Pt is not able to lift her arm to do her hair or lift her arm up to reach something on a  high shelf. Pt is able to sleep on right side now.  6/27/19: Pt reports that she feels as if swimming is very helpful.  She feels better . She still has difficulty reaching up  with right hand and reaching behind her head to fix her hair. Objective:   Observation:   Test measurements:  Shoulder flexion: 130 , Shoulder scaption: 80, Int ROT: 30  Ext Rot: -20 deg of neutral          5/7/19: Flex:  135  Abd: 75  IR:  45  ER: 5           6/11/19: ROM: Flex: 135   ABD: 71   IR: 0-91  ER: 0-20                      Strength:  Flex: 4-/5   Deltoid: 4-/5   IR:3+/5   ER:  3/5     Exercises:  Exercise/Equipment Resistance/Repetitions Other comments   10X omit   10X omit    10X 10 sec Flex and scaption, omit     omit    10X 1# each omit   Ext rot to neutral 10X    PROM on right shoulder 10X To 30 deg of ER   Sub max painfree isometrics 5 sec x 10 manual    delete for now due to pain onknee    for cane exercises into flexion 5 sec x 20, with 2# as of 5/14/19 4/1, 5/101/9 Add 2# wt next time    20X 4/1   UE ranger seated/standing Added 6/2719 - side to side motion with ranger  X 20  Added standing 5/7/19      nustep ue  L3 x 6 min Added 4/15   OH pulleys 4 min Added 4/15   20X 5/2/19   20X each Added 5/7/19   2.0 # x 5 each way 5/7/19   1# on each 2x 10 5/10/19, progress to 2#   5 sec x 10, 5/14/19: Tried door stretch with success    5 sec x 20    Rows orange x 20   Yellow 20 x each Added 5/14/19   Wall push ups 20X 5/28/19   Hands behind head 5 sec x 10 Added 6/27/19   Diagonals Orange band anchored overhead, x 10 Added 6/27/19   Fencing 2 min Added 6/27/19   Lawnmower/saws 10X increasing to 20X Added 6/27/19     Other Therapeutic Activities:  Patient was educated on diagnosis, plan of care and prognosis of their complaint. Also, frequency and duration of treatments was discussed. Patient was informed of the attendance policy and issued a copy for their records. 3/22/19: Reviewed with patient the protocol for Phase 2 (3-6 weeks).  Advised her to wean off sling at home due to forearm pain  - wear it only in car and in congested areas and in bed. ) Reminded her to avoid lifting more that a coffee cup. 6/11/19: Reviewed Quick Dash and took measurements. Discussed goals. Home Exercise Program: Patient was given written instructions for home exercises as above. Patient performs them correctly and understands purpose. Reviewed program given to her by [facility] PT , all of which are still appropriate. Added codman's exercises for home. 3/28/19: Gave pt written instructions for isometrics to add to HEP. 4/1/19:  Added cane exercises into flexion while supine for HEP 6/11/19: Gave blue and plum bands last session for progression with HEP. Reviewed all HEP.6/27/19: Gave another orange band and added 4 exercises as  noted above. Gave handouts with written instructions on new exercises. Manual Treatments:      Modalities:    IFC with CP to right shoulder  x 15 min  Timed Code Treatment Minutes:    60  Total Treatment Minutes:    80  Treatment/Activity Tolerance:  [x] Patient tolerated treatment well [] Patient limited by fatigue  [] Patient limited by pain  [] Patient limited by other medical complications  [x] Other: 6/11/19: After review of goals and discussion with patient about what she is unable to do functionally, PT offered pt the option to continue therapy once every two weeks for 4 sessions. ; Called Dr. Lc Banda office and asked for approval to continue. Will await call back. Reassured her that time will help her to improve in mobility and that she may not improve on right shoulder as much as her left. Pt will benefit from more PT for strengthening, following her reverse TSR on 2/26/19. .    Prognosis: [x] Good [] Fair  [] Poor    Goals:    Short term goals  Time Frame for Short term goals: 6 weeks  Short term goal 1: Pt will be independent in HEP for right shoulder/MET  Short term goal 2: Pt will increase right shoulder ROM  by at least 20 degrees/MET  Short term goal 3: Pt will begin to use right UE for ADL's /MET     Long term goals  Time Frame for Long term goals : 12 weeks  Long term goal 1: Pt will increase strength of right shoulder to 3+/5 - 4/5/Partially MET  Long term goal 2: Pt will use right UE for most ADL's without pain/ Not Met       Patient Requires Follow-up: [x] Yes  [] No    Plan:   [x] Continue per plan of care [] Alter current plan (see comments)  [] Plan of care initiated [] Hold pending MD visit [] Discharge    Plan for Next Session: Continue 3 more sessions, once every two weeks. Review new exercises, side to side on UE ranger and the last 4 exercises listed above. Follow up on scanned approveal for extension of 4 sessions.     Electronically signed by:  Sierra Ribeiro PT,

## 2019-07-11 ENCOUNTER — HOSPITAL ENCOUNTER (OUTPATIENT)
Dept: PHYSICAL THERAPY | Age: 64
Setting detail: THERAPIES SERIES
Discharge: HOME | End: 2019-07-11
Payer: MEDICARE

## 2019-07-11 PROCEDURE — 97110 THERAPEUTIC EXERCISES: CPT

## 2019-07-11 PROCEDURE — G0283 ELEC STIM OTHER THAN WOUND: HCPCS

## 2019-07-11 NOTE — FLOWSHEET NOTE
increase right shoulder ROM  by at least 20 degrees/MET  Short term goal 3: Pt will begin to use right UE for ADL's /MET     Long term goals  Time Frame for Long term goals : 12 weeks  Long term goal 1: Pt will increase strength of right shoulder to 3+/5 - 4/5/Partially MET  Long term goal 2: Pt will use right UE for most ADL's without pain/ Not Met       Patient Requires Follow-up: [x] Yes  [] No    Plan:   [x] Continue per plan of care [] Alter current plan (see comments)  [] Plan of care initiated [] Hold pending MD visit [] Discharge    Plan for Next Session: Continue 3 more sessions, once every two weeks. Review new exercises, side to side on UE ranger and the last 4 exercises listed above. Follow up on scanned approveal for extension of 4 sessions.     Electronically signed by:  Carol Schwab PTA,

## 2019-07-25 ENCOUNTER — HOSPITAL ENCOUNTER (OUTPATIENT)
Dept: PHYSICAL THERAPY | Age: 64
Setting detail: THERAPIES SERIES
Discharge: HOME | End: 2019-07-25
Payer: MEDICARE

## 2019-07-25 PROCEDURE — 97140 MANUAL THERAPY 1/> REGIONS: CPT

## 2019-07-25 PROCEDURE — G0283 ELEC STIM OTHER THAN WOUND: HCPCS

## 2019-07-25 PROCEDURE — 97110 THERAPEUTIC EXERCISES: CPT

## 2019-07-25 NOTE — FLOWSHEET NOTE
and duration of treatments was discussed. Patient was informed of the attendance policy and issued a copy for their records. 3/22/19: Reviewed with patient the protocol for Phase 2 (3-6 weeks). Advised her to wean off sling at home due to forearm pain  - wear it only in car and in congested areas and in bed. ) Reminded her to avoid lifting more that a coffee cup. 6/11/19: Reviewed Quick Dash and took measurements. Discussed goals. Home Exercise Program: Patient was given written instructions for home exercises as above. Patient performs them correctly and understands purpose. Reviewed program given to her by [facility] PT , all of which are still appropriate. Added codman's exercises for home. 3/28/19: Gave pt written instructions for isometrics to add to HEP. 4/1/19:  Added cane exercises into flexion while supine for HEP 6/11/19: Gave blue and plum bands last session for progression with HEP. Reviewed all HEP.6/27/19: Gave another orange band and added 4 exercises as  noted above. Gave handouts with written instructions on new exercises. 7/25/19: Reviewed all home exercises, modifying and eliminating/adding as indicated. Manual Treatments:  STM to scar and shoulder, Grade 1 mobs into flex, PROM per protocol seated  x 15 min    Modalities:    IFC with CP to right shoulder  x 15 min  Timed Code Treatment Minutes:    60  Total Treatment Minutes:    80  Treatment/Activity Tolerance:  [x] Patient tolerated treatment well [] Patient limited by fatigue  [] Patient limited by pain  [] Patient limited by other medical complications  [x] Other: 6/11/19: After review of goals and discussion with patient about what she is unable to do functionally, PT offered pt the option to continue therapy once every two weeks for 4 sessions. ; Called Dr. Betancourt Click office and asked for approval to continue. Will await call back.  Reassured her that time will help her to improve in mobility and that she may not improve on right

## 2019-08-08 ENCOUNTER — APPOINTMENT (OUTPATIENT)
Dept: PHYSICAL THERAPY | Age: 64
End: 2019-08-08
Payer: MEDICARE

## 2019-08-19 DIAGNOSIS — M19.079 ARTHRITIS, MIDFOOT: Primary | ICD-10-CM

## 2019-08-20 ENCOUNTER — HOSPITAL ENCOUNTER (OUTPATIENT)
Dept: PHYSICAL THERAPY | Age: 64
Setting detail: THERAPIES SERIES
Discharge: HOME | End: 2019-08-20
Payer: MEDICARE

## 2019-08-20 PROCEDURE — 97110 THERAPEUTIC EXERCISES: CPT

## 2019-08-20 PROCEDURE — 97530 THERAPEUTIC ACTIVITIES: CPT

## 2019-08-20 PROCEDURE — G0283 ELEC STIM OTHER THAN WOUND: HCPCS

## 2019-08-20 NOTE — FLOWSHEET NOTE
not used sling in a couple of days. 3/28/19: Pt reports that the forearm is a little better since she stopped wearing the sling. 4/1/19; Pt reports that she was able to \"get out\" yesterday. She was able to take a shower independently and could  put on her seat belt and she was able to use blow dryer and  curlers for her hair. 04/04/19; Patient reports her shoulder and forearm feels better she is able to use her left arm to cut food. States her back and knee has been hurting a lot today. 4/8/19:\"My back is acting up again. \" Pt was able to use light towels to dry after shower. Pt was able to get on pants today. 04/11/19; Patient reports she drove today for the first time since surgery. States shoulder has been feeling better overall. 04/15/19: Patient reports she is able to get dressed easier and driving now. 4/18/19: Pt reports that her shoulder is \"not too bad,\" Pt is able to do more ADL's.  04/22/19: Patient reports she has been using her shoulder more. 4/26/19: Pt reports that her ortho doctor told her that she had no restrictions and could use her right arm to \"do anything. \" Her shoulder is feeling well. 4/30/19: Pt reports minimal pain on shoulder. 5/2/19: Pt reports very minimal pain. 5/7/19: Pt reports no pain. 5/10/19: Pt reports no pain. 5/14/19: Pt reports that she has been doing a lot of work at home, so pain is a little more from lifting a lot. 05/17/19: Patient reports shoulder is doing ok,using shoulder more. 5/22/19: Pt reports that her shoulder increased in pain to 4-5/10 but now it is OK again. 5/28/19: Pt reports that her shoulder feels well today. 6/4/19: Pt reports no pain . 6/11/19: Pt reports a little pain and hears the popping noises in her right shoulder. Pt is not able to lift her arm to do her hair or lift her arm up to reach something on a  high shelf. Pt is able to sleep on right side now.  6/27/19: Pt reports that she feels as if swimming is very helpful.  She feels discussion with patient about what she is unable to do functionally, PT offered pt the option to continue therapy once every two weeks for 4 sessions. ; Called Dr. Burgess Nunes office and asked for approval to continue. Will await call back. Reassured her that time will help her to improve in mobility and that she may not improve on right shoulder as much as her left. Pt will benefit from more PT for strengthening, following her reverse TSR on 2/26/19. .    Prognosis: [x] Good [] Fair  [] Poor    Goals:    Short term goals  Time Frame for Short term goals: 6 weeks  Short term goal 1: Pt will be independent in HEP for right shoulder/MET  Short term goal 2: Pt will increase right shoulder ROM  by at least 20 degrees/MET  Short term goal 3: Pt will begin to use right UE for ADL's /MET     Long term goals  Time Frame for Long term goals : 12 weeks  Long term goal 1: Pt will increase strength of right shoulder to 3+/5 - 4/5/Partially MET  Long term goal 2: Pt will use right UE for most ADL's without pain/  Met     Patient goals : \"Full function/use/ROM of right shoulder/arm (similar to left shoulder)\"/ Mostly MET     Patient Requires Follow-up: [x] Yes  [] No    Plan:   [] Continue per plan of care [] Alter current plan (see comments)  [] Plan of care initiated [] Hold pending MD visit [x] Discharge    Plan for Next Session: DC from formal PT and continue pool exercises on her own.     Electronically signed by:  Michael Klein, PT,

## 2019-08-20 NOTE — DISCHARGE SUMMARY
Outpatient Physical Therapy  [] [facility]    Phone: [phone]   Fax: [phone]   [x] [facility]  Phone: [phone]   Fax: [phone]  [] [facility]              Phone: [phone]   Fax: [phone]     Physical Therapy Progress/Discharge Note  Date: 2019        Patient Name:  Nida Kern    :  1955  MRN: [id number]  Restrictions/Precautions:    · Diagnosis:  Reverse right TSR 19  · Treatment Diagnosis: S/P reverse right TSR, surgical pain, limited ROM and weakness of Right shoulder and UE     Insurance/Certification information:  PT Insurance Information: Medicare  Physician Information:  Referring Practitioner: SRIKANTH Christina  Plan of care signed (Y/N):  Sent to SRIKANTH Christina/Dr. Anu Cyr 3/12/19     Visit# / total visits:    Pain level:       0/10     G-Code (if applicable):                                             Date / Visit # G-Code Applied:  / 19   Tapia Bunk score = 18/CI             Time Period for Report:  3/12/19-19  Cancels/No-shows to date:  0    Plan of Care/Treatment to date:  [x] Therapeutic Exercise    [x] Modalities:  [x] Therapeutic Activity     [] Ultrasound  [] Electrical Stimulation  [] Gait Training      [] Cervical Traction    [] Lumbar Traction  [] Neuromuscular Re-education  [] Cold/hotpack [] Iontophoresis  [x] Instruction in HEP      Other:  [x] Manual Therapy       []    [] Aquatic Therapy       []                    ? Significant Findings At Last Visit/Comments:    Subjective:     Patient had pain since  in the right shoulder,  since a fall in . Pt has arthritis which progressed over time. She continued to exercise and do pool exercises. Patient had reverse TSR in left arm last year and she is left handed. She had right  reverse TSR on 19. She reports more intense pain on right shoulder post op than on left shoulder post op.   She had x ray this morning and so pain is worse since then as well due to Short term goals  Time Frame for Short term goals: 6 weeks  Short term goal 1: Pt will be independent in HEP for right shoulder/MET  Short term goal 2: Pt will increase right shoulder ROM  by at least 20 degrees/MET  Short term goal 3: Pt will begin to use right UE for ADL's /MET     Long term goals  Time Frame for Long term goals : 12 weeks  Long term goal 1: Pt will increase strength of right shoulder to 3+/5 - 4/5/Partially MET  Long term goal 2: Pt will use right UE for most ADL's without pain/  Met      Patient goals : \"Full function/use/ROM of right shoulder/arm (similar to left shoulder)\"/ Mostly MET     Current Frequency/Duration:  # Days per week: [x] 1 day # Weeks: [] 1 week [] 4 weeks      [x] 2 days? [] 2 weeks [] 5 weeks **6 months                                                                                  3 days   [] 3 weeks [] 6 weeks     Rehab Potential: [] Excellent [x] Good [] Fair  [] Poor     Goal Status:  [x] Achieved [] Partially Achieved  [] Not Achieved     Patient Status: [] Continue per initial plan of Care     [x] Patient now discharged     [] Additional visits requested, Please re-certify for additional visits:      Requested frequency/duration:  X/week for weeks    Electronically signed by:  Rex More PT    If you have any questions or concerns, please don't hesitate to call.   Thank you for your referral.    Physician Signature:________________________________Date:__________________  By signing above, therapists plan is approved by physician

## 2019-08-21 ENCOUNTER — ORTHOTIC/BRACE ENCOUNTER (OUTPATIENT)
Dept: ORTHOPEDIC SURGERY | Age: 64
End: 2019-08-21

## 2019-09-04 ENCOUNTER — ORTHOTIC/BRACE ENCOUNTER (OUTPATIENT)
Dept: ORTHOPEDIC SURGERY | Age: 64
End: 2019-09-04
Payer: MEDICARE

## 2019-09-04 DIAGNOSIS — E11.9 TYPE 2 DIABETES MELLITUS WITHOUT COMPLICATION, WITHOUT LONG-TERM CURRENT USE OF INSULIN (HCC): Primary | ICD-10-CM

## 2019-09-04 PROCEDURE — L3020 FOOT LONGITUD/METATARSAL SUP: HCPCS | Performed by: PEDORTHIST

## 2019-12-23 ENCOUNTER — HOSPITAL ENCOUNTER (OUTPATIENT)
Age: 64
Setting detail: OUTPATIENT SURGERY
Discharge: HOME | End: 2019-12-23
Attending: INTERNAL MEDICINE | Admitting: INTERNAL MEDICINE
Payer: MEDICARE

## 2019-12-23 VITALS
SYSTOLIC BLOOD PRESSURE: 103 MMHG | TEMPERATURE: 97.4 F | HEART RATE: 52 BPM | WEIGHT: 203 LBS | DIASTOLIC BLOOD PRESSURE: 67 MMHG | HEIGHT: 69 IN | BODY MASS INDEX: 30.07 KG/M2 | OXYGEN SATURATION: 97 % | RESPIRATION RATE: 14 BRPM

## 2019-12-23 PROCEDURE — 2709999900 HC NON-CHARGEABLE SUPPLY: Performed by: INTERNAL MEDICINE

## 2019-12-23 PROCEDURE — 88342 IMHCHEM/IMCYTCHM 1ST ANTB: CPT

## 2019-12-23 PROCEDURE — 88305 TISSUE EXAM BY PATHOLOGIST: CPT

## 2019-12-23 PROCEDURE — 7100000010 HC PHASE II RECOVERY - FIRST 15 MIN: Performed by: INTERNAL MEDICINE

## 2019-12-23 PROCEDURE — 7100000011 HC PHASE II RECOVERY - ADDTL 15 MIN: Performed by: INTERNAL MEDICINE

## 2019-12-23 PROCEDURE — 3609012400 HC EGD TRANSORAL BIOPSY SINGLE/MULTIPLE: Performed by: INTERNAL MEDICINE

## 2019-12-23 PROCEDURE — 99152 MOD SED SAME PHYS/QHP 5/>YRS: CPT | Performed by: INTERNAL MEDICINE

## 2019-12-23 PROCEDURE — 6360000002 HC RX W HCPCS: Performed by: INTERNAL MEDICINE

## 2019-12-23 RX ORDER — MEPERIDINE HYDROCHLORIDE 50 MG/ML
INJECTION INTRAMUSCULAR; INTRAVENOUS; SUBCUTANEOUS PRN
Status: DISCONTINUED | OUTPATIENT
Start: 2019-12-23 | End: 2019-12-23 | Stop reason: ALTCHOICE

## 2019-12-23 RX ORDER — LORATADINE 10 MG/1
10 CAPSULE, LIQUID FILLED ORAL DAILY
COMMUNITY

## 2019-12-23 RX ORDER — DIPHENHYDRAMINE HYDROCHLORIDE 50 MG/ML
INJECTION INTRAMUSCULAR; INTRAVENOUS PRN
Status: DISCONTINUED | OUTPATIENT
Start: 2019-12-23 | End: 2019-12-23 | Stop reason: ALTCHOICE

## 2019-12-23 RX ORDER — MIDAZOLAM HYDROCHLORIDE 1 MG/ML
INJECTION INTRAMUSCULAR; INTRAVENOUS PRN
Status: DISCONTINUED | OUTPATIENT
Start: 2019-12-23 | End: 2019-12-23 | Stop reason: ALTCHOICE

## 2019-12-23 ASSESSMENT — PAIN - FUNCTIONAL ASSESSMENT
PAIN_FUNCTIONAL_ASSESSMENT: FACES
PAIN_FUNCTIONAL_ASSESSMENT: 0-10
PAIN_FUNCTIONAL_ASSESSMENT: FACES
PAIN_FUNCTIONAL_ASSESSMENT: FACES

## 2019-12-23 ASSESSMENT — PAIN SCALES - WONG BAKER: WONGBAKER_NUMERICALRESPONSE: 0

## 2020-03-17 ENCOUNTER — HOSPITAL ENCOUNTER (OUTPATIENT)
Dept: PHYSICAL THERAPY | Age: 65
Setting detail: THERAPIES SERIES
Discharge: HOME | End: 2020-03-17
Payer: MEDICARE

## 2020-03-17 PROCEDURE — 97035 APP MDLTY 1+ULTRASOUND EA 15: CPT

## 2020-03-17 PROCEDURE — 97110 THERAPEUTIC EXERCISES: CPT

## 2020-03-17 PROCEDURE — 97162 PT EVAL MOD COMPLEX 30 MIN: CPT

## 2020-03-17 ASSESSMENT — PAIN DESCRIPTION - ONSET: ONSET: ON-GOING

## 2020-03-17 ASSESSMENT — PAIN DESCRIPTION - ORIENTATION: ORIENTATION: RIGHT;LEFT

## 2020-03-17 ASSESSMENT — PAIN SCALES - GENERAL: PAINLEVEL_OUTOF10: 2

## 2020-03-17 ASSESSMENT — PAIN DESCRIPTION - LOCATION: LOCATION: KNEE

## 2020-03-17 ASSESSMENT — PAIN DESCRIPTION - PROGRESSION: CLINICAL_PROGRESSION: GRADUALLY IMPROVING

## 2020-03-17 ASSESSMENT — PAIN - FUNCTIONAL ASSESSMENT: PAIN_FUNCTIONAL_ASSESSMENT: PREVENTS OR INTERFERES SOME ACTIVE ACTIVITIES AND ADLS

## 2020-03-17 ASSESSMENT — PAIN DESCRIPTION - DESCRIPTORS: DESCRIPTORS: ACHING;SORE

## 2020-03-17 ASSESSMENT — PAIN DESCRIPTION - PAIN TYPE: TYPE: CHRONIC PAIN

## 2020-03-17 ASSESSMENT — PAIN DESCRIPTION - FREQUENCY: FREQUENCY: INTERMITTENT

## 2020-03-17 NOTE — PROGRESS NOTES
Physical Therapy  Initial Assessment  Date: 3/17/2020  Patient Name: Garfield Burleson  MRN: [id number]  : 1955     Treatment Diagnosis: Pain on knees due to OA, weakness of quads, limited knee ROM,  postural changes (genu valgus with foot pronation)    Restrictions       Subjective   General  Chart Reviewed: Yes  Patient assessed for rehabilitation services?: Yes  Additional Pertinent Hx: PLOF: Independent  Family / Caregiver Present: No  Referring Practitioner: Steve Flores MD  Referral Date : 20  Diagnosis: OA of Right and left knees  PT Visit Information  Onset Date: 19  PT Insurance Information: Medicare  Total # of Visits Approved: 12  Total # of Visits to Date: 1  Subjective  Subjective: Patient has a history of bilateral knee pain for at least 10 years. Since 19 pain has increased, alternating with right and left knees. She has trouble with twisting and going up and down steps. She has had 4 cortisone shots. [place] [facility] and then she had 3 gel injections. .  She is not having that much pain. It is more the aggravation  of not being able to get up and move well. .  It is starting to feel a little better after the gel injection. She remains active. and was swimming regularly in pool at [employer]. Pain Screening  Patient Currently in Pain: Yes  Pain Assessment  Pain Assessment: 0-10  Pain Level: 2  Patient's Stated Pain Goal: No pain  Pain Type: Chronic pain  Pain Location: Knee  Pain Orientation: Right;Left  Pain Descriptors: Aching; Sore  Pain Frequency: Intermittent  Pain Onset: On-going  Clinical Progression: Gradually improving  Functional Pain Assessment: Prevents or interferes some active activities and ADLs  Non-Pharmaceutical Pain Intervention(s): Rest;Cold applied(Pool exercises)  Vital Signs  Patient Currently in Pain: Yes         Social/Functional History  Social/Functional History  Lives With: Spouse  Type of Home: House  Active : Yes  Occupation: Retired    Objective 4+/5  Short term goal 3: Pt will increase bilateral knee flexion to at least 115 degrees  Short term goal 4: Pt will be independent with HEP  Patient Goals   Patient goals : \"Strengthen legs for greater flexibilty, mobility, transferring, and put off surgery for 9+ months\"       Therapy Time   Individual Concurrent Group Co-treatment   Time In 1515         Time Out 1645         Minutes 90         Treatment time= 38 min       GURINDER JON, PT

## 2020-03-17 NOTE — PLAN OF CARE
Outpatient Physical Therapy  [] [facility]    Phone: [phone]   Fax: [phone]   [x] [facility]  Phone: [phone]              Fax: [phone]  [] [facility]   Phone: [phone]   Fax: [phone]     To: Referring Practitioner: Brian Mcguire MD      Patient: Dusty Mena   : 1955   MRN: [id number]  Evaluation Date: 3/17/2020      Diagnosis Information:  · Diagnosis: OA of Right and left knees   · Treatment Diagnosis: Pain on knees due to OA, weakness of quads, limited knee ROM,  postural changes (genu valgus with foot pronation)     Physical Therapy Certification/Re-Certification Form  Dear Roberto Goldstein,  The following patient has been evaluated for physical therapy services and for therapy to continue, Medicare requires monthly physician review of the treatment plan. Please review the attached evaluation and/or summary of the patient's plan of care, and verify that you agree therapy should continue by signing the attached document and sending it back to our office. Plan of Care/Treatment to date:  [x] Therapeutic Exercise    [x] Modalities:  [x] Therapeutic Activity     [] Ultrasound  [] Electrical Stimulation  [x] Gait Training      [] Cervical Traction [] Lumbar Traction  [] Neuromuscular Re-education    [] Cold/hotpack [] Iontophoresis   [x] Instruction in HEP     Other:  [x] Manual Therapy      []             [] Aquatic Therapy      []           ? Frequency/Duration:  # Days per week: [] 1 day # Weeks: [] 1 week [] 5 weeks     [x] 2 days? [] 2 weeks [x] 6 weeks     [] 3 days   [] 3 weeks [] 7 weeks     [] 4 days   [] 4 weeks [] 8 weeks    Rehab Potential: [] Excellent [x] Good [] Fair  [] Poor       Electronically signed by:  Belia Corona PT      If you have any questions or concerns, please don't hesitate to call.   Thank you for your referral.      Physician Signature:________________________________Date:__________________  By signing above, therapists plan is approved by physician

## 2020-03-17 NOTE — FLOWSHEET NOTE
Physical Therapy Daily Treatment Note  Date:  3/17/2020    Patient Name:  Lucie Li    :  1955  MRN: [id number]    Restrictions/Precautions:      Pertinent Medical History: Additional Pertinent Hx: PLOF: Independent    Medical/Treatment Diagnosis Information:  · Diagnosis: OA of Right and left knees  · Treatment Diagnosis: Pain on knees due to OA, weakness of quads, limited knee ROM,  postural changes (genu valgus with foot pronation)    Insurance/Certification information:  PT Insurance Information: Medicare  Physician Information:  Referring Practitioner: Shaina Medellin MD  Plan of care signed (Y/N):  Sent to Dr. Shira Suarez 3/17/20    Visit# / total visits:    Pain level:  0-2/10     G-Code (if applicable):      Date / Visit # G-Code Applied:  /       Progress Note: []  Yes  [x]  No  Next due by: Visit #10      History of Injury: See below    Subjective:  Subjective  Subjective: Patient has a history of bilateral knee pain for at least 10 years. Since 19 pain has increased, alternating with right and left knees. She has trouble with twisting and going up and down steps. She has had 4 cortisone shots. [place] and then she had 3 gel injections. .  She is not having that much pain. It is more the aggravation  of not being able to get up and move well. .  It is starting to feel a little better after the gel injection. She remains active. and [place] [facility] [place]:  See eval   Observation:    Test measurements:        Exercises:  Exercise/Equipment Resistance/Repetitions Other comments   Quad sets 5 -6 sec x 10    Heel slides 10 sec x 10    HS stretch (seated) 3 x 30 sec                                                              Other Therapeutic Activities:  Patient was educated on diagnosis, plan of care and prognosis of their complaint. Also, frequency and duration of treatments was discussed.  Patient was informed of the attendance policy and issued a copy for their records. Home Exercise Program: Patient was given written instructions for home exercises as above. Patient performs them correctly and understands purpose. 3/17/20: gave information on ordering exercise strap. Manual Treatments:      Modalities:    US x 5 min to each patellar area at 1.4 W/cm2 pulsed    Charges: Therapeutic Exercise:  [x] (88481) Provided verbal/tactile cueing for activities to restore or maintain strength, flexibility, endurance, ROM for improvements with self-care, mobility, lifting and ambulation. Neuromuscular Re-Education  [] (83461) Provided verbal/tactile cueing for activities to restore or maintain balance, coordination, kinesthetic sense, posture, motor skill, proprioception for self-care, mobility, lifting, and ambulation. Therapeutic Activities:    [] (33967) Provided verbal/tactile cueing to address functional limitations related to loss of mobility, strength, balance, and coordination. Gait Training:  [] (77361) Provided training and instruction to the patient for proper postural muscle recruitment and positioning with ambulation re-education     Home Exercise Program:    [] (19913) Reviewed/Progressed HEP activities related to strengthening, flexibility, endurance, ROM for functional self-care, mobility, lifting and ambulation   [] (15649) Reviewed/Progressed HEP activities related to improving balance, coordination, kinesthetic sense, posture, motor skill, proprioception for self-care, mobility, lifting, and ambulation      Manual Treatments:  MFR / STM / Oscillations-Mobs:  G-I, II, III, IV / Manipulation / MLD  [] (63144) Provided manual therapy to mobilize  soft tissue/joints/fluid for the purpose of modulating pain, promoting relaxation, increasing ROM, reducing/eliminating soft tissue swelling/inflammation/restriction, improving soft tissue extensibility and allowing for proper ROM for normal function with self- care, mobility, lifting and ambulation. Timed Code Treatment Minutes: 38   Total Treatment Minutes: 90     [] EVAL (LOW) 26417   [x] EVAL (MOD) 92766   [] EVAL (HIGH) 11341   [] RE-EVAL   [x] TE (69067) x 2      [] NMR (44326)   x    [] Manual (44876) x    [x] Ultrasound (85396) x 10[] TA (77894) x  [] Mech Traction (88573)  [] Ionto (50674)           [] ES (un) (57296):   [] Other:      Treatment/Activity Tolerance:  [] Patient tolerated treatment well [] Patient limited by fatigue  [] Patient limited by pain  [] Patient limited by other medical complications  [] Other:     Prognosis: [x] Good [] Fair  [] Poor    Goals:    Short term goals  Time Frame for Short term goals: 6 weeks  Short term goal 1: Pt will increase hamstring flexibility so she can fully extend her knees.   Short term goal 2: Pt will increase strength of her quads to 4+/5  Short term goal 3: Pt will increase bilateral knee flexion to at least 115 degrees  Short term goal 4: Pt will be independent with HEP              Patient Requires Follow-up: [x] Yes  [] No    Plan:   [] Continue per plan of care [] Alter current plan (see comments)  [x] Plan of care initiated [] Hold pending MD visit [] Discharge    Plan for Next Session: See above    Electronically signed by:  Miguel Angel Aguirre, PT,

## 2020-03-19 ENCOUNTER — HOSPITAL ENCOUNTER (OUTPATIENT)
Dept: PHYSICAL THERAPY | Age: 65
Setting detail: THERAPIES SERIES
Discharge: HOME | End: 2020-03-19
Payer: MEDICARE

## 2020-03-19 PROCEDURE — 97110 THERAPEUTIC EXERCISES: CPT

## 2020-03-19 PROCEDURE — 97035 APP MDLTY 1+ULTRASOUND EA 15: CPT

## 2020-03-24 ENCOUNTER — APPOINTMENT (OUTPATIENT)
Dept: PHYSICAL THERAPY | Age: 65
End: 2020-03-24
Payer: MEDICARE

## 2020-03-26 ENCOUNTER — APPOINTMENT (OUTPATIENT)
Dept: PHYSICAL THERAPY | Age: 65
End: 2020-03-26
Payer: MEDICARE

## 2020-03-31 ENCOUNTER — APPOINTMENT (OUTPATIENT)
Dept: PHYSICAL THERAPY | Age: 65
End: 2020-03-31
Payer: MEDICARE

## 2020-04-01 NOTE — PROGRESS NOTES
gave way twice in the shower today. \" Yesterday she felt bad and iced the knee but did not do any exercises.     Objective:  See eval  · Observation:   Test measurements:           Assessment:   Summary: Pt had only 2 visits and was put on hold due to COVID 19 . PT checked in by phone twice to advance exercises. Will call again after virus is over. Progression Towards Functional goals:  [] Patient is progressing as expected towards functional goals listed. [] Progression is slowed due to complexities listed. [] Progression has been slowed due to co-morbidities. [x] Plan just implemented, too soon to assess goals progression  [] Other:    Goals:     Short term goals  Time Frame for Short term goals: 6 weeks  Short term goal 1: Pt will increase hamstring flexibility so she can fully extend her knees. Short term goal 2: Pt will increase strength of her quads to 4+/5  Short term goal 3: Pt will increase bilateral knee flexion to at least 115 degrees  Short term goal 4: Pt will be independent with HEP  Patient Goals   Patient goals : \"Strengthen legs for greater flexibilty, mobility, transferring, and put off surgery for 9+ months         Rehab Potential: [] Excellent [x] Good [] Fair  [] Poor     Goal Status:  [] Achieved [] Partially Achieved  [x] Not Achieved     Current Frequency/Duration:  # Days per week: [x] 1 day # Weeks: [] 1 week [] 4 weeks      [] 2 days   [x] 2 weeks [] 5 weeks      [] 3 days   [] 3 weeks [] 6 weeks     Patient Status: [] Continue per initial plan of Care     [x] Patient now discharged     [] Additional visits requested, Please re-certify for additional visits:      Requested frequency/duration:  X/week for weeks    Electronically signed by:  Dashawn Ortiz PT    If you have any questions or concerns, please don't hesitate to call.   Thank you for your referral.    Physician Signature:________________________________Date:__________________  By signing above, therapists plan is approved by physician

## 2020-05-22 ENCOUNTER — OFFICE VISIT (OUTPATIENT)
Dept: ORTHOPEDIC SURGERY | Age: 65
End: 2020-05-22
Payer: MEDICARE

## 2020-05-22 VITALS — HEIGHT: 69 IN | BODY MASS INDEX: 30.07 KG/M2 | WEIGHT: 203.04 LBS

## 2020-05-22 PROCEDURE — 4040F PNEUMOC VAC/ADMIN/RCVD: CPT | Performed by: ORTHOPAEDIC SURGERY

## 2020-05-22 PROCEDURE — 1036F TOBACCO NON-USER: CPT | Performed by: ORTHOPAEDIC SURGERY

## 2020-05-22 PROCEDURE — 1090F PRES/ABSN URINE INCON ASSESS: CPT | Performed by: ORTHOPAEDIC SURGERY

## 2020-05-22 PROCEDURE — G8400 PT W/DXA NO RESULTS DOC: HCPCS | Performed by: ORTHOPAEDIC SURGERY

## 2020-05-22 PROCEDURE — G8427 DOCREV CUR MEDS BY ELIG CLIN: HCPCS | Performed by: ORTHOPAEDIC SURGERY

## 2020-05-22 PROCEDURE — 3017F COLORECTAL CA SCREEN DOC REV: CPT | Performed by: ORTHOPAEDIC SURGERY

## 2020-05-22 PROCEDURE — 1123F ACP DISCUSS/DSCN MKR DOCD: CPT | Performed by: ORTHOPAEDIC SURGERY

## 2020-05-22 PROCEDURE — 99213 OFFICE O/P EST LOW 20 MIN: CPT | Performed by: ORTHOPAEDIC SURGERY

## 2020-05-22 PROCEDURE — G8417 CALC BMI ABV UP PARAM F/U: HCPCS | Performed by: ORTHOPAEDIC SURGERY

## 2020-05-26 ENCOUNTER — HOSPITAL ENCOUNTER (OUTPATIENT)
Dept: PHYSICAL THERAPY | Age: 65
Setting detail: THERAPIES SERIES
Discharge: HOME | End: 2020-05-26
Payer: MEDICARE

## 2020-05-26 PROCEDURE — 97110 THERAPEUTIC EXERCISES: CPT

## 2020-05-26 PROCEDURE — 97140 MANUAL THERAPY 1/> REGIONS: CPT

## 2020-05-26 NOTE — FLOWSHEET NOTE
maintain balance, coordination, kinesthetic sense, posture, motor skill, proprioception for self-care, mobility, lifting, and ambulation. Therapeutic Activities:    [] (13558) Provided verbal/tactile cueing to address functional limitations related to loss of mobility, strength, balance, and coordination. Gait Training:  [] (12475) Provided training and instruction to the patient for proper postural muscle recruitment and positioning with ambulation re-education     Home Exercise Program:    [] (18049) Reviewed/Progressed HEP activities related to strengthening, flexibility, endurance, ROM for functional self-care, mobility, lifting and ambulation   [] (43406) Reviewed/Progressed HEP activities related to improving balance, coordination, kinesthetic sense, posture, motor skill, proprioception for self-care, mobility, lifting, and ambulation      Manual Treatments:  MFR / STM / Oscillations-Mobs:  G-I, II, III, IV / Manipulation / MLD  [] (22085) Provided manual therapy to mobilize  soft tissue/joints/fluid for the purpose of modulating pain, promoting relaxation, increasing ROM, reducing/eliminating soft tissue swelling/inflammation/restriction, improving soft tissue extensibility and allowing for proper ROM for normal function with self- care, mobility, lifting and ambulation.         Timed Code Treatment Minutes: 60   Total Treatment Minutes: 65     [] EVAL (LOW) 28302   [] EVAL (MOD) 05369   [] EVAL (HIGH) 68284   [] RE-EVAL   [x] TE (36910) x 2      [] NMR (39628)   x    [x] Manual (77261) x2    [x] Ultrasound (30397) x 10[] TA (08034) x  [] Mech Traction (37362)  [] Ionto (67091)           [] ES (un) (03338):   [] Other:      Treatment/Activity Tolerance:  [x] Patient tolerated treatment well [] Patient limited by fatigue  [] Patient limited by pain  [] Patient limited by other medical complications  [] Other:     Prognosis: [x] Good [] Fair  [] Poor    Goals:       Short term goals  Time Frame for Short term goals: 6 weeks  Short term goal 1: Pt will increase hamstring flexibility so she can fully extend her knees.   Short term goal 2: Pt will increase strength of her quads to 4+/5  Short term goal 3: Pt will increase bilateral knee flexion to at least 115 degrees  Short term goal 4: Pt will be independent with HEP  Patient Goals   Patient goals : \"Strengthen legs for greater flexibilty, mobility, transferring, and put off surgery for 9+ months             Patient Requires Follow-up: [x] Yes  [] No    Plan:   [x] Continue per plan of care [] Alter current plan (see comments)  [] Plan of care initiated [] Hold pending MD visit [] Discharge    Plan for Next Session: 5/26/20   Work on loosening  Muscles, improving patellar mob, strength and proprio michael in wb position    Electronically signed by:  Dominic Runner, PT,

## 2020-06-01 ENCOUNTER — HOSPITAL ENCOUNTER (OUTPATIENT)
Dept: PHYSICAL THERAPY | Age: 65
Setting detail: THERAPIES SERIES
Discharge: HOME | End: 2020-06-01
Payer: MEDICARE

## 2020-06-01 PROCEDURE — 97140 MANUAL THERAPY 1/> REGIONS: CPT

## 2020-06-01 PROCEDURE — 97110 THERAPEUTIC EXERCISES: CPT

## 2020-06-01 NOTE — FLOWSHEET NOTE
Physical Therapy Daily Treatment Note  Date:  2020    Patient Name:  Kitty Viveros    :  1955  MRN: [id number]    Restrictions/Precautions:      Pertinent Medical History:      Medical/Treatment Diagnosis Information:  · Diagnosis: OA of Right and left knees  · Treatment Diagnosis: Pain on knees due to OA, weakness of quads, limited knee ROM,  postural changes (genu valgus with foot pronation)    Insurance/Certification information:  PT Insurance Information: Medicare  Physician Information:  Referring Practitioner: Agnieszka Canales MD  Plan of care signed (Y/N):  Sent to Dr. Marina Head 3/17/20    Visit# / total visits:    Pain level:  0/10 on right and 3-4/10     G-Code (if applicable):      Date / Visit # G-Code Applied:  /       Progress Note: []  Yes  [x]  No  Next due by: Visit #10      History of Injury: See below    Subjective:  Subjective  Subjective: Patient has a history of bilateral knee pain for at least 10 years. Since 19 pain has increased, alternating with right and left knees. She has trouble with twisting and going up and down steps. She has had 4 cortisone shots. [place] and then she had 3 gel injections. .  She is not having that much pain. It is more the aggravation  of not being able to get up and move well. .  It is starting to feel a little better after the gel injection. She remains active. [place] [facility] [place]  Pt returns on 20 to resume PT after being off due to the covid-19. Pt states, that her knees are about the same as they were prior to covid. She had a cortisone shot in both knees in early May. She also has had gel shots. She has been doing exercises from therapy over the past 2 months. She also was swimming and is going to resume this asap. She hopes to buy some time and avoiid getting tka until necessary. 3/19/20: \"The left knee gave way twice in the shower today. \" Yesterday she felt bad and iced the knee but did not do any

## 2020-06-05 ENCOUNTER — HOSPITAL ENCOUNTER (OUTPATIENT)
Dept: PHYSICAL THERAPY | Age: 65
Setting detail: THERAPIES SERIES
Discharge: HOME | End: 2020-06-05
Payer: MEDICARE

## 2020-06-05 PROCEDURE — 97110 THERAPEUTIC EXERCISES: CPT

## 2020-06-05 PROCEDURE — 97140 MANUAL THERAPY 1/> REGIONS: CPT

## 2020-06-05 NOTE — FLOWSHEET NOTE
(24825) Provided verbal/tactile cueing for activities to restore or maintain strength, flexibility, endurance, ROM for improvements with self-care, mobility, lifting and ambulation. Neuromuscular Re-Education  [] (25309) Provided verbal/tactile cueing for activities to restore or maintain balance, coordination, kinesthetic sense, posture, motor skill, proprioception for self-care, mobility, lifting, and ambulation. Therapeutic Activities:    [] (33413) Provided verbal/tactile cueing to address functional limitations related to loss of mobility, strength, balance, and coordination. Gait Training:  [] (41565) Provided training and instruction to the patient for proper postural muscle recruitment and positioning with ambulation re-education     Home Exercise Program:    [] (95449) Reviewed/Progressed HEP activities related to strengthening, flexibility, endurance, ROM for functional self-care, mobility, lifting and ambulation   [] (65416) Reviewed/Progressed HEP activities related to improving balance, coordination, kinesthetic sense, posture, motor skill, proprioception for self-care, mobility, lifting, and ambulation      Manual Treatments:  MFR / STM / Oscillations-Mobs:  G-I, II, III, IV / Manipulation / MLD  [] (08681) Provided manual therapy to mobilize  soft tissue/joints/fluid for the purpose of modulating pain, promoting relaxation, increasing ROM, reducing/eliminating soft tissue swelling/inflammation/restriction, improving soft tissue extensibility and allowing for proper ROM for normal function with self- care, mobility, lifting and ambulation.         Timed Code Treatment Minutes: 60   Total Treatment Minutes: 65     [] EVAL (LOW) 92749   [] EVAL (MOD) 24574   [] EVAL (HIGH) 04686   [] RE-EVAL   [x] TE (65776) x 2      [] NMR (28834)   x    [x] Manual (11741) x2    [] Ultrasound (19397) x 10[] TA (90949) x  [] Mech Traction (53009)  [] Ionto (40279)           [] ES (un) (75829):   [] Other:

## 2020-06-08 ENCOUNTER — HOSPITAL ENCOUNTER (OUTPATIENT)
Dept: PHYSICAL THERAPY | Age: 65
Setting detail: THERAPIES SERIES
Discharge: HOME | End: 2020-06-08
Payer: MEDICARE

## 2020-06-08 PROCEDURE — 97140 MANUAL THERAPY 1/> REGIONS: CPT

## 2020-06-08 PROCEDURE — 97110 THERAPEUTIC EXERCISES: CPT

## 2020-06-12 ENCOUNTER — HOSPITAL ENCOUNTER (OUTPATIENT)
Dept: PHYSICAL THERAPY | Age: 65
Setting detail: THERAPIES SERIES
Discharge: HOME | End: 2020-06-12
Payer: MEDICARE

## 2020-06-12 PROCEDURE — 97140 MANUAL THERAPY 1/> REGIONS: CPT

## 2020-06-12 PROCEDURE — 97110 THERAPEUTIC EXERCISES: CPT

## 2020-06-12 NOTE — FLOWSHEET NOTE
tissue/joints/fluid for the purpose of modulating pain, promoting relaxation, increasing ROM, reducing/eliminating soft tissue swelling/inflammation/restriction, improving soft tissue extensibility and allowing for proper ROM for normal function with self- care, mobility, lifting and ambulation. Timed Code Treatment Minutes: 60   Total Treatment Minutes: 65     [] EVAL (LOW) 93981   [] EVAL (MOD) 40829   [] EVAL (HIGH) 10658   [] RE-EVAL   [x] TE (27847) x 2      [] NMR (22832)   x    [x] Manual (15437) x2    [] Ultrasound (45905) x 10[] TA (88187) x  [] Mech Traction (88778)  [] Ionto (37077)           [] ES (un) (26136):   [] Other:      Treatment/Activity Tolerance:  [x] Patient tolerated treatment well [] Patient limited by fatigue  [] Patient limited by pain  [] Patient limited by other medical complications  [] Other:     Prognosis: [x] Good [] Fair  [] Poor    Goals:       Short term goals  Time Frame for Short term goals: 6 weeks  Short term goal 1: Pt will increase hamstring flexibility so she can fully extend her knees.   Short term goal 2: Pt will increase strength of her quads to 4+/5  Short term goal 3: Pt will increase bilateral knee flexion to at least 115 degrees  Short term goal 4: Pt will be independent with HEP  Patient Goals   Patient goals : \"Strengthen legs for greater flexibilty, mobility, transferring, and put off surgery for 9+ months             Patient Requires Follow-up: [x] Yes  [] No    Plan:   [x] Continue per plan of care [] Alter current plan (see comments)  [] Plan of care initiated [] Hold pending MD visit [] Discharge    Plan for Next Session: 5/26/20   Work on loosening  Muscles, improving patellar mob, strength and proprio michael in wb position    Electronically signed by:  Shannan Knight, PT,

## 2020-06-15 ENCOUNTER — HOSPITAL ENCOUNTER (OUTPATIENT)
Dept: PHYSICAL THERAPY | Age: 65
Setting detail: THERAPIES SERIES
Discharge: HOME | End: 2020-06-15
Payer: MEDICARE

## 2020-06-15 PROCEDURE — 97140 MANUAL THERAPY 1/> REGIONS: CPT

## 2020-06-15 PROCEDURE — 97110 THERAPEUTIC EXERCISES: CPT

## 2020-06-15 NOTE — FLOWSHEET NOTE
Physical Therapy Daily Treatment Note  Date:  6/15/2020    Patient Name:  Colletta Perone    :  1955  MRN: [id number]    Restrictions/Precautions:      Pertinent Medical History:      Medical/Treatment Diagnosis Information:  · Diagnosis: OA of Right and left knees  · Treatment Diagnosis: Pain on knees due to OA, weakness of quads, limited knee ROM,  postural changes (genu valgus with foot pronation)    Insurance/Certification information:  PT Insurance Information: Medicare  Physician Information:  Referring Practitioner: Danae Hoff MD  Plan of care signed (Y/N):  Sent to Dr. Silva Ledezma 3/17/20    Visit# / total visits:812  Pain level: Right-1/10,  Left- 4/10     G-Code (if applicable):      Date / Visit # G-Code Applied:  /       Progress Note: []  Yes  [x]  No  Next due by: Visit #10      History of Injury: See below    Subjective:  Subjective  Subjective: Patient has a history of bilateral knee pain for at least 10 years. Since 19 pain has increased, alternating with right and left knees. She has trouble with twisting and going up and down steps. She has had 4 cortisone shots. [place] and then she had 3 gel injections. .  She is not having that much pain. It is more the aggravation  of not being able to get up and move well. .  It is starting to feel a little better after the gel injection. She remains active. and was swimming regularly in [place] [facility] [place]. .  Pt returns on 20 to resume PT after being off due to the covid-19. Pt states, that her knees are about the same as they were prior to covid. She had a cortisone shot in both knees in early May. She also has had gel shots. She has been doing exercises from therapy over the past 2 months. She also was swimming and is going to resume this asap. She hopes to buy some time and avoiid getting tka until necessary. 3/19/20: \"The left knee gave way twice in the shower today. \" Yesterday she felt bad and iced the knee but did not do any exercises. 6/1/20  Pt states, \" not too bad today \"  6/5/20  Pt states, \" \" Knees seem to be improving \"  6/8/20  Pt states, : \"My left knee was sore after the last rx \"  6/12/20  Pt states, \" Improving, was able to swim 2 days this week \"   6/15/20  Pt states, \" I was able to do some gardening today , left knee a little more sore \"    Objective:  See eval   Observation:    Test measurements:        Exercises:  Exercise/Equipment Resistance/Repetitions Other comments   HEP     Quad sets 5 -6 sec x 10 Reviewed all exs 5/26/20/ 6/1/20   Heel slides 10 sec x 10    HS stretch (seated) 3 x 30 sec    TKE 30    SLR 10 x     Seated knee extension 20    laq X 30    Prone flex 30 x 3,  5/26/20   clams X 20 5/26/20                  Exercises in Clinic     Nu step X 8 min L4    Leg press 65# x 30    Prone flex 30 x 3,3#  x 30    Semi squat X 20    Step up 4\" for and lat x 20 ea     wobble X 2 min  ea    Hams stretch 30 x 3    gastroc stretch 30 x 3    Went over pool exercises X 5 minutes    incline 60 x 2    bosu X 2 min                           Knee strength - bialt- 4/5 strength, Proprio- 3/5,  ROM- left- flex-100, ext--15,  Left- flex-105, ext- 15      Other Therapeutic Activities:  Patient was educated on diagnosis, plan of care and prognosis of their complaint. Also, frequency and duration of treatments was discussed. Patient was informed of the attendance policy and issued a copy for their records. Home Exercise Program: Patient was given written instructions for home exercises as above. Patient performs them correctly and understands purpose. 3/17/20: gave information on ordering exercise strap. 3/19/20: Pt and PT reviewed HEP and added three new ones for advancement. Access Code: [email or web address]   URL: [email or web address]   Date: 05/26/2020   Prepared by: Erasmo Burgos     Exercises  Clamshell - 10 reps - 3 sets - 1x daily - 7x weekly  Prone Knee Flexion - 10 reps - 3 sets - 1x daily - 7x weekly  Supine Quad

## 2020-06-17 ENCOUNTER — HOSPITAL ENCOUNTER (OUTPATIENT)
Dept: PHYSICAL THERAPY | Age: 65
Setting detail: THERAPIES SERIES
Discharge: HOME | End: 2020-06-17
Payer: MEDICARE

## 2020-06-17 PROCEDURE — 97110 THERAPEUTIC EXERCISES: CPT

## 2020-06-17 PROCEDURE — 97140 MANUAL THERAPY 1/> REGIONS: CPT

## 2020-06-17 NOTE — FLOWSHEET NOTE
Physical Therapy Daily Treatment Note  Date:  2020    Patient Name:  Colletta Perone    :  1955  MRN: [id number]    Restrictions/Precautions:      Pertinent Medical History:      Medical/Treatment Diagnosis Information:  · Diagnosis: OA of Right and left knees  · Treatment Diagnosis: Pain on knees due to OA, weakness of quads, limited knee ROM,  postural changes (genu valgus with foot pronation)    Insurance/Certification information:  PT Insurance Information: Medicare  Physician Information:  Referring Practitioner: Danae Hoff MD  Plan of care signed (Y/N):  Sent to Dr. Silva Ledezma 3/17/20    Visit# / total visits:  Pain level: Right-1/10,  Left- 4/10     G-Code (if applicable):      Date / Visit # G-Code Applied:  /       Progress Note: []  Yes  [x]  No  Next due by: Visit #10      History of Injury: See below    Subjective:  Subjective  Subjective: Patient has a history of bilateral knee pain for at least 10 years. Since 19 pain has increased, alternating with right and left knees. She has trouble with twisting and going up and down steps. She has had 4 cortisone shots. [place] Colvin and then she had 3 gel injections. .  She is not having that much pain. It is more the aggravation  of not being able to get up and move well. .  It is starting to feel a little better after the gel injection. She remains active. and was [place] [facility] [place]. .  Pt returns on 20 to resume PT after being off due to the covid-19. Pt states, that her knees are about the same as they were prior to covid. She had a cortisone shot in both knees in early May. She also has had gel shots. She has been doing exercises from therapy over the past 2 months. She also was swimming and is going to resume this asap. She hopes to buy some time and avoiid getting tka until necessary. 3/19/20: \"The left knee gave way twice in the shower today. \" Yesterday she felt bad and iced the knee but did not do any Treatments:  MFR / STM / Oscillations-Mobs:  G-I, II, III, IV / Manipulation / MLD  [] (95374) Provided manual therapy to mobilize  soft tissue/joints/fluid for the purpose of modulating pain, promoting relaxation, increasing ROM, reducing/eliminating soft tissue swelling/inflammation/restriction, improving soft tissue extensibility and allowing for proper ROM for normal function with self- care, mobility, lifting and ambulation. Timed Code Treatment Minutes: 60   Total Treatment Minutes: 65     [] EVAL (LOW) 84650   [] EVAL (MOD) 97035   [] EVAL (HIGH) 44107   [] RE-EVAL   [x] TE (08048) x 2      [] NMR (61763)   x    [x] Manual (11334) x2    [] Ultrasound (21458) x 10[] TA (64527) x  [] Mech Traction (60030)  [] Ionto (09569)           [] ES (un) (11747):   [] Other:      Treatment/Activity Tolerance:  [x] Patient tolerated treatment well [] Patient limited by fatigue  [] Patient limited by pain  [] Patient limited by other medical complications  [] Other:     Prognosis: [x] Good [] Fair  [] Poor    Goals:       Short term goals  Time Frame for Short term goals: 6 weeks  Short term goal 1: Pt will increase hamstring flexibility so she can fully extend her knees.   Short term goal 2: Pt will increase strength of her quads to 4+/5  Short term goal 3: Pt will increase bilateral knee flexion to at least 115 degrees  Short term goal 4: Pt will be independent with HEP  Patient Goals   Patient goals : \"Strengthen legs for greater flexibilty, mobility, transferring, and put off surgery for 9+ months             Patient Requires Follow-up: [x] Yes  [] No    Plan:   [x] Continue per plan of care [] Alter current plan (see comments)  [] Plan of care initiated [] Hold pending MD visit [] Discharge    Plan for Next Session: 5/26/20   Work on loosening  Muscles, improving patellar mob, strength and proprio michael in wb position    Electronically signed by:  Wolfgang Hamilton PT,

## 2020-06-22 ENCOUNTER — HOSPITAL ENCOUNTER (OUTPATIENT)
Dept: PHYSICAL THERAPY | Age: 65
Setting detail: THERAPIES SERIES
Discharge: HOME | End: 2020-06-22
Payer: MEDICARE

## 2020-06-22 PROCEDURE — 97110 THERAPEUTIC EXERCISES: CPT

## 2020-06-22 PROCEDURE — 97140 MANUAL THERAPY 1/> REGIONS: CPT

## 2020-06-22 NOTE — FLOWSHEET NOTE
Physical Therapy Daily Treatment Note  Date:  2020    Patient Name:  Anand Wild    :  1955  MRN: [id number]    Restrictions/Precautions:      Pertinent Medical History:      Medical/Treatment Diagnosis Information:  · Diagnosis: OA of Right and left knees  · Treatment Diagnosis: Pain on knees due to OA, weakness of quads, limited knee ROM,  postural changes (genu valgus with foot pronation)    Insurance/Certification information:  PT Insurance Information: Medicare  Physician Information:  Referring Practitioner: Adrienne Wilson MD  Plan of care signed (Y/N):  Sent to Dr. Navdeep Butler 3/17/20    Visit# / total visits:10/12  Pain level: Right-1/10,  Left- 3/10     G-Code (if applicable):      Date / Visit # G-Code Applied:  /       Progress Note: []  Yes  [x]  No  Next due by: Visit #10      History of Injury: See below    Subjective:  Subjective  Subjective: Patient has a history of bilateral knee pain for at least 10 years. Since 19 pain has increased, alternating with right and left knees. She has trouble with twisting and going up and down steps. She has had 4 cortisone shots. Randee Curia and then she had 3 gel injections. .  She is not having that much pain. It is more the aggravation  of not being able to get up and move well. .  It is starting to feel a little better after the gel injection. She remains active. and was [place] [facility] [place]. .  Pt returns on 20 to resume PT after being off due to the covid-19. Pt states, that her knees are about the same as they were prior to covid. She had a cortisone shot in both knees in early May. She also has had gel shots. She has been doing exercises from therapy over the past 2 months. She also was swimming and is going to resume this asap. She hopes to buy some time and avoiid getting tka until necessary. 3/19/20: \"The left knee gave way twice in the shower today. \" Yesterday she felt bad and iced the knee but did not do any reps - 3 sets - 1x daily - 7x weekly  Prone Knee Flexion - 10 reps - 3 sets - 1x daily - 7x weekly  Supine Quad Set - 10 reps - 3 sets - 1x daily - 7x weeklyAccess Code: [email or web address]   URL: [email or web address]   Date: 06/12/2020   Prepared by: Britta Tamez     Exercises  Supine Piriformis Stretch Pulling Heel to Hip - 10 reps - 3 sets - 1x daily - 7x weekly  Supine Piriformis Stretch with Foot on Ground - 10 reps - 3 sets - 1x daily - 7x weekly  Standing Hamstring Stretch on Chair - 10 reps - 3 sets - 1x daily - 7x weekly  Pelvic Tilt - 10 reps - 3 sets - 1x daily - 7x weekly  Manual Treatments:mfr to bilateral post tib, gastroc, itb's patellar mobs gr 3 all directions, . purcussion massager to hams, gastroc, and post tib x 30      Modalities:        , Kineseo tape runners knee bilat    Charges: Therapeutic Exercise:  [x] (14765) Provided verbal/tactile cueing for activities to restore or maintain strength, flexibility, endurance, ROM for improvements with self-care, mobility, lifting and ambulation. Neuromuscular Re-Education  [] (32581) Provided verbal/tactile cueing for activities to restore or maintain balance, coordination, kinesthetic sense, posture, motor skill, proprioception for self-care, mobility, lifting, and ambulation. Therapeutic Activities:    [] (80995) Provided verbal/tactile cueing to address functional limitations related to loss of mobility, strength, balance, and coordination.      Gait Training:  [] (45075) Provided training and instruction to the patient for proper postural muscle recruitment and positioning with ambulation re-education     Home Exercise Program:    [] (98599) Reviewed/Progressed HEP activities related to strengthening, flexibility, endurance, ROM for functional self-care, mobility, lifting and ambulation   [] (29382) Reviewed/Progressed HEP activities related to improving balance, coordination, kinesthetic sense, posture, motor skill, proprioception for self-care, mobility, lifting, and ambulation      Manual Treatments:  MFR / STM / Oscillations-Mobs:  G-I, II, III, IV / Manipulation / MLD  [] (49140) Provided manual therapy to mobilize  soft tissue/joints/fluid for the purpose of modulating pain, promoting relaxation, increasing ROM, reducing/eliminating soft tissue swelling/inflammation/restriction, improving soft tissue extensibility and allowing for proper ROM for normal function with self- care, mobility, lifting and ambulation. Timed Code Treatment Minutes: 60   Total Treatment Minutes: 65     [] EVAL (LOW) 68235   [] EVAL (MOD) 15781   [] EVAL (HIGH) 46731   [] RE-EVAL   [x] TE (40659) x 2      [] NMR (29197)   x    [x] Manual (64462) x2    [] Ultrasound (45179) x 10[] TA (31778) x  [] Mech Traction (91264)  [] Ionto (30730)           [] ES (un) (40606):   [] Other:      Treatment/Activity Tolerance:  [x] Patient tolerated treatment well [] Patient limited by fatigue  [] Patient limited by pain  [] Patient limited by other medical complications  [] Other:     Prognosis: [x] Good [] Fair  [] Poor    Goals:       Short term goals  Time Frame for Short term goals: 6 weeks  Short term goal 1: Pt will increase hamstring flexibility so she can fully extend her knees.   Short term goal 2: Pt will increase strength of her quads to 4+/5  Short term goal 3: Pt will increase bilateral knee flexion to at least 115 degrees  Short term goal 4: Pt will be independent with HEP  Patient Goals   Patient goals : \"Strengthen legs for greater flexibilty, mobility, transferring, and put off surgery for 9+ months             Patient Requires Follow-up: [x] Yes  [] No    Plan:   [x] Continue per plan of care [] Alter current plan (see comments)  [] Plan of care initiated [] Hold pending MD visit [] Discharge    Plan for Next Session: 5/26/20   Work on loosening  Muscles, improving patellar mob, strength and proprio michael in wb position    Electronically signed by:  Juvenal Oliveira, PT,

## 2020-06-24 ENCOUNTER — HOSPITAL ENCOUNTER (OUTPATIENT)
Dept: PHYSICAL THERAPY | Age: 65
Setting detail: THERAPIES SERIES
Discharge: HOME | End: 2020-06-24
Payer: MEDICARE

## 2020-06-24 PROCEDURE — 97140 MANUAL THERAPY 1/> REGIONS: CPT

## 2020-06-24 PROCEDURE — 97110 THERAPEUTIC EXERCISES: CPT

## 2020-06-24 NOTE — FLOWSHEET NOTE
Physical Therapy Daily Treatment Note  Date:  2020    Patient Name:  Aguila Weiner    :  1955  MRN: [id number]    Restrictions/Precautions:      Pertinent Medical History:      Medical/Treatment Diagnosis Information:  · Diagnosis: OA of Right and left knees  · Treatment Diagnosis: Pain on knees due to OA, weakness of quads, limited knee ROM,  postural changes (genu valgus with foot pronation)    Insurance/Certification information:  PT Insurance Information: Medicare  Physician Information:  Referring Practitioner: Seb Sullivan MD  Plan of care signed (Y/N):  Sent to Dr. Jeff Narvaez 3/17/20    Visit# / total visits:  Pain level: Right-1/10,  Left- 3/10     G-Code (if applicable):      Date / Visit # G-Code Applied:  /       Progress Note: []  Yes  [x]  No  Next due by: Visit #10      History of Injury: See below    Subjective:  Subjective  Subjective: Patient has a history of bilateral knee pain for at least 10 years. Since 19 pain has increased, alternating with right and left knees. She has trouble with twisting and going up and down steps. She has had 4 cortisone shots. Clarene Will and then she had 3 gel injections. .  She is not having that much pain. It is more the aggravation  of not being able to get up and move well. .  It is starting to feel a little better after the gel injection. She remains active. and was swimming regularly in pool at [address] [place] [address]. .  Pt returns on 20 to resume PT after being off due to the covid-19. Pt states, that her knees are about the same as they were prior to covid. She had a cortisone shot in both knees in early May. She also has had gel shots. She has been doing exercises from therapy over the past 2 months. She also was swimming and is going to resume this asap. She hopes to buy some time and avoiid getting tka until necessary. 3/19/20: \"The left knee gave way twice in the shower today. \" Yesterday she felt bad and iced the knee but did not do any weeklyAccess Code: [email or web address]   URL: [email or web address]   Date: 06/12/2020   Prepared by: Gracia Ernandez     Exercises  Supine Piriformis Stretch Pulling Heel to Hip - 10 reps - 3 sets - 1x daily - 7x weekly  Supine Piriformis Stretch with Foot on Ground - 10 reps - 3 sets - 1x daily - 7x weekly  Standing Hamstring Stretch on Chair - 10 reps - 3 sets - 1x daily - 7x weekly  Pelvic Tilt - 10 reps - 3 sets - 1x daily - 7x weekly  Manual Treatments:mfr to bilateral post tib, gastroc, itb's patellar mobs gr 3 all directions, . purcussion massager to hams, gastroc, and post tib x 30      Modalities:        , Kineseo tape runners knee bilat    Charges: Therapeutic Exercise:  [x] (81331) Provided verbal/tactile cueing for activities to restore or maintain strength, flexibility, endurance, ROM for improvements with self-care, mobility, lifting and ambulation. Neuromuscular Re-Education  [] (33407) Provided verbal/tactile cueing for activities to restore or maintain balance, coordination, kinesthetic sense, posture, motor skill, proprioception for self-care, mobility, lifting, and ambulation. Therapeutic Activities:    [] (79373) Provided verbal/tactile cueing to address functional limitations related to loss of mobility, strength, balance, and coordination.      Gait Training:  [] (65170) Provided training and instruction to the patient for proper postural muscle recruitment and positioning with ambulation re-education     Home Exercise Program:    [] (46212) Reviewed/Progressed HEP activities related to strengthening, flexibility, endurance, ROM for functional self-care, mobility, lifting and ambulation   [] (75212) Reviewed/Progressed HEP activities related to improving balance, coordination, kinesthetic sense, posture, motor skill, proprioception for self-care, mobility, lifting, and ambulation      Manual Treatments:  MFR / STM / Oscillations-Mobs:  G-I, II, III, IV / Manipulation / MLD  [] (14356) Provided

## 2020-06-29 ENCOUNTER — HOSPITAL ENCOUNTER (OUTPATIENT)
Dept: PHYSICAL THERAPY | Age: 65
Setting detail: THERAPIES SERIES
Discharge: HOME | End: 2020-06-29
Payer: MEDICARE

## 2020-06-29 PROCEDURE — 97110 THERAPEUTIC EXERCISES: CPT

## 2020-06-29 PROCEDURE — 97140 MANUAL THERAPY 1/> REGIONS: CPT

## 2020-07-01 ENCOUNTER — HOSPITAL ENCOUNTER (OUTPATIENT)
Dept: PHYSICAL THERAPY | Age: 65
Setting detail: THERAPIES SERIES
Discharge: HOME | End: 2020-07-01
Payer: MEDICARE

## 2020-07-01 PROCEDURE — 97035 APP MDLTY 1+ULTRASOUND EA 15: CPT

## 2020-07-01 PROCEDURE — 97110 THERAPEUTIC EXERCISES: CPT

## 2020-07-01 PROCEDURE — 97140 MANUAL THERAPY 1/> REGIONS: CPT

## 2020-07-01 NOTE — FLOWSHEET NOTE
Physical Therapy Daily Treatment Note  Date:  2020    Patient Name:  Eugenia Baker    :  1955  MRN: [id number]    Restrictions/Precautions:      Pertinent Medical History:      Medical/Treatment Diagnosis Information:  · Diagnosis: OA of Right and left knees  · Treatment Diagnosis: Pain on knees due to OA, weakness of quads, limited knee ROM,  postural changes (genu valgus with foot pronation)    Insurance/Certification information:  PT Insurance Information: Medicare  Physician Information:  Referring Practitioner: Sanjuana Jurado MD  Plan of care signed (Y/N):  Sent to Dr. Pito Leigh 3/17/20    Visit# / total visits:  Pain level: Right-1/10,  Left- 3/10     G-Code (if applicable):      Date / Visit # G-Code Applied:  /       Progress Note: []  Yes  [x]  No  Next due by: Visit #10      History of Injury: See below    Subjective:  Subjective  Subjective: Patient has a history of bilateral knee pain for at least 10 years. Since 19 pain has increased, alternating with right and left knees. She has trouble with twisting and going up and down steps. She has had 4 cortisone shots. Lucas Singletons and then she had 3 gel injections. .  She is not having that much pain. It is more the aggravation  of not being able to get up and move well. .  It is starting to feel a little better after the gel injection. She remains active. and was swimming regularly in pool at [facility] [place]. .  Pt returns on 20 to resume PT after being off due to the covid-19. Pt states, that her knees are about the same as they were prior to covid. She had a cortisone shot in both knees in early May. She also has had gel shots. She has been doing exercises from therapy over the past 2 months. She also was swimming and is going to resume this asap. She hopes to buy some time and avoiid getting tka until necessary. 3/19/20: \"The left knee gave way twice in the shower today. \" Yesterday she felt bad and iced the knee but did not do any exercises. 6/1/20  Pt states, \" not too bad today \"  6/5/20  Pt states, \" \" Knees seem to be improving \"  6/8/20  Pt states, : \"My left knee was sore after the last rx \"  6/12/20  Pt states, \" Improving, was able to swim 2 days this week \"   6/15/20  Pt states, \" I was able to do some gardening today , left knee a little more sore \"  6/17/20  Pt states, \" improving, went to the pool yesterday \"  6/22/20  Pt states, \" doing pretty good today \"  6/24/20  Pt states, \" knees feeling a little stronger \"  6/29/20  Pt states, \" pain off and on , better overall\"   7/1/20  Pt states, \"  Left knee is sore today \"    Objective:  See eval   Observation:    Test measurements:        Exercises:  Exercise/Equipment Resistance/Repetitions Other comments                                                                    Exercises in Clinic     Nu step X 8 min L4    Leg press 75# x 40, 35# x 30 ea               wobble X 2 min  ea    Hams stretch 30 x 3    gastroc stretch 30 x 3    Went over pool exercises X 5 minutes                                 Knee strength - bialt- 4/5 strength, Proprio- 3/5,  ROM- left- flex-100, ext--15,  Left- flex-105, ext- 15      Other Therapeutic Activities:  Patient was educated on diagnosis, plan of care and prognosis of their complaint. Also, frequency and duration of treatments was discussed. Patient was informed of the attendance policy and issued a copy for their records. Home Exercise Program: Patient was given written instructions for home exercises as above. Patient performs them correctly and understands purpose. 3/17/20: gave information on ordering exercise strap. 3/19/20: Pt and PT reviewed HEP and added three new ones for advancement. Access Code: [email or web address]   URL: [email or web address]   Date: 05/26/2020   Prepared by: Duran cMkeon     Exercises  Clamshell - 10 reps - 3 sets - 1x daily - 7x weekly  Prone Knee Flexion - 10 reps - 3 sets - 1x daily - 7x weekly  Supine Quad Set - 10 reps - 3 sets - 1x daily - 7x weeklyAccess Code: [email or web address]   URL: [email or web address]   Date: 06/12/2020   Prepared by: Milvia Navarrete     Exercises  Supine Piriformis Stretch Pulling Heel to Hip - 10 reps - 3 sets - 1x daily - 7x weekly  Supine Piriformis Stretch with Foot on Ground - 10 reps - 3 sets - 1x daily - 7x weekly  Standing Hamstring Stretch on Chair - 10 reps - 3 sets - 1x daily - 7x weekly  Pelvic Tilt - 10 reps - 3 sets - 1x daily - 7x weekly  Manual Treatments:mfr to bilateral post tib, gastroc, itb's patellar mobs gr 3 all directions, . purcussion massager to hams, gastroc, and post tib x 30      Modalities:   US1.5 w/cm2 50% to left knee     , Kineseo tape runners knee bilat    Charges: Therapeutic Exercise:  [x] (85230) Provided verbal/tactile cueing for activities to restore or maintain strength, flexibility, endurance, ROM for improvements with self-care, mobility, lifting and ambulation. Neuromuscular Re-Education  [] (83738) Provided verbal/tactile cueing for activities to restore or maintain balance, coordination, kinesthetic sense, posture, motor skill, proprioception for self-care, mobility, lifting, and ambulation. Therapeutic Activities:    [] (72872) Provided verbal/tactile cueing to address functional limitations related to loss of mobility, strength, balance, and coordination.      Gait Training:  [] (56565) Provided training and instruction to the patient for proper postural muscle recruitment and positioning with ambulation re-education     Home Exercise Program:    [] (09455) Reviewed/Progressed HEP activities related to strengthening, flexibility, endurance, ROM for functional self-care, mobility, lifting and ambulation   [] (31422) Reviewed/Progressed HEP activities related to improving balance, coordination, kinesthetic sense, posture, motor skill, proprioception for self-care, mobility, lifting, and ambulation      Manual Treatments:  Natalio Montenegro / Megha Dockery / Oscillations-Mobs: G-I, II, III, IV / Manipulation / MLD  [] (91862) Provided manual therapy to mobilize  soft tissue/joints/fluid for the purpose of modulating pain, promoting relaxation, increasing ROM, reducing/eliminating soft tissue swelling/inflammation/restriction, improving soft tissue extensibility and allowing for proper ROM for normal function with self- care, mobility, lifting and ambulation. Timed Code Treatment Minutes: 60   Total Treatment Minutes: 65     [] EVAL (LOW) 98043   [] EVAL (MOD) 52681   [] EVAL (HIGH) 47284    [] RE-EVAL   [x] TE (76587) x 1      [] NMR (16486)   x    [x] Manual (02695) x2    [x] Ultrasound (41220) x 10[] TA (44700) x  [] Mech Traction (97410)  [] Ionto (88975)           [] ES (un) (49030):   [] Other:      Treatment/Activity Tolerance:  [x] Patient tolerated treatment well [] Patient limited by fatigue  [] Patient limited by pain  [] Patient limited by other medical complications  [] Other:     Prognosis: [x] Good [] Fair  [] Poor    Goals:       Short term goals  Time Frame for Short term goals: 6 weeks  Short term goal 1: Pt will increase hamstring flexibility so she can fully extend her knees.   Short term goal 2: Pt will increase strength of her quads to 4+/5  Short term goal 3: Pt will increase bilateral knee flexion to at least 115 degrees  Short term goal 4: Pt will be independent with HEP  Patient Goals   Patient goals : \"Strengthen legs for greater flexibilty, mobility, transferring, and put off surgery for 9+ months             Patient Requires Follow-up: [x] Yes  [] No    Plan:   [x] Continue per plan of care [] Alter current plan (see comments)  [] Plan of care initiated [] Hold pending MD visit [] Discharge    Plan for Next Session: 5/26/20   Work on loosening  Muscles, improving patellar mob, strength and proprio michael in wb position    Electronically signed by:  Dorinda Perez PT,

## 2020-07-06 ENCOUNTER — APPOINTMENT (OUTPATIENT)
Dept: PHYSICAL THERAPY | Age: 65
End: 2020-07-06
Payer: MEDICARE

## 2020-07-07 ENCOUNTER — HOSPITAL ENCOUNTER (OUTPATIENT)
Dept: PHYSICAL THERAPY | Age: 65
Setting detail: THERAPIES SERIES
Discharge: HOME | End: 2020-07-07
Payer: MEDICARE

## 2020-07-07 PROCEDURE — 97140 MANUAL THERAPY 1/> REGIONS: CPT

## 2020-07-07 PROCEDURE — 97110 THERAPEUTIC EXERCISES: CPT

## 2020-07-07 NOTE — FLOWSHEET NOTE
Physical Therapy Daily Treatment Note  Date:  2020    Patient Name:  Paul Flowers    :  1955  MRN: [id number]    Restrictions/Precautions:      Pertinent Medical History:      Medical/Treatment Diagnosis Information:  · Diagnosis: OA of Right and left knees  · Treatment Diagnosis: Pain on knees due to OA, weakness of quads, limited knee ROM,  postural changes (genu valgus with foot pronation)    Insurance/Certification information:  PT Insurance Information: Medicare  Physician Information:  Referring Practitioner: Zion Villarreal MD  Plan of care signed (Y/N):  Sent to Dr. Manuel Schmidt 3/17/20    Visit# / total visits:  Pain level: Right-1/10,  Left- 3/10     G-Code (if applicable):      Date / Visit # G-Code Applied:  /       Progress Note: []  Yes  [x]  No  Next due by: Visit #10      History of Injury: See below    Subjective:  Subjective  Subjective: Patient has a history of bilateral knee pain for at least 10 years. Since 19 pain has increased, alternating with right and left knees. She has trouble with twisting and going up and down steps. She has had 4 cortisone shots. [employer] and then she had 3 gel injections. .  She is not having that much pain. It is more the aggravation  of not being able to get up and move well. .  It is starting to feel a little better after the gel injection. She remains active. and was swimming regularly in pool at [facility]. .  Pt returns on 20 to resume PT after being off due to the covid-19. Pt states, that her knees are about the same as they were prior to covid. She had a cortisone shot in both knees in early May. She also has had gel shots. She has been doing exercises from therapy over the past 2 months. She also was swimming and is going to resume this asap. She hopes to buy some time and avoiid getting tka until necessary. 3/19/20: \"The left knee gave way twice in the shower today. \" Yesterday she felt bad and iced the knee but did not do any exercises. 6/1/20  Pt states, \" not too bad today \"  6/5/20  Pt states, \" \" Knees seem to be improving \"  6/8/20  Pt states, : \"My left knee was sore after the last rx \"  6/12/20  Pt states, \" Improving, was able to swim 2 days this week \"   6/15/20  Pt states, \" I was able to do some gardening today , left knee a little more sore \"  6/17/20  Pt states, \" improving, went to the pool yesterday \"  6/22/20  Pt states, \" doing pretty good today \"  6/24/20  Pt states, \" knees feeling a little stronger \"  6/29/20  Pt states, \" pain off and on , better overall\"   7/1/20  Pt states, \"  Left knee is sore today \"  7/7/20  Pt states, \" Doing better overall, feeling stronger \"    Objective:  See eval   Observation:    Test measurements:        Exercises:  Exercise/Equipment Resistance/Repetitions Other comments                                                                    Exercises in Clinic     Nu step X 8 min L4    Leg press 75# x 40, 35# x 30 ea               wobble X 2 min  ea    Hams stretch 30 x 3    gastroc stretch 30 x 3    Went over pool exercises X 5 minutes                                 Knee strength - bialt- 4/5 strength, Proprio- 3/5,  ROM- left- flex-100, ext--15,  Left- flex-105, ext- 15  rom- is wfl, strength- 5-/5      Other Therapeutic Activities:  Patient was educated on diagnosis, plan of care and prognosis of their complaint. Also, frequency and duration of treatments was discussed. Patient was informed of the attendance policy and issued a copy for their records. Home Exercise Program: Patient was given written instructions for home exercises as above. Patient performs them correctly and understands purpose. 3/17/20: gave information on ordering exercise strap. 3/19/20: Pt and PT reviewed HEP and added three new ones for advancement. Access Code: [email or web address]   URL: [email or web address]   Date: 05/26/2020   Prepared by: Viry Bench     Exercises  Srinath - 10 reps - 3 sets - 1x daily - 7x weekly  Prone Knee Flexion - 10 reps - 3 sets - 1x daily - 7x weekly  Supine Quad Set - 10 reps - 3 sets - 1x daily - 7x weeklyAccess Code: [email or web address]   URL: [email or web address]   Date: 06/12/2020   Prepared by: Darylene Knuckles     Exercises  Supine Piriformis Stretch Pulling Heel to Hip - 10 reps - 3 sets - 1x daily - 7x weekly  Supine Piriformis Stretch with Foot on Ground - 10 reps - 3 sets - 1x daily - 7x weekly  Standing Hamstring Stretch on Chair - 10 reps - 3 sets - 1x daily - 7x weekly  Pelvic Tilt - 10 reps - 3 sets - 1x daily - 7x weekly  Manual Treatments:mfr to bilateral post tib, gastroc, itb's patellar mobs gr 3 all directions, . purcussion massager to hams, gastroc, and post tib x 30      Modalities:   US1.5 w/cm2 50% to left knee     , Kineseo tape runners knee bilat    Charges: Therapeutic Exercise:  [x] (43075) Provided verbal/tactile cueing for activities to restore or maintain strength, flexibility, endurance, ROM for improvements with self-care, mobility, lifting and ambulation. Neuromuscular Re-Education  [] (61617) Provided verbal/tactile cueing for activities to restore or maintain balance, coordination, kinesthetic sense, posture, motor skill, proprioception for self-care, mobility, lifting, and ambulation. Therapeutic Activities:    [] (40529) Provided verbal/tactile cueing to address functional limitations related to loss of mobility, strength, balance, and coordination.      Gait Training:  [] (41798) Provided training and instruction to the patient for proper postural muscle recruitment and positioning with ambulation re-education     Home Exercise Program:    [] (45334) Reviewed/Progressed HEP activities related to strengthening, flexibility, endurance, ROM for functional self-care, mobility, lifting and ambulation   [] (58194) Reviewed/Progressed HEP activities related to improving balance, coordination, kinesthetic sense, posture, motor skill, proprioception for self-care, mobility, lifting, and ambulation      Manual Treatments:  MFR / STM / Oscillations-Mobs:  G-I, II, III, IV / Manipulation / MLD  [] (37449) Provided manual therapy to mobilize  soft tissue/joints/fluid for the purpose of modulating pain, promoting relaxation, increasing ROM, reducing/eliminating soft tissue swelling/inflammation/restriction, improving soft tissue extensibility and allowing for proper ROM for normal function with self- care, mobility, lifting and ambulation. Timed Code Treatment Minutes: 45   Total Treatment Minutes: 45     [] EVAL (LOW) 58798   [] EVAL (MOD) 23956   [] EVAL (HIGH) 15940    [] RE-EVAL   [x] TE (85779) x 1      [] NMR (63445)   x    [x] Manual (17262) x2    [] Ultrasound (74518) x 10[] TA (81885) x  [] Mech Traction (36576)  [] Ionto (68412)           [] ES (un) (39438):   [] Other:      Treatment/Activity Tolerance:  [x] Patient tolerated treatment well [] Patient limited by fatigue  [] Patient limited by pain  [] Patient limited by other medical complications  [] Other:     Prognosis: [x] Good [] Fair  [] Poor    Goals:       Short term goals  Time Frame for Short term goals: 6 weeks  Short term goal 1: Pt will increase hamstring flexibility so she can fully extend her knees.   Short term goal 2: Pt will increase strength of her quads to 4+/5  Short term goal 3: Pt will increase bilateral knee flexion to at least 115 degrees  Short term goal 4: Pt will be independent with HEP  Patient Goals   Patient goals : \"Strengthen legs for greater flexibilty, mobility, transferring, and put off surgery for 9+ months             Patient Requires Follow-up: [x] Yes  [] No    Plan:   [x] Continue per plan of care [] Alter current plan (see comments)  [] Plan of care initiated [] Hold pending MD visit [] Discharge    Plan for Next Session: 5/26/20   Work on loosening  Muscles, improving patellar mob, strength and proprio michael in wb position    Electronically signed by:  Carlie Pizano Mark PT,

## 2020-07-08 NOTE — DISCHARGE SUMMARY
Outpatient Physical Therapy  [] [facility]    Phone: [phone]   Fax: [phone]   [x] [facility]  Phone: [phone]   Fax: [phone]  [] [facility]              Phone: [phone]   Fax: [phone]     Physical Therapy Progress/Discharge Note  Date: 2020        Patient Name:  Marleen Rodriguez    :  1955  MRN: [id number]  Pertinent Medical History:       Medical/Treatment Diagnosis Information:  · Diagnosis: OA of Right and left knees  · Treatment Diagnosis: Pain on knees due to OA, weakness of quads, limited knee ROM,  postural changes (genu valgus with foot pronation)     Insurance/Certification information:  PT Insurance Information: Medicare  Physician Information:  Referring Practitioner: Vandana Romeo MD  Plan of care signed (Y/N):  Sent to Dr. Khoury 3/17/20     Visit# / total visits:  Pain level:      Right-1/10,  Left- 3/10        Plan of Care/Treatment to date:  [x] Therapeutic Exercise    [] Modalities:  [x] Therapeutic Activity     [x] Ultrasound  [] Electrical Stimulation  [x] Gait Training      [] Cervical Traction    [] Lumbar Traction  [x] Neuromuscular Re-education  [x] Cold/hotpack [] Iontophoresis  [x] Instruction in HEP      Other:  [x] Manual Therapy       []    [] Aquatic Therapy       []                          Significant Findings At Last Visit/Comments:    Subjective:          Objective:   rom is wfl, strength- 5-/5      Assessment:   Pt was seen for 14 rx and has met her goals. She will continue with her hep, swimmng and exercising at the [facility]    Progression Towards Functional goals:  [x] Patient is progressing as expected towards functional goals listed. [] Progression is slowed due to complexities listed. [] Progression has been slowed due to co-morbidities.   [] Plan just implemented, too soon to assess goals progression  [] Other:    Goals:Short term goals  Time Frame for Short term goals: 6 weeks  Short term goal 1: Pt will increase hamstring

## 2021-01-03 NOTE — PROGRESS NOTES
Subjective: Patient is here for follow-up of multiple complaints. Today she is complaining of left thumb CMC pain as well as bilateral knee pain. She is going to have both of her knees done by Dr. Frank Finley in April and then in October. She states she has had some recent pain on the plantar medial aspect of the heel thinks this is related the plantar fasciitis. She has new inserts that seem to be fitting fairly well. She has had both of her shoulders replaced  Objective: Physical exam shows she has almost no tenderness on the plantar medial aspect of the left heel today. Has 10 degrees dorsiflexion 40 degrees of plantarflexion strength is 5/5. She has decreased sensation to [employer] 5.0 7 monofilament and has preulcerative calluses on the plantar aspect of the first and fifth metatarsal head. She has varicosities in both lower extremities she has genu valgus has tenderness to the left thumb CMC joint palpation worse obviously prominent. She has decreased  strength. Positive grind test  Imaging:  Assessment and plan: Gave her thumb spica brace that she can use for the left hand and she will continue her inserts follow-up with me as needed. She is diabetic and has peripheral neuropathy along with preulcerative calluses underneath the first and fifth metatarsal head.   We will need to get new inserts after her knee replacements

## 2021-01-05 ENCOUNTER — OFFICE VISIT (OUTPATIENT)
Dept: ORTHOPEDIC SURGERY | Age: 66
End: 2021-01-05
Payer: MEDICARE

## 2021-01-05 VITALS — WEIGHT: 203.04 LBS | HEIGHT: 69 IN | BODY MASS INDEX: 30.07 KG/M2

## 2021-01-05 DIAGNOSIS — M18.12 ARTHRITIS OF CARPOMETACARPAL (CMC) JOINT OF LEFT THUMB: Primary | ICD-10-CM

## 2021-01-05 PROCEDURE — G8484 FLU IMMUNIZE NO ADMIN: HCPCS | Performed by: ORTHOPAEDIC SURGERY

## 2021-01-05 PROCEDURE — 1090F PRES/ABSN URINE INCON ASSESS: CPT | Performed by: ORTHOPAEDIC SURGERY

## 2021-01-05 PROCEDURE — G8400 PT W/DXA NO RESULTS DOC: HCPCS | Performed by: ORTHOPAEDIC SURGERY

## 2021-01-05 PROCEDURE — 3017F COLORECTAL CA SCREEN DOC REV: CPT | Performed by: ORTHOPAEDIC SURGERY

## 2021-01-05 PROCEDURE — 1123F ACP DISCUSS/DSCN MKR DOCD: CPT | Performed by: ORTHOPAEDIC SURGERY

## 2021-01-05 PROCEDURE — G8427 DOCREV CUR MEDS BY ELIG CLIN: HCPCS | Performed by: ORTHOPAEDIC SURGERY

## 2021-01-05 PROCEDURE — G8417 CALC BMI ABV UP PARAM F/U: HCPCS | Performed by: ORTHOPAEDIC SURGERY

## 2021-01-05 PROCEDURE — 99212 OFFICE O/P EST SF 10 MIN: CPT | Performed by: ORTHOPAEDIC SURGERY

## 2021-01-05 PROCEDURE — 4040F PNEUMOC VAC/ADMIN/RCVD: CPT | Performed by: ORTHOPAEDIC SURGERY

## 2021-01-05 PROCEDURE — 1036F TOBACCO NON-USER: CPT | Performed by: ORTHOPAEDIC SURGERY

## 2021-01-05 PROCEDURE — L3908 WHO COCK-UP NONMOLDE PRE OTS: HCPCS | Performed by: ORTHOPAEDIC SURGERY

## 2021-01-05 NOTE — LETTER
[address] [place] [address] Dr Sharyn Veras [facility] [address]  Phone: [phone]  Fax: [phone] MD Olivia        January 5, 2021 [address] [facility] [address]      To Whom It May Concern: It is in my medical opinion that my patient Erik Pierce would benefit from the use of a cold cure foot wrap and a BFST foot wrap due to an orthopaedic condition. DX: M72.2    If you have any questions or concerns, please don't hesitate to call.     Sincerely,            MD Mahsa Hale MD

## 2021-01-14 ENCOUNTER — TELEPHONE (OUTPATIENT)
Dept: ORTHOPEDIC SURGERY | Age: 66
End: 2021-01-14

## 2021-04-19 ENCOUNTER — HOSPITAL ENCOUNTER (OUTPATIENT)
Dept: PHYSICAL THERAPY | Age: 66
Setting detail: THERAPIES SERIES
Discharge: HOME | End: 2021-04-19
Payer: MEDICARE

## 2021-04-19 PROCEDURE — 97530 THERAPEUTIC ACTIVITIES: CPT

## 2021-04-19 PROCEDURE — 97110 THERAPEUTIC EXERCISES: CPT

## 2021-04-19 PROCEDURE — 97161 PT EVAL LOW COMPLEX 20 MIN: CPT

## 2021-04-19 NOTE — PLAN OF CARE
[facility]. [address]  Phone: [phone]   Fax:     [phone]                                                       Physical Therapy Certification    Dear Referring Practitioner: Jillian Novak MD,    We had the pleasure of evaluating the following patient for physical therapy services at [facility]. A summary of our findings can be found in the initial assessment below. This includes our plan of care. If you have any questions or concerns regarding these findings, please do not hesitate to contact me at the office phone number checked above.   Thank you for the referral.       Physician Signature:_______________________________Date:__________________  By signing above (or electronic signature), therapists plan is approved by physician          Patient: Melanie Hoover   : 1955   MRN: [id number]  Referring Physician: Referring Practitioner: Jillian Novak MD      Evaluation Date: 2021      Medical Diagnosis Information:  Diagnosis:  M17.12 (ICD-10-CM) - Unilateral primary osteoarthritis, left knee   Z96.652 (ICD-10-CM) - Presence of left artificial knee joint         Treatment Diagnosis: Pain on left knee, limited knee mobility, weakness, difficulty with walking                                         Insurance information: PT Insurance Information: Medicare     Precautions/ Contra-indications:   Latex Allergy:  [x]NO      []YES  Preferred Language for Healthcare:   [x]English       []other:    C-SSRS Triggered by Intake questionnaire (Past 2 wk assessment ):   [x] No, Questionnaire did not trigger screening.   [] Yes, Patient intake triggered C-SSRS Screening      [] C-SSRS Screening completed  [] PCP notified via Epic     SUBJECTIVE: Patient stated complaint: Pain on left knee    Relevant Medical History:   Functional Outcome Measure: WOMAC = 83    Pain Scale: 7-10/10  Easing factors: Ice and meds  Provocative factors: Bending, transferring in and out of car, sleeping    Type: [x]Constant   []Intermittent  []Radiating []Localized []other:     Numbness/Tingling: None  Occupation/School: Retired    Living Status/Prior Level of Function: Independent with ADLs and IADLs,  assists with ADL's    OBJECTIVE:     Posture: Pronated on both feet, but less so on left since straightening left knee during TKR surgery on 4/16/21    Functional Mobility/Transfers:  Uses elevated toilet seat, somewhat difficult to get onto toilet     Palpation: Tenderness to touch  Bandages/Dressings/Incisions:Intact post op    Gait: (include devices/WB status) walker, slow ramana    ROM LEFT RIGHT   HIP Flex 100 110   HIP Abd     HIP Ext     HIP IR  WNL   HIP ER  WNL   Knee ext -7 0   Knee Flex 70 110   Ankle PF     Ankle DF     Ankle In     Ankle Ev     Strength  LEFT RIGHT   HIP Flexors Not tested 3/5   HIP Abductors     HIP Ext     Hip ER     Knee EXT (quad) Not tested due to recency of surgery 3+/5   Knee Flex (HS) Not tested due to recency of surgery 3+/5   Ankle DF 4/5 4/5   Ankle PF     Ankle Inv     Ankle EV          Circumference  Mid apex  7 cm prox     19 1/2    16 3/4     Reflexes/Sensation:    [x]Dermatomes/Myotomes intact    []Reflexes equal and normal bilaterally   []Other:    Joint mobility:    []Normal    [x]Hypo   []Hyper    Orthopedic Special Tests:                        [x] Patient history, allergies, meds reviewed. Medical chart reviewed. See intake form. Review Of Systems (ROS):  [x]Performed Review of systems (Integumentary, CardioPulmonary, Neurological) by intake and observation. Intake form has been scanned into medical record. Patient has been instructed to contact their primary care physician regarding ROS issues if not already being addressed at this time.       Co-morbidities/Complexities (which will affect course of rehabilitation):  []None           Arthritic conditions   []Rheumatoid arthritis (M05.9)  [x]Osteoarthritis (M19.91)   Cardiovascular conditions   []Hypertension (I10)  []Hyperlipidemia (E78.5)  []Angina pectoris (I20)  []Atherosclerosis (I70)  []CVA Musculoskeletal conditions   []Disc pathology   []Congenital spine pathologies   []Prior surgical intervention  []Osteoporosis (M81.8)  []Osteopenia (M85.8)   Endocrine conditions   []Hypothyroid (E03.9)  []Hyperthyroid Gastrointestinal conditions   []Constipation (V49.10)   Metabolic conditions   []Morbid obesity (E66.01)  []Diabetes type 1(E10.65) or 2 (E11.65)   []Neuropathy (G60.9)     Pulmonary conditions   []Asthma (J45)  []Coughing   []COPD (J44.9)   Psychological Disorders  []Anxiety (F41.9)  []Depression (F32.9)   []Other:   [x]Other: COVID, previous reverse TSR, back issues         Barriers to/and or personal factors that will affect rehab potential:              []Age  []Sex    []Smoker              []Motivation/Lack of Motivation                        [x]Co-Morbidities              []Cognitive Function, education/learning barriers              []Environmental, home barriers              []profession/work barriers  []past PT/medical experience  []other:  Justification:   Falls Risk Assessment (30 days):   [x] Falls Risk assessed and no intervention required.   [] Falls Risk assessed and Patient requires intervention due to being higher risk   TUG score (>12s at risk):     [] Falls education provided, including         ASSESSMENT:   Functional Impairments:     []Noted lumbar/proximal hip/LE hypomobility   [x]Decreased LE functional ROM   []Decreased core/proximal hip strength and neuromuscular control   [x]Decreased LE functional strength   []Reduced balance/proprioceptive control   []other:      Functional Activity Limitations (from functional questionnaire and intake)   [x]Reduced ability to tolerate prolonged functional positions   [x]Reduced ability or difficulty with changes of positions or transfers between positions   []Reduced ability to maintain good posture and demonstrate good body mechanics with sitting, bending, and lifting   [x]Reduced ability to sleep   [x] Reduced ability or tolerance with driving and/or computer work   [x]Reduced ability to perform lifting, carrying tasks   [x]Reduced ability to squat   []Reduced ability to forward bend   [x]Reduced ability to ambulate prolonged functional periods/distances/surfaces   [x]Reduced ability to ascend/descend stairs   [x]Reduced ability to run, hop or jump   []other:     Participation Restrictions   [x]Reduced participation in self care activities   [x]Reduced participation in home management activities   []Reduced participation in work activities   []Reduced participation in social activities. []Reduced participation in sport activities. Classification :    [x]Signs/symptoms consistent with post-surgical status including decreased ROM, strength and function.    []Signs/symptoms consistent with joint sprain/strain   []Signs/symptoms consistent with patella-femoral syndrome   [x]Signs/symptoms consistent with knee OA/hip OA   []Signs/symptoms consistent with internal derangement of knee/Hip   []Signs/symptoms consistent with functional hip weakness/NMR control      []Signs/symptoms consistent with tendinitis/tendinosis    []signs/symptoms consistent with pathology which may benefit from Dry needling      []other:      Prognosis/Rehab Potential:      []Excellent   [x]Good    []Fair   []Poor    Tolerance of evaluation/treatment:    []Excellent   []Good    [x]Fair   []Poor    Physical Therapy Evaluation Complexity Justification  [x] A history of present problem with:  [] no personal factors and/or comorbidities that impact the plan of care;  [x]1-2 personal factors and/or comorbidities that impact the plan of care  []3 personal factors and/or comorbidities that impact the plan of care  [x] An examination of body systems using standardized tests and measures addressing any of the following: body structures and functions (impairments), activity limitations, and/or participation restrictions;:  [x] a total of 1-2 or more elements   [] a total of 3 or more elements   [] a total of 4 or more elements   [x] A clinical presentation with:  [] stable and/or uncomplicated characteristics   [x] evolving clinical presentation with changing characteristics  [] unstable and unpredictable characteristics;   [x] Clinical decision making of [x] low, [] moderate, [] high complexity using standardized patient assessment instrument and/or measurable assessment of functional outcome. [x] EVAL (LOW) 74099 (typically 20 minutes face-to-face)  [] EVAL (MOD) 52320 (typically 30 minutes face-to-face)  [] EVAL (HIGH) 16916 (typically 45 minutes face-to-face)  [] RE-EVAL     PLAN:  Frequency/Duration: 2-3 days per week for 6 Weeks:  Interventions:  [x]  Therapeutic exercise including: strength training, ROM, for Lower extremity and core   [x]  NMR activation and proprioception for LE, Glutes and Core   [x]  Manual therapy as indicated for LE, Hip and spine to include: Dry Needling/IASTM, STM, PROM, Gr I-IV mobilizations, manipulation. [x] Modalities as needed that may include: thermal agents, E-stim, Biofeedback, US, iontophoresis as indicated  [x] Patient education on joint protection, postural re-education, activity modification, progression of HEP. HEP instruction:    GOALS:  Patient stated goal: \"Rehab after knee replacement\"  [] Progressing: [] Met: [] Not Met: [] Adjusted    Therapist goals for Patient:   Short Term Goals: To be achieved in: 2 weeks  1. Independent in HEP and progression per patient tolerance, in order to prevent re-injury. [] Progressing: [] Met: [] Not Met: [] Adjusted  2. Patient will have a decrease in pain to facilitate improvement in movement, function, and ADLs as indicated by Functional Deficits.   [] Progressing: [] Met: [] Not Met: [] Adjusted    Long Term Goals: To be achieved in: 6 weeks  1. Disability index score of 20% or less for the LEFS to assist with reaching prior level of function. [] Progressing: [] Met: [] Not Met: [] Adjusted  2. Patient will demonstrate increased AROM to 0-110 on left knee  to allow for proper joint functioning as indicated by patients Functional Deficits. [] Progressing: [] Met: [] Not Met: [] Adjusted  3. Patient will demonstrate an increase in Strength to good proximal hip strength and control, within 5lb HHD in LE to allow for proper functional mobility as indicated by patients Functional Deficits. [] Progressing: [] Met: [] Not Met: [] Adjusted  4. Patient will return to 80% functional activities without increased symptoms or restriction. [] Progressing: [] Met: [] Not Met: [] Adjusted  5. Pt  will ambulate safely with SPC on level surfaces.(patient specific functional goal)    [] Progressing: [] Met: [] Not Met: [] Adjusted     Electronically signed by:  Isabelle Harding PT      Note: If patient does not return for scheduled/recommended follow up visits, this note will serve as a discharge from care along with the most recent update on progress.

## 2021-04-19 NOTE — FLOWSHEET NOTE
20X    Seated knee flex with assist from other foot 20X    LAQ 20X    SLR 10X with assistance    SAQ 20X    Nu Step  add   Manual Intervention (93186)  Min:     Knee mobs/PROM     Tib/Fem Mobs     Patella Mobs     Ankle mobs               NMR re-education (91186)  Min:  CUES NEEDED             Therapeutic Activity (19667)  Min:10      Practiced getting onto bed and off of bed, assisted with ambulation using RW on level surfaces, 100 feet x 2. Reassurance offered to  pt         Modalities  Min:     IFC with      CP after exercises     MH after exercises            Other Therapeutic Activities: Pt was educated on PT POC, Diagnosis, Prognosis, pathomechanics as well as frequency and duration of scheduling future physical therapy appointments. Time was also taken on this day to answer all patient questions and participation in PT. Reviewed appointment policy in detail with patient and patient verbalized understanding. Home Exercise Program: Patient was instructed in the following for HEP:     . Patient verbalized/demonstrated understanding and was issued written handout. Continue HEP as given by [facility] PT during prehab. Therapeutic Exercise and NMR EXR  [x] (14493) Provided verbal/tactile cueing for activities related to strengthening, flexibility, endurance, ROM for improvements in LE, proximal hip, and core control with self care, mobility, lifting, ambulation.  [] (44359) Provided verbal/tactile cueing for activities related to improving balance, coordination, kinesthetic sense, posture, motor skill, proprioception  to assist with LE, proximal hip, and core control in self care, mobility, lifting, ambulation and eccentric single leg control.      NMR and Therapeutic Activities:    [] (83473 or 09053) Provided verbal/tactile cueing for activities related to improving balance, coordination, kinesthetic sense, posture, motor skill, proprioception and motor activation to allow for proper function of core, proximal hip and LE with self care and ADLs and functional mobility.   [] (65542) Gait Re-education- Provided training and instruction to the patient for proper LE, core and proximal hip recruitment and positioning and eccentric body weight control with ambulation re-education including up and down stairs     Home Exercise Program:    [x] (68865) Reviewed/Progressed HEP activities related to strengthening, flexibility, endurance, ROM of core, proximal hip and LE for functional self-care, mobility, lifting and ambulation/stair navigation   [] (02196)Reviewed/Progressed HEP activities related to improving balance, coordination, kinesthetic sense, posture, motor skill, proprioception of core, proximal hip and LE for self care, mobility, lifting, and ambulation/stair navigation      Manual Treatments:  PROM / STM / Oscillations-Mobs:  G-I, II, III, IV (PA's, Inf., Post.)  [] (68489) Provided manual therapy to mobilize LE, proximal hip and/or LS spine soft tissue/joints for the purpose of modulating pain, promoting relaxation,  increasing ROM, reducing/eliminating soft tissue swelling/inflammation/restriction, improving soft tissue extensibility and allowing for proper ROM for normal function with self care, mobility, lifting and ambulation.      If [facility] Please Indicate Time In/Out  CPT Code Time in Time out                         Charges:  Timed Code Treatment Minutes: 65   Total Treatment Minutes: 65      [x] EVAL (LOW) 84217 (typically 20 minutes face-to-face)  [] EVAL (MOD) 04186 (typically 30 minutes face-to-face)  [] EVAL (HIGH) 69413 (typically 45 minutes face-to-face)  [] RE-EVAL     [x] JR(58681) x   2  [] Dry needle 1 or 2 Muscles (28958)  [] NMR (32158) x     [] Dry needle 3+ Muscles (13527)  [] Manual (62612) x     [] Ultrasound (31602) x  [x] TA (33439) x   1  [] Mech Traction (53864)  [] ES(attended) (24483)     [] ES (un) (71742):   [] Vasopump (73910) [] Ionto (74749)   [] Other:    GOALS: Patient stated goal: \"Rehab after knee replacement\"  []? Progressing: []? Met: []? Not Met: []? Adjusted     Therapist goals for Patient:   Short Term Goals: To be achieved in: 2 weeks  1. Independent in HEP and progression per patient tolerance, in order to prevent re-injury. []? Progressing: []? Met: []? Not Met: []? Adjusted  2. Patient will have a decrease in pain to facilitate improvement in movement, function, and ADLs as indicated by Functional Deficits. []? Progressing: []? Met: []? Not Met: []? Adjusted     Long Term Goals: To be achieved in: 6 weeks  1. Disability index score of 20% or less for the LEFS to assist with reaching prior level of function. []? Progressing: []? Met: []? Not Met: []? Adjusted  2. Patient will demonstrate increased AROM to 0-110 on left knee  to allow for proper joint functioning as indicated by patients Functional Deficits. []? Progressing: []? Met: []? Not Met: []? Adjusted  3. Patient will demonstrate an increase in Strength to good proximal hip strength and control, within 5lb HHD in LE to allow for proper functional mobility as indicated by patients Functional Deficits. []? Progressing: []? Met: []? Not Met: []? Adjusted  4. Patient will return to 80% functional activities without increased symptoms or restriction. []? Progressing: []? Met: []? Not Met: []? Adjusted  5. Pt  will ambulate safely with SPC on level surfaces.(patient specific functional goal)    []? Progressing: []? Met: []? Not Met: []? Adjusted          ASSESSMENT:  See eval    Treatment/Activity Tolerance:  [x] Patient tolerated treatment well [] Patient limited by fatique  [] Patient limited by pain  [] Patient limited by other medical complications  [] Other:     Overall Progression Towards Functional goals/ Treatment Progress Update:  [] Patient is progressing as expected towards functional goals listed. [] Progression is slowed due to complexities/Impairments listed.   [] Progression has been slowed due to co-morbidities. [x] Plan just implemented, too soon to assess goals progression <30days   [] Goals require adjustment due to lack of progress  [] Patient is not progressing as expected and requires additional follow up with physician  [] Other    Prognosis for POC: [x] Good [] Fair  [] Poor    Patient requires continued skilled intervention: [x] Yes  [] No        PLAN: Progress as able . Pt has not had any home therapy and TKR on left just took place on 4/16/21. [] Continue per plan of care [] Alter current plan (see comments)  [x] Plan of care initiated [] Hold pending MD visit [] Discharge    Electronically signed by: Arsenio Espino, PT    Note: If patient does not return for scheduled/recommended follow up visits, this note will serve as a discharge from care along with the most recent update on progress.

## 2021-04-21 ENCOUNTER — HOSPITAL ENCOUNTER (OUTPATIENT)
Dept: PHYSICAL THERAPY | Age: 66
Setting detail: THERAPIES SERIES
Discharge: HOME | End: 2021-04-21
Payer: MEDICARE

## 2021-04-21 PROCEDURE — 97110 THERAPEUTIC EXERCISES: CPT

## 2021-04-21 PROCEDURE — 97140 MANUAL THERAPY 1/> REGIONS: CPT

## 2021-04-21 NOTE — FLOWSHEET NOTE
[facility] - Outpatient Rehabilitation & Therapy  [address]. [place]  Phone: [phone]   Fax:     [phone]      Physical Therapy Treatment Note/ Progress Report:     Date:  2021    Patient Name:  Ramiro Villagomez    :  1955  MRN: [id number]    Pertinent Medical History:  Previous reverse TSR, back issues, OA    Medical/Treatment Diagnosis Information:  Diagnosis:   M17.12 (ICD-10-CM) - Unilateral primary osteoarthritis, left knee   Z96.652 (ICD-10-CM) - Presence of left artificial knee joint     ·   · Treatment Diagnosis: Pain on left knee, limited knee mobility, weakness, difficulty with walking    Insurance/Certification information:  PT Insurance Information: Medicare  Physician Information:  Referring Practitioner: Panchito Winkler MD  Plan of care signed (Y/N): Sent to Dr. Julian Gusman on 21    Date of Patient follow up with Physician:      Progress Report: []  Yes  [x]  No     Date Range for reporting period:  Beginnin2021  Ending:      Progress report due (10 Rx/or 30 days whichever is less):    Recertification due (POC duration/ or 90 days whichever is less):      Visit # POC/Insurance Allowable Auth Needed   2 18 []Yes   [x]No     Latex Allergy:  [x]NO      []YES  Preferred Language for Healthcare:   [x]English       []Other:    Functional Scale:      Date assessed: at eval  Test: WOMAC  Score: 83    Pain level:  9-10/10     History of Injury: Pt underwent a left TKR on 21. Went home and has not had any home therapy.  reports she is doing well at home. SUBJECTIVE:  21: Patient reports knee has been sore and swollen.     OBJECTIVE: See eval   Observation:    Test measurements:      RESTRICTIONS/PRECAUTIONS:  Exercises/Interventions:     Therapeutic Ex (89602)   Min:30 Reps/Resistance Notes/CUES   Ankle pumps 30X    Glut sets 20X    Quad sets 20X    Heel slides 20X    Knee stretch supine 1 min prop    Hip abduction 20X    Seated ROM 20X    Seated knee flex with assist from other foot 20X    LAQ 20X    SLR 10X with assistance    SAQ 20X    Nu Step seat 12  L2 x 5 min added   Manual Intervention (46459)  Min:     Knee mobs/PROM Flex /ext  STM to quad,gastoc,HS    Tib/Fem Mobs     Patella Mobs     Ankle mobs               NMR re-education (24618)  Min:  CUES NEEDED             Therapeutic Activity (01339)  Min:10      Practiced getting onto bed and off of bed, assisted with ambulation using RW on level surfaces, 100 feet x 2. Reassurance offered to  pt         Modalities  Min:     IFC with      CP after exercises     MH after exercises            Other Therapeutic Activities: Pt was educated on PT POC, Diagnosis, Prognosis, pathomechanics as well as frequency and duration of scheduling future physical therapy appointments. Time was also taken on this day to answer all patient questions and participation in PT. Reviewed appointment policy in detail with patient and patient verbalized understanding. Home Exercise Program: Patient was instructed in the following for HEP:     . Patient verbalized/demonstrated understanding and was issued written handout. Continue HEP as given by [facility] PT during prehab. Therapeutic Exercise and NMR EXR  [x] (67023) Provided verbal/tactile cueing for activities related to strengthening, flexibility, endurance, ROM for improvements in LE, proximal hip, and core control with self care, mobility, lifting, ambulation.  [] (87001) Provided verbal/tactile cueing for activities related to improving balance, coordination, kinesthetic sense, posture, motor skill, proprioception  to assist with LE, proximal hip, and core control in self care, mobility, lifting, ambulation and eccentric single leg control.      NMR and Therapeutic Activities:    [] (53899 or 28291) Provided verbal/tactile cueing for activities related to improving balance, coordination, kinesthetic [] ES (un) (72759):   [] Vasopump (55149) [] Ionto (47871)   [] Other:    GOALS: Patient stated goal: \"Rehab after knee replacement\"  []? Progressing: []? Met: []? Not Met: []? Adjusted     Therapist goals for Patient:   Short Term Goals: To be achieved in: 2 weeks  1. Independent in HEP and progression per patient tolerance, in order to prevent re-injury. []? Progressing: []? Met: []? Not Met: []? Adjusted  2. Patient will have a decrease in pain to facilitate improvement in movement, function, and ADLs as indicated by Functional Deficits. []? Progressing: []? Met: []? Not Met: []? Adjusted     Long Term Goals: To be achieved in: 6 weeks  1. Disability index score of 20% or less for the LEFS to assist with reaching prior level of function. []? Progressing: []? Met: []? Not Met: []? Adjusted  2. Patient will demonstrate increased AROM to 0-110 on left knee  to allow for proper joint functioning as indicated by patients Functional Deficits. []? Progressing: []? Met: []? Not Met: []? Adjusted  3. Patient will demonstrate an increase in Strength to good proximal hip strength and control, within 5lb HHD in LE to allow for proper functional mobility as indicated by patients Functional Deficits. []? Progressing: []? Met: []? Not Met: []? Adjusted  4. Patient will return to 80% functional activities without increased symptoms or restriction. []? Progressing: []? Met: []? Not Met: []? Adjusted  5. Pt  will ambulate safely with SPC on level surfaces.(patient specific functional goal)    []? Progressing: []? Met: []? Not Met: []? Adjusted          ASSESSMENT:  See eval    Treatment/Activity Tolerance:  [x] Patient tolerated treatment well [] Patient limited by fatique  [] Patient limited by pain  [] Patient limited by other medical complications  [] Other:     Overall Progression Towards Functional goals/ Treatment Progress Update:  [] Patient is progressing as expected towards functional goals listed.     [] Progression is slowed due to complexities/Impairments listed. [] Progression has been slowed due to co-morbidities. [x] Plan just implemented, too soon to assess goals progression <30days   [] Goals require adjustment due to lack of progress  [] Patient is not progressing as expected and requires additional follow up with physician  [] Other    Prognosis for POC: [x] Good [] Fair  [] Poor    Patient requires continued skilled intervention: [x] Yes  [] No        PLAN: Progress as able . Pt has not had any home therapy and TKR on left just took place on 4/16/21. [] Continue per plan of care [] Alter current plan (see comments)  [x] Plan of care initiated [] Hold pending MD visit [] Discharge    Electronically signed by: Meagan Wakefield PTA    Note: If patient does not return for scheduled/recommended follow up visits, this note will serve as a discharge from care along with the most recent update on progress.

## 2021-04-23 ENCOUNTER — HOSPITAL ENCOUNTER (OUTPATIENT)
Dept: PHYSICAL THERAPY | Age: 66
Setting detail: THERAPIES SERIES
Discharge: HOME | End: 2021-04-23
Payer: MEDICARE

## 2021-04-23 PROCEDURE — 97140 MANUAL THERAPY 1/> REGIONS: CPT

## 2021-04-23 PROCEDURE — 97110 THERAPEUTIC EXERCISES: CPT

## 2021-04-23 NOTE — FLOWSHEET NOTE
[facility] - Outpatient Rehabilitation & Therapy  [address] [place] [address]. [place]  Phone: [phone]   Fax:     [phone]      Physical Therapy Treatment Note/ Progress Report:     Date:  2021    Patient Name:  Mark Rodriguez    :  1955  MRN: [id number]    Pertinent Medical History:  Previous reverse TSR, back issues, OA    Medical/Treatment Diagnosis Information:  Diagnosis:   M17.12 (ICD-10-CM) - Unilateral primary osteoarthritis, left knee   Z96.652 (ICD-10-CM) - Presence of left artificial knee joint     ·   · Treatment Diagnosis: Pain on left knee, limited knee mobility, weakness, difficulty with walking    Insurance/Certification information:  PT Insurance Information: Medicare  Physician Information:  Referring Practitioner: Himanshu Dunaway MD  Plan of care signed (Y/N): Sent to Dr. Capo Tariq on 21    Date of Patient follow up with Physician:      Progress Report: []  Yes  [x]  No     Date Range for reporting period:  Beginnin2021  Ending:      Progress report due (10 Rx/or 30 days whichever is less):    Recertification due (POC duration/ or 90 days whichever is less):      Visit # POC/Insurance Allowable Auth Needed   3 18 []Yes   [x]No     Latex Allergy:  [x]NO      []YES  Preferred Language for Healthcare:   [x]English       []Other:    Functional Scale:      Date assessed: at eval  Test: WOMAC  Score: 83    Pain level:  9-10/10     History of Injury: Pt underwent a left TKR on 21. Went home and has not had any home therapy.  reports she is doing well at home. SUBJECTIVE:  21: Patient reports knee has been sore and swollen.   21  Pt states, \" Still really sore \"    OBJECTIVE: See eval   Observation:    Test measurements:      RESTRICTIONS/PRECAUTIONS:  Exercises/Interventions:     Therapeutic Ex (69308)   Min:30 Reps/Resistance Notes/CUES   Nu step L1 x 8 min    Ankle pumps 30X    Glut sets 20X    Quad sets 20X    Heel slides 20X    Knee stretch supine 1 min prop    Hip abduction 20X    Seated ROM 20X    Seated knee flex with assist from other foot 20X    LAQ 20X    SLR 10X     SAQ 20X    gastroc stretch 30 x 5    Seated heel slide X 3 min Hep 4/23/21   Manual Intervention (20721)  Min:     Knee mobs/PROM Flex /ext  STM to quad,gastoc,HS    Tib/Fem Mobs     Patella Mobs     Ankle mobs               NMR re-education (85036)  Min:  CUES NEEDED             Therapeutic Activity (25349)  Min:10      Practiced getting onto bed and off of bed, assisted with ambulation using RW on level surfaces, 100 feet x 2. Reassurance offered to  pt         Modalities  Min:     IFC with      CP after exercises     MH after exercises            Other Therapeutic Activities: Pt was educated on PT POC, Diagnosis, Prognosis, pathomechanics as well as frequency and duration of scheduling future physical therapy appointments. Time was also taken on this day to answer all patient questions and participation in PT. Reviewed appointment policy in detail with patient and patient verbalized understanding. Home Exercise Program: Patient was instructed in the following for HEP:     . Patient verbalized/demonstrated understanding and was issued written handout. Continue HEP as given by [facility] PT during prehab. Therapeutic Exercise and NMR EXR  [x] (57764) Provided verbal/tactile cueing for activities related to strengthening, flexibility, endurance, ROM for improvements in LE, proximal hip, and core control with self care, mobility, lifting, ambulation.  [] (29600) Provided verbal/tactile cueing for activities related to improving balance, coordination, kinesthetic sense, posture, motor skill, proprioception  to assist with LE, proximal hip, and core control in self care, mobility, lifting, ambulation and eccentric single leg control.      NMR and Therapeutic Activities:    [] (61909 or 28818) Provided (01531)     [] ES (un) (65939):   [] Vasopump (69542) [] Ionto (12996)   [] Other:    GOALS: Patient stated goal: \"Rehab after knee replacement\"  []? Progressing: []? Met: []? Not Met: []? Adjusted     Therapist goals for Patient:   Short Term Goals: To be achieved in: 2 weeks  1. Independent in HEP and progression per patient tolerance, in order to prevent re-injury. []? Progressing: []? Met: []? Not Met: []? Adjusted  2. Patient will have a decrease in pain to facilitate improvement in movement, function, and ADLs as indicated by Functional Deficits. []? Progressing: []? Met: []? Not Met: []? Adjusted     Long Term Goals: To be achieved in: 6 weeks  1. Disability index score of 20% or less for the LEFS to assist with reaching prior level of function. []? Progressing: []? Met: []? Not Met: []? Adjusted  2. Patient will demonstrate increased AROM to 0-110 on left knee  to allow for proper joint functioning as indicated by patients Functional Deficits. []? Progressing: []? Met: []? Not Met: []? Adjusted  3. Patient will demonstrate an increase in Strength to good proximal hip strength and control, within 5lb HHD in LE to allow for proper functional mobility as indicated by patients Functional Deficits. []? Progressing: []? Met: []? Not Met: []? Adjusted  4. Patient will return to 80% functional activities without increased symptoms or restriction. []? Progressing: []? Met: []? Not Met: []? Adjusted  5. Pt  will ambulate safely with SPC on level surfaces.(patient specific functional goal)    []? Progressing: []? Met: []? Not Met: []? Adjusted          ASSESSMENT:  See eval    Treatment/Activity Tolerance:  [x] Patient tolerated treatment well [] Patient limited by fatique  [] Patient limited by pain  [] Patient limited by other medical complications  [] Other:     Overall Progression Towards Functional goals/ Treatment Progress Update:  [] Patient is progressing as expected towards functional goals listed.

## 2021-04-26 ENCOUNTER — HOSPITAL ENCOUNTER (OUTPATIENT)
Dept: PHYSICAL THERAPY | Age: 66
Setting detail: THERAPIES SERIES
Discharge: HOME | End: 2021-04-26
Payer: MEDICARE

## 2021-04-26 PROCEDURE — 97140 MANUAL THERAPY 1/> REGIONS: CPT

## 2021-04-26 PROCEDURE — 97530 THERAPEUTIC ACTIVITIES: CPT

## 2021-04-26 PROCEDURE — 97110 THERAPEUTIC EXERCISES: CPT

## 2021-04-26 NOTE — FLOWSHEET NOTE
Ankle pumps 30X    Glut sets 20X    Quad sets 20X    Heel slides 20X    Knee stretch supine 1 min prop    Hip abduction 20X    Seated ROM 20X    Seated knee flex with assist from other foot 20X    LAQ 20X    SLR 10X     SAQ 20X    gastroc stretch 30 x 5    Seated heel slide X 3 min Hep 4/23/21   Manual Intervention (22298)  Min:     Knee mobs/PROM Flex /ext  STM to quad,gastoc,HS    Tib/Fem Mobs     Patella Mobs left    Ankle mobs               NMR re-education (44192)  Min:  CUES NEEDED             Therapeutic Activity (41957)  Min:10      Practiced getting onto bed and off of bed, assisted with ambulation using RW on level surfaces, 100 feet x 2. Reassurance offered to  pt         Modalities  Min:     IFC with      CP after exercises     MH after exercises            Other Therapeutic Activities:   Pt was educated on PT POC, Diagnosis, Prognosis, pathomechanics as well as frequency and duration of scheduling future physical therapy appointments. Time was also taken on this day to answer all patient questions and participation in PT. Reviewed appointment policy in detail with patient and patient verbalized understanding. Home Exercise Program:   Patient was instructed in the following for HEP:     . Patient verbalized/demonstrated understanding and was issued written handout. Continue HEP as given by [facility] PT during prehab. Therapeutic Exercise and NMR EXR  [x] (05346) Provided verbal/tactile cueing for activities related to strengthening, flexibility, endurance, ROM for improvements in LE, proximal hip, and core control with self care, mobility, lifting, ambulation.  [] (35022) Provided verbal/tactile cueing for activities related to improving balance, coordination, kinesthetic sense, posture, motor skill, proprioception  to assist with LE, proximal hip, and core control in self care, mobility, lifting, ambulation and eccentric single leg control.      NMR and Therapeutic Activities:    [] Traction (80083)  [] ES(attended) (27896)     [] ES (un) (65648):   [] Vasopump (15050) [] Ionto (85972)   [] Other:    GOALS: Patient stated goal: \"Rehab after knee replacement\"  []? Progressing: []? Met: []? Not Met: []? Adjusted     Therapist goals for Patient:   Short Term Goals: To be achieved in: 2 weeks  1. Independent in HEP and progression per patient tolerance, in order to prevent re-injury. []? Progressing: []? Met: []? Not Met: []? Adjusted  2. Patient will have a decrease in pain to facilitate improvement in movement, function, and ADLs as indicated by Functional Deficits. []? Progressing: []? Met: []? Not Met: []? Adjusted     Long Term Goals: To be achieved in: 6 weeks  1. Disability index score of 20% or less for the LEFS to assist with reaching prior level of function. []? Progressing: []? Met: []? Not Met: []? Adjusted  2. Patient will demonstrate increased AROM to 0-110 on left knee  to allow for proper joint functioning as indicated by patients Functional Deficits. []? Progressing: []? Met: []? Not Met: []? Adjusted  3. Patient will demonstrate an increase in Strength to good proximal hip strength and control, within 5lb HHD in LE to allow for proper functional mobility as indicated by patients Functional Deficits. []? Progressing: []? Met: []? Not Met: []? Adjusted  4. Patient will return to 80% functional activities without increased symptoms or restriction. []? Progressing: []? Met: []? Not Met: []? Adjusted  5. Pt  will ambulate safely with SPC on level surfaces.(patient specific functional goal)    []? Progressing: []? Met: []? Not Met: []?  Adjusted          ASSESSMENT:  See eval    Treatment/Activity Tolerance:  [x] Patient tolerated treatment well [] Patient limited by fatique  [] Patient limited by pain  [] Patient limited by other medical complications  [] Other:     Overall Progression Towards Functional goals/ Treatment Progress Update:  [x] Patient is progressing as expected towards functional goals listed. [] Progression is slowed due to complexities/Impairments listed. [] Progression has been slowed due to co-morbidities. [] Plan just implemented, too soon to assess goals progression <30days   [] Goals require adjustment due to lack of progress  [] Patient is not progressing as expected and requires additional follow up with physician  [] Other    Prognosis for POC: [x] Good [] Fair  [] Poor    Patient requires continued skilled intervention: [x] Yes  [] No        PLAN: Progress as able . Pt has not had any home therapy and TKR on left just took place on 4/16/21. [] Continue per plan of care [] Alter current plan (see comments)  [x] Plan of care initiated [] Hold pending MD visit [] Discharge    Electronically signed by: Annita Dumont PT    Note: If patient does not return for scheduled/recommended follow up visits, this note will serve as a discharge from care along with the most recent update on progress.

## 2021-04-28 ENCOUNTER — HOSPITAL ENCOUNTER (OUTPATIENT)
Dept: PHYSICAL THERAPY | Age: 66
Setting detail: THERAPIES SERIES
Discharge: HOME | End: 2021-04-28
Payer: MEDICARE

## 2021-04-28 PROCEDURE — 97530 THERAPEUTIC ACTIVITIES: CPT

## 2021-04-28 PROCEDURE — 97140 MANUAL THERAPY 1/> REGIONS: CPT

## 2021-04-28 PROCEDURE — 97110 THERAPEUTIC EXERCISES: CPT

## 2021-04-28 NOTE — FLOWSHEET NOTE
RESTRICTIONS/PRECAUTIONS:  Exercises/Interventions:     Therapeutic Ex (57539)   Min:30 Reps/Resistance Notes/CUES   Nu step L2 x 5 min    Ankle pumps 30X    Glut sets 20X    Quad sets 20X    Heel slides 20X    Knee stretch supine 1 min prop    Hip abduction     Seated ROM 20X    Seated knee flex with assist from other foot 20X    LAQ 20X    SLR 10X     SAQ 20X    gastroc stretch 30 x 5    Seated heel slide X 3 min Hep 4/23/21   Manual Intervention (95521)  Min:     Knee mobs/PROM Flex /ext  STM to quad,gastoc,HS    Tib/Fem Mobs     Patella Mobs left    Ankle mobs               NMR re-education (28307)  Min:  CUES NEEDED             Therapeutic Activity (66840)  Min:10      Practiced getting onto bed and off of bed, assisted with ambulation using RW on level surfaces, 100 feet x 2. Reassurance offered to  pt         Modalities  Min:     IFC with      CP after exercises 10 min    MH after exercises            Other Therapeutic Activities:   Pt was educated on PT POC, Diagnosis, Prognosis, pathomechanics as well as frequency and duration of scheduling future physical therapy appointments. Time was also taken on this day to answer all patient questions and participation in PT. Reviewed appointment policy in detail with patient and patient verbalized understanding. Home Exercise Program:   Patient was instructed in the following for HEP:     . Patient verbalized/demonstrated understanding and was issued written handout. Continue HEP as given by [facility] PT during prehab.     Therapeutic Exercise and NMR EXR  [x] (15370) Provided verbal/tactile cueing for activities related to strengthening, flexibility, endurance, ROM for improvements in LE, proximal hip, and core control with self care, mobility, lifting, ambulation.  [] (01920) Provided verbal/tactile cueing for activities related to improving balance, coordination, kinesthetic sense, posture, motor skill, proprioception  to assist with LE, proximal hip, and core control in self care, mobility, lifting, ambulation and eccentric single leg control. NMR and Therapeutic Activities:    [] (78647 or 33962) Provided verbal/tactile cueing for activities related to improving balance, coordination, kinesthetic sense, posture, motor skill, proprioception and motor activation to allow for proper function of core, proximal hip and LE with self care and ADLs and functional mobility.   [] (02747) Gait Re-education- Provided training and instruction to the patient for proper LE, core and proximal hip recruitment and positioning and eccentric body weight control with ambulation re-education including up and down stairs     Home Exercise Program:    [x] (79450) Reviewed/Progressed HEP activities related to strengthening, flexibility, endurance, ROM of core, proximal hip and LE for functional self-care, mobility, lifting and ambulation/stair navigation   [] (33162)Reviewed/Progressed HEP activities related to improving balance, coordination, kinesthetic sense, posture, motor skill, proprioception of core, proximal hip and LE for self care, mobility, lifting, and ambulation/stair navigation      Manual Treatments:  PROM / STM / Oscillations-Mobs:  G-I, II, III, IV (PA's, Inf., Post.)  [] (35365) Provided manual therapy to mobilize LE, proximal hip and/or LS spine soft tissue/joints for the purpose of modulating pain, promoting relaxation,  increasing ROM, reducing/eliminating soft tissue swelling/inflammation/restriction, improving soft tissue extensibility and allowing for proper ROM for normal function with self care, mobility, lifting and ambulation.        Charges:  Timed Code Treatment Minutes: 55   Total Treatment Minutes: 65      [] EVAL (LOW) 64502 (typically 20 minutes face-to-face)  [] EVAL (MOD) 19248 (typically 30 minutes face-to-face)  [] EVAL (HIGH) 76205 (typically 45 minutes face-to-face)  [] RE-EVAL     [x] VU(71566) x 2  [] Dry needle 1 or 2 Muscles (91921)  [] NMR (51098) x     [] Dry needle 3+ Muscles (08635)  [x] Manual (81954) x  1   [] Ultrasound (01116) x  [x] TA (65671) x     [] Mech Traction (15259)  [] ES(attended) (51665)     [] ES (un) (35384):   [] Vasopump (44224) [] Ionto (77759)   [] Other:    GOALS: Patient stated goal: \"Rehab after knee replacement\"  []? Progressing: []? Met: []? Not Met: []? Adjusted     Therapist goals for Patient:   Short Term Goals: To be achieved in: 2 weeks  1. Independent in HEP and progression per patient tolerance, in order to prevent re-injury. []? Progressing: []? Met: []? Not Met: []? Adjusted  2. Patient will have a decrease in pain to facilitate improvement in movement, function, and ADLs as indicated by Functional Deficits. []? Progressing: []? Met: []? Not Met: []? Adjusted     Long Term Goals: To be achieved in: 6 weeks  1. Disability index score of 20% or less for the LEFS to assist with reaching prior level of function. []? Progressing: []? Met: []? Not Met: []? Adjusted  2. Patient will demonstrate increased AROM to 0-110 on left knee  to allow for proper joint functioning as indicated by patients Functional Deficits. []? Progressing: []? Met: []? Not Met: []? Adjusted  3. Patient will demonstrate an increase in Strength to good proximal hip strength and control, within 5lb HHD in LE to allow for proper functional mobility as indicated by patients Functional Deficits. []? Progressing: []? Met: []? Not Met: []? Adjusted  4. Patient will return to 80% functional activities without increased symptoms or restriction. []? Progressing: []? Met: []? Not Met: []? Adjusted  5. Pt  will ambulate safely with SPC on level surfaces.(patient specific functional goal)    []? Progressing: []? Met: []? Not Met: []?  Adjusted          ASSESSMENT:  See eval    Treatment/Activity Tolerance:  [x] Patient tolerated treatment well [] Patient limited by fatique  [] Patient limited by pain  [] Patient limited by other medical complications  [] Other:     Overall Progression Towards Functional goals/ Treatment Progress Update:  [x] Patient is progressing as expected towards functional goals listed. [] Progression is slowed due to complexities/Impairments listed. [] Progression has been slowed due to co-morbidities. [] Plan just implemented, too soon to assess goals progression <30days   [] Goals require adjustment due to lack of progress  [] Patient is not progressing as expected and requires additional follow up with physician  [] Other    Prognosis for POC: [x] Good [] Fair  [] Poor    Patient requires continued skilled intervention: [x] Yes  [] No        PLAN: Progress as able . Pt has not had any home therapy and TKR on left just took place on 4/16/21. [] Continue per plan of care [] Alter current plan (see comments)  [x] Plan of care initiated [] Hold pending MD visit [] Discharge    Electronically signed by: Eugene Alfonso PTA    Note: If patient does not return for scheduled/recommended follow up visits, this note will serve as a discharge from care along with the most recent update on progress.

## 2021-04-30 ENCOUNTER — HOSPITAL ENCOUNTER (OUTPATIENT)
Dept: PHYSICAL THERAPY | Age: 66
Setting detail: THERAPIES SERIES
Discharge: HOME | End: 2021-04-30
Payer: MEDICARE

## 2021-04-30 PROCEDURE — 97140 MANUAL THERAPY 1/> REGIONS: CPT

## 2021-04-30 PROCEDURE — 97530 THERAPEUTIC ACTIVITIES: CPT

## 2021-04-30 PROCEDURE — 97110 THERAPEUTIC EXERCISES: CPT

## 2021-04-30 NOTE — FLOWSHEET NOTE
[facility]  [address] [place] [address]  Phone: [phone]   Fax:     [phone]      Physical Therapy Treatment Note/ Progress Report:     Date:  2021    Patient Name:  Antelmo Sylvester    :  1955  MRN: [id number]    Pertinent Medical History:  Previous reverse TSR, back issues, OA    Medical/Treatment Diagnosis Information:  Diagnosis:   M17.12 (ICD-10-CM) - Unilateral primary osteoarthritis, left knee   Z96.652 (ICD-10-CM) - Presence of left artificial knee joint     ·   · Treatment Diagnosis: Pain on left knee, limited knee mobility, weakness, difficulty with walking    Insurance/Certification information:  PT Insurance Information: Medicare  Physician Information:  Referring Practitioner: Jaky Garcia MD  Plan of care signed (Y/N): Sent to Dr. Wyatt Gary on 21    Date of Patient follow up with Physician:      Progress Report: []  Yes  [x]  No     Date Range for reporting period:  Beginnin2021  Ending:      Progress report due (10 Rx/or 30 days whichever is less):    Recertification due (POC duration/ or 90 days whichever is less):      Visit # POC/Insurance Allowable Auth Needed   6 18 []Yes   [x]No     Latex Allergy:  [x]NO      []YES  Preferred Language for Healthcare:   [x]English       []Other:    Functional Scale:      Date assessed: at eval  Test: WOMAC  Score: 83    Pain level:  7/10     History of Injury: Pt underwent a left TKR on 21. Went home and has not had any home therapy.  reports she is doing well at home. SUBJECTIVE:    21: Patient reports knee has been sore and swollen. 21  Pt states, \" Still really sore \"  21: Still having pain  21: Patient reports swelling is going down some,walking a little better overall. 21: Pt reports 7/10 pain on knee but also has left foot and ankle pain. Still taking 9 oxycodones per day.  Edema is decreasing.  OBJECTIVE: Observation:    Test measurements:  4/30/21: ROM left knee: -8-94    RESTRICTIONS/PRECAUTIONS:  Exercises/Interventions:     Therapeutic Ex (41110)   Min:30 Reps/Resistance Notes/CUES   Nu step L2 x 5 min    Incline stretch 3 x 30 sec Added 4/30/21   Lunge stretch on step and HSS on step 3 x 30 sec each Added 4/30/21   Leg press 60# x 30  Added 4/30/21   Ankle pumps 30X    Glut sets 20X    Quad sets 20X     20X    Knee stretch supine 1 min prop    Hip abduction 20X    20X    20X    20X    10X     20X    30 x 5    X 3 min Hep 4/23/21   Manual Intervention (47640)  Min:     Knee mobs/PROM Flex /ext  STM to quad,gastoc,HS    Tib/Fem Mobs     Patella Mobs left    Ankle mobs               NMR re-education (95755)  Min:  CUES NEEDED             Therapeutic Activity (32553)  Min:10           PT removed outer tegaderm and gauze bandage at pt request after she spoke with nurse from surgeon's office and was given specific instrcutionsl     Modalities  Min:     IFC with      CP after exercises 10 min    MH after exercises            Other Therapeutic Activities:   Pt was educated on PT POC, Diagnosis, Prognosis, pathomechanics as well as frequency and duration of scheduling future physical therapy appointments. Time was also taken on this day to answer all patient questions and participation in PT. Reviewed appointment policy in detail with patient and patient verbalized understanding. Home Exercise Program:   Patient was instructed in the following for HEP:     . Patient verbalized/demonstrated understanding and was issued written handout. Continue HEP as given by [facility] PT during prehab.     Therapeutic Exercise and NMR EXR  [x] (33928) Provided verbal/tactile cueing for activities related to strengthening, flexibility, endurance, ROM for improvements in LE, proximal hip, and core control with self care, mobility, lifting, ambulation.  [] (91193) Provided verbal/tactile cueing for activities related to improving balance, coordination, kinesthetic sense, posture, motor skill, proprioception  to assist with LE, proximal hip, and core control in self care, mobility, lifting, ambulation and eccentric single leg control. NMR and Therapeutic Activities:    [] (99709 or 00416) Provided verbal/tactile cueing for activities related to improving balance, coordination, kinesthetic sense, posture, motor skill, proprioception and motor activation to allow for proper function of core, proximal hip and LE with self care and ADLs and functional mobility.   [] (61730) Gait Re-education- Provided training and instruction to the patient for proper LE, core and proximal hip recruitment and positioning and eccentric body weight control with ambulation re-education including up and down stairs     Home Exercise Program:    [x] (41374) Reviewed/Progressed HEP activities related to strengthening, flexibility, endurance, ROM of core, proximal hip and LE for functional self-care, mobility, lifting and ambulation/stair navigation   [] (33805)Reviewed/Progressed HEP activities related to improving balance, coordination, kinesthetic sense, posture, motor skill, proprioception of core, proximal hip and LE for self care, mobility, lifting, and ambulation/stair navigation      Manual Treatments:  PROM / STM / Oscillations-Mobs:  G-I, II, III, IV (PA's, Inf., Post.)  [] (68530) Provided manual therapy to mobilize LE, proximal hip and/or LS spine soft tissue/joints for the purpose of modulating pain, promoting relaxation,  increasing ROM, reducing/eliminating soft tissue swelling/inflammation/restriction, improving soft tissue extensibility and allowing for proper ROM for normal function with self care, mobility, lifting and ambulation.        Charges:  Timed Code Treatment Minutes: 55   Total Treatment Minutes: 65      [] EVAL (LOW) 79437 (typically 20 minutes face-to-face)  [] EVAL (MOD) 98789 (typically 30 minutes face-to-face)  [] EVAL (HIGH) 18245 (typically 45 minutes face-to-face)  [] RE-EVAL     [x] BROWN(48662) x 2  [] Dry needle 1 or 2 Muscles (47588)  [] NMR (57183) x     [] Dry needle 3+ Muscles (94122)  [x] Manual (24380) x  1   [] Ultrasound (97693) x  [x] TA (40526) x  1   [] Mech Traction (09661)  [] ES(attended) (82517)     [] ES (un) (60506):   [] Vasopump (75251) [] Ionto (17455)   [] Other:    GOALS: Patient stated goal: \"Rehab after knee replacement\"  []? Progressing: []? Met: []? Not Met: []? Adjusted     Therapist goals for Patient:   Short Term Goals: To be achieved in: 2 weeks  1. Independent in HEP and progression per patient tolerance, in order to prevent re-injury. []? Progressing: []? Met: []? Not Met: []? Adjusted  2. Patient will have a decrease in pain to facilitate improvement in movement, function, and ADLs as indicated by Functional Deficits. []? Progressing: []? Met: []? Not Met: []? Adjusted     Long Term Goals: To be achieved in: 6 weeks  1. Disability index score of 20% or less for the LEFS to assist with reaching prior level of function. []? Progressing: []? Met: []? Not Met: []? Adjusted  2. Patient will demonstrate increased AROM to 0-110 on left knee  to allow for proper joint functioning as indicated by patients Functional Deficits. []? Progressing: []? Met: []? Not Met: []? Adjusted  3. Patient will demonstrate an increase in Strength to good proximal hip strength and control, within 5lb HHD in LE to allow for proper functional mobility as indicated by patients Functional Deficits. []? Progressing: []? Met: []? Not Met: []? Adjusted  4. Patient will return to 80% functional activities without increased symptoms or restriction. []? Progressing: []? Met: []? Not Met: []? Adjusted  5. Pt  will ambulate safely with SPC on level surfaces.(patient specific functional goal)    []? Progressing: []? Met: []? Not Met: []?  Adjusted          ASSESSMENT:  See eval    Treatment/Activity Tolerance:  [x] Patient tolerated treatment well [] Patient limited by fatique  [] Patient limited by pain  [] Patient limited by other medical complications  [] Other:     Overall Progression Towards Functional goals/ Treatment Progress Update:  [x] Patient is progressing as expected towards functional goals listed. [] Progression is slowed due to complexities/Impairments listed. [] Progression has been slowed due to co-morbidities. [] Plan just implemented, too soon to assess goals progression <30days   [] Goals require adjustment due to lack of progress  [] Patient is not progressing as expected and requires additional follow up with physician  [] Other    Prognosis for POC: [x] Good [] Fair  [] Poor    Patient requires continued skilled intervention: [x] Yes  [] No        PLAN: Progress as able . Pt has not had any home therapy and TKR on left just took place on 4/16/21. [x] Continue per plan of care [] Alter current plan (see comments)  [] Plan of care initiated [] Hold pending MD visit [] Discharge    Electronically signed by: Satish Barrios PT    Note: If patient does not return for scheduled/recommended follow up visits, this note will serve as a discharge from care along with the most recent update on progress.

## 2021-05-03 ENCOUNTER — HOSPITAL ENCOUNTER (OUTPATIENT)
Dept: PHYSICAL THERAPY | Age: 66
Setting detail: THERAPIES SERIES
Discharge: HOME | End: 2021-05-03
Payer: MEDICARE

## 2021-05-03 PROCEDURE — 97140 MANUAL THERAPY 1/> REGIONS: CPT

## 2021-05-03 PROCEDURE — 97110 THERAPEUTIC EXERCISES: CPT

## 2021-05-03 NOTE — FLOWSHEET NOTE
[facility] - [facility]patient [facility] Therapy  [address] [facility]. [place]  Phone: [phone]   Fax:     [phone]      Physical Therapy Treatment Note/ Progress Report:     Date:  5/3/2021    Patient Name:  Ramiro Villagomez    :  1955  MRN: [id number]    Pertinent Medical History:  Previous reverse TSR, back issues, OA    Medical/Treatment Diagnosis Information:  Diagnosis:   M17.12 (ICD-10-CM) - Unilateral primary osteoarthritis, left knee   Z96.652 (ICD-10-CM) - Presence of left artificial knee joint     ·   · Treatment Diagnosis: Pain on left knee, limited knee mobility, weakness, difficulty with walking    Insurance/Certification information:  PT Insurance Information: Medicare  Physician Information:  Referring Practitioner: Panchito Winkler MD  Plan of care signed (Y/N): Sent to Dr. Julian Gusman on 21    Date of Patient follow up with Physician:      Progress Report: []  Yes  [x]  No     Date Range for reporting period:  Beginnin2021  Ending:      Progress report due (10 Rx/or 30 days whichever is less):    Recertification due (POC duration/ or 90 days whichever is less):      Visit # POC/Insurance Allowable Auth Needed   7 18 []Yes   [x]No     Latex Allergy:  [x]NO      []YES  Preferred Language for Healthcare:   [x]English       []Other:    Functional Scale:      Date assessed: at eval  Test: WOMAC  Score: 83    Pain level:  7/10     History of Injury: Pt underwent a left TKR on 21. Went home and has not had any home therapy.  reports she is doing well at home. SUBJECTIVE:    21: Patient reports knee has been sore and swollen. 21  Pt states, \" Still really sore \"  21: Still having pain  21: Patient reports swelling is going down some,walking a little better overall. 21: Pt reports 7/10 pain on knee but also has left foot and ankle pain. Still taking 9 oxycodones per day.  Edema is decreasing. 05/03/21: Patient reports her knee got very sore over the weekend,felt it almost locked up on her. States today is not too bad.  OBJECTIVE: Observation:    Test measurements:  4/30/21: ROM left knee: -8-94   05/03/21 left knee flex 98 ext -7    RESTRICTIONS/PRECAUTIONS:  Exercises/Interventions:     Therapeutic Ex (84450)   Min:30 Reps/Resistance Notes/CUES   Nu step L2 x 5 min    Incline stretch 3 x 20 sec Added 4/30/21   Lunge stretch on step and HSS on step 3 x 20 sec each Added 4/30/21   Leg press   Held on 5/03/21   Ankle pumps 30X    Glut sets 20X    Quad sets 20X    Heel slides 20X    Knee stretch supine 1 min prop    Hip abduction 20X    20X    20X    20X    10X     20X    30 x 5    X 3 min Hep 4/23/21   Manual Intervention (59730)  Min:     Knee mobs/PROM Flex /ext  STM to quad,gastoc,HS    Tib/Fem Mobs     Patella Mobs left    Ankle mobs               NMR re-education (70586)  Min:  CUES NEEDED             Therapeutic Activity (57084)  Min:10               Modalities  Min:     IFC with      CP after exercises 10 min    MH after exercises            Other Therapeutic Activities:   Pt was educated on PT POC, Diagnosis, Prognosis, pathomechanics as well as frequency and duration of scheduling future physical therapy appointments. Time was also taken on this day to answer all patient questions and participation in PT. Reviewed appointment policy in detail with patient and patient verbalized understanding. Home Exercise Program:   Patient was instructed in the following for HEP:     . Patient verbalized/demonstrated understanding and was issued written handout. Continue HEP as given by [facility] PT during prehab.     Therapeutic Exercise and NMR EXR  [x] (68607) Provided verbal/tactile cueing for activities related to strengthening, flexibility, endurance, ROM for improvements in LE, proximal hip, and core control with self care, mobility, lifting, ambulation.  [] (08377) Provided verbal/tactile cueing for activities related to improving balance, coordination, kinesthetic sense, posture, motor skill, proprioception  to assist with LE, proximal hip, and core control in self care, mobility, lifting, ambulation and eccentric single leg control. NMR and Therapeutic Activities:    [] (70095 or 99900) Provided verbal/tactile cueing for activities related to improving balance, coordination, kinesthetic sense, posture, motor skill, proprioception and motor activation to allow for proper function of core, proximal hip and LE with self care and ADLs and functional mobility.   [] (68648) Gait Re-education- Provided training and instruction to the patient for proper LE, core and proximal hip recruitment and positioning and eccentric body weight control with ambulation re-education including up and down stairs     Home Exercise Program:    [x] (01727) Reviewed/Progressed HEP activities related to strengthening, flexibility, endurance, ROM of core, proximal hip and LE for functional self-care, mobility, lifting and ambulation/stair navigation   [] (90949)Reviewed/Progressed HEP activities related to improving balance, coordination, kinesthetic sense, posture, motor skill, proprioception of core, proximal hip and LE for self care, mobility, lifting, and ambulation/stair navigation      Manual Treatments:  PROM / STM / Oscillations-Mobs:  G-I, II, III, IV (PA's, Inf., Post.)  [] (67180) Provided manual therapy to mobilize LE, proximal hip and/or LS spine soft tissue/joints for the purpose of modulating pain, promoting relaxation,  increasing ROM, reducing/eliminating soft tissue swelling/inflammation/restriction, improving soft tissue extensibility and allowing for proper ROM for normal function with self care, mobility, lifting and ambulation.        Charges:  Timed Code Treatment Minutes: 50   Total Treatment Minutes: 60      [] EVAL (LOW) 32740 (typically 20 minutes face-to-face)  [] EVAL (MOD) 26961 (typically 30 minutes face-to-face)  [] EVAL (HIGH) 62434 (typically 45 minutes face-to-face)  [] RE-EVAL     [x] DD(88197) x 2  [] Dry needle 1 or 2 Muscles (68008)  [] NMR (97960) x     [] Dry needle 3+ Muscles (27728)  [x] Manual (30934) x  1   [] Ultrasound (69999) x  [] TA (24603) x     [] Mech Traction (29414)  [] ES(attended) (88381)     [] ES (un) (14135):   [] Vasopump (81885) [] Ionto (85166)   [] Other:    GOALS: Patient stated goal: \"Rehab after knee replacement\"  []? Progressing: []? Met: []? Not Met: []? Adjusted     Therapist goals for Patient:   Short Term Goals: To be achieved in: 2 weeks  1. Independent in HEP and progression per patient tolerance, in order to prevent re-injury. []? Progressing: []? Met: []? Not Met: []? Adjusted  2. Patient will have a decrease in pain to facilitate improvement in movement, function, and ADLs as indicated by Functional Deficits. []? Progressing: []? Met: []? Not Met: []? Adjusted     Long Term Goals: To be achieved in: 6 weeks  1. Disability index score of 20% or less for the LEFS to assist with reaching prior level of function. []? Progressing: []? Met: []? Not Met: []? Adjusted  2. Patient will demonstrate increased AROM to 0-110 on left knee  to allow for proper joint functioning as indicated by patients Functional Deficits. []? Progressing: []? Met: []? Not Met: []? Adjusted  3. Patient will demonstrate an increase in Strength to good proximal hip strength and control, within 5lb HHD in LE to allow for proper functional mobility as indicated by patients Functional Deficits. []? Progressing: []? Met: []? Not Met: []? Adjusted  4. Patient will return to 80% functional activities without increased symptoms or restriction. []? Progressing: []? Met: []? Not Met: []? Adjusted  5. Pt  will ambulate safely with SPC on level surfaces.(patient specific functional goal)    []? Progressing: []? Met: []? Not Met: []?  Adjusted          ASSESSMENT:  See eval    Treatment/Activity Tolerance:  [x] Patient tolerated treatment well [] Patient limited by fatique  [] Patient limited by pain  [] Patient limited by other medical complications  [] Other:     Overall Progression Towards Functional goals/ Treatment Progress Update:  [x] Patient is progressing as expected towards functional goals listed. [] Progression is slowed due to complexities/Impairments listed. [] Progression has been slowed due to co-morbidities. [] Plan just implemented, too soon to assess goals progression <30days   [] Goals require adjustment due to lack of progress  [] Patient is not progressing as expected and requires additional follow up with physician  [] Other    Prognosis for POC: [x] Good [] Fair  [] Poor    Patient requires continued skilled intervention: [x] Yes  [] No        PLAN: Progress as able . Pt has not had any home therapy and TKR on left just took place on 4/16/21. [x] Continue per plan of care [] Alter current plan (see comments)  [] Plan of care initiated [] Hold pending MD visit [] Discharge    Electronically signed by: Radhames Savage PTA    Note: If patient does not return for scheduled/recommended follow up visits, this note will serve as a discharge from care along with the most recent update on progress.

## 2021-05-05 ENCOUNTER — HOSPITAL ENCOUNTER (OUTPATIENT)
Dept: PHYSICAL THERAPY | Age: 66
Setting detail: THERAPIES SERIES
Discharge: HOME | End: 2021-05-05
Payer: MEDICARE

## 2021-05-05 PROCEDURE — 97110 THERAPEUTIC EXERCISES: CPT

## 2021-05-05 PROCEDURE — 97140 MANUAL THERAPY 1/> REGIONS: CPT

## 2021-05-05 NOTE — FLOWSHEET NOTE
[facility] - Outpatient Rehabilitation & Therapy  [address] [facility]. [place]  Phone: [phone]   Fax:     [phone]      Physical Therapy Treatment Note/ Progress Report:     Date:  2021    Patient Name:  Evelina Coon    :  1955  MRN: [id number]    Pertinent Medical History:  Previous reverse TSR, back issues, OA    Medical/Treatment Diagnosis Information:  Diagnosis:   M17.12 (ICD-10-CM) - Unilateral primary osteoarthritis, left knee   Z96.652 (ICD-10-CM) - Presence of left artificial knee joint     ·   · Treatment Diagnosis: Pain on left knee, limited knee mobility, weakness, difficulty with walking    Insurance/Certification information:  PT Insurance Information: Medicare  Physician Information:  Referring Practitioner: Anand Deluca MD  Plan of care signed (Y/N): Sent to Dr. Erick Garcia on 21    Date of Patient follow up with Physician:      Progress Report: []  Yes  [x]  No     Date Range for reporting period:  Beginnin2021  Ending:      Progress report due (10 Rx/or 30 days whichever is less):    Recertification due (POC duration/ or 90 days whichever is less):      Visit # POC/Insurance Allowable Auth Needed   8 18 []Yes   [x]No     Latex Allergy:  [x]NO      []YES  Preferred Language for Healthcare:   [x]English       []Other:    Functional Scale:      Date assessed: at eval  Test: WOMAC  Score: 83    Pain level:  7/10     History of Injury: Pt underwent a left TKR on 21. Went home and has not had any home therapy.  reports she is doing well at home. SUBJECTIVE:    21: Patient reports knee has been sore and swollen. 21  Pt states, \" Still really sore \"  21: Still having pain  21: Patient reports swelling is going down some,walking a little better overall. 21: Pt reports 7/10 pain on knee but also has left foot and ankle pain. Still taking 9 oxycodones per day.  Edema is decreasing. 05/03/21: Patient reports her knee got very sore over the weekend,felt it almost locked up on her. States today is not too bad.   5/5/21: Pt reports that her heel is still bothering her more than the knee.  OBJECTIVE:    Observation:    Test measurements:  4/30/21: ROM left knee: -8-94   05/03/21 left knee flex 98 ext -7   5/5/21: left knee flex 90    Ext:  -4/ -5    RESTRICTIONS/PRECAUTIONS:  Exercises/Interventions:     Therapeutic Ex (59437)   Min:30 Reps/Resistance Notes/CUES   Nu step L2 x 5 min    Incline stretch 3 x 20 sec Added 4/30/21   Lunge stretch on step and HSS on step 3 x 20 sec each Added 4/30/21   Leg press   Held on 5/03/21   Ankle pumps 30X    Glut sets 20X    Quad sets 20X    Heel slides 20X    Knee stretch supine 1 min prop     20X    20X    20X    20X    SLR 10X     SAQ 20X    30 x 5    X 3 min Hep 4/23/21   Manual Intervention (36260)  Min:     Knee mobs/PROM Flex /ext  STM to quad,gastoc,HS    Tib/Fem Mobs     Patella Mobs left    Ankle mobs               NMR re-education (82481)  Min:  CUES NEEDED             Therapeutic Activity (89606)  Min:10               Modalities  Min:     IFC with      CP after exercises 10 min To back, left ankle, left knee   MH after exercises            Other Therapeutic Activities:   Pt was educated on PT POC, Diagnosis, Prognosis, pathomechanics as well as frequency and duration of scheduling future physical therapy appointments. Time was also taken on this day to answer all patient questions and participation in PT. Reviewed appointment policy in detail with patient and patient verbalized understanding. Home Exercise Program:   Patient was instructed in the following for HEP:     . Patient verbalized/demonstrated understanding and was issued written handout. Continue HEP as given by [facility] PT during prehab.     Therapeutic Exercise and NMR EXR  [x] (04907) Provided verbal/tactile cueing for activities related to strengthening, flexibility, endurance, ROM for improvements in LE, proximal hip, and core control with self care, mobility, lifting, ambulation.  [] (16680) Provided verbal/tactile cueing for activities related to improving balance, coordination, kinesthetic sense, posture, motor skill, proprioception  to assist with LE, proximal hip, and core control in self care, mobility, lifting, ambulation and eccentric single leg control. NMR and Therapeutic Activities:    [] (45147 or 37597) Provided verbal/tactile cueing for activities related to improving balance, coordination, kinesthetic sense, posture, motor skill, proprioception and motor activation to allow for proper function of core, proximal hip and LE with self care and ADLs and functional mobility.   [] (56783) Gait Re-education- Provided training and instruction to the patient for proper LE, core and proximal hip recruitment and positioning and eccentric body weight control with ambulation re-education including up and down stairs     Home Exercise Program:    [x] (09391) Reviewed/Progressed HEP activities related to strengthening, flexibility, endurance, ROM of core, proximal hip and LE for functional self-care, mobility, lifting and ambulation/stair navigation   [] (31740)Reviewed/Progressed HEP activities related to improving balance, coordination, kinesthetic sense, posture, motor skill, proprioception of core, proximal hip and LE for self care, mobility, lifting, and ambulation/stair navigation      Manual Treatments:  PROM / STM / Oscillations-Mobs:  G-I, II, III, IV (PA's, Inf., Post.)  [] (55978) Provided manual therapy to mobilize LE, proximal hip and/or LS spine soft tissue/joints for the purpose of modulating pain, promoting relaxation,  increasing ROM, reducing/eliminating soft tissue swelling/inflammation/restriction, improving soft tissue extensibility and allowing for proper ROM for normal function with self care, mobility, lifting and ambulation. Charges:  Timed Code Treatment Minutes: 60   Total Treatment Minutes: 70      [] EVAL (LOW) 16366 (typically 20 minutes face-to-face)  [] EVAL (MOD) 38324 (typically 30 minutes face-to-face)  [] EVAL (HIGH) 07096 (typically 45 minutes face-to-face)  [] RE-EVAL     [x] PH(94162) x 3  [] Dry needle 1 or 2 Muscles (00596)  [] NMR (42459) x     [] Dry needle 3+ Muscles (18035)  [x] Manual (88367) x  1   [] Ultrasound (68756) x  [] TA (78360) x     [] Mech Traction (23287)  [] ES(attended) (43046)     [] ES (un) (14107):   [] Vasopump (66933) [] Ionto (88051)   [] Other:    GOALS: Patient stated goal: \"Rehab after knee replacement\"  []? Progressing: []? Met: []? Not Met: []? Adjusted     Therapist goals for Patient:   Short Term Goals: To be achieved in: 2 weeks  1. Independent in HEP and progression per patient tolerance, in order to prevent re-injury. []? Progressing: []? Met: []? Not Met: []? Adjusted  2. Patient will have a decrease in pain to facilitate improvement in movement, function, and ADLs as indicated by Functional Deficits. []? Progressing: []? Met: []? Not Met: []? Adjusted     Long Term Goals: To be achieved in: 6 weeks  1. Disability index score of 20% or less for the LEFS to assist with reaching prior level of function. []? Progressing: []? Met: []? Not Met: []? Adjusted  2. Patient will demonstrate increased AROM to 0-110 on left knee  to allow for proper joint functioning as indicated by patients Functional Deficits. []? Progressing: []? Met: []? Not Met: []? Adjusted  3. Patient will demonstrate an increase in Strength to good proximal hip strength and control, within 5lb HHD in LE to allow for proper functional mobility as indicated by patients Functional Deficits. []? Progressing: []? Met: []? Not Met: []? Adjusted  4. Patient will return to 80% functional activities without increased symptoms or restriction. []? Progressing: []? Met: []? Not Met: []? Adjusted  5.  Pt  will ambulate safely with SPC on level surfaces.(patient specific functional goal)    []? Progressing: []? Met: []? Not Met: []? Adjusted          ASSESSMENT:  Patient is improving with gait and with extension range. Flexion range is still difficult and progressing slowly. Treatment/Activity Tolerance:  [x] Patient tolerated treatment well [] Patient limited by fatique  [] Patient limited by pain  [] Patient limited by other medical complications  [] Other:     Overall Progression Towards Functional goals/ Treatment Progress Update:  [x] Patient is progressing as expected towards functional goals listed. [] Progression is slowed due to complexities/Impairments listed. [] Progression has been slowed due to co-morbidities. [] Plan just implemented, too soon to assess goals progression <30days   [] Goals require adjustment due to lack of progress  [] Patient is not progressing as expected and requires additional follow up with physician  [] Other    Prognosis for POC: [x] Good [] Fair  [] Poor    Patient requires continued skilled intervention: [x] Yes  [] No        PLAN: Progress as able . Pt has not had any home therapy and TKR on left just took place on 4/16/21. Practice with cane next visit. [] Alter current plan (see comments)    XContinue with plan of care  [] Plan of care initiated [] Hold pending MD visit [] Discharge    Electronically signed by: Hayden Vallejo PT    Note: If patient does not return for scheduled/recommended follow up visits, this note will serve as a discharge from care along with the most recent update on progress.

## 2021-05-07 ENCOUNTER — HOSPITAL ENCOUNTER (OUTPATIENT)
Dept: PHYSICAL THERAPY | Age: 66
Setting detail: THERAPIES SERIES
Discharge: HOME | End: 2021-05-07
Payer: MEDICARE

## 2021-05-07 PROCEDURE — 97110 THERAPEUTIC EXERCISES: CPT

## 2021-05-07 PROCEDURE — 97140 MANUAL THERAPY 1/> REGIONS: CPT

## 2021-05-07 PROCEDURE — 97112 NEUROMUSCULAR REEDUCATION: CPT

## 2021-05-07 NOTE — FLOWSHEET NOTE
[facility] - Outpatient Rehabilitation & Therapy  [address]. [place]  Phone: [phone]   Fax:     [phone]      Physical Therapy Treatment Note/ Progress Report:     Date:  2021    Patient Name:  Nancy Yeung    :  1955  MRN: [id number]    Pertinent Medical History:  Previous reverse TSR, back issues, OA    Medical/Treatment Diagnosis Information:  Diagnosis:   M17.12 (ICD-10-CM) - Unilateral primary osteoarthritis, left knee   Z96.652 (ICD-10-CM) - Presence of left artificial knee joint     ·   · Treatment Diagnosis: Pain on left knee, limited knee mobility, weakness, difficulty with walking    Insurance/Certification information:  PT Insurance Information: Medicare  Physician Information:  Referring Practitioner: Ludmila Salazar MD  Plan of care signed (Y/N): Sent to Dr. Valeria Vail on 21    Date of Patient follow up with Physician:      Progress Report: []  Yes  [x]  No     Date Range for reporting period:  Beginnin2021  Ending:      Progress report due (10 Rx/or 30 days whichever is less):    Recertification due (POC duration/ or 90 days whichever is less):      Visit # POC/Insurance Allowable Auth Needed   9 18 []Yes   [x]No     Latex Allergy:  [x]NO      []YES  Preferred Language for Healthcare:   [x]English       []Other:    Functional Scale:      Date assessed: at [facility]  Test: [facility]  Score: 83    Pain level:  6/10     History of Injury: Pt underwent a left TKR on 21. Went home and has not had any home therapy.  reports she is doing well at home. SUBJECTIVE:    21: Patient reports knee has been sore and swollen. 21  Pt states, \" Still really sore \"  21: Still having pain  21: Patient reports swelling is going down some,walking a little better overall. 21: Pt reports 7/10 pain on knee but also has left foot and ankle pain. Still taking 9 oxycodones per day.  Edema is decreasing. 05/03/21: Patient reports her knee got very sore over the weekend,felt it almost locked up on her. States today is not too bad.   5/5/21: Pt reports that her heel is still bothering her more than the knee. 5/7/21: Sleeping is still difficult. Heel is \"not great\". Knee pain is slightly less. She saw DANNA Johnson's office Michelle Mcdowell, CARLOS) and she removed the mesh.     OBJECTIVE:    Observation:    Test measurements:  4/30/21: ROM left knee: -8-94   05/03/21 left knee flex 80 ext -7   5/5/21: left knee flex 90    Ext:  -4/ -5   5/7/21: ROM left knee: -5-96    RESTRICTIONS/PRECAUTIONS:  Exercises/Interventions:     Therapeutic Ex (46706)   Min:30 Reps/Resistance Notes/CUES   Nu step L2 x 5 min    Incline stretch 3 x 20 sec Added 4/30/21   Lunge stretch on step and HSS on step 3 x 20 sec each Added 4/30/21   Leg press   Held on 5/03/21   Ankle pumps 30X    20X    20X    Heel slides 20X    Knee stretch supine 1 min prop     20X    Seated ROM 20X    Seated knee flex with assist from other foot 20X                  30 x 5    Seated heel slidX 3 min Hep 4/23/21   Manual Intervention (57608)  Min:     Knee mobs/PROM Flex /ext  STM to quad,gastoc,HS    Tib/Fem Mobs     Patella Mobs left    Ankle mobs               NMR re-education (77279)  Min:  CUES NEEDED   Minisquats  add   LAQ 20X    SAQ 20X    Education with use of single point cane instruction and practice with cane 60 feet,  Needs cuing for proper sequence   SLR 20X    Therapeutic Activity (97144)  Min:10               Modalities  Min:     IFC with      CP after exercises 10 min To back, left ankle, left knee   MH after exercises            Other Therapeutic Activities:   Pt was educated on PT POC, Diagnosis, Prognosis, pathomechanics as well as frequency and duration of scheduling future physical therapy appointments. Time was also taken on this day to answer all patient questions and participation in PT.  Reviewed appointment policy in detail with patient and patient verbalized understanding. Home Exercise Program:   Patient was instructed in the following for HEP:     . Patient verbalized/demonstrated understanding and was issued written handout. Continue HEP as given by [facility] PT during prehab. Therapeutic Exercise and NMR EXR  [x] (22935) Provided verbal/tactile cueing for activities related to strengthening, flexibility, endurance, ROM for improvements in LE, proximal hip, and core control with self care, mobility, lifting, ambulation.  [] (06028) Provided verbal/tactile cueing for activities related to improving balance, coordination, kinesthetic sense, posture, motor skill, proprioception  to assist with LE, proximal hip, and core control in self care, mobility, lifting, ambulation and eccentric single leg control.      NMR and Therapeutic Activities:    [x] (29260 or 83378) Provided verbal/tactile cueing for activities related to improving balance, coordination, kinesthetic sense, posture, motor skill, proprioception and motor activation to allow for proper function of core, proximal hip and LE with self care and ADLs and functional mobility.   [] (33980) Gait Re-education- Provided training and instruction to the patient for proper LE, core and proximal hip recruitment and positioning and eccentric body weight control with ambulation re-education including up and down stairs     Home Exercise Program:    [x] (52204) Reviewed/Progressed HEP activities related to strengthening, flexibility, endurance, ROM of core, proximal hip and LE for functional self-care, mobility, lifting and ambulation/stair navigation   [] (43477)Reviewed/Progressed HEP activities related to improving balance, coordination, kinesthetic sense, posture, motor skill, proprioception of core, proximal hip and LE for self care, mobility, lifting, and ambulation/stair navigation      Manual Treatments:  PROM / STM / Oscillations-Mobs:  G-I, II, III, IV (PA's, Inf., Post.)  [x] (19180) Provided manual therapy to mobilize LE, proximal hip and/or LS spine soft tissue/joints for the purpose of modulating pain, promoting relaxation,  increasing ROM, reducing/eliminating soft tissue swelling/inflammation/restriction, improving soft tissue extensibility and allowing for proper ROM for normal function with self care, mobility, lifting and ambulation. Charges:  Timed Code Treatment Minutes: 75   Total Treatment Minutes: 85      [] EVAL (LOW) 71698 (typically 20 minutes face-to-face)  [] EVAL (MOD) 83375 (typically 30 minutes face-to-face)  [] EVAL (HIGH) 99243 (typically 45 minutes face-to-face)  [] RE-EVAL     [x] SP(96702) x 3  [] Dry needle 1 or 2 Muscles (53595)  [x] NMR (72234) x   1  [] Dry needle 3+ Muscles (72143)  [x] Manual (91838) x  1   [] Ultrasound (80872) x  [] TA (45990) x 1    [] Mech Traction (58694)  [] ES(attended) (87183)     [] ES (un) (12604):   [] Vasopump (50032) [] Ionto (37105)   [] Other:    GOALS: Patient stated goal: \"Rehab after knee replacement\"  []? Progressing: []? Met: []? Not Met: []? Adjusted     Therapist goals for Patient:   Short Term Goals: To be achieved in: 2 weeks  1. Independent in HEP and progression per patient tolerance, in order to prevent re-injury. []? Progressing: []? Met: []? Not Met: []? Adjusted  2. Patient will have a decrease in pain to facilitate improvement in movement, function, and ADLs as indicated by Functional Deficits. []? Progressing: []? Met: []? Not Met: []? Adjusted     Long Term Goals: To be achieved in: 6 weeks  1. Disability index score of 20% or less for the LEFS to assist with reaching prior level of function. []? Progressing: []? Met: []? Not Met: []? Adjusted  2. Patient will demonstrate increased AROM to 0-110 on left knee  to allow for proper joint functioning as indicated by patients Functional Deficits. []? Progressing: []? Met: []? Not Met: []? Adjusted  3.  Patient will demonstrate an increase in Strength to good proximal hip strength and control, within 5lb HHD in LE to allow for proper functional mobility as indicated by patients Functional Deficits. []? Progressing: []? Met: []? Not Met: []? Adjusted  4. Patient will return to 80% functional activities without increased symptoms or restriction. []? Progressing: []? Met: []? Not Met: []? Adjusted  5. Pt  will ambulate safely with SPC on level surfaces.(patient specific functional goal)    []? Progressing: []? Met: []? Not Met: []? Adjusted          ASSESSMENT:  Patient is improving with gait and with extension range. Flexion range is still difficult and progressing slowly. Treatment/Activity Tolerance:  [x] Patient tolerated treatment well [] Patient limited by fatique  [] Patient limited by pain  [] Patient limited by other medical complications  [] Other:     Overall Progression Towards Functional goals/ Treatment Progress Update:  [x] Patient is progressing as expected towards functional goals listed. [] Progression is slowed due to complexities/Impairments listed. [] Progression has been slowed due to co-morbidities. [] Plan just implemented, too soon to assess goals progression <30days   [] Goals require adjustment due to lack of progress  [] Patient is not progressing as expected and requires additional follow up with physician  [] Other    Prognosis for POC: [x] Good [] Fair  [] Poor    Patient requires continued skilled intervention: [x] Yes  [] No        PLAN: Progress as able . Pt has not had any home therapy and TKR on left just took place on 4/16/21. Practice with cane next visit.       [] Alter current plan (see comments)    XContinue with plan of care  [] Plan of care initiated [] Hold pending MD visit [] Discharge    Electronically signed by: Michelle Krause PT    Note: If patient does not return for scheduled/recommended follow up visits, this note will serve as a discharge from care along with the most recent update on progress.

## 2021-05-10 ENCOUNTER — HOSPITAL ENCOUNTER (OUTPATIENT)
Dept: PHYSICAL THERAPY | Age: 66
Setting detail: THERAPIES SERIES
Discharge: HOME | End: 2021-05-10
Payer: MEDICARE

## 2021-05-10 PROCEDURE — 97112 NEUROMUSCULAR REEDUCATION: CPT

## 2021-05-10 PROCEDURE — 97110 THERAPEUTIC EXERCISES: CPT

## 2021-05-10 PROCEDURE — 97140 MANUAL THERAPY 1/> REGIONS: CPT

## 2021-05-10 NOTE — FLOWSHEET NOTE
[facility] - Outpatient Rehabilitation & Therapy  [address]. [place]  Phone: [phone]   Fax:     [phone]      Physical Therapy Treatment Note/ Progress Report:     Date:  5/10/2021    Patient Name:  Radha tSarkey    :  1955  MRN: [id number]    Pertinent Medical History:  Previous reverse TSR, back issues, OA    Medical/Treatment Diagnosis Information:  Diagnosis:   M17.12 (ICD-10-CM) - Unilateral primary osteoarthritis, left knee   Z96.652 (ICD-10-CM) - Presence of left artificial knee joint     ·   · Treatment Diagnosis: Pain on left knee, limited knee mobility, weakness, difficulty with walking    Insurance/Certification information:  PT Insurance Information: Medicare  Physician Information:  Referring Practitioner: Lux Kothari MD  Plan of care signed (Y/N): Sent to Dr. Isabella Hammonds on 21    Date of Patient follow up with Physician:      Progress Report: []  Yes  [x]  No     Date Range for reporting period:  Beginnin2021  Ending:      Progress report due (10 Rx/or 30 days whichever is less):    Recertification due (POC duration/ or 90 days whichever is less):      Visit # POC/Insurance Allowable Auth Needed   10 18 []Yes   [x]No     Latex Allergy:  [x]NO      []YES  Preferred Language for Healthcare:   [x]English       []Other:    Functional Scale:      Date assessed: at 10th visit on 5/10/21  Test: [facility]  Score: 62    Pain level:  6-7/10     History of Injury: Pt underwent a left TKR on 21. Went home and has not had any home therapy.  reports she is doing well at home. SUBJECTIVE:    21: Patient reports knee has been sore and swollen. 21  Pt states, \" Still really sore \"  21: Still having pain  21: Patient reports swelling is going down some,walking a little better overall. 21: Pt reports 7/10 pain on knee but also has left foot and ankle pain.  Still taking 9 oxycodones per day. Edema is decreasing. 05/03/21: Patient reports her knee got very sore over the weekend,felt it almost locked up on her. States today is not too bad.   5/5/21: Pt reports that her heel is still bothering her more than the knee. 5/7/21: Sleeping is still difficult. Heel is \"not great\". Knee pain is slightly less. She saw DANNA Johnson's office Pedro Justice CNP) and she removed the mesh. 5/10/21: Pt reports she had increased pain about 3 hours ago and took  meds and ice. She sometimes walks without any device.       OBJECTIVE:    Observation:    Test measurements:  4/30/21: ROM left knee: -8-94   05/03/21 left knee flex 98 ext -7   5/5/21: left knee flex 90    Ext:  -4/ -5   5/7/21: ROM left knee: -5-96    5/10/21: -3- 93    RESTRICTIONS/PRECAUTIONS:  Exercises/Interventions:     Therapeutic Ex (14376)   Min:30 Reps/Resistance Notes/CUES   Nu step L2 x 5 min    Incline stretch 3 x 20 sec Added 4/30/21   Lunge stretch on step and HSS on step 3 x 20 sec each Added 4/30/21   Leg press   Held on 5/03/21   Ankle pumps 30X    Glut sets 20X    Quad sets 20X    Heel slides 20X    Knee stretch supine 2 min prop     20X    Seated ROM 20X    Seated knee flex with assist from other foot 20X                  30 x 5    Seated heel slidX 3 min Hep 4/23/21   Manual Intervention (18752)  Min:     Knee mobs/PROM Flex /ext  STM to quad,gastoc,HS    Tib/Fem Mobs     Patella Mobs left    Ankle mobs               NMR re-education (67444)  Min:  CUES NEEDED   Minisquats  Hold  LAQ 30X    SAQ 20X    Education with use of single point cane instruction and practice with cane 100 feet,  Needs cuing for proper sequence   SLR 20X    Therapeutic Activity (67779)  Min:10               Modalities  Min:     IFC with      CP after exercises 10 min To back, left ankle, left knee   MH after exercises            Other Therapeutic Activities:   Pt was educated on PT POC, Diagnosis, Prognosis, pathomechanics as well as frequency and duration of scheduling future physical therapy appointments. Time was also taken on this day to answer all patient questions and participation in PT. Reviewed appointment policy in detail with patient and patient verbalized understanding. Home Exercise Program:   Patient was instructed in the following for HEP:     . Patient verbalized/demonstrated understanding and was issued written handout. Continue HEP as given by Emily Machado PT during prehab. Therapeutic Exercise and NMR EXR  [x] (95897) Provided verbal/tactile cueing for activities related to strengthening, flexibility, endurance, ROM for improvements in LE, proximal hip, and core control with self care, mobility, lifting, ambulation.  [] (07878) Provided verbal/tactile cueing for activities related to improving balance, coordination, kinesthetic sense, posture, motor skill, proprioception  to assist with LE, proximal hip, and core control in self care, mobility, lifting, ambulation and eccentric single leg control.      NMR and Therapeutic Activities:    [x] (08128 or 79912) Provided verbal/tactile cueing for activities related to improving balance, coordination, kinesthetic sense, posture, motor skill, proprioception and motor activation to allow for proper function of core, proximal hip and LE with self care and ADLs and functional mobility.   [] (63575) Gait Re-education- Provided training and instruction to the patient for proper LE, core and proximal hip recruitment and positioning and eccentric body weight control with ambulation re-education including up and down stairs     Home Exercise Program:    [x] (77893) Reviewed/Progressed HEP activities related to strengthening, flexibility, endurance, ROM of core, proximal hip and LE for functional self-care, mobility, lifting and ambulation/stair navigation   [] (29732)Reviewed/Progressed HEP activities related to improving balance, coordination, kinesthetic sense, posture, motor skill, proprioception of core, proximal hip and LE for self care, mobility, lifting, and ambulation/stair navigation      Manual Treatments:  PROM / STM / Oscillations-Mobs:  G-I, II, III, IV (PA's, Inf., Post.)  [x] (13758) Provided manual therapy to mobilize LE, proximal hip and/or LS spine soft tissue/joints for the purpose of modulating pain, promoting relaxation,  increasing ROM, reducing/eliminating soft tissue swelling/inflammation/restriction, improving soft tissue extensibility and allowing for proper ROM for normal function with self care, mobility, lifting and ambulation. Charges:  Timed Code Treatment Minutes: 60   Total Treatment Minutes: 75      [] EVAL (LOW) 15732 (typically 20 minutes face-to-face)  [] EVAL (MOD) 53555 (typically 30 minutes face-to-face)  [] EVAL (HIGH) 00325 (typically 45 minutes face-to-face)  [] RE-EVAL     [x] NQ(83880) x 2  [] Dry needle 1 or 2 Muscles (64160)  [x] NMR (82552) x   1  [] Dry needle 3+ Muscles (39694)  [x] Manual (60534) x  1   [] Ultrasound (50797) x  [] TA (00195) x 1    [] Mech Traction (09123)  [] ES(attended) (60725)     [] ES (un) (31571):   [] Vasopump (36707) [] Ionto (88249)   [] Other:    GOALS: Patient stated goal: \"Rehab after knee replacement\"  []? Progressing: []? Met: []? Not Met: []? Adjusted     Therapist goals for Patient:   Short Term Goals: To be achieved in: 2 weeks  1. Independent in HEP and progression per patient tolerance, in order to prevent re-injury. []? Progressing: []? Met: []? Not Met: []? Adjusted  2. Patient will have a decrease in pain to facilitate improvement in movement, function, and ADLs as indicated by Functional Deficits. []? Progressing: []? Met: []? Not Met: []? Adjusted     Long Term Goals: To be achieved in: 6 weeks  1. Disability index score of 20% or less for the LEFS to assist with reaching prior level of function. []? Progressing: []? Met: []? Not Met: []? Adjusted  2.  Patient will demonstrate increased AROM to 0-110 on left knee  to allow for proper joint functioning as indicated by patients Functional Deficits. []? Progressing: []? Met: []? Not Met: []? Adjusted  3. Patient will demonstrate an increase in Strength to good proximal hip strength and control, within 5lb HHD in LE to allow for proper functional mobility as indicated by patients Functional Deficits. []? Progressing: []? Met: []? Not Met: []? Adjusted  4. Patient will return to 80% functional activities without increased symptoms or restriction. []? Progressing: []? Met: []? Not Met: []? Adjusted  5. Pt  will ambulate safely with SPC on level surfaces.(patient specific functional goal)    []? Progressing: []? Met: []? Not Met: []? Adjusted          ASSESSMENT:  Patient is improving with gait and with extension range. Flexion range is still difficult and progressing slowly. Treatment/Activity Tolerance:  [x] Patient tolerated treatment well [] Patient limited by fatique  [] Patient limited by pain  [] Patient limited by other medical complications  [] Other:     Overall Progression Towards Functional goals/ Treatment Progress Update:  [x] Patient is progressing as expected towards functional goals listed. [] Progression is slowed due to complexities/Impairments listed. [] Progression has been slowed due to co-morbidities. [] Plan just implemented, too soon to assess goals progression <30days   [] Goals require adjustment due to lack of progress  [] Patient is not progressing as expected and requires additional follow up with physician  [] Other    Prognosis for POC: [x] Good [] Fair  [] Poor    Patient requires continued skilled intervention: [x] Yes  [] No        PLAN: Progress as able . Pt has not had any home therapy and TKR on left just took place on 4/16/21. Practice with cane.       [] Alter current plan (see comments)    XContinue with plan of care  [] Plan of care initiated [] Hold pending MD visit [] Discharge    Electronically signed by: [id number] [facility], PT    Note: If patient does not return for scheduled/recommended follow up visits, this note will serve as a discharge from care along with the most recent update on progress.

## 2021-05-12 ENCOUNTER — HOSPITAL ENCOUNTER (OUTPATIENT)
Dept: PHYSICAL THERAPY | Age: 66
Setting detail: THERAPIES SERIES
Discharge: HOME | End: 2021-05-12
Payer: MEDICARE

## 2021-05-12 PROCEDURE — 97112 NEUROMUSCULAR REEDUCATION: CPT

## 2021-05-12 PROCEDURE — 97110 THERAPEUTIC EXERCISES: CPT

## 2021-05-12 PROCEDURE — 97140 MANUAL THERAPY 1/> REGIONS: CPT

## 2021-05-12 NOTE — FLOWSHEET NOTE
[facility] - Outpatient Rehabilitation & Therapy  [address] [facility]. [address] [place] [address]  Phone: [phone]   Fax:     [phone]      Physical Therapy Treatment Note/ Progress Report:     Date:  2021    Patient Name:  Sayra Bourne    :  1955  MRN: [id number]    Pertinent Medical History:  Previous reverse TSR, back issues, OA    Medical/Treatment Diagnosis Information:  Diagnosis:   M17.12 (ICD-10-CM) - Unilateral primary osteoarthritis, left knee   Z96.652 (ICD-10-CM) - Presence of left artificial knee joint     ·   · Treatment Diagnosis: Pain on left knee, limited knee mobility, weakness, difficulty with walking    Insurance/Certification information:  PT Insurance Information: Medicare  Physician Information:  Referring Practitioner: Lidia Ocasio MD  Plan of care signed (Y/N): Sent to Dr. Angel Ibanez on 21    Date of Patient follow up with Physician:      Progress Report: []  Yes  [x]  No     Date Range for reporting period:  Beginnin2021  Ending:      Progress report due (10 Rx/or 30 days whichever is less):    Recertification due (POC duration/ or 90 days whichever is less):      Visit # POC/Insurance Allowable Auth Needed   11 18 []Yes   [x]No     Latex Allergy:  [x]NO      []YES  Preferred Language for Healthcare:   [x]English       []Other:    Functional Scale:      Date assessed: at 10th visit on 5/10/21  Test: [facility]  Score: 62    Pain level:  9/10     History of Injury: Pt underwent a left TKR on 21. Went home and has not had any home therapy.  reports she is doing well at home. SUBJECTIVE:    21: Patient reports knee has been sore and swollen. 21  Pt states, \" Still really sore \"  21: Still having pain  21: Patient reports swelling is going down some,walking a little better overall. 21: Pt reports 7/10 pain on knee but also has left foot and ankle pain.  Still taking 9 oxycodones per day. Edema is decreasing. 05/03/21: Patient reports her knee got very sore over the weekend,felt it almost locked up on her. States today is not too bad.   5/5/21: Pt reports that her heel is still bothering her more than the knee. 5/7/21: Sleeping is still difficult. Heel is \"not great\". Knee pain is slightly less. She saw DANNA Johnson's office Bookeraris Larios, CNP) and she removed the mesh. 5/10/21: Pt reports she had increased pain about 3 hours ago and took  meds and ice. She sometimes walks without any device. 5/12/21: Pt reports several episodes of the knee locking up on her during exercises yesterday and today. Vincent Thomas She will see Dr. Madeline Smith next Tuesday for a possible injection into the heel.       OBJECTIVE:    Observation:    Test measurements:  4/30/21: ROM left knee: -8-94   05/03/21 left knee flex 98 ext -7   5/5/21: left knee flex 90    Ext:  -4/ -5   5/7/21: ROM left knee: -5-96    5/10/21: -3- 93    RESTRICTIONS/PRECAUTIONS:  Exercises/Interventions:     Therapeutic Ex (46646)   Min:30 Reps/Resistance Notes/CUES   Nu step L3 x 5 min    Incline stretch 3 x 20 sec Added 4/30/21   Lunge stretch on step and HSS on step 3 x 20 sec each Added 4/30/21   Leg press   Held on 5/03/21   Ankle pumps 30X    20X    20X    Heel slides 20X    Knee stretch supine 2 min prop     20X    Seated ROM 20X     20X                  30 x 5    Seated heel slidX 3 min Hep 4/23/21   Manual Intervention (56013)  Min: 20 min     Knee mobs/PROM Flex /ext  STM to quad,gastoc,HS    Tib/Fem Mobs     Patella Mobs left    Ankle mobs               NMR re-education (83342)  Min:  CUES NEEDED   Minisquats  Hold  LAQ 30X    SAQ 20X    Education with use of single point cane instruction and practice with cane 100 feet,  Needs cuing for proper sequence   SLR 20X    Therapeutic Activity (36836)  Min:10               Modalities  Min:     IFC with      CP after exercises 15 min To back, left ankle, left knee   MH after exercises 10 min on left quad           Other Therapeutic Activities:   Pt was educated on PT POC, Diagnosis, Prognosis, pathomechanics as well as frequency and duration of scheduling future physical therapy appointments. Time was also taken on this day to answer all patient questions and participation in PT. Reviewed appointment policy in detail with patient and patient verbalized understanding. Home Exercise Program:   Patient was instructed in the following for HEP:     . Patient verbalized/demonstrated understanding and was issued written handout. Continue HEP as given by [facility] PT during prehab. Therapeutic Exercise and NMR EXR  [x] (09949) Provided verbal/tactile cueing for activities related to strengthening, flexibility, endurance, ROM for improvements in LE, proximal hip, and core control with self care, mobility, lifting, ambulation.  [] (82028) Provided verbal/tactile cueing for activities related to improving balance, coordination, kinesthetic sense, posture, motor skill, proprioception  to assist with LE, proximal hip, and core control in self care, mobility, lifting, ambulation and eccentric single leg control.      NMR and Therapeutic Activities:    [x] (87970 or 31660) Provided verbal/tactile cueing for activities related to improving balance, coordination, kinesthetic sense, posture, motor skill, proprioception and motor activation to allow for proper function of core, proximal hip and LE with self care and ADLs and functional mobility.   [] (79017) Gait Re-education- Provided training and instruction to the patient for proper LE, core and proximal hip recruitment and positioning and eccentric body weight control with ambulation re-education including up and down stairs     Home Exercise Program:    [x] (07638) Reviewed/Progressed HEP activities related to strengthening, flexibility, endurance, ROM of core, proximal hip and LE for functional self-care, mobility, lifting and ambulation/stair navigation [] (57892)Reviewed/Progressed HEP activities related to improving balance, coordination, kinesthetic sense, posture, motor skill, proprioception of core, proximal hip and LE for self care, mobility, lifting, and ambulation/stair navigation      Manual Treatments:  PROM / STM / Oscillations-Mobs:  G-I, II, III, IV (PA's, Inf., Post.)  [x] (62667) Provided manual therapy to mobilize LE, proximal hip and/or LS spine soft tissue/joints for the purpose of modulating pain, promoting relaxation,  increasing ROM, reducing/eliminating soft tissue swelling/inflammation/restriction, improving soft tissue extensibility and allowing for proper ROM for normal function with self care, mobility, lifting and ambulation. Charges:  Timed Code Treatment Minutes: 60   Total Treatment Minutes: 75      [] EVAL (LOW) 63650 (typically 20 minutes face-to-face)  [] EVAL (MOD) 71232 (typically 30 minutes face-to-face)  [] EVAL (HIGH) 14764 (typically 45 minutes face-to-face)  [] RE-EVAL     [x] LK(88906) x 2  [] Dry needle 1 or 2 Muscles (74861)  [x] NMR (58278) x   1  [] Dry needle 3+ Muscles (31183)  [x] Manual (02837) x  1   [] Ultrasound (08236) x  [] TA (79182) x 1    [] Mech Traction (03095)  [] ES(attended) (86857)     [] ES (un) (75276):   [] Vasopump (28307) [] Ionto (16902)   [] Other:    GOALS: Patient stated goal: \"Rehab after knee replacement\"  []? Progressing: []? Met: []? Not Met: []? Adjusted     Therapist goals for Patient:   Short Term Goals: To be achieved in: 2 weeks  1. Independent in HEP and progression per patient tolerance, in order to prevent re-injury. []? Progressing: []? Met: []? Not Met: []? Adjusted  2. Patient will have a decrease in pain to facilitate improvement in movement, function, and ADLs as indicated by Functional Deficits. []? Progressing: []? Met: []? Not Met: []? Adjusted     Long Term Goals: To be achieved in: 6 weeks  1.  Disability index score of 20% or less for the LEFS to assist with e stim. Will try this next time. [] Alter current plan (see comments)    XContinue with plan of care  [] Plan of care initiated [] Hold pending MD visit [] Discharge    Electronically signed by: Christie Pearce PT    Note: If patient does not return for scheduled/recommended follow up visits, this note will serve as a discharge from care along with the most recent update on progress.

## 2021-05-14 ENCOUNTER — HOSPITAL ENCOUNTER (OUTPATIENT)
Dept: PHYSICAL THERAPY | Age: 66
Setting detail: THERAPIES SERIES
Discharge: HOME | End: 2021-05-14
Payer: MEDICARE

## 2021-05-14 PROCEDURE — 97110 THERAPEUTIC EXERCISES: CPT

## 2021-05-14 PROCEDURE — 97140 MANUAL THERAPY 1/> REGIONS: CPT

## 2021-05-14 PROCEDURE — G0283 ELEC STIM OTHER THAN WOUND: HCPCS

## 2021-05-14 NOTE — FLOWSHEET NOTE
[facility] - Outpatient Rehabilitation & Therapy  [address] [facility]. Yoni [place]  Phone: [phone]   Fax:     [phone]      Physical Therapy Treatment Note/ Progress Report:     Date:  2021    Patient Name:  Reggie Munroe    :  1955  MRN: [id number]    Pertinent Medical History:  Previous reverse TSR, back issues, OA    Medical/Treatment Diagnosis Information:  Diagnosis:   M17.12 (ICD-10-CM) - Unilateral primary osteoarthritis, left knee   Z96.652 (ICD-10-CM) - Presence of left artificial knee joint     ·   · Treatment Diagnosis: Pain on left knee, limited knee mobility, weakness, difficulty with walking    Insurance/Certification information:  PT Insurance Information: Medicare  Physician Information:  Referring Practitioner: Kevin Benavidez MD  Plan of care signed (Y/N): Sent to Dr. Francheska Stanley on 21    Date of Patient follow up with Physician:      Progress Report: []  Yes  [x]  No     Date Range for reporting period:  Beginnin2021  Ending:      Progress report due (10 Rx/or 30 days whichever is less):    Recertification due (POC duration/ or 90 days whichever is less):      Visit # POC/Insurance Allowable Auth Needed   12 18 []Yes   [x]No     Latex Allergy:  [x]NO      []YES  Preferred Language for Healthcare:   [x]English       []Other:    Functional Scale:      Date assessed: at 10th visit on 5/10/21  Test: [facility]  Score: 62    Pain level:  6/10     History of Injury: Pt underwent a left TKR on 21. Went home and has not had any home therapy.  reports she is doing well at home. SUBJECTIVE:    21: Patient reports knee has been sore and swollen. 21  Pt states, \" Still really sore \"  21: Still having pain  21: Patient reports swelling is going down some,walking a little better overall. 21: Pt reports 7/10 pain on knee but also has left foot and ankle pain.  Still taking 9 oxycodones per day. Edema is decreasing. 05/03/21: Patient reports her knee got very sore over the weekend,felt it almost locked up on her. States today is not too bad.   5/5/21: Pt reports that her heel is still bothering her more than the knee. 5/7/21: Sleeping is still difficult. Heel is \"not great\". Knee pain is slightly less. She saw DANNA Johnson's office Simón Muse CNP) and she removed the mesh. 5/10/21: Pt reports she had increased pain about 3 hours ago and took  meds and ice. She sometimes walks without any device. 5/12/21: Pt reports several episodes of the knee locking up on her during exercises yesterday and today. Zena Valdez She will see Dr. Oakley Or next Tuesday for a possible injection into the heel. 5/14/21: Pt reports that she had some bad times yesterday with cramping and she thinks it is due to icing her left calf.       OBJECTIVE:    Observation:    Test measurements:  4/30/21: ROM left knee: -8-94   05/03/21 left knee flex 98 ext -7   5/5/21: left knee flex 90    Ext:  -4/ -5   5/7/21: ROM left knee: -5-96    5/10/21: -3- 93   5/14/21:-9 - 97    RESTRICTIONS/PRECAUTIONS:  Exercises/Interventions:     Therapeutic Ex (69211)   Min:30 Reps/Resistance Notes/CUES   Nu step L3 x 5 min    Incline stretch 3 x 20 sec Added 4/30/21   Lunge stretch on step and HSS on step 3 x 20 sec each Added 4/30/21   Leg press   Held on 5/03/21   Ankle pumps 30X    20X    20X    Heel slides 20X    Knee stretch supine 2 min prop     20X    Seated ROM 20X     20X                  30 x 5    Seated heel slidX 3 min Hep 4/23/21   Manual Intervention (12525)  Min: 15 min     Knee mobs/PROM Flex /ext  STM to quad,gastoc,HS    Tib/Fem Mobs     Patella Mobs left    Ankle mobs               NMR re-education (68310)  Min:  CUES NEEDED     Hold  LAQ 30X    SAQ 20X     instruction and practice with cane 100 feet,  Needs cuing for proper sequence   SLR 20X    Therapeutic Activity (32507)  Min:10               Modalities  Min:     IFC with  15 min    CP after exercises 15 min To back, left knee   MH after exercises 10 min on left calf and heel           Other Therapeutic Activities:   Pt was educated on PT POC, Diagnosis, Prognosis, pathomechanics as well as frequency and duration of scheduling future physical therapy appointments. Time was also taken on this day to answer all patient questions and participation in PT. Reviewed appointment policy in detail with patient and patient verbalized understanding. Home Exercise Program:   Patient was instructed in the following for HEP:     . Patient verbalized/demonstrated understanding and was issued written handout. Continue HEP as given by Emily Machado PT during prehab. Therapeutic Exercise and NMR EXR  [x] (48474) Provided verbal/tactile cueing for activities related to strengthening, flexibility, endurance, ROM for improvements in LE, proximal hip, and core control with self care, mobility, lifting, ambulation.  [] (22279) Provided verbal/tactile cueing for activities related to improving balance, coordination, kinesthetic sense, posture, motor skill, proprioception  to assist with LE, proximal hip, and core control in self care, mobility, lifting, ambulation and eccentric single leg control.      NMR and Therapeutic Activities:    [x] (55727 or 44612) Provided verbal/tactile cueing for activities related to improving balance, coordination, kinesthetic sense, posture, motor skill, proprioception and motor activation to allow for proper function of core, proximal hip and LE with self care and ADLs and functional mobility.   [] (20056) Gait Re-education- Provided training and instruction to the patient for proper LE, core and proximal hip recruitment and positioning and eccentric body weight control with ambulation re-education including up and down stairs     Home Exercise Program:    [x] (43682) Reviewed/Progressed HEP activities related to strengthening, flexibility, endurance, ROM of core, proximal hip and LE for functional self-care, mobility, lifting and ambulation/stair navigation   [] (99238)Reviewed/Progressed HEP activities related to improving balance, coordination, kinesthetic sense, posture, motor skill, proprioception of core, proximal hip and LE for self care, mobility, lifting, and ambulation/stair navigation      Manual Treatments:  PROM / STM / Oscillations-Mobs:  G-I, II, III, IV (PA's, Inf., Post.)  [x] (54184) Provided manual therapy to mobilize LE, proximal hip and/or LS spine soft tissue/joints for the purpose of modulating pain, promoting relaxation,  increasing ROM, reducing/eliminating soft tissue swelling/inflammation/restriction, improving soft tissue extensibility and allowing for proper ROM for normal function with self care, mobility, lifting and ambulation. Charges:  Timed Code Treatment Minutes: 45   Total Treatment Minutes: 60      [] EVAL (LOW) 66398 (typically 20 minutes face-to-face)  [] EVAL (MOD) 24417 (typically 30 minutes face-to-face)  [] EVAL (HIGH) 50724 (typically 45 minutes face-to-face)  [] RE-EVAL     [x] LF(10594) x 2  [] Dry needle 1 or 2 Muscles (28970)  [] NMR (05312) x   1  [] Dry needle 3+ Muscles (06674)  [x] Manual (54466) x  1   [] Ultrasound (92589) x  [] TA (54809) x 1    [] Mech Traction (67287)  [] ES(attended) (55482)     [x] ES (un) (24314):   [] Vasopump (10147) [] Ionto (24611)   [] Other:    GOALS: Patient stated goal: \"Rehab after knee replacement\"  []? Progressing: []? Met: []? Not Met: []? Adjusted     Therapist goals for Patient:   Short Term Goals: To be achieved in: 2 weeks  1. Independent in HEP and progression per patient tolerance, in order to prevent re-injury. []? Progressing: []? Met: []? Not Met: []? Adjusted  2. Patient will have a decrease in pain to facilitate improvement in movement, function, and ADLs as indicated by Functional Deficits. []? Progressing: []? Met: []? Not Met: []? Adjusted     Long Term Goals:  To be achieved in: 6 weeks  1. Disability index score of 20% or less for the LEFS to assist with reaching prior level of function. []? Progressing: []? Met: []? Not Met: []? Adjusted  2. Patient will demonstrate increased AROM to 0-110 on left knee  to allow for proper joint functioning as indicated by patients Functional Deficits. []? Progressing: []? Met: []? Not Met: []? Adjusted  3. Patient will demonstrate an increase in Strength to good proximal hip strength and control, within 5lb HHD in LE to allow for proper functional mobility as indicated by patients Functional Deficits. []? Progressing: []? Met: []? Not Met: []? Adjusted  4. Patient will return to 80% functional activities without increased symptoms or restriction. []? Progressing: []? Met: []? Not Met: []? Adjusted  5. Pt  will ambulate safely with SPC on level surfaces.(patient specific functional goal)    []? Progressing: []? Met: []? Not Met: []? Adjusted          ASSESSMENT:  Patient is improving with gait and with extension range. Flexion range is still difficult and progressing slowly. Treatment/Activity Tolerance:  [x] Patient tolerated treatment well [] Patient limited by fatique  [] Patient limited by pain  [] Patient limited by other medical complications  [] Other:     Overall Progression Towards Functional goals/ Treatment Progress Update:  [x] Patient is progressing as expected towards functional goals listed. [] Progression is slowed due to complexities/Impairments listed. [] Progression has been slowed due to co-morbidities. [] Plan just implemented, too soon to assess goals progression <30days   [] Goals require adjustment due to lack of progress  [] Patient is not progressing as expected and requires additional follow up with physician  [] Other    Prognosis for POC: [x] Good [] Fair  [] Poor    Patient requires continued skilled intervention: [x] Yes  [] No        PLAN: Progress as able .  Pt has not had any home therapy and TKR on left just took place on 4/16/21. Practice with cane. Pt expressed interest in trying e stim. Monitor effect of e stim. [] Alter current plan (see comments)    XContinue with plan of care  [] Plan of care initiated [] Hold pending MD visit [] Discharge    Electronically signed by: Magy Cuello, PT    Note: If patient does not return for scheduled/recommended follow up visits, this note will serve as a discharge from care along with the most recent update on progress.

## 2021-05-17 ENCOUNTER — HOSPITAL ENCOUNTER (OUTPATIENT)
Dept: PHYSICAL THERAPY | Age: 66
Setting detail: THERAPIES SERIES
Discharge: HOME | End: 2021-05-17
Payer: MEDICARE

## 2021-05-17 PROCEDURE — 97110 THERAPEUTIC EXERCISES: CPT

## 2021-05-17 PROCEDURE — G0283 ELEC STIM OTHER THAN WOUND: HCPCS

## 2021-05-17 PROCEDURE — 97140 MANUAL THERAPY 1/> REGIONS: CPT

## 2021-05-17 NOTE — FLOWSHEET NOTE
[facility] - Outpatient Rehabilitation & Therapy  [address]. [place]  Phone: [phone]   Fax:     [phone]      Physical Therapy Treatment Note/ Progress Report:     Date:  2021    Patient Name:  Reba Garcia    :  1955  MRN: [id number]    Pertinent Medical History:  Previous reverse TSR, back issues, OA    Medical/Treatment Diagnosis Information:  Diagnosis:   M17.12 (ICD-10-CM) - Unilateral primary osteoarthritis, left knee   Z96.652 (ICD-10-CM) - Presence of left artificial knee joint     ·   · Treatment Diagnosis: Pain on left knee, limited knee mobility, weakness, difficulty with walking    Insurance/Certification information:  PT Insurance Information: Medicare  Physician Information:  Referring Practitioner: Enoch Haro MD  Plan of care signed (Y/N): Sent to Dr. Geneva Lopez on 21    Date of Patient follow up with Physician:      Progress Report: []  Yes  [x]  No     Date Range for reporting period:  Beginnin2021  Ending:      Progress report due (10 Rx/or 30 days whichever is less):    Recertification due (POC duration/ or 90 days whichever is less):      Visit # POC/Insurance Allowable Auth Needed   13 18 []Yes   [x]No     Latex Allergy:  [x]NO      []YES  Preferred Language for Healthcare:   [x]English       []Other:    Functional Scale:      Date assessed: at 10th visit on 5/10/21  Test: [facility]  Score: 62    Pain level:  7-8/10     History of Injury: Pt underwent a left TKR on 21. Went home and has not had any home therapy.  reports she is doing well at home. SUBJECTIVE:    21: Patient reports knee has been sore and swollen. 21  Pt states, \" Still really sore \"  21: Still having pain  21: Patient reports swelling is going down some,walking a little better overall. 21: Pt reports 7/10 pain on knee but also has left foot and ankle pain.  Still taking 9 and practice with cane 100 feet,  Needs cuing for proper sequence   SLR     Therapeutic Activity (10133)  Min:10               Modalities  Min:     IFC with  15 min    CP after exercises 15 min To back, left knee   MH after exercises 10 min on left calf and heel           Other Therapeutic Activities:   Pt was educated on PT POC, Diagnosis, Prognosis, pathomechanics as well as frequency and duration of scheduling future physical therapy appointments. Time was also taken on this day to answer all patient questions and participation in PT. Reviewed appointment policy in detail with patient and patient verbalized understanding. Home Exercise Program:   Patient was instructed in the following for HEP:     . Patient verbalized/demonstrated understanding and was issued written handout. Continue HEP as given by [facility] PT during prehab. Therapeutic Exercise and NMR EXR  [x] (52690) Provided verbal/tactile cueing for activities related to strengthening, flexibility, endurance, ROM for improvements in LE, proximal hip, and core control with self care, mobility, lifting, ambulation.  [] (92883) Provided verbal/tactile cueing for activities related to improving balance, coordination, kinesthetic sense, posture, motor skill, proprioception  to assist with LE, proximal hip, and core control in self care, mobility, lifting, ambulation and eccentric single leg control.      NMR and Therapeutic Activities:    [x] (40467 or 55675) Provided verbal/tactile cueing for activities related to improving balance, coordination, kinesthetic sense, posture, motor skill, proprioception and motor activation to allow for proper function of core, proximal hip and LE with self care and ADLs and functional mobility.   [] (40925) Gait Re-education- Provided training and instruction to the patient for proper LE, core and proximal hip recruitment and positioning and eccentric body weight control with ambulation re-education including up and down stairs     Home Exercise Program:    [x] (30100) Reviewed/Progressed HEP activities related to strengthening, flexibility, endurance, ROM of core, proximal hip and LE for functional self-care, mobility, lifting and ambulation/stair navigation   [] (13372)Reviewed/Progressed HEP activities related to improving balance, coordination, kinesthetic sense, posture, motor skill, proprioception of core, proximal hip and LE for self care, mobility, lifting, and ambulation/stair navigation      Manual Treatments:  PROM / STM / Oscillations-Mobs:  G-I, II, III, IV (PA's, Inf., Post.)  [x] (39273) Provided manual therapy to mobilize LE, proximal hip and/or LS spine soft tissue/joints for the purpose of modulating pain, promoting relaxation,  increasing ROM, reducing/eliminating soft tissue swelling/inflammation/restriction, improving soft tissue extensibility and allowing for proper ROM for normal function with self care, mobility, lifting and ambulation. Charges:  Timed Code Treatment Minutes: 45   Total Treatment Minutes: 60      [] EVAL (LOW) 40089 (typically 20 minutes face-to-face)  [] EVAL (MOD) 13338 (typically 30 minutes face-to-face)  [] EVAL (HIGH) 46754 (typically 45 minutes face-to-face)  [] RE-EVAL     [x] CD(63656) x 2  [] Dry needle 1 or 2 Muscles (98606)  [] NMR (20327) x   1  [] Dry needle 3+ Muscles (54091)  [x] Manual (58197) x  1   [] Ultrasound (80925) x  [] TA (11645) x 1    [] Mech Traction (16279)  [] ES(attended) (53761)     [x] ES (un) (25042):   [] Vasopump (74207) [] Ionto (47214)   [] Other:    GOALS: Patient stated goal: \"Rehab after knee replacement\"  []? Progressing: []? Met: []? Not Met: []? Adjusted     Therapist goals for Patient:   Short Term Goals: To be achieved in: 2 weeks  1. Independent in HEP and progression per patient tolerance, in order to prevent re-injury. []? Progressing: []? Met: []? Not Met: []? Adjusted  2.  Patient will have a decrease in pain to facilitate improvement in movement, function, and ADLs as indicated by Functional Deficits. []? Progressing: []? Met: []? Not Met: []? Adjusted     Long Term Goals: To be achieved in: 6 weeks  1. Disability index score of 20% or less for the LEFS to assist with reaching prior level of function. []? Progressing: []? Met: []? Not Met: []? Adjusted  2. Patient will demonstrate increased AROM to 0-110 on left knee  to allow for proper joint functioning as indicated by patients Functional Deficits. []? Progressing: []? Met: []? Not Met: []? Adjusted  3. Patient will demonstrate an increase in Strength to good proximal hip strength and control, within 5lb HHD in LE to allow for proper functional mobility as indicated by patients Functional Deficits. []? Progressing: []? Met: []? Not Met: []? Adjusted  4. Patient will return to 80% functional activities without increased symptoms or restriction. []? Progressing: []? Met: []? Not Met: []? Adjusted  5. Pt  will ambulate safely with SPC on level surfaces.(patient specific functional goal)    []? Progressing: []? Met: []? Not Met: []? Adjusted          ASSESSMENT:  Patient is improving with gait and with extension range. Flexion range is still difficult and progressing slowly. Treatment/Activity Tolerance:  [x] Patient tolerated treatment well [] Patient limited by fatique  [] Patient limited by pain  [] Patient limited by other medical complications  [] Other:     Overall Progression Towards Functional goals/ Treatment Progress Update:  [x] Patient is progressing as expected towards functional goals listed. [] Progression is slowed due to complexities/Impairments listed. [] Progression has been slowed due to co-morbidities.   [] Plan just implemented, too soon to assess goals progression <30days   [] Goals require adjustment due to lack of progress  [] Patient is not progressing as expected and requires additional follow up with physician  [] Other    Prognosis for POC: [x] Good [] Fair  [] Poor    Patient requires continued skilled intervention: [x] Yes  [] No        PLAN: Progress as able . Pt has not had any home therapy and TKR on left just took place on 4/16/21. Practice with cane. Pt expressed interest in trying e stim. Monitor effect of e stim. [] Alter current plan (see comments)    XContinue with plan of care  [] Plan of care initiated [] Hold pending MD visit [] Discharge    Electronically signed by: Neo Montano PTA    Note: If patient does not return for scheduled/recommended follow up visits, this note will serve as a discharge from care along with the most recent update on progress.

## 2021-05-19 ENCOUNTER — HOSPITAL ENCOUNTER (OUTPATIENT)
Dept: PHYSICAL THERAPY | Age: 66
Setting detail: THERAPIES SERIES
Discharge: HOME | End: 2021-05-19
Payer: MEDICARE

## 2021-05-19 PROCEDURE — 97140 MANUAL THERAPY 1/> REGIONS: CPT

## 2021-05-19 PROCEDURE — G0283 ELEC STIM OTHER THAN WOUND: HCPCS

## 2021-05-19 PROCEDURE — 97110 THERAPEUTIC EXERCISES: CPT

## 2021-05-19 NOTE — FLOWSHEET NOTE
oxycodones per day. Edema is decreasing. 05/03/21: Patient reports her knee got very sore over the weekend,felt it almost locked up on her. States today is not too bad.   5/5/21: Pt reports that her heel is still bothering her more than the knee. 5/7/21: Sleeping is still difficult. Heel is \"not great\". Knee pain is slightly less. She saw DANNA Johnson's office Arielle Sterling, CARLOS) and she removed the mesh. 5/10/21: Pt reports she had increased pain about 3 hours ago and took  meds and ice. She sometimes walks without any device. 5/12/21: Pt reports several episodes of the knee locking up on her during exercises yesterday and today. Erlinda Andrew She will see Dr. Allyn Anne next Tuesday for a possible injection into the heel. 5/14/21: Pt reports that she had some bad times yesterday with cramping and she thinks it is due to icing her left calf.  05/17/21: Patient reports knee has been sore and locking up on her several times a day,it takes her 30 min to get knee moving again. 05/19/21: 15 min late. Patient reports knee still has been locking up this am.States she seeing Dr. Hannah alfaro.     OBJECTIVE:    Observation:    Test measurements:  4/30/21: ROM left knee: -8-94   05/03/21 left knee flex 98 ext -7   5/5/21: left knee flex 90    Ext:  -4/ -5   5/7/21: ROM left knee: -5-96    5/10/21: -3- 93   5/14/21:-9 - 97   05/17/21  -9 -92    05/19/21  -10 -85 decreased range.     RESTRICTIONS/PRECAUTIONS:  Exercises/Interventions:     Therapeutic Ex (29235)   Min:30 Reps/Resistance Notes/CUES   Nu step L3 x 5 min    Incline stretch 3 x 20 sec Added 4/30/21   Lunge stretch on step and HSS on step 3 x 20 sec each Added 4/30/21   Leg press   Held on 5/03/21   Ankle pumps 30X    20X    20X    Heel slides  donna fair   Knee stretch supine      20X    Seated ROM 20X     20X                  30 x 5    Seated heel slid Hep 4/23/21   Manual Intervention (69651)  Min: 15 min     Knee mobs/PROM Flex /ext  STM to quad,gastoc,HS donna fair   Tib/Fem Mobs     Patella Mobs left    Ankle mobs               NMR re-education (63981)  Min:  CUES NEEDED     Hold  LAQ    SAQ     instruction and practice with cane 100 feet,  Needs cuing for proper sequence   SLR     Therapeutic Activity (92854)  Min:10               Modalities  Min:     IFC with  15 min    CP after exercises 15 min To back, left knee   MH after exercises            Other Therapeutic Activities:   Pt was educated on PT POC, Diagnosis, Prognosis, pathomechanics as well as frequency and duration of scheduling future physical therapy appointments. Time was also taken on this day to answer all patient questions and participation in PT. Reviewed appointment policy in detail with patient and patient verbalized understanding. Home Exercise Program:   Patient was instructed in the following for HEP:     . Patient verbalized/demonstrated understanding and was issued written handout. Continue HEP as given by [facility] PT during prehab. Therapeutic Exercise and NMR EXR  [x] (26408) Provided verbal/tactile cueing for activities related to strengthening, flexibility, endurance, ROM for improvements in LE, proximal hip, and core control with self care, mobility, lifting, ambulation.  [] (55356) Provided verbal/tactile cueing for activities related to improving balance, coordination, kinesthetic sense, posture, motor skill, proprioception  to assist with LE, proximal hip, and core control in self care, mobility, lifting, ambulation and eccentric single leg control.      NMR and Therapeutic Activities:    [x] (07010 or 44304) Provided verbal/tactile cueing for activities related to improving balance, coordination, kinesthetic sense, posture, motor skill, proprioception and motor activation to allow for proper function of core, proximal hip and LE with self care and ADLs and functional mobility.   [] (02984) Gait Re-education- Provided training and instruction to the patient for proper LE, core and proximal hip recruitment and positioning and eccentric body weight control with ambulation re-education including up and down stairs     Home Exercise Program:    [x] (88997) Reviewed/Progressed HEP activities related to strengthening, flexibility, endurance, ROM of core, proximal hip and LE for functional self-care, mobility, lifting and ambulation/stair navigation   [] (05643)Reviewed/Progressed HEP activities related to improving balance, coordination, kinesthetic sense, posture, motor skill, proprioception of core, proximal hip and LE for self care, mobility, lifting, and ambulation/stair navigation      Manual Treatments:  PROM / STM / Oscillations-Mobs:  G-I, II, III, IV (PA's, Inf., Post.)  [x] (02025) Provided manual therapy to mobilize LE, proximal hip and/or LS spine soft tissue/joints for the purpose of modulating pain, promoting relaxation,  increasing ROM, reducing/eliminating soft tissue swelling/inflammation/restriction, improving soft tissue extensibility and allowing for proper ROM for normal function with self care, mobility, lifting and ambulation. Charges:  Timed Code Treatment Minutes: 35   Total Treatment Minutes: 50      [] EVAL (LOW) 94582 (typically 20 minutes face-to-face)  [] EVAL (MOD) 27075 (typically 30 minutes face-to-face)  [] EVAL (HIGH) 57138 (typically 45 minutes face-to-face)  [] RE-EVAL     [x] RL(49899) x 1  [] Dry needle 1 or 2 Muscles (44693)  [] NMR (71124) x   1  [] Dry needle 3+ Muscles (72525)  [x] Manual (60197) x  1   [] Ultrasound (09558) x  [] TA (27252) x 1    [] Mech Traction (84581)  [] ES(attended) (94508)     [x] ES (un) (38411):   [] Vasopump (57661) [] Ionto (87116)   [] Other:    GOALS: Patient stated goal: \"Rehab after knee replacement\"  []? Progressing: []? Met: []? Not Met: []? Adjusted     Therapist goals for Patient:   Short Term Goals: To be achieved in: 2 weeks  1. Independent in HEP and progression per patient tolerance, in order to prevent re-injury. []? Progressing: []? Met: []? Not Met: []? Adjusted  2. Patient will have a decrease in pain to facilitate improvement in movement, function, and ADLs as indicated by Functional Deficits. []? Progressing: []? Met: []? Not Met: []? Adjusted     Long Term Goals: To be achieved in: 6 weeks  1. Disability index score of 20% or less for the LEFS to assist with reaching prior level of function. []? Progressing: []? Met: []? Not Met: []? Adjusted  2. Patient will demonstrate increased AROM to 0-110 on left knee  to allow for proper joint functioning as indicated by patients Functional Deficits. []? Progressing: []? Met: []? Not Met: []? Adjusted  3. Patient will demonstrate an increase in Strength to good proximal hip strength and control, within 5lb HHD in LE to allow for proper functional mobility as indicated by patients Functional Deficits. []? Progressing: []? Met: []? Not Met: []? Adjusted  4. Patient will return to 80% functional activities without increased symptoms or restriction. []? Progressing: []? Met: []? Not Met: []? Adjusted  5. Pt  will ambulate safely with SPC on level surfaces.(patient specific functional goal)    []? Progressing: []? Met: []? Not Met: []? Adjusted          ASSESSMENT:  Patient is improving with gait and with extension range. Flexion range is still difficult and progressing slowly. Treatment/Activity Tolerance:  [] Patient tolerated treatment well [] Patient limited by fatique  [] Patient limited by pain  [] Patient limited by other medical complications  [x] Other: 5/19/21 Patient arrived to PT having a lot of pain and tearful stating that her knee locking up. ROM and exercise were limited due to pain. Overall Progression Towards Functional goals/ Treatment Progress Update:  [x] Patient is progressing as expected towards functional goals listed. [] Progression is slowed due to complexities/Impairments listed. [] Progression has been slowed due to co-morbidities.   [] Plan just implemented, too soon to assess goals progression <30days   [] Goals require adjustment due to lack of progress  [] Patient is not progressing as expected and requires additional follow up with physician  [] Other    Prognosis for POC: [x] Good [] Fair  [] Poor    Patient requires continued skilled intervention: [x] Yes  [] No        PLAN: Progress as able . Pt has not had any home therapy and TKR on left just took place on 4/16/21. Practice with cane. Pt expressed interest in trying e stim. Monitor effect of e stim. [] Alter current plan (see comments)    XContinue with plan of care  [] Plan of care initiated [] Hold pending MD visit [] Discharge    Electronically signed by: Mariela Tolbert PTA    Note: If patient does not return for scheduled/recommended follow up visits, this note will serve as a discharge from care along with the most recent update on progress.

## 2021-05-21 ENCOUNTER — HOSPITAL ENCOUNTER (OUTPATIENT)
Dept: PHYSICAL THERAPY | Age: 66
Setting detail: THERAPIES SERIES
Discharge: HOME | End: 2021-05-21
Payer: MEDICARE

## 2021-05-21 PROCEDURE — G0283 ELEC STIM OTHER THAN WOUND: HCPCS

## 2021-05-21 PROCEDURE — 97110 THERAPEUTIC EXERCISES: CPT

## 2021-05-21 PROCEDURE — 97140 MANUAL THERAPY 1/> REGIONS: CPT

## 2021-05-21 NOTE — PLAN OF CARE
[facility] - Outpatient Rehabilitation & Therapy  [address]. [place]  Phone: [phone]   Fax:     [phone]    Physical Therapy Treatment Note/ Progress Report:   Date:  2021    Patient Name:  Cl Stovall    :  1955  MRN: [id number]    Pertinent Medical History:  Previous reverse TSR, back issues, OA    Medical/Treatment Diagnosis Information:  Diagnosis:   M17.12 (ICD-10-CM) - Unilateral primary osteoarthritis, left knee   Z96.652 (ICD-10-CM) - Presence of left artificial knee joint     ·   · Treatment Diagnosis: Pain on left knee, limited knee mobility, weakness, difficulty with walking    Insurance/Certification information:  PT Insurance Information: Medicare  Physician Information:  Referring Practitioner: Michael Gamez MD  Plan of care signed (Y/N): Sent to Dr. Jona Bumpers on 21    Date of Patient follow up with Physician: 21     Progress Report: [x]  Yes  []  No     Date Range for reporting period:  Beginnin2021  Endin21  Progress report due (10 Rx/or 30 days whichever is less):    Recertification due (POC duration/ or 90 days whichever is less):      Visit # POC/Insurance Allowable Auth Needed   15 18 []Yes   [x]No     Latex Allergy:  [x]NO      []YES  Preferred Language for Healthcare:   [x]English       []Other:    Functional Scale:      Date assessed: at 10th visit on 5/10/21  Test: [facility]  Score: 62    Pain level:  7/10     History of Injury: Pt underwent a left TKR on 21. Went home and has not had any home therapy.  reports she is doing well at home. SUBJECTIVE:    21: Patient reports knee has been sore and swollen. 21  Pt states, \" Still really sore \"  21: Still having pain  21: Patient reports swelling is going down some,walking a little better overall. 21: Pt reports 7/10 pain on knee but also has left foot and ankle pain.  Still taking 9 oxycodones per day. Edema is decreasing. 05/03/21: Patient reports her knee got very sore over the weekend,felt it almost locked up on her. States today is not too bad.   5/5/21: Pt reports that her heel is still bothering her more than the knee. 5/7/21: Sleeping is still difficult. Heel is \"not great\". Knee pain is slightly less. She saw DANNA Johnson's office Vignesh Colin CNP) and she removed the mesh. 5/10/21: Pt reports she had increased pain about 3 hours ago and took  meds and ice. She sometimes walks without any device. 5/12/21: Pt reports several episodes of the knee locking up on her during exercises yesterday and today. Heidy Colder She will see Dr. Vinicio Geiger next Tuesday for a possible injection into the heel. 5/14/21: Pt reports that she had some bad times yesterday with cramping and she thinks it is due to icing her left calf.  05/17/21: Patient reports knee has been sore and locking up on her several times a day,it takes her 30 min to get knee moving again. 05/19/21: 15 min late. Patient reports knee still has been locking up this am.States she seeing Dr. Nahed alfaro. 5/21/21:Pt reports that she feels better than on Wednesday. She was able to perform one set of exercises this morning although she was so stiff all during last night.     OBJECTIVE:    Observation:    Test measurements:  4/30/21: ROM left knee: -8-94   05/03/21 left knee flex 98 ext -7   5/5/21: left knee flex 90    Ext:  -4/ -5   5/7/21: ROM left knee: -5-96    5/10/21: -3- 93   5/14/21:-9 - 97   05/17/21  -9 -92    05/19/21  -10 -85 decreased range.    5/21/21: - 10 - 95    RESTRICTIONS/PRECAUTIONS:  Exercises/Interventions:     Therapeutic Ex (59895)   Min:30 Reps/Resistance Notes/CUES   Nu step L3 x 5 min    Incline stretch 3 x 20 sec Added 4/30/21   Lunge stretch on step and HSS on step 3 x 20 sec each Added 4/30/21   Leg press   Held on 5/03/21   Ankle pumps 30X    20X    20X    Heel slides  donna fair   Knee stretch supine      20X Seated ROM 20X     20X                  30 x 5    Seated heel slid Hep 4/23/21   Manual Intervention (23702)  Min: 15 min     Knee mobs/PROM Flex /ext  STM to quad,gastoc,HS donna fair   Tib/Fem Mobs     Patella Mobs left    Ankle mobs               NMR re-education (03597)  Min:  CUES NEEDED     Hold  LAQ 30X   SAQ 20X    instruction and practice with cane 100 feet,  Needs cuing for proper sequence   SLR 20X    Therapeutic Activity (66100)  Min:10               Modalities  Min:     IFC with  15 min    CP after exercises 15 min To back, left knee and heel   MH after exercises 15 min on left calf            Other Therapeutic Activities:   Pt was educated on PT POC, Diagnosis, Prognosis, pathomechanics as well as frequency and duration of scheduling future physical therapy appointments. Time was also taken on this day to answer all patient questions and participation in PT. Reviewed appointment policy in detail with patient and patient verbalized understanding. Home Exercise Program:   Patient was instructed in the following for HEP:     . Patient verbalized/demonstrated understanding and was issued written handout. Continue HEP as given by [facility] PT during prehab. Therapeutic Exercise and NMR EXR  [x] (80886) Provided verbal/tactile cueing for activities related to strengthening, flexibility, endurance, ROM for improvements in LE, proximal hip, and core control with self care, mobility, lifting, ambulation.  [] (68622) Provided verbal/tactile cueing for activities related to improving balance, coordination, kinesthetic sense, posture, motor skill, proprioception  to assist with LE, proximal hip, and core control in self care, mobility, lifting, ambulation and eccentric single leg control.      NMR and Therapeutic Activities:    [x] (73189 or 32350) Provided verbal/tactile cueing for activities related to improving balance, coordination, kinesthetic sense, posture, motor skill, proprioception and motor activation to allow for proper function of core, proximal hip and LE with self care and ADLs and functional mobility.   [] (86182) Gait Re-education- Provided training and instruction to the patient for proper LE, core and proximal hip recruitment and positioning and eccentric body weight control with ambulation re-education including up and down stairs     Home Exercise Program:    [x] (62401) Reviewed/Progressed HEP activities related to strengthening, flexibility, endurance, ROM of core, proximal hip and LE for functional self-care, mobility, lifting and ambulation/stair navigation   [] (16859)Reviewed/Progressed HEP activities related to improving balance, coordination, kinesthetic sense, posture, motor skill, proprioception of core, proximal hip and LE for self care, mobility, lifting, and ambulation/stair navigation      Manual Treatments:  PROM / STM / Oscillations-Mobs:  G-I, II, III, IV (PA's, Inf., Post.)  [x] (76616) Provided manual therapy to mobilize LE, proximal hip and/or LS spine soft tissue/joints for the purpose of modulating pain, promoting relaxation,  increasing ROM, reducing/eliminating soft tissue swelling/inflammation/restriction, improving soft tissue extensibility and allowing for proper ROM for normal function with self care, mobility, lifting and ambulation.        Charges:  Timed Code Treatment Minutes: 35   Total Treatment Minutes: 50      [] EVAL (LOW) 79201 (typically 20 minutes face-to-face)  [] EVAL (MOD) 56853 (typically 30 minutes face-to-face)  [] EVAL (HIGH) 81797 (typically 45 minutes face-to-face)  [] RE-EVAL     [x] SH(97682) x 2  [] Dry needle 1 or 2 Muscles (38457)  [] NMR (00560) x   1  [] Dry needle 3+ Muscles (52691)  [x] Manual (14702) x  1   [] Ultrasound (59938) x  [] TA (79875) x 1    [] Mech Traction (60711)  [] ES(attended) (12245)     [x] ES (un) (43738):   [] Vasopump (67832) [] Ionto (82875)   [] Other:    GOALS: Patient stated goal: \"Rehab after knee replacement\"  []? Progressing: []? Met: []? Not Met: []? Adjusted     Therapist goals for Patient:   Short Term Goals: To be achieved in: 2 weeks  1. Independent in HEP and progression per patient tolerance, in order to prevent re-injury. []? Progressing: []? Met: []? Not Met: []? Adjusted  2. Patient will have a decrease in pain to facilitate improvement in movement, function, and ADLs as indicated by Functional Deficits. []? Progressing: []? Met: []? Not Met: []? Adjusted     Long Term Goals: To be achieved in: 6 weeks  1. Disability index score of 20% or less for the LEFS to assist with reaching prior level of function. []? Progressing: []? Met: []? Not Met: []? Adjusted  2. Patient will demonstrate increased AROM to 0-110 on left knee  to allow for proper joint functioning as indicated by patients Functional Deficits. []? Progressing: []? Met: []? Not Met: []? Adjusted  3. Patient will demonstrate an increase in Strength to good proximal hip strength and control, within 5lb HHD in LE to allow for proper functional mobility as indicated by patients Functional Deficits. []? Progressing: []? Met: []? Not Met: []? Adjusted  4. Patient will return to 80% functional activities without increased symptoms or restriction. []? Progressing: []? Met: []? Not Met: []? Adjusted  5. Pt  will ambulate safely with SPC on level surfaces.(patient specific functional goal)    []? Progressing: []? Met: []? Not Met: []? Adjusted          ASSESSMENT:  Patient has more pain and has actually lost range of motion over the past week. Flexion range is still difficult and she is progressing slowly overall. Treatment/Activity Tolerance:  [] Patient tolerated treatment well [] Patient limited by fatique  [] Patient limited by pain  [] Patient limited by other medical complications  [x] Other: 5/19/21 Patient arrived to PT having a lot of pain and tearful stating that her knee locking up. ROM and exercise were limited due to pain.     Overall Progression Towards Functional goals/ Treatment Progress Update:  [] Patient is progressing as expected towards functional goals listed. [x] Progression is slowed due to complexities/Impairments listed. [] Progression has been slowed due to co-morbidities. [] Plan just implemented, too soon to assess goals progression <30days   [] Goals require adjustment due to lack of progress  [] Patient is not progressing as expected and requires additional follow up with physician  [] Other    Prognosis for POC: [x] Good [] Fair  [] Poor    Patient requires continued skilled intervention: [x] Yes  [] No        PLAN: Practice with cane. Monitor effect of e stim. Progress as tolerated, although patient is progressing slower than expected. [x] Alter current plan (see comments)  (Continue PT 3 x weekly for 4 weeks)       XContinue with plan of care  [] Plan of care initiated [] Hold pending MD visit [] Discharge    Electronically signed by: Dustin Aguilar PT    Note: If patient does not return for scheduled/recommended follow up visits, this note will serve as a discharge from care along with the most recent update on progress.

## 2021-05-21 NOTE — PROGRESS NOTES
[facility] - Outpatient Rehabilitation & Therapy  [address]. [place]  Phone: [phone]   Fax:     [phone]    Physical Therapy Treatment Note/ Progress Report:   Date:  2021    Patient Name:  Marko Davis    :  1955  MRN: [id number]    Pertinent Medical History:  Previous reverse TSR, back issues, OA    Medical/Treatment Diagnosis Information:  Diagnosis:   M17.12 (ICD-10-CM) - Unilateral primary osteoarthritis, left knee   Z96.652 (ICD-10-CM) - Presence of left artificial knee joint     ·   · Treatment Diagnosis: Pain on left knee, limited knee mobility, weakness, difficulty with walking    Insurance/Certification information:  PT Insurance Information: Medicare  Physician Information:  Referring Practitioner: Dashawn Brewster MD  Plan of care signed (Y/N): Sent to Dr. Sasha Villa on 21    Date of Patient follow up with Physician: 21     Progress Report: [x]  Yes  []  No     Date Range for reporting period:  Beginnin2021  Endin21  Progress report due (10 Rx/or 30 days whichever is less):    Recertification due (POC duration/ or 90 days whichever is less):      Visit # POC/Insurance Allowable Auth Needed   14 18 []Yes   [x]No     Latex Allergy:  [x]NO      []YES  Preferred Language for Healthcare:   [x]English       []Other:    Functional Scale:      Date assessed: at 10th visit on 5/10/21  Test: [employer] [facility] [employer]  Score: 62    Pain level:  9/10     History of Injury: Pt underwent a left TKR on 21. Went home and has not had any home therapy.  reports she is doing well at home. SUBJECTIVE:    21: Patient reports knee has been sore and swollen. 21  Pt states, \" Still really sore \"  21: Still having pain  21: Patient reports swelling is going down some,walking a little better overall. 21: Pt reports 7/10 pain on knee but also has left foot and ankle pain.  Still taking 9 oxycodones per day. Edema is decreasing. 05/03/21: Patient reports her knee got very sore over the weekend,felt it almost locked up on her. States today is not too bad.   5/5/21: Pt reports that her heel is still bothering her more than the knee. 5/7/21: Sleeping is still difficult. Heel is \"not great\". Knee pain is slightly less. She saw DANNA Johnson's office Michelle Wadsworth, CARLOS) and she removed the mesh. 5/10/21: Pt reports she had increased pain about 3 hours ago and took  meds and ice. She sometimes walks without any device. 5/12/21: Pt reports several episodes of the knee locking up on her during exercises yesterday and today. Kelsy Lin She will see Dr. Ludmila Souza next Tuesday for a possible injection into the heel. 5/14/21: Pt reports that she had some bad times yesterday with cramping and she thinks it is due to icing her left calf.  05/17/21: Patient reports knee has been sore and locking up on her several times a day,it takes her 30 min to get knee moving again. 05/19/21: 15 min late. Patient reports knee still has been locking up this am.States she seeing Dr. Rylie alfaro.     OBJECTIVE:    Observation:    Test measurements:  4/30/21: ROM left knee: -8-94   05/03/21 left knee flex 98 ext -7   5/5/21: left knee flex 90    Ext:  -4/ -5   5/7/21: ROM left knee: -5-96    5/10/21: -3- 93   5/14/21:-9 - 97   05/17/21  -9 -92    05/19/21  -10 -85 decreased range.     RESTRICTIONS/PRECAUTIONS:  Exercises/Interventions:     Therapeutic Ex (36778)   Min:30 Reps/Resistance Notes/CUES   Nu step L3 x 5 min    Incline stretch 3 x 20 sec Added 4/30/21   Lunge stretch on step and HSS on step 3 x 20 sec each Added 4/30/21   Leg press   Held on 5/03/21   Ankle pumps 30X    20X    20X    Heel slides  donna fair   Knee stretch supine      20X    Seated ROM 20X     20X                  30 x 5    Seated heel slid Hep 4/23/21   Manual Intervention (93564)  Min: 15 min     Knee mobs/PROM Flex /ext  STM to quad,gastoc,HS donna fair   Tib/Fem Mobs     Patella Mobs left    Ankle mobs               NMR re-education (04072)  Min:  CUES NEEDED     Hold  LAQ    SAQ     instruction and practice with cane 100 feet,  Needs cuing for proper sequence   SLR     Therapeutic Activity (98698)  Min:10               Modalities  Min:     IFC with  15 min    CP after exercises 15 min To back, left knee   MH after exercises            Other Therapeutic Activities:   Pt was educated on PT POC, Diagnosis, Prognosis, pathomechanics as well as frequency and duration of scheduling future physical therapy appointments. Time was also taken on this day to answer all patient questions and participation in PT. Reviewed appointment policy in detail with patient and patient verbalized understanding. Home Exercise Program:   Patient was instructed in the following for HEP:     . Patient verbalized/demonstrated understanding and was issued written handout. Continue HEP as given by [facility] PT during prehab. Therapeutic Exercise and NMR EXR  [x] (67206) Provided verbal/tactile cueing for activities related to strengthening, flexibility, endurance, ROM for improvements in LE, proximal hip, and core control with self care, mobility, lifting, ambulation.  [] (19872) Provided verbal/tactile cueing for activities related to improving balance, coordination, kinesthetic sense, posture, motor skill, proprioception  to assist with LE, proximal hip, and core control in self care, mobility, lifting, ambulation and eccentric single leg control.      NMR and Therapeutic Activities:    [x] (71708 or 80589) Provided verbal/tactile cueing for activities related to improving balance, coordination, kinesthetic sense, posture, motor skill, proprioception and motor activation to allow for proper function of core, proximal hip and LE with self care and ADLs and functional mobility.   [] (22000) Gait Re-education- Provided training and instruction to the patient for proper LE, core and proximal hip recruitment and positioning and eccentric body weight control with ambulation re-education including up and down stairs     Home Exercise Program:    [x] (29799) Reviewed/Progressed HEP activities related to strengthening, flexibility, endurance, ROM of core, proximal hip and LE for functional self-care, mobility, lifting and ambulation/stair navigation   [] (93572)Reviewed/Progressed HEP activities related to improving balance, coordination, kinesthetic sense, posture, motor skill, proprioception of core, proximal hip and LE for self care, mobility, lifting, and ambulation/stair navigation      Manual Treatments:  PROM / STM / Oscillations-Mobs:  G-I, II, III, IV (PA's, Inf., Post.)  [x] (57196) Provided manual therapy to mobilize LE, proximal hip and/or LS spine soft tissue/joints for the purpose of modulating pain, promoting relaxation,  increasing ROM, reducing/eliminating soft tissue swelling/inflammation/restriction, improving soft tissue extensibility and allowing for proper ROM for normal function with self care, mobility, lifting and ambulation. Charges:  Timed Code Treatment Minutes: 35   Total Treatment Minutes: 50      [] EVAL (LOW) 12402 (typically 20 minutes face-to-face)  [] EVAL (MOD) 68251 (typically 30 minutes face-to-face)  [] EVAL (HIGH) 35603 (typically 45 minutes face-to-face)  [] RE-EVAL     [x] TR(76185) x 1  [] Dry needle 1 or 2 Muscles (94926)  [] NMR (14241) x   1  [] Dry needle 3+ Muscles (59437)  [x] Manual (75703) x  1   [] Ultrasound (96525) x  [] TA (67014) x 1    [] Mech Traction (63291)  [] ES(attended) (65456)     [x] ES (un) (23243):   [] Vasopump (43678) [] Ionto (31680)   [] Other:    GOALS: Patient stated goal: \"Rehab after knee replacement\"  []? Progressing: []? Met: []? Not Met: []? Adjusted     Therapist goals for Patient:   Short Term Goals: To be achieved in: 2 weeks  1. Independent in HEP and progression per patient tolerance, in order to prevent re-injury. []? Progressing: []? Met: []? Not Met: []? Adjusted  2. Patient will have a decrease in pain to facilitate improvement in movement, function, and ADLs as indicated by Functional Deficits. []? Progressing: []? Met: []? Not Met: []? Adjusted     Long Term Goals: To be achieved in: 6 weeks  1. Disability index score of 20% or less for the LEFS to assist with reaching prior level of function. []? Progressing: []? Met: []? Not Met: []? Adjusted  2. Patient will demonstrate increased AROM to 0-110 on left knee  to allow for proper joint functioning as indicated by patients Functional Deficits. []? Progressing: []? Met: []? Not Met: []? Adjusted  3. Patient will demonstrate an increase in Strength to good proximal hip strength and control, within 5lb HHD in LE to allow for proper functional mobility as indicated by patients Functional Deficits. []? Progressing: []? Met: []? Not Met: []? Adjusted  4. Patient will return to 80% functional activities without increased symptoms or restriction. []? Progressing: []? Met: []? Not Met: []? Adjusted  5. Pt  will ambulate safely with SPC on level surfaces.(patient specific functional goal)    []? Progressing: []? Met: []? Not Met: []? Adjusted          ASSESSMENT:  Patient has more pain and has actually lost range of motion over the past week. Flexion range is still difficult and she is progressing slowly overall. Treatment/Activity Tolerance:  [] Patient tolerated treatment well [] Patient limited by fatique  [] Patient limited by pain  [] Patient limited by other medical complications  [x] Other: 5/19/21 Patient arrived to PT having a lot of pain and tearful stating that her knee locking up. ROM and exercise were limited due to pain. Overall Progression Towards Functional goals/ Treatment Progress Update:  [x] Patient is progressing as expected towards functional goals listed. [] Progression is slowed due to complexities/Impairments listed.   [] Progression has been slowed due to co-morbidities. [] Plan just implemented, too soon to assess goals progression <30days   [] Goals require adjustment due to lack of progress  [] Patient is not progressing as expected and requires additional follow up with physician  [] Other    Prognosis for POC: [x] Good [] Fair  [] Poor    Patient requires continued skilled intervention: [x] Yes  [] No        PLAN: Progress as able . Pt has not had any home therapy and TKR on left just took place on 4/16/21. Practice with cane. Pt expressed interest in trying e stim. Monitor effect of e stim. [x] Alter current plan (see comments)  (Continue PT 3 x weekly for 4 weeks)       XContinue with plan of care  [] Plan of care initiated [] Hold pending MD visit [] Discharge    Electronically signed by: Racheal Kat PT    Note: If patient does not return for scheduled/recommended follow up visits, this note will serve as a discharge from care along with the most recent update on progress.

## 2021-05-24 ENCOUNTER — HOSPITAL ENCOUNTER (OUTPATIENT)
Dept: PHYSICAL THERAPY | Age: 66
Setting detail: THERAPIES SERIES
Discharge: HOME | End: 2021-05-24
Payer: MEDICARE

## 2021-05-24 PROCEDURE — 97140 MANUAL THERAPY 1/> REGIONS: CPT

## 2021-05-24 PROCEDURE — 97110 THERAPEUTIC EXERCISES: CPT

## 2021-05-24 NOTE — FLOWSHEET NOTE
[facility] - Outpatient Rehabilitation & Therapy  [id number] [place] [address]. Yoni Carter  Phone: [phone]   Fax:     [phone]    Physical Therapy Treatment Note/ Progress Report:   Date:  2021    Patient Name:  Nancy Yeung    :  1955  MRN: [id number]    Pertinent Medical History:  Previous reverse TSR, back issues, OA    Medical/Treatment Diagnosis Information:  Diagnosis:   M17.12 (ICD-10-CM) - Unilateral primary osteoarthritis, left knee   Z96.652 (ICD-10-CM) - Presence of left artificial knee joint     ·   · Treatment Diagnosis: Pain on left knee, limited knee mobility, weakness, difficulty with walking    Insurance/Certification information:  PT Insurance Information: Medicare  Physician Information:  Referring Practitioner: Ludmila Salazar MD  Plan of care signed (Y/N): Sent to Dr. Valeria Vail on 21    Date of Patient follow up with Physician: 21     Progress Report: [x]  Yes  []  No     Date Range for reporting period:  Beginnin2021  Endin21  Progress report due (10 Rx/or 30 days whichever is less):    Recertification due (POC duration/ or 90 days whichever is less):      Visit # POC/Insurance Allowable Auth Needed   16 18 []Yes   [x]No     Latex Allergy:  [x]NO      []YES  Preferred Language for Healthcare:   [x]English       []Other:    Functional Scale:      Date assessed: at 10th visit on 5/10/21  Test: [facility]  Score: 62    Pain level:  6/10     History of Injury: Pt underwent a left TKR on 21. Went home and has not had any home therapy.  reports she is doing well at home. SUBJECTIVE:    21: Patient reports knee has been sore and swollen. 21  Pt states, \" Still really sore \"  21: Still having pain  21: Patient reports swelling is going down some,walking a little better overall. 21: Pt reports 7/10 pain on knee but also has left foot and ankle pain.  Still taking 9 oxycodones per day. Edema is decreasing. 05/03/21: Patient reports her knee got very sore over the weekend,felt it almost locked up on her. States today is not too bad.   5/5/21: Pt reports that her heel is still bothering her more than the knee. 5/7/21: Sleeping is still difficult. Heel is \"not great\". Knee pain is slightly less. She saw DANNA Johnson's office Manjinder Hartman CNP) and she removed the mesh. 5/10/21: Pt reports she had increased pain about 3 hours ago and took  meds and ice. She sometimes walks without any device. 5/12/21: Pt reports several episodes of the knee locking up on her during exercises yesterday and today. Renny Arteaga She will see Dr. Cielo Argueta next Tuesday for a possible injection into the heel. 5/14/21: Pt reports that she had some bad times yesterday with cramping and she thinks it is due to icing her left calf.  05/17/21: Patient reports knee has been sore and locking up on her several times a day,it takes her 30 min to get knee moving again. 05/19/21: 15 min late. Patient reports knee still has been locking up this am.States she seeing Dr. Megan alfaro. 5/21/21:Pt reports that she feels better than on Wednesday. She was able to perform one set of exercises this morning although she was so stiff all during last night.  5/24/21: Pt reports that she felt better over the weekend.     OBJECTIVE:    Observation:    Test measurements:  4/30/21: ROM left knee: -8-94   05/03/21 left knee flex 98 ext -7   5/5/21: left knee flex 90    Ext:  -4/ -5   5/7/21: ROM left knee: -5-96    5/10/21: -3- 93   5/14/21:-9 - 97   05/17/21  -9 -92    05/19/21  -10 -85 decreased range.    5/21/21: - 10 - 95   5/24/21:  -10 -92    RESTRICTIONS/PRECAUTIONS:  Exercises/Interventions:     Therapeutic Ex (96276)   Min:30 Reps/Resistance Notes/CUES   Nu step L3 x 5 min    Incline stretch 3 x 20 sec Added 4/30/21   Lunge stretch on step and HSS on step 3 x 20 sec each Added 4/30/21   Leg press   Held on 5/03/21   Ankle pumps 30X    20X    20X    Heel slides 3 min donna fair   Knee stretch supine      20X    Seated ROM 20X     20X                  30 x 5    Seated heel slid Hep 4/23/21   Manual Intervention (68940)  Min: 15 min     Knee mobs/PROM Flex /ext  STM to quad,gastoc,HS donna fair   Tib/Fem Mobs     Patella Mobs left    Ankle mobs               NMR re-education (70889)  Min:  CUES NEEDED     Hold  LAQ 30X    20X   Education with use of single point cane instruction and practice with cane 30 feet,  Needs cuing for proper sequence    20X    Therapeutic Activity (87190)  Min:10               Modalities  Min:     IFC with      CP after exercises 10 min To back, left knee and heel   MH after exercises 10 min on left calf            Other Therapeutic Activities:   Pt was educated on PT POC, Diagnosis, Prognosis, pathomechanics as well as frequency and duration of scheduling future physical therapy appointments. Time was also taken on this day to answer all patient questions and participation in PT. Reviewed appointment policy in detail with patient and patient verbalized understanding. Home Exercise Program:   Patient was instructed in the following for HEP:     . Patient verbalized/demonstrated understanding and was issued written handout. Continue HEP as given by [facility] PT during prehab. Therapeutic Exercise and NMR EXR  [x] (49266) Provided verbal/tactile cueing for activities related to strengthening, flexibility, endurance, ROM for improvements in LE, proximal hip, and core control with self care, mobility, lifting, ambulation.  [] (02218) Provided verbal/tactile cueing for activities related to improving balance, coordination, kinesthetic sense, posture, motor skill, proprioception  to assist with LE, proximal hip, and core control in self care, mobility, lifting, ambulation and eccentric single leg control.      NMR and Therapeutic Activities:    [x] (04525 or 55031) Provided verbal/tactile cueing for activities Vasopump (56190) [] Ionto (21432)   [] Other:    GOALS: Patient stated goal: \"Rehab after knee replacement\"  []? Progressing: []? Met: []? Not Met: []? Adjusted     Therapist goals for Patient:   Short Term Goals: To be achieved in: 2 weeks  1. Independent in HEP and progression per patient tolerance, in order to prevent re-injury. []? Progressing: []? Met: []? Not Met: []? Adjusted  2. Patient will have a decrease in pain to facilitate improvement in movement, function, and ADLs as indicated by Functional Deficits. []? Progressing: []? Met: []? Not Met: []? Adjusted     Long Term Goals: To be achieved in: 6 weeks  1. Disability index score of 20% or less for the LEFS to assist with reaching prior level of function. []? Progressing: []? Met: []? Not Met: []? Adjusted  2. Patient will demonstrate increased AROM to 0-110 on left knee  to allow for proper joint functioning as indicated by patients Functional Deficits. []? Progressing: []? Met: []? Not Met: []? Adjusted  3. Patient will demonstrate an increase in Strength to good proximal hip strength and control, within 5lb HHD in LE to allow for proper functional mobility as indicated by patients Functional Deficits. []? Progressing: []? Met: []? Not Met: []? Adjusted  4. Patient will return to 80% functional activities without increased symptoms or restriction. []? Progressing: []? Met: []? Not Met: []? Adjusted  5. Pt  will ambulate safely with SPC on level surfaces.(patient specific functional goal)    []? Progressing: []? Met: []? Not Met: []? Adjusted          ASSESSMENT:  Patient has more pain and has actually lost range of motion over the past week. Flexion range is still difficult and she is progressing slowly overall.      Treatment/Activity Tolerance:  [] Patient tolerated treatment well [] Patient limited by fatique  [] Patient limited by pain  [] Patient limited by other medical complications  [x] Other: 5/24/21 Patient arrived to PT stating that she felt better over the weekend. Overall Progression Towards Functional goals/ Treatment Progress Update:  [] Patient is progressing as expected towards functional goals listed. [x] Progression is slowed due to complexities/Impairments listed. [] Progression has been slowed due to co-morbidities. [] Plan just implemented, too soon to assess goals progression <30days   [] Goals require adjustment due to lack of progress  [] Patient is not progressing as expected and requires additional follow up with physician  [] Other    Prognosis for POC: [x] Good [] Fair  [] Poor    Patient requires continued skilled intervention: [x] Yes  [] No        PLAN: Practice with cane. Monitor effect of e stim. Progress as tolerated, although patient is progressing slower than expected. [x] Alter current plan (see comments)  (Continue PT 3 x weekly for 4 weeks)       XContinue with plan of care  [] Plan of care initiated [] Hold pending MD visit [] Discharge    Electronically signed by: Cami Cummings, PT    Note: If patient does not return for scheduled/recommended follow up visits, this note will serve as a discharge from care along with the most recent update on progress.

## 2021-05-26 ENCOUNTER — HOSPITAL ENCOUNTER (OUTPATIENT)
Dept: PHYSICAL THERAPY | Age: 66
Setting detail: THERAPIES SERIES
Discharge: HOME | End: 2021-05-26
Payer: MEDICARE

## 2021-05-26 PROCEDURE — 97110 THERAPEUTIC EXERCISES: CPT

## 2021-05-26 PROCEDURE — 97140 MANUAL THERAPY 1/> REGIONS: CPT

## 2021-05-26 PROCEDURE — 97112 NEUROMUSCULAR REEDUCATION: CPT

## 2021-05-26 NOTE — FLOWSHEET NOTE
[facility] - Outpatient Rehabilitation & Therapy  [address] [facility]. Yoni Carter  Phone: [phone]   Fax:     [phone]    Physical Therapy Treatment Note/ Progress Report:   Date:  2021    Patient Name:  Antelmo Sylvester    :  1955  MRN: [id number]    Pertinent Medical History:  Previous reverse TSR, back issues, OA    Medical/Treatment Diagnosis Information:  Diagnosis:   M17.12 (ICD-10-CM) - Unilateral primary osteoarthritis, left knee   Z96.652 (ICD-10-CM) - Presence of left artificial knee joint     ·   · Treatment Diagnosis: Pain on left knee, limited knee mobility, weakness, difficulty with walking    Insurance/Certification information:  PT Insurance Information: Medicare  Physician Information:  Referring Practitioner: Jaky Garcia MD  Plan of care signed (Y/N): Sent to Dr. Wyatt Gary on 21    Date of Patient follow up with Physician: 21     Progress Report: [x]  Yes  []  No     Date Range for reporting period:  Beginnin2021  Endin21  Progress report due (10 Rx/or 30 days whichever is less):    Recertification due (POC duration/ or 90 days whichever is less):      Visit # POC/Insurance Allowable Auth Needed   17 18 []Yes   [x]No     Latex Allergy:  [x]NO      []YES  Preferred Language for Healthcare:   [x]English       []Other:    Functional Scale:      Date assessed: at 10th visit on 5/10/21  Test: [facility]  Score: 62    Pain level:  6/10 on  Knee and 7-8/10 on left foot      History of Injury: Pt underwent a left TKR on 21. Went home and has not had any home therapy.  reports she is doing well at home. SUBJECTIVE:    21: Patient reports knee has been sore and swollen. 21  Pt states, \" Still really sore \"  21: Still having pain  21: Patient reports swelling is going down some,walking a little better overall.   21: Pt reports 7/10 pain on knee but also has left foot and ankle pain. Still taking 9 oxycodones per day. Edema is decreasing. 05/03/21: Patient reports her knee got very sore over the weekend,felt it almost locked up on her. States today is not too bad.   5/5/21: Pt reports that her heel is still bothering her more than the knee. 5/7/21: Sleeping is still difficult. Heel is \"not great\". Knee pain is slightly less. She saw DANNA Johnson's office Michelle Mcdowell CNP) and she removed the mesh. 5/10/21: Pt reports she had increased pain about 3 hours ago and took  meds and ice. She sometimes walks without any device. 5/12/21: Pt reports several episodes of the knee locking up on her during exercises yesterday and today. Diana Cotto She will see Dr. Ashlie Mcmillan next Tuesday for a possible injection into the heel. 5/14/21: Pt reports that she had some bad times yesterday with cramping and she thinks it is due to icing her left calf.  05/17/21: Patient reports knee has been sore and locking up on her several times a day,it takes her 30 min to get knee moving again. 05/19/21: 15 min late. Patient reports knee still has been locking up this am.States she seeing Dr. Julian alfaro. 5/21/21:Pt reports that she feels better than on Wednesday. She was able to perform one set of exercises this morning although she was so stiff all during last night.  5/24/21: Pt reports that she felt better over the weekend. 5/26/21:Pt reports that her left heel and foot were more sore after last session.     OBJECTIVE:    Observation:    Test measurements:  4/30/21: ROM left knee: -8-94   05/03/21 left knee flex 98 ext -7   5/5/21: left knee flex 90    Ext:  -4/ -5   5/7/21: ROM left knee: -5-96    5/10/21: -3- 93   5/14/21:-9 - 97   05/17/21  -9 -92    05/19/21  -10 -85 decreased range.    5/21/21: - 10 - 95   5/24/21:  -10 -92    RESTRICTIONS/PRECAUTIONS:  Exercises/Interventions:     Therapeutic Ex (56296)   Min:30 Reps/Resistance Notes/CUES   Nu step L3 x 5 min    Incline stretch 3 x 20 sec Added 4/30/21   Lunge stretch on step and  3 x 20 sec each Added 4/30/21   Leg press 45# x 30  Reinitiated on 5/26/21   Ankle pumps 30X    20X    20X    Heel slides 3 min donna fair   Knee stretch supine      20X    Seated ROM 20X     20X    Seated HS stretch 3 x 20 sec              30 x 5     Hep 4/23/21   Manual Intervention (30135)  Min: 15 min     Knee mobs/PROM Flex /ext  STM to quad,gastoc,HS donna fair   Tib/Fem Mobs     Patella Mobs left    Ankle mobs               NMR re-education (79191)  Min:  CUES NEEDED     Hold  LAQ 30X    20X   Education with use of single point cane instruction and practice with cane 30 feet,  Needs cuing for proper sequence    20X    Therapeutic Activity (51621)  Min:10               Modalities  Min:     IFC with      CP after exercises 15 min To  left knee and heel   MH after exercises 15 min on left calf            Other Therapeutic Activities:   Pt was educated on PT POC, Diagnosis, Prognosis, pathomechanics as well as frequency and duration of scheduling future physical therapy appointments. Time was also taken on this day to answer all patient questions and participation in PT. Reviewed appointment policy in detail with patient and patient verbalized understanding. Home Exercise Program:   Patient was instructed in the following for HEP:     . Patient verbalized/demonstrated understanding and was issued written handout. Continue HEP as given by [facility] PT during prehab.     Therapeutic Exercise and NMR EXR  [x] (31367) Provided verbal/tactile cueing for activities related to strengthening, flexibility, endurance, ROM for improvements in LE, proximal hip, and core control with self care, mobility, lifting, ambulation.  [] (51672) Provided verbal/tactile cueing for activities related to improving balance, coordination, kinesthetic sense, posture, motor skill, proprioception  to assist with LE, proximal hip, and core control in self care, mobility, lifting, ambulation and eccentric single leg control. NMR and Therapeutic Activities:    [x] (06049 or 47070) Provided verbal/tactile cueing for activities related to improving balance, coordination, kinesthetic sense, posture, motor skill, proprioception and motor activation to allow for proper function of core, proximal hip and LE with self care and ADLs and functional mobility.   [] (62750) Gait Re-education- Provided training and instruction to the patient for proper LE, core and proximal hip recruitment and positioning and eccentric body weight control with ambulation re-education including up and down stairs     Home Exercise Program:    [x] (72124) Reviewed/Progressed HEP activities related to strengthening, flexibility, endurance, ROM of core, proximal hip and LE for functional self-care, mobility, lifting and ambulation/stair navigation   [] (28198)Reviewed/Progressed HEP activities related to improving balance, coordination, kinesthetic sense, posture, motor skill, proprioception of core, proximal hip and LE for self care, mobility, lifting, and ambulation/stair navigation      Manual Treatments:  PROM / STM / Oscillations-Mobs:  G-I, II, III, IV (PA's, Inf., Post.)  [x] (87308) Provided manual therapy to mobilize LE, proximal hip and/or LS spine soft tissue/joints for the purpose of modulating pain, promoting relaxation,  increasing ROM, reducing/eliminating soft tissue swelling/inflammation/restriction, improving soft tissue extensibility and allowing for proper ROM for normal function with self care, mobility, lifting and ambulation.        Charges:  Timed Code Treatment Minutes: 58   Total Treatment Minutes: 70      [] EVAL (LOW) 22112 (typically 20 minutes face-to-face)  [] EVAL (MOD) 07309 (typically 30 minutes face-to-face)  [] EVAL (HIGH) 77289 (typically 45 minutes face-to-face)  [] RE-EVAL     [x] TW(27765) x 2 [] Dry needle 1 or 2 Muscles (30538)  [x] NMR (46611) x 1  [] Dry needle 3+ Muscles (14404)  [x] Manual ([id number]) x  1   [] Ultrasound (50844) x  [] TA (37569) x 1    [] Mech Traction (13753)  [] ES(attended) (15648)     [] ES (un) (78036):   [] Vasopump (47276) [] Ionto (67358)   [] Other:    GOALS: Patient stated goal: \"Rehab after knee replacement\"  []? Progressing: []? Met: []? Not Met: []? Adjusted     Therapist goals for Patient:   Short Term Goals: To be achieved in: 2 weeks  1. Independent in HEP and progression per patient tolerance, in order to prevent re-injury. []? Progressing: []? Met: []? Not Met: []? Adjusted  2. Patient will have a decrease in pain to facilitate improvement in movement, function, and ADLs as indicated by Functional Deficits. []? Progressing: []? Met: []? Not Met: []? Adjusted     Long Term Goals: To be achieved in: 6 weeks  1. Disability index score of 20% or less for the LEFS to assist with reaching prior level of function. []? Progressing: []? Met: []? Not Met: []? Adjusted  2. Patient will demonstrate increased AROM to 0-110 on left knee  to allow for proper joint functioning as indicated by patients Functional Deficits. []? Progressing: []? Met: []? Not Met: []? Adjusted  3. Patient will demonstrate an increase in Strength to good proximal hip strength and control, within 5lb HHD in LE to allow for proper functional mobility as indicated by patients Functional Deficits. []? Progressing: []? Met: []? Not Met: []? Adjusted  4. Patient will return to 80% functional activities without increased symptoms or restriction. []? Progressing: []? Met: []? Not Met: []? Adjusted  5. Pt  will ambulate safely with SPC on level surfaces.(patient specific functional goal)    []? Progressing: []? Met: []? Not Met: []? Adjusted          ASSESSMENT:  Patient has more pain and has actually lost range of motion over the past week. Flexion range is still difficult and she is progressing slowly overall.      Treatment/Activity Tolerance:  [] Patient tolerated treatment well [] Patient limited by kelly  [] Patient limited by pain  [] Patient limited by other medical complications  [x] Other: 5/24/21 Patient arrived to PT stating that she felt better over the weekend. Overall Progression Towards Functional goals/ Treatment Progress Update:  [] Patient is progressing as expected towards functional goals listed. [x] Progression is slowed due to complexities/Impairments listed. [] Progression has been slowed due to co-morbidities. [] Plan just implemented, too soon to assess goals progression <30days   [] Goals require adjustment due to lack of progress  [] Patient is not progressing as expected and requires additional follow up with physician  [] Other    Prognosis for POC: [x] Good [] Fair  [] Poor    Patient requires continued skilled intervention: [x] Yes  [] No        PLAN: Practice with cane. Monitor effect of e stim. Progress as tolerated, although patient is progressing slower than expected. [x] Alter current plan (see comments)  (Continue PT 3 x weekly for 4 weeks)       XContinue with plan of care  [] Plan of care initiated [] Hold pending MD visit [] Discharge    Electronically signed by: Arnulfo Matthew PT    Note: If patient does not return for scheduled/recommended follow up visits, this note will serve as a discharge from care along with the most recent update on progress.

## 2021-05-28 ENCOUNTER — HOSPITAL ENCOUNTER (OUTPATIENT)
Dept: PHYSICAL THERAPY | Age: 66
Setting detail: THERAPIES SERIES
Discharge: HOME | End: 2021-05-28
Payer: MEDICARE

## 2021-05-28 PROCEDURE — 97140 MANUAL THERAPY 1/> REGIONS: CPT

## 2021-05-28 PROCEDURE — 97110 THERAPEUTIC EXERCISES: CPT

## 2021-05-28 PROCEDURE — 97530 THERAPEUTIC ACTIVITIES: CPT

## 2021-05-28 NOTE — FLOWSHEET NOTE
[facility] - Outpatient Rehabilitation & Therapy  [address]. [place]  Phone: [phone]   Fax:     [phone]    Physical Therapy Treatment Note/ Progress Report:   Date:  2021    Patient Name:  Marko Davis    :  1955  MRN: [id number]    Pertinent Medical History:  Previous reverse TSR, back issues, OA    Medical/Treatment Diagnosis Information:  Diagnosis:   M17.12 (ICD-10-CM) - Unilateral primary osteoarthritis, left knee   Z96.652 (ICD-10-CM) - Presence of left artificial knee joint     ·   · Treatment Diagnosis: Pain on left knee, limited knee mobility, weakness, difficulty with walking    Insurance/Certification information:  PT Insurance Information: Medicare  Physician Information:  Referring Practitioner: Dashawn Brewster MD  Plan of care signed (Y/N): Sent to Dr. Sasha Villa on 21    Date of Patient follow up with Physician: 21     Progress Report: [x]  Yes  []  No     Date Range for reporting period:  Beginnin2021  Endin21  Progress report due (10 Rx/or 30 days whichever is less):    Recertification due (POC duration/ or 90 days whichever is less):      Visit # POC/Insurance Allowable Auth Needed   18 18 []Yes   [x]No     Latex Allergy:  [x]NO      []YES  Preferred Language for Healthcare:   [x]English       []Other:    Functional Scale:      Date assessed: at 10th visit on 5/10/21  Test: [facility]  Score: 62    Pain level:  5/10 on  Knee and 7-8/10 on left foot      History of Injury: Pt underwent a left TKR on 21. Went home and has not had any home therapy.  reports she is doing well at home. SUBJECTIVE:    21: Patient reports knee has been sore and swollen. 21  Pt states, \" Still really sore \"  21: Still having pain  21: Patient reports swelling is going down some,walking a little better overall.   21: Pt reports 7/10 pain on knee but also has left Reps/Resistance Notes/CUES   Nu step L3 x 5 min    Incline stretch 3 x 20 sec Added 4/30/21   Lunge stretch on step and  3 x 20 sec each Added 4/30/21   Leg press 50# x 30  Reinitiated on 5/26/21   Ankle pumps 30X    20X    20X    Heel slides 3 min donna fair   Knee stretch supine      20X    Seated ROM 20X     20X    Seated HS stretch 10 x 10 sec              30 x 5     Hep 4/23/21   Manual Intervention (11370)  Min: 15 min     Knee mobs/PROM Flex /ext  STM to quad,gastoc,HS donna fair   Tib/Fem Mobs     Patella Mobs left    Ankle mobs               NMR re-education (50610)  Min:  CUES NEEDED     Hold  LAQ 30X    20X   Education with use of single point cane instruction and practice with cane 100 feet, practiced turns with \"U turn\" Needs cuing for proper sequence   SLR 20X    Therapeutic Activity (43942)  Min:10               Modalities  Min:     IFC with      CP after exercises 15 min To  left knee and heel   MH after exercises 15 min on left calf            Other Therapeutic Activities:   Pt was educated on PT POC, Diagnosis, Prognosis, pathomechanics as well as frequency and duration of scheduling future physical therapy appointments. Time was also taken on this day to answer all patient questions and participation in PT. Reviewed appointment policy in detail with patient and patient verbalized understanding. Home Exercise Program:   Patient was instructed in the following for HEP:     . Patient verbalized/demonstrated understanding and was issued written handout. Continue HEP as given by [facility] PT during prehab.     Therapeutic Exercise and NMR EXR  [x] (53339) Provided verbal/tactile cueing for activities related to strengthening, flexibility, endurance, ROM for improvements in LE, proximal hip, and core control with self care, mobility, lifting, ambulation.  [] (02387) Provided verbal/tactile cueing for activities related to improving balance, coordination, kinesthetic sense, posture, motor skill, YQ(38895) x 1 [] Dry needle 1 or 2 Muscles (83038)  [] NMR (92772) x [] Dry needle 3+ Muscles (00586)  [x] Manual (24594) x  2   [] Ultrasound (74282) x  [x] TA (81752) x 1    [] Mech Traction (28542)  [] ES(attended) (09723)     [] ES (un) (89873):   [] Vasopump (57490) [] Ionto (08028)   [] Other:    GOALS: Patient stated goal: \"Rehab after knee replacement\"  []? Progressing: []? Met: []? Not Met: []? Adjusted     Therapist goals for Patient:   Short Term Goals: To be achieved in: 2 weeks  1. Independent in HEP and progression per patient tolerance, in order to prevent re-injury. []? Progressing: []? Met: []? Not Met: []? Adjusted  2. Patient will have a decrease in pain to facilitate improvement in movement, function, and ADLs as indicated by Functional Deficits. []? Progressing: []? Met: []? Not Met: []? Adjusted     Long Term Goals: To be achieved in: 6 weeks  1. Disability index score of 20% or less for the LEFS to assist with reaching prior level of function. []? Progressing: []? Met: []? Not Met: []? Adjusted  2. Patient will demonstrate increased AROM to 0-110 on left knee  to allow for proper joint functioning as indicated by patients Functional Deficits. []? Progressing: []? Met: []? Not Met: []? Adjusted  3. Patient will demonstrate an increase in Strength to good proximal hip strength and control, within 5lb HHD in LE to allow for proper functional mobility as indicated by patients Functional Deficits. []? Progressing: []? Met: []? Not Met: []? Adjusted  4. Patient will return to 80% functional activities without increased symptoms or restriction. []? Progressing: []? Met: []? Not Met: []? Adjusted  5. Pt  will ambulate safely with SPC on level surfaces.(patient specific functional goal)    []? Progressing: []? Met: []? Not Met: []? Adjusted          ASSESSMENT:  Patient has more pain and has actually lost range of motion over the past week.   Flexion range is still difficult and she is progressing slowly overall. Treatment/Activity Tolerance:  [] Patient tolerated treatment well [] Patient limited by fatique  [] Patient limited by pain  [] Patient limited by other medical complications  [x] Other: 5/28/21 Patient arrived to PT stating that she felt better . Overall Progression Towards Functional goals/ Treatment Progress Update:  [] Patient is progressing as expected towards functional goals listed. [x] Progression is slowed due to complexities/Impairments listed. [] Progression has been slowed due to co-morbidities. [] Plan just implemented, too soon to assess goals progression <30days   [] Goals require adjustment due to lack of progress  [] Patient is not progressing as expected and requires additional follow up with physician  [] Other    Prognosis for POC: [x] Good [] Fair  [] Poor    Patient requires continued skilled intervention: [x] Yes  [] No        PLAN: Practice with cane. Monitor effect of e stim. Progress as tolerated, although patient is progressing slower than expected. [x] Alter current plan (see comments)  (Continue PT 3 x weekly for 4 weeks)       XContinue with plan of care  [] Plan of care initiated [] Hold pending MD visit [] Discharge    Electronically signed by: Brenna Chavez PT    Note: If patient does not return for scheduled/recommended follow up visits, this note will serve as a discharge from care along with the most recent update on progress.

## 2021-06-01 ENCOUNTER — HOSPITAL ENCOUNTER (OUTPATIENT)
Dept: PHYSICAL THERAPY | Age: 66
Setting detail: THERAPIES SERIES
Discharge: HOME | End: 2021-06-01
Payer: MEDICARE

## 2021-06-01 PROCEDURE — 97110 THERAPEUTIC EXERCISES: CPT

## 2021-06-01 PROCEDURE — 97140 MANUAL THERAPY 1/> REGIONS: CPT

## 2021-06-01 PROCEDURE — 97530 THERAPEUTIC ACTIVITIES: CPT

## 2021-06-01 NOTE — FLOWSHEET NOTE
foot and ankle pain. Still taking 9 oxycodones per day. Edema is decreasing. 05/03/21: Patient reports her knee got very sore over the weekend,felt it almost locked up on her. States today is not too bad.   5/5/21: Pt reports that her heel is still bothering her more than the knee. 5/7/21: Sleeping is still difficult. Heel is \"not great\". Knee pain is slightly less. She saw DANNA Johnson's office Cande Sow CNP) and she removed the mesh. 5/10/21: Pt reports she had increased pain about 3 hours ago and took  meds and ice. She sometimes walks without any device. 5/12/21: Pt reports several episodes of the knee locking up on her during exercises yesterday and today. Kareem Pisano She will see Dr. Rosa Maria Dukes next Tuesday for a possible injection into the heel. 5/14/21: Pt reports that she had some bad times yesterday with cramping and she thinks it is due to icing her left calf.  05/17/21: Patient reports knee has been sore and locking up on her several times a day,it takes her 30 min to get knee moving again. 05/19/21: 15 min late. Patient reports knee still has been locking up this am.States she seeing Dr. Valeria alfaro. 5/21/21:Pt reports that she feels better than on Wednesday. She was able to perform one set of exercises this morning although she was so stiff all during last night.  5/24/21: Pt reports that she felt better over the weekend. 5/26/21:Pt reports that her left heel and foot were more sore after last session. 5/28/21: Pt reports she feels less sore today. 06/01/21: Patient reports she is feeling better,swelling is going down some.     OBJECTIVE:    Observation:    Test measurements:  4/30/21: ROM left knee: -8-94   05/03/21 left knee flex 98 ext -7   5/5/21: left knee flex 90    Ext:  -4/ -5   5/7/21: ROM left knee: -5-96    5/10/21: -3- 93   5/14/21:-9 - 97   05/17/21  -9 -92    05/19/21  -10 -85 decreased range.    5/21/21: - 10 - 95  5/24/21:  -10 -92   5/28/21:  -10 - 98 with OP    RESTRICTIONS/PRECAUTIONS:  Exercises/Interventions:     Therapeutic Ex (80327)   Min:30 Reps/Resistance Notes/CUES   Nu step L3 x 5 min    Incline stretch 3 x 20 sec Added 4/30/21   Lunge stretch on step and  3 x 20 sec each Added 4/30/21   Leg press 50# x 30  Reinitiated on 5/26/21   Ankle pumps 30X    20X    20X    Heel slides 3 min donna fair   Knee stretch supine      20X    Seated ROM 20X     20X    Seated HS stretch 10 x 10 sec              30 x 5     Hep 4/23/21   Manual Intervention (80511)  Min: 15 min     Knee mobs/PROM Flex /ext  STM to quad,gastoc,HS donna fair   Tib/Fem Mobs     Patella Mobs left    Ankle mobs               NMR re-education (61343)  Min:  CUES NEEDED     Hold  LAQ 30X    20X   Education with use of single point cane instruction and practice with cane 100 feet, practiced turns with \"U turn\" Needs cuing for proper sequence   SLR 20X    Therapeutic Activity (64824)  Min:10               Modalities  Min:     IFC with      CP after exercises 15 min To  left knee and heel   MH after exercises 15 min on left calf            Other Therapeutic Activities:   Pt was educated on PT POC, Diagnosis, Prognosis, pathomechanics as well as frequency and duration of scheduling future physical therapy appointments. Time was also taken on this day to answer all patient questions and participation in PT. Reviewed appointment policy in detail with patient and patient verbalized understanding. Home Exercise Program:   Patient was instructed in the following for HEP:     . Patient verbalized/demonstrated understanding and was issued written handout. Continue HEP as given by [facility] PT during prehab.     Therapeutic Exercise and NMR EXR  [x] (97799) Provided verbal/tactile cueing for activities related to strengthening, flexibility, endurance, ROM for improvements in LE, proximal hip, and core control with self care, mobility, lifting, ambulation.  [] (22891) Provided verbal/tactile cueing for activities related to improving balance, coordination, kinesthetic sense, posture, motor skill, proprioception  to assist with LE, proximal hip, and core control in self care, mobility, lifting, ambulation and eccentric single leg control. NMR and Therapeutic Activities:    [x] (97280 or 47932) Provided verbal/tactile cueing for activities related to improving balance, coordination, kinesthetic sense, posture, motor skill, proprioception and motor activation to allow for proper function of core, proximal hip and LE with self care and ADLs and functional mobility.   [] (01915) Gait Re-education- Provided training and instruction to the patient for proper LE, core and proximal hip recruitment and positioning and eccentric body weight control with ambulation re-education including up and down stairs     Home Exercise Program:    [x] (32313) Reviewed/Progressed HEP activities related to strengthening, flexibility, endurance, ROM of core, proximal hip and LE for functional self-care, mobility, lifting and ambulation/stair navigation   [] (44646)Reviewed/Progressed HEP activities related to improving balance, coordination, kinesthetic sense, posture, motor skill, proprioception of core, proximal hip and LE for self care, mobility, lifting, and ambulation/stair navigation      Manual Treatments:  PROM / STM / Oscillations-Mobs:  G-I, II, III, IV (PA's, Inf., Post.)  [x] (23699) Provided manual therapy to mobilize LE, proximal hip and/or LS spine soft tissue/joints for the purpose of modulating pain, promoting relaxation,  increasing ROM, reducing/eliminating soft tissue swelling/inflammation/restriction, improving soft tissue extensibility and allowing for proper ROM for normal function with self care, mobility, lifting and ambulation.        Charges:  Timed Code Treatment Minutes: 60   Total Treatment Minutes: 75      [] EVAL (LOW) 62973 (typically 20 minutes face-to-face)  [] EVAL (MOD) 40383 (typically 30 minutes face-to-face)  [] EVAL (HIGH) 17380 (typically 45 minutes face-to-face)  [] RE-EVAL     [x] ME(71225) x 1 [] Dry needle 1 or 2 Muscles (55989)  [] NMR (21921) x [] Dry needle 3+ Muscles (23019)  [x] Manual (04510) x  2   [] Ultrasound (80988) x  [x] TA (21265) x 1    [] Mech Traction (49943)  [] ES(attended) (63026)     [] ES (un) (73735):   [] Vasopump (01796) [] Ionto (61506)   [] Other:    GOALS: Patient stated goal: \"Rehab after knee replacement\"  []? Progressing: []? Met: []? Not Met: []? Adjusted     Therapist goals for Patient:   Short Term Goals: To be achieved in: 2 weeks  1. Independent in HEP and progression per patient tolerance, in order to prevent re-injury. []? Progressing: []? Met: []? Not Met: []? Adjusted  2. Patient will have a decrease in pain to facilitate improvement in movement, function, and ADLs as indicated by Functional Deficits. []? Progressing: []? Met: []? Not Met: []? Adjusted     Long Term Goals: To be achieved in: 6 weeks  1. Disability index score of 20% or less for the LEFS to assist with reaching prior level of function. []? Progressing: []? Met: []? Not Met: []? Adjusted  2. Patient will demonstrate increased AROM to 0-110 on left knee  to allow for proper joint functioning as indicated by patients Functional Deficits. []? Progressing: []? Met: []? Not Met: []? Adjusted  3. Patient will demonstrate an increase in Strength to good proximal hip strength and control, within 5lb HHD in LE to allow for proper functional mobility as indicated by patients Functional Deficits. []? Progressing: []? Met: []? Not Met: []? Adjusted  4. Patient will return to 80% functional activities without increased symptoms or restriction. []? Progressing: []? Met: []? Not Met: []? Adjusted  5. Pt  will ambulate safely with SPC on level surfaces.(patient specific functional goal)    []? Progressing: []? Met: []? Not Met: []?  Adjusted          ASSESSMENT:  Patient has more pain and has actually lost range of motion over the past week. Flexion range is still difficult and she is progressing slowly overall. Treatment/Activity Tolerance:  [] Patient tolerated treatment well [] Patient limited by fatique  [] Patient limited by pain  [] Patient limited by other medical complications  [x] Other: 5/28/21 Patient arrived to PT stating that she felt better . Overall Progression Towards Functional goals/ Treatment Progress Update:  [] Patient is progressing as expected towards functional goals listed. [x] Progression is slowed due to complexities/Impairments listed. [] Progression has been slowed due to co-morbidities. [] Plan just implemented, too soon to assess goals progression <30days   [] Goals require adjustment due to lack of progress  [] Patient is not progressing as expected and requires additional follow up with physician  [] Other    Prognosis for POC: [x] Good [] Fair  [] Poor    Patient requires continued skilled intervention: [x] Yes  [] No        PLAN: Practice with cane. Monitor effect of e stim. Progress as tolerated, although patient is progressing slower than expected. [x] Alter current plan (see comments)  (Continue PT 3 x weekly for 4 weeks)       XContinue with plan of care  [] Plan of care initiated [] Hold pending MD visit [] Discharge    Electronically signed by: Juventino Wooten PTA    Note: If patient does not return for scheduled/recommended follow up visits, this note will serve as a discharge from care along with the most recent update on progress.

## 2021-06-02 ENCOUNTER — HOSPITAL ENCOUNTER (OUTPATIENT)
Dept: PHYSICAL THERAPY | Age: 66
Setting detail: THERAPIES SERIES
Discharge: HOME | End: 2021-06-02
Payer: MEDICARE

## 2021-06-02 PROCEDURE — 97530 THERAPEUTIC ACTIVITIES: CPT

## 2021-06-02 PROCEDURE — 97140 MANUAL THERAPY 1/> REGIONS: CPT

## 2021-06-02 PROCEDURE — 97110 THERAPEUTIC EXERCISES: CPT

## 2021-06-02 NOTE — FLOWSHEET NOTE
[facility] - Outpatient Rehabilitation & Therapy  [address] [facility]. [address] [place] [address]  Phone: [phone]   Fax:     [phone]    Physical Therapy Treatment Note/ Progress Report:   Date:  2021    Patient Name:  Antelmo Sylvester    :  1955  MRN: [id number]    Pertinent Medical History:  Previous reverse TSR, back issues, OA    Medical/Treatment Diagnosis Information:  Diagnosis:   M17.12 (ICD-10-CM) - Unilateral primary osteoarthritis, left knee   Z96.652 (ICD-10-CM) - Presence of left artificial knee joint     ·   · Treatment Diagnosis: Pain on left knee, limited knee mobility, weakness, difficulty with walking    Insurance/Certification information:  PT Insurance Information: Medicare  Physician Information:  Referring Practitioner: Jaky Garcia MD  Plan of care signed (Y/N): Sent to Dr. Wyatt Gary on 21    Date of Patient follow up with Physician: 21     Progress Report: [x]  Yes  []  No     Date Range for reporting period:  Beginnin2021  Endin21  Progress report due (10 Rx/or 30 days whichever is less):    Recertification due (POC duration/ or 90 days whichever is less):      Visit # POC/Insurance Allowable Auth Needed   20 30 []Yes   [x]No     Latex Allergy:  [x]NO      []YES  Preferred Language for Healthcare:   [x]English       []Other:    Functional Scale:      Date assessed: at 21st visit on 21  Test: [employer]  Score: 62    Pain level:  5/10 on  Knee and 7/10 on left foot      History of Injury: Pt underwent a left TKR on 21. Went home and has not had any home therapy.  reports she is doing well at home. SUBJECTIVE:    21: Patient reports knee has been sore and swollen. 21  Pt states, \" Still really sore \"  21: Still having pain  21: Patient reports swelling is going down some,walking a little better overall.   21: Pt reports 7/10 pain on knee but also has left foot and ankle pain. Still taking 9 oxycodones per day. Edema is decreasing. 05/03/21: Patient reports her knee got very sore over the weekend,felt it almost locked up on her. States today is not too bad.   5/5/21: Pt reports that her heel is still bothering her more than the knee. 5/7/21: Sleeping is still difficult. Heel is \"not great\". Knee pain is slightly less. She saw DANNA Johnson's office Yoni Pearce CNP) and she removed the mesh. 5/10/21: Pt reports she had increased pain about 3 hours ago and took  meds and ice. She sometimes walks without any device. 5/12/21: Pt reports several episodes of the knee locking up on her during exercises yesterday and today. Guido Garcia She will see Dr. Mabel Gill next Tuesday for a possible injection into the heel. 5/14/21: Pt reports that she had some bad times yesterday with cramping and she thinks it is due to icing her left calf.  05/17/21: Patient reports knee has been sore and locking up on her several times a day,it takes her 30 min to get knee moving again. 05/19/21: 15 min late. Patient reports knee still has been locking up this am.States she seeing Dr. Zainab alfaro. 5/21/21:Pt reports that she feels better than on Wednesday. She was able to perform one set of exercises this morning although she was so stiff all during last night.  5/24/21: Pt reports that she felt better over the weekend. 5/26/21:Pt reports that her left heel and foot were more sore after last session. 5/28/21: Pt reports she feels less sore today. 06/01/21: Patient reports she is feeling better,swelling is going down some. 6/2/21: Pt reports she is doing better still, swelling is less still. Solonpas did some good.       OBJECTIVE:    Observation:    Test measurements:  4/30/21: ROM left knee: -8-94   05/03/21 left knee flex 98 ext -7   5/5/21: left knee flex 90    Ext:  -4/ -5   5/7/21: ROM left knee: -5-96    5/10/21: -3- 93   5/14/21:-9 - 97  05/17/21  -9 -92  05/19/21  -10 -85 decreased range.   5/21/21: - 10 - 95   5/24/21:  -10 -92   5/28/21:  -10 - 98 with OP    RESTRICTIONS/PRECAUTIONS:  Exercises/Interventions:     Therapeutic Ex (97820)   Min:30 Reps/Resistance Notes/CUES   Nu step L3 x 5 min    Incline stretch 3 x 20 sec Added 4/30/21   Lunge stretch on step   3 x 20 sec each Added 4/30/21   Leg press 50# x 30  Reinitiated on 5/26/21   Ankle pumps 30X    20X    20X    Heel slides 3 min donna fair   Knee stretch supine      20X    Seated ROM 20X     20X    Seated HS stretch 10 x 10 sec              30 x 5     Hep 4/23/21   Manual Intervention (25525)  Min: 15 min     Knee mobs/PROM Flex /ext  STM to quad,gastoc,HS donna fair   Tib/Fem Mobs     Patella Mobs left    Ankle mobs               NMR re-education (56746)  Min:  CUES NEEDED     Hold  LAQ 30X    20X   Education with use of single point cane instruction and practice with cane 100 feet, practiced turns with \"U turn\" Needs cuing for proper sequence   SLR 20X    Therapeutic Activity (91041)  Min:10      assisted with ambulation using RW on level surfaces, 100 feet x 2. Reassurance offered to  pt         Modalities  Min:     IFC with      CP after exercises 15 min To  left knee and heel   MH after exercises 15 min on left calf            Other Therapeutic Activities:   Pt was educated on PT POC, Diagnosis, Prognosis, pathomechanics as well as frequency and duration of scheduling future physical therapy appointments. Time was also taken on this day to answer all patient questions and participation in PT. Reviewed appointment policy in detail with patient and patient verbalized understanding. Home Exercise Program:   Patient was instructed in the following for HEP:     . Patient verbalized/demonstrated understanding and was issued written handout. Continue HEP as given by [facility] PT during prehab.     Therapeutic Exercise and NMR EXR  [x] (24318) Provided verbal/tactile cueing for activities related to strengthening, flexibility, endurance, ROM for improvements in LE, proximal hip, and core control with self care, mobility, lifting, ambulation.  [] (50495) Provided verbal/tactile cueing for activities related to improving balance, coordination, kinesthetic sense, posture, motor skill, proprioception  to assist with LE, proximal hip, and core control in self care, mobility, lifting, ambulation and eccentric single leg control. NMR and Therapeutic Activities:    [x] (36653 or 65603) Provided verbal/tactile cueing for activities related to improving balance, coordination, kinesthetic sense, posture, motor skill, proprioception and motor activation to allow for proper function of core, proximal hip and LE with self care and ADLs and functional mobility.   [] (71401) Gait Re-education- Provided training and instruction to the patient for proper LE, core and proximal hip recruitment and positioning and eccentric body weight control with ambulation re-education including up and down stairs     Home Exercise Program:    [x] (78908) Reviewed/Progressed HEP activities related to strengthening, flexibility, endurance, ROM of core, proximal hip and LE for functional self-care, mobility, lifting and ambulation/stair navigation   [] (61773)Reviewed/Progressed HEP activities related to improving balance, coordination, kinesthetic sense, posture, motor skill, proprioception of core, proximal hip and LE for self care, mobility, lifting, and ambulation/stair navigation      Manual Treatments:  PROM / STM / Oscillations-Mobs:  G-I, II, III, IV (PA's, Inf., Post.)  [x] (63264) Provided manual therapy to mobilize LE, proximal hip and/or LS spine soft tissue/joints for the purpose of modulating pain, promoting relaxation,  increasing ROM, reducing/eliminating soft tissue swelling/inflammation/restriction, improving soft tissue extensibility and allowing for proper ROM for normal function with self care, mobility, lifting and ambulation.        Charges:  Timed Code Treatment Minutes: 60   Total Treatment Minutes: 75      [] EVAL (LOW) 93147 (typically 20 minutes face-to-face)  [] EVAL (MOD) 90897 (typically 30 minutes face-to-face)  [] EVAL (HIGH) 80877 (typically 45 minutes face-to-face)  [] RE-EVAL     [x] XA(06633) x 2 [] Dry needle 1 or 2 Muscles (01151)  [] NMR (84074) x [] Dry needle 3+ Muscles (34549)  [x] Manual (87271) x  1   [] Ultrasound (69570) x  [x] TA (35551) x 1    [] Mech Traction (07214)  [] ES(attended) (22500)     [] ES (un) (44697):   [] Vasopump (06548) [] Ionto (17542)   [] Other:    GOALS: Patient stated goal: \"Rehab after knee replacement\"  []? Progressing: []? Met: []? Not Met: []? Adjusted     Therapist goals for Patient:   Short Term Goals: To be achieved in: 2 weeks  1. Independent in HEP and progression per patient tolerance, in order to prevent re-injury. []? Progressing: []? Met: []? Not Met: []? Adjusted  2. Patient will have a decrease in pain to facilitate improvement in movement, function, and ADLs as indicated by Functional Deficits. []? Progressing: []? Met: []? Not Met: []? Adjusted     Long Term Goals: To be achieved in: 6 weeks  1. Disability index score of 20% or less for the LEFS to assist with reaching prior level of function. []? Progressing: []? Met: []? Not Met: []? Adjusted  2. Patient will demonstrate increased AROM to 0-110 on left knee  to allow for proper joint functioning as indicated by patients Functional Deficits. []? Progressing: []? Met: []? Not Met: []? Adjusted  3. Patient will demonstrate an increase in Strength to good proximal hip strength and control, within 5lb HHD in LE to allow for proper functional mobility as indicated by patients Functional Deficits. []? Progressing: []? Met: []? Not Met: []? Adjusted  4. Patient will return to 80% functional activities without increased symptoms or restriction. []? Progressing: []? Met: []? Not Met: []? Adjusted  5.  Pt  will ambulate safely with SPC on level surfaces.(patient specific functional goal)    []? Progressing: []? Met: []? Not Met: []? Adjusted          ASSESSMENT:  Patient has less pain and has increased range of motion over the past week. Flexion range is still difficult and she is progressing slowly overall. Less edema noted on knee. Treatment/Activity Tolerance:  [] Patient tolerated treatment well [] Patient limited by fatique  [] Patient limited by pain  [] Patient limited by other medical complications  [x] Other: 5/28/21 Patient arrived to PT stating that she felt better . Overall Progression Towards Functional goals/ Treatment Progress Update:  [] Patient is progressing as expected towards functional goals listed. [x] Progression is slowed due to complexities/Impairments listed. [] Progression has been slowed due to co-morbidities. [] Plan just implemented, too soon to assess goals progression <30days   [] Goals require adjustment due to lack of progress  [] Patient is not progressing as expected and requires additional follow up with physician  [] Other    Prognosis for POC: [x] Good [] Fair  [] Poor    Patient requires continued skilled intervention: [x] Yes  [] No        PLAN: Practice with cane. Monitor effect of e stim. Progress as tolerated, although patient is progressing slower than expected. [x] Alter current plan (see comments)  (Continue PT 3 x weekly for 4 weeks)       XContinue with plan of care  [] Plan of care initiated [] Hold pending MD visit [] Discharge    Electronically signed by: Jose Whitley PT    Note: If patient does not return for scheduled/recommended follow up visits, this note will serve as a discharge from care along with the most recent update on progress.

## 2021-06-04 ENCOUNTER — HOSPITAL ENCOUNTER (OUTPATIENT)
Dept: PHYSICAL THERAPY | Age: 66
Setting detail: THERAPIES SERIES
Discharge: HOME | End: 2021-06-04
Payer: MEDICARE

## 2021-06-04 PROCEDURE — 97530 THERAPEUTIC ACTIVITIES: CPT

## 2021-06-04 PROCEDURE — 97140 MANUAL THERAPY 1/> REGIONS: CPT

## 2021-06-04 PROCEDURE — 97110 THERAPEUTIC EXERCISES: CPT

## 2021-06-04 NOTE — FLOWSHEET NOTE
[facility] - Outpatient Rehabilitation & Therapy  [address] [facility]. Yoni Carter  Phone: [phone]   Fax:     [phone]    Physical Therapy Treatment Note/ Progress Report:   Date:  2021    Patient Name:  Marko Davis    :  1955  MRN: [id number]    Pertinent Medical History:  Previous reverse TSR, back issues, OA    Medical/Treatment Diagnosis Information:  Diagnosis:   M17.12 (ICD-10-CM) - Unilateral primary osteoarthritis, left knee   Z96.652 (ICD-10-CM) - Presence of left artificial knee joint     ·   · Treatment Diagnosis: Pain on left knee, limited knee mobility, weakness, difficulty with walking    Insurance/Certification information:  PT Insurance Information: Medicare  Physician Information:  Referring Practitioner: Dashawn Brewster MD  Plan of care signed (Y/N): Sent to Dr. Sasha Villa on 21    Date of Patient follow up with Physician: 21     Progress Report: [x]  Yes  []  No     Date Range for reporting period:  Beginnin2021  Endin21  Progress report due (10 Rx/or 30 days whichever is less):    Recertification due (POC duration/ or 90 days whichever is less):      Visit # POC/Insurance Allowable Auth Needed    []Yes   [x]No     Latex Allergy:  [x]NO      []YES  Preferred Language for Healthcare:   [x]English       []Other:    Functional Scale:      Date assessed: at 21st visit on 21  Test: [employer]  Score: 49    Pain level:  4/10 on  Knee and 6/10 on left foot      History of Injury: Pt underwent a left TKR on 21. Went home and has not had any home therapy.  reports she is doing well at home. SUBJECTIVE:    21: Patient reports knee has been sore and swollen. 21  Pt states, \" Still really sore \"  21: Still having pain  21: Patient reports swelling is going down some,walking a little better overall.   21: Pt reports 7/10 pain on knee but also has left foot and ankle pain. Still taking 9 oxycodones per day. Edema is decreasing. 05/03/21: Patient reports her knee got very sore over the weekend,felt it almost locked up on her. States today is not too bad.   5/5/21: Pt reports that her heel is still bothering her more than the knee. 5/7/21: Sleeping is still difficult. Heel is \"not great\". Knee pain is slightly less. She saw DANNA Johnson's office Vignesh Colin CNP) and she removed the mesh. 5/10/21: Pt reports she had increased pain about 3 hours ago and took  meds and ice. She sometimes walks without any device. 5/12/21: Pt reports several episodes of the knee locking up on her during exercises yesterday and today. Heidy Lantigua She will see Dr. Vinicoi Geiger next Tuesday for a possible injection into the heel. 5/14/21: Pt reports that she had some bad times yesterday with cramping and she thinks it is due to icing her left calf.  05/17/21: Patient reports knee has been sore and locking up on her several times a day,it takes her 30 min to get knee moving again. 05/19/21: 15 min late. Patient reports knee still has been locking up this am.States she seeing Dr. Nahed alfaro. 5/21/21:Pt reports that she feels better than on Wednesday. She was able to perform one set of exercises this morning although she was so stiff all during last night.  5/24/21: Pt reports that she felt better over the weekend. 5/26/21:Pt reports that her left heel and foot were more sore after last session. 5/28/21: Pt reports she feels less sore today. 06/01/21: Patient reports she is feeling better,swelling is going down some. 6/2/21: Pt reports she is doing better still, swelling is less still. Solonpas did some good. 6/4/21: Pt reports that she feels well today and has less  knee pain.       OBJECTIVE:    Observation:    Test measurements:  4/30/21: ROM left knee: -8-94   05/03/21 left knee flex 98 ext -7   5/5/21: left knee flex 90    Ext:  -4/ -5   5/7/21: ROM left knee: -5-96    5/10/21: -3- 93   5/14/21:-9 - 97   05/17/21  -9 -92    05/19/21  -10 -85 decreased range.  5/21/21: - 10 - 95   5/24/21:  -10 -92   5/28/21:  -10 - 98 with OP        6/4/21: - with OP, encouraged pt to lie prone at home to stretch hamstrings    RESTRICTIONS/PRECAUTIONS:  Exercises/Interventions:     Therapeutic Ex (78762)   Min:30 Reps/Resistance Notes/CUES   Nu step L3 x 5 min    Incline stretch 3 x 20 sec Added 4/30/21   Lunge stretch on step   3 x 20 sec each Added 4/30/21   Leg press 55# x 20 x2 Reinitiated on 5/26/21   Ankle pumps 30X    20X    20X    Heel slides 3 min donna fair   Knee stretch supine      20X    Seated ROM 20X     20X    Seated HS stretch 10 x 10 sec              30 x 5     Hep 4/23/21   Manual Intervention (93772)  Min: 15 min     Knee mobs/PROM Flex /ext  STM to quad,gastoc,HS donna fair   Tib/Fem Mobs     Patella Mobs left    Ankle mobs               NMR re-education (54373)  Min:  CUES NEEDED     Hold  LAQ 30X   SAQ 20X   Education with use of single point cane instruction and practice with cane 100 feet, practiced turns with \"U turn\" Needs cuing for proper sequence   SLR 20X    Therapeutic Activity (64764)  Min:10      assisted with ambulation using RW on level surfaces, 100 feet x 2. Reassurance offered to  pt         Modalities  Min:     IFC with      CP after exercises 15 min To  left knee and heel   MH after exercises 15 min on left calf            Other Therapeutic Activities:   Pt was educated on PT POC, Diagnosis, Prognosis, pathomechanics as well as frequency and duration of scheduling future physical therapy appointments. Time was also taken on this day to answer all patient questions and participation in PT. Reviewed appointment policy in detail with patient and patient verbalized understanding. Home Exercise Program:   Patient was instructed in the following for HEP:     . Patient verbalized/demonstrated understanding and was issued written handout.  Continue HEP as given by [facility] PT during prehab. Therapeutic Exercise and NMR EXR  [x] (11445) Provided verbal/tactile cueing for activities related to strengthening, flexibility, endurance, ROM for improvements in LE, proximal hip, and core control with self care, mobility, lifting, ambulation.  [] (01120) Provided verbal/tactile cueing for activities related to improving balance, coordination, kinesthetic sense, posture, motor skill, proprioception  to assist with LE, proximal hip, and core control in self care, mobility, lifting, ambulation and eccentric single leg control.      NMR and Therapeutic Activities:    [x] (33704 or 36106) Provided verbal/tactile cueing for activities related to improving balance, coordination, kinesthetic sense, posture, motor skill, proprioception and motor activation to allow for proper function of core, proximal hip and LE with self care and ADLs and functional mobility.   [] (61500) Gait Re-education- Provided training and instruction to the patient for proper LE, core and proximal hip recruitment and positioning and eccentric body weight control with ambulation re-education including up and down stairs     Home Exercise Program:    [x] (01176) Reviewed/Progressed HEP activities related to strengthening, flexibility, endurance, ROM of core, proximal hip and LE for functional self-care, mobility, lifting and ambulation/stair navigation   [] (77160)Reviewed/Progressed HEP activities related to improving balance, coordination, kinesthetic sense, posture, motor skill, proprioception of core, proximal hip and LE for self care, mobility, lifting, and ambulation/stair navigation      Manual Treatments:  PROM / STM / Oscillations-Mobs:  G-I, II, III, IV (PA's, Inf., Post.)  [x] (81717) Provided manual therapy to mobilize LE, proximal hip and/or LS spine soft tissue/joints for the purpose of modulating pain, promoting relaxation,  increasing ROM, reducing/eliminating soft tissue swelling/inflammation/restriction, improving soft tissue extensibility and allowing for proper ROM for normal function with self care, mobility, lifting and ambulation. Charges:  Timed Code Treatment Minutes: 60   Total Treatment Minutes: 75      [] EVAL (LOW) 77331 (typically 20 minutes face-to-face)  [] EVAL (MOD) 51811 (typically 30 minutes face-to-face)  [] EVAL (HIGH) 85313 (typically 45 minutes face-to-face)  [] RE-EVAL     [x] FB(82076) x 2 [] Dry needle 1 or 2 Muscles (13763)  [] NMR (66508) x [] Dry needle 3+ Muscles (72948)  [x] Manual (47416) x  1   [] Ultrasound (03026) x  [x] TA (95699) x 1    [] Mech Traction (39769)  [] ES(attended) (62693)     [] ES (un) (19420):   [] Vasopump (13925) [] Ionto (87427)   [] Other:    GOALS: Patient stated goal: \"Rehab after knee replacement\"  []? Progressing: []? Met: []? Not Met: []? Adjusted     Therapist goals for Patient:   Short Term Goals: To be achieved in: 2 weeks  1. Independent in HEP and progression per patient tolerance, in order to prevent re-injury. []? Progressing: []? Met: []? Not Met: []? Adjusted  2. Patient will have a decrease in pain to facilitate improvement in movement, function, and ADLs as indicated by Functional Deficits. []? Progressing: []? Met: []? Not Met: []? Adjusted     Long Term Goals: To be achieved in: 6 weeks  1. Disability index score of 20% or less for the LEFS to assist with reaching prior level of function. []? Progressing: []? Met: []? Not Met: []? Adjusted  2. Patient will demonstrate increased AROM to 0-110 on left knee  to allow for proper joint functioning as indicated by patients Functional Deficits. []? Progressing: []? Met: []? Not Met: []? Adjusted  3. Patient will demonstrate an increase in Strength to good proximal hip strength and control, within 5lb HHD in LE to allow for proper functional mobility as indicated by patients Functional Deficits. []? Progressing: []? Met: []? Not Met: []? Adjusted  4. Patient will return to 80% functional activities without increased symptoms or restriction. []? Progressing: []? Met: []? Not Met: []? Adjusted  5. Pt  will ambulate safely with SPC on level surfaces.(patient specific functional goal)    []? Progressing: []? Met: []? Not Met: []? Adjusted          ASSESSMENT:  Patient has less pain and has increased range of motion over the past week. Flexion range is still difficult and she is progressing slowly overall. Less edema noted on knee. Treatment/Activity Tolerance:  [] Patient tolerated treatment well [] Patient limited by fatique  [] Patient limited by pain  [] Patient limited by other medical complications  [x] Other: 5/28/21 Patient arrived to PT stating that she felt better . Overall Progression Towards Functional goals/ Treatment Progress Update:  [] Patient is progressing as expected towards functional goals listed. [x] Progression is slowed due to complexities/Impairments listed. [] Progression has been slowed due to co-morbidities. [] Plan just implemented, too soon to assess goals progression <30days   [] Goals require adjustment due to lack of progress  [] Patient is not progressing as expected and requires additional follow up with physician  [] Other    Prognosis for POC: [x] Good [] Fair  [] Poor    Patient requires continued skilled intervention: [x] Yes  [] No        PLAN: Practice with cane. Monitor effect of e stim. Progress as tolerated, although patient is progressing slower than expected. [x] Alter current plan (see comments)  (Continue PT 3 x weekly for 4 weeks)       XContinue with plan of care  [] Plan of care initiated [] Hold pending MD visit [] Discharge    Electronically signed by: Moe Seals PT    Note: If patient does not return for scheduled/recommended follow up visits, this note will serve as a discharge from care along with the most recent update on progress.

## 2021-06-07 ENCOUNTER — HOSPITAL ENCOUNTER (OUTPATIENT)
Dept: PHYSICAL THERAPY | Age: 66
Setting detail: THERAPIES SERIES
Discharge: HOME | End: 2021-06-07
Payer: MEDICARE

## 2021-06-07 PROCEDURE — 97140 MANUAL THERAPY 1/> REGIONS: CPT

## 2021-06-07 PROCEDURE — 97110 THERAPEUTIC EXERCISES: CPT

## 2021-06-07 PROCEDURE — 97112 NEUROMUSCULAR REEDUCATION: CPT

## 2021-06-07 PROCEDURE — 97530 THERAPEUTIC ACTIVITIES: CPT

## 2021-06-07 NOTE — FLOWSHEET NOTE
[facility] - Outpatient Rehabilitation & Therapy  [address] [facility]. [place]  Phone: [phone]   Fax:     [phone]    Physical Therapy Treatment Note/ Progress Report:   Date:  2021    Patient Name:  Janifer Severs    :  1955  MRN: [id number]    Pertinent Medical History:  Previous reverse TSR, back issues, OA    Medical/Treatment Diagnosis Information:  Diagnosis:   M17.12 (ICD-10-CM) - Unilateral primary osteoarthritis, left knee   Z96.652 (ICD-10-CM) - Presence of left artificial knee joint     ·   · Treatment Diagnosis: Pain on left knee, limited knee mobility, weakness, difficulty with walking    Insurance/Certification information:  PT Insurance Information: Medicare  Physician Information:  Referring Practitioner: Vikki Fritz MD  Plan of care signed (Y/N): Sent to Dr. Marci Marcial on 21    Date of Patient follow up with Physician: 21     Progress Report: [x]  Yes  []  No     Date Range for reporting period:  Beginnin2021  Endin21  Progress report due (10 Rx/or 30 days whichever is less):    Recertification due (POC duration/ or 90 days whichever is less):      Visit # POC/Insurance Allowable Auth Needed    []Yes   [x]No     Latex Allergy:  [x]NO      []YES  Preferred Language for Healthcare:   [x]English       []Other:    Functional Scale:      Date assessed: at 21st visit on 21  Test: [facility]  Score: 49    Pain level:  3/10 on  Knee and 6/10 on left foot      History of Injury: Pt underwent a left TKR on 21. Went home and has not had any home therapy.  reports she is doing well at home. SUBJECTIVE:    21: Patient reports knee has been sore and swollen. 21  Pt states, \" Still really sore \"  21: Still having pain  21: Patient reports swelling is going down some,walking a little better overall.   21: Pt reports 7/10 pain on knee but also has left foot and ankle pain. Still taking 9 oxycodones per day. Edema is decreasing. 05/03/21: Patient reports her knee got very sore over the weekend,felt it almost locked up on her. States today is not too bad.   5/5/21: Pt reports that her heel is still bothering her more than the knee. 5/7/21: Sleeping is still difficult. Heel is \"not great\". Knee pain is slightly less. She saw DANNA Johnson's office Becky Puckett CNP) and she removed the mesh. 5/10/21: Pt reports she had increased pain about 3 hours ago and took  meds and ice. She sometimes walks without any device. 5/12/21: Pt reports several episodes of the knee locking up on her during exercises yesterday and today. Cintia Bautista She will see Dr. Mateo Currie next Tuesday for a possible injection into the heel. 5/14/21: Pt reports that she had some bad times yesterday with cramping and she thinks it is due to icing her left calf.  05/17/21: Patient reports knee has been sore and locking up on her several times a day,it takes her 30 min to get knee moving again. 05/19/21: 15 min late. Patient reports knee still has been locking up this am.States she seeing Dr. Capo alfaro. 5/21/21:Pt reports that she feels better than on Wednesday. She was able to perform one set of exercises this morning although she was so stiff all during last night.  5/24/21: Pt reports that she felt better over the weekend. 5/26/21:Pt reports that her left heel and foot were more sore after last session. 5/28/21: Pt reports she feels less sore today. 06/01/21: Patient reports she is feeling better,swelling is going down some. 6/2/21: Pt reports she is doing better still, swelling is less still. Solonpas did some good. 6/4/21: Pt reports that she feels well today and has less  knee pain.  6/7/21: Pt reports that she feels well and is looking forward to returning to driving and swimming.       OBJECTIVE:    Observation:    Test measurements:  4/30/21: ROM left knee: -8-94   05/03/21 left knee flex 98 ext -7   5/5/21: left knee flex 90    Ext:  -4/ -5   5/7/21: ROM left knee: -5-96    5/10/21: -3- 93   5/14/21:-9 - 97   05/17/21  -9 -92    05/19/21  -10 -85 decreased range.  5/21/21: - 10 - 95   5/24/21:  -10 -92   5/28/21:  -10 - 98 with OP        6/4/21: - with OP, encouraged pt to lie prone at home to stretch hamstrings        6/7/21: -6 -   103 with OP, encouraged her to lie prone,    RESTRICTIONS/PRECAUTIONS:  Exercises/Interventions:     Therapeutic Ex (83374)   Min:30 Reps/Resistance Notes/CUES   Nu step L3 x 5 min    Incline stretch 3 x 20 sec Added 4/30/21   Lunge stretch on step   3 x 20 sec each Added 4/30/21   Leg press 55# x 20 x2 Reinitiated on 5/26/21   Ankle pumps 30X    20X    20X    Heel slides 3 min donna fair   Knee stretch supine      20X    Seated ROM 20X     20X    Seated HS stretch 10 x 10 sec    Prone on wider mat near front of dept HS stretch with gravity assisting and STM to hamstrings, prone knee flexion also         30 x 5     Hep 4/23/21   Manual Intervention (13584)  Min: 15 min     Knee mobs/PROM Flex /ext  STM to quad,gastoc,HS donna fair   Tib/Fem Mobs     Patella Mobs left    Ankle mobs               NMR re-education (67905)  Min:  CUES NEEDED     Hold  LAQ 30X   SAQ 20X   Education with use of single point cane instruction and practice with cane 100 feet, practiced turns with \"U turn\" Needs cuing for proper sequence   SLR 20X    Therapeutic Activity (30875)  Min:10      assisted with ambulation using RW on level surfaces, 100 feet x 2.   Reassurance offered to  pt     Steps up and down for 4 steps with and without hand rail Practice with 6 steps in stairwell next time    Wobble board 2 min     SLS x 5 sec x 5         Modalities  Min:     IFC with      CP after exercises 15 min To  left knee and heel   MH after exercises 15 min on left calf            Other Therapeutic Activities:   Pt was educated on PT POC, Diagnosis, Prognosis, pathomechanics as well as frequency and proprioception of core, proximal hip and LE for self care, mobility, lifting, and ambulation/stair navigation      Manual Treatments:  PROM / STM / Oscillations-Mobs:  G-I, II, III, IV (PA's, Inf., Post.)  [x] (50531) Provided manual therapy to mobilize LE, proximal hip and/or LS spine soft tissue/joints for the purpose of modulating pain, promoting relaxation,  increasing ROM, reducing/eliminating soft tissue swelling/inflammation/restriction, improving soft tissue extensibility and allowing for proper ROM for normal function with self care, mobility, lifting and ambulation. Charges:  Timed Code Treatment Minutes: 60   Total Treatment Minutes: 75      [] EVAL (LOW) 08717 (typically 20 minutes face-to-face)  [] EVAL (MOD) 03891 (typically 30 minutes face-to-face)  [] EVAL (HIGH) 79943 (typically 45 minutes face-to-face)  [] RE-EVAL     [x] IG(20611) x 1 [] Dry needle 1 or 2 Muscles (59790)  [x] NMR (80611) x1 [] Dry needle 3+ Muscles (57519)  [x] Manual (92488) x  1   [] Ultrasound (05676) x  [x] TA (26244) x 1    [] Mech Traction (89016)  [] ES(attended) (51340)     [] ES (un) (06075):   [] Vasopump (96576) [] Ionto (93859)   [] Other:    GOALS: Patient stated goal: \"Rehab after knee replacement\"  []? Progressing: []? Met: []? Not Met: []? Adjusted     Therapist goals for Patient:   Short Term Goals: To be achieved in: 2 weeks  1. Independent in HEP and progression per patient tolerance, in order to prevent re-injury. []? Progressing: [x]? Met: []? Not Met: []? Adjusted  2. Patient will have a decrease in pain to facilitate improvement in movement, function, and ADLs as indicated by Functional Deficits. []? Progressing: [x]? Met: []? Not Met: []? Adjusted     Long Term Goals: To be achieved in: 6 weeks  1. Disability index score of 20% or less for the LEFS to assist with reaching prior level of function. []? Progressing: [x]? Met: []? Not Met: []? Adjusted  2.  Patient will demonstrate increased AROM to 0-110 on left knee  to allow for proper joint functioning as indicated by patients Functional Deficits. []? Progressing: []? Met: []? Not Met: []? Adjusted  3. Patient will demonstrate an increase in Strength to good proximal hip strength and control, within 5lb HHD in LE to allow for proper functional mobility as indicated by patients Functional Deficits. []? Progressing: []? Met: []? Not Met: []? Adjusted  4. Patient will return to 80% functional activities without increased symptoms or restriction. []? Progressing: []? Met: []? Not Met: []? Adjusted  5. Pt  will ambulate safely with SPC on level surfaces.(patient specific functional goal)    []? Progressing: [x]? Met: []? Not Met: []? Adjusted          ASSESSMENT:  Patient has less pain and has increased range of motion over the past week. Flexion range is still difficult and she is progressing slowly overall. Less edema noted on knee. Treatment/Activity Tolerance:  [] Patient tolerated treatment well [] Patient limited by fatique  [] Patient limited by pain  [] Patient limited by other medical complications  [x] Other: 5/28/21 Patient arrived to PT stating that she felt better . Overall Progression Towards Functional goals/ Treatment Progress Update:  [] Patient is progressing as expected towards functional goals listed. [x] Progression is slowed due to complexities/Impairments listed. [] Progression has been slowed due to co-morbidities. [] Plan just implemented, too soon to assess goals progression <30days   [] Goals require adjustment due to lack of progress  [] Patient is not progressing as expected and requires additional follow up with physician  [] Other    Prognosis for POC: [x] Good [] Fair  [] Poor    Patient requires continued skilled intervention: [x] Yes  [] No        PLAN: Practice with cane on level surfaces and steps with 1 hand rail. . Monitor effect of e stim.  Progress as tolerated, although patient is progressing slower than expected. [x] Alter current plan (see comments)  (Continue PT 3 x weekly for 4 weeks)       XContinue with plan of care  [] Plan of care initiated [] Hold pending MD visit [] Discharge    Electronically signed by: Madan Reilly PT    Note: If patient does not return for scheduled/recommended follow up visits, this note will serve as a discharge from care along with the most recent update on progress.

## 2021-06-09 ENCOUNTER — HOSPITAL ENCOUNTER (OUTPATIENT)
Dept: PHYSICAL THERAPY | Age: 66
Setting detail: THERAPIES SERIES
Discharge: HOME | End: 2021-06-09
Payer: MEDICARE

## 2021-06-09 PROCEDURE — 97110 THERAPEUTIC EXERCISES: CPT

## 2021-06-09 PROCEDURE — 97112 NEUROMUSCULAR REEDUCATION: CPT

## 2021-06-09 PROCEDURE — 97530 THERAPEUTIC ACTIVITIES: CPT

## 2021-06-09 PROCEDURE — 97140 MANUAL THERAPY 1/> REGIONS: CPT

## 2021-06-09 NOTE — FLOWSHEET NOTE
[facility] - Outpatient Rehabilitation & Therapy  [address] [facility]. [address] [place] [address]  Phone: [phone]   Fax:     [phone]    Physical Therapy Treatment Note/ Progress Report:   Date:  2021    Patient Name:  Reggie Munroe    :  1955  MRN: [id number]    Pertinent Medical History:  Previous reverse TSR, back issues, OA    Medical/Treatment Diagnosis Information:  Diagnosis:   M17.12 (ICD-10-CM) - Unilateral primary osteoarthritis, left knee   Z96.652 (ICD-10-CM) - Presence of left artificial knee joint     ·   · Treatment Diagnosis: Pain on left knee, limited knee mobility, weakness, difficulty with walking    Insurance/Certification information:  PT Insurance Information: Medicare  Physician Information:  Referring Practitioner: Kevin Benavidez MD  Plan of care signed (Y/N): Sent to Dr. Francheska Stanley on 21    Date of Patient follow up with Physician: 21     Progress Report: [x]  Yes  []  No     Date Range for reporting period:  Beginnin2021  Endin21  Progress report due (10 Rx/or 30 days whichever is less):    Recertification due (POC duration/ or 90 days whichever is less):      Visit # POC/Insurance Allowable Auth Needed    []Yes   [x]No     Latex Allergy:  [x]NO      []YES  Preferred Language for Healthcare:   [x]English       []Other:    Functional Scale:      Date assessed: at 21st visit on 21  Test: [facility]  Score: 49    Pain level:  3/10 on  Knee and 6/10 on left foot      History of Injury: Pt underwent a left TKR on 21. Went home and has not had any home therapy.  reports she is doing well at home. SUBJECTIVE:    21: Patient reports knee has been sore and swollen. 21  Pt states, \" Still really sore \"  21: Still having pain  21: Patient reports swelling is going down some,walking a little better overall.   21: Pt reports 7/10 pain on knee but also has left foot  Test measurements:  4/30/21: ROM left knee: -8-94   05/03/21 left knee flex 98 ext -7   5/5/21: left knee flex 90    Ext:  -4/ -5   5/7/21: ROM left knee: -5-96    5/10/21: -3- 93   5/14/21:-9 - 97   05/17/21  -9 -92    05/19/21  -10 -85 decreased range.  5/21/21: - 10 - 95   5/24/21:  -10 -92   5/28/21:  -10 - 98 with OP        6/4/21: - with OP, encouraged pt to lie prone at home to stretch hamstrings        6/7/21: -6 -   103 with OP, encouraged her to lie prone,    RESTRICTIONS/PRECAUTIONS:  Exercises/Interventions:     Therapeutic Ex (29130)   Min:30 Reps/Resistance Notes/CUES   Nu step L3 x 5 min    Incline stretch 3 x 20 sec Added 4/30/21   Lunge stretch on step   3 x 20 sec each Added 4/30/21   Leg press 55# x 20 x2 Reinitiated on 5/26/21   Ankle pumps 30X    20X    20X    Heel slides  donna fair   Knee stretch supine      20X    Seated ROM 20X     20X    Seated HS stretch 10 x 10 sec    Prone on wider mat near front of dept HS stretch with gravity assisting and STM to hamstrings, prone knee flexion also         30 x 5     Hep 4/23/21   Manual Intervention (31896)  Min: 15 min     Knee mobs/PROM Flex /ext  STM to quad,gastoc,HS donna fair   Tib/Fem Mobs     Patella Mobs left    Ankle mobs               NMR re-education (93347)  Min:  CUES NEEDED     Hold  LAQ 30X   SAQ 20X   Education with use of single point cane  Needs cuing for proper sequence   SLR 20X    Therapeutic Activity (66628)  Min:10      assisted with      Steps up and down for 4 steps with and without hand rail Practice with 6 steps in stairwell next time    Wobble board 2 min     SLS x 5 sec x 5         Modalities  Min:     IFC with      CP after exercises 15 min To  left knee and heel   MH after exercises 15 min on left calf            Other Therapeutic Activities:   Pt was educated on PT POC, Diagnosis, Prognosis, pathomechanics as well as frequency and duration of scheduling future physical therapy appointments.  Time was also taken on this day to answer all patient questions and participation in PT. Reviewed appointment policy in detail with patient and patient verbalized understanding. Home Exercise Program:   Patient was instructed in the following for HEP:     . Patient verbalized/demonstrated understanding and was issued written handout. Continue HEP as given by [facility] PT during prehab. Therapeutic Exercise and NMR EXR  [x] (99389) Provided verbal/tactile cueing for activities related to strengthening, flexibility, endurance, ROM for improvements in LE, proximal hip, and core control with self care, mobility, lifting, ambulation.  [] (25371) Provided verbal/tactile cueing for activities related to improving balance, coordination, kinesthetic sense, posture, motor skill, proprioception  to assist with LE, proximal hip, and core control in self care, mobility, lifting, ambulation and eccentric single leg control.      NMR and Therapeutic Activities:    [x] (42029 or 23520) Provided verbal/tactile cueing for activities related to improving balance, coordination, kinesthetic sense, posture, motor skill, proprioception and motor activation to allow for proper function of core, proximal hip and LE with self care and ADLs and functional mobility.   [] (28115) Gait Re-education- Provided training and instruction to the patient for proper LE, core and proximal hip recruitment and positioning and eccentric body weight control with ambulation re-education including up and down stairs     Home Exercise Program:    [x] (05224) Reviewed/Progressed HEP activities related to strengthening, flexibility, endurance, ROM of core, proximal hip and LE for functional self-care, mobility, lifting and ambulation/stair navigation   [] (55108)Reviewed/Progressed HEP activities related to improving balance, coordination, kinesthetic sense, posture, motor skill, proprioception of core, proximal hip and LE for self care, mobility, lifting, and Functional Deficits. []? Progressing: []? Met: []? Not Met: []? Adjusted  3. Patient will demonstrate an increase in Strength to good proximal hip strength and control, within 5lb HHD in LE to allow for proper functional mobility as indicated by patients Functional Deficits. []? Progressing: []? Met: []? Not Met: []? Adjusted  4. Patient will return to 80% functional activities without increased symptoms or restriction. []? Progressing: []? Met: []? Not Met: []? Adjusted  5. Pt  will ambulate safely with SPC on level surfaces.(patient specific functional goal)    []? Progressing: [x]? Met: []? Not Met: []? Adjusted          ASSESSMENT:  Patient has less pain and has increased range of motion over the past week. Flexion range is still difficult and she is progressing slowly overall. Less edema noted on knee. Treatment/Activity Tolerance:  [] Patient tolerated treatment well [] Patient limited by fatique  [] Patient limited by pain  [] Patient limited by other medical complications  [x] Other: 5/28/21 Patient arrived to PT stating that she felt better . Overall Progression Towards Functional goals/ Treatment Progress Update:  [] Patient is progressing as expected towards functional goals listed. [x] Progression is slowed due to complexities/Impairments listed. [] Progression has been slowed due to co-morbidities. [] Plan just implemented, too soon to assess goals progression <30days   [] Goals require adjustment due to lack of progress  [] Patient is not progressing as expected and requires additional follow up with physician  [] Other    Prognosis for POC: [x] Good [] Fair  [] Poor    Patient requires continued skilled intervention: [x] Yes  [] No        PLAN: Practice with cane on level surfaces and steps with 1 hand rail. . Monitor effect of e stim. Progress as tolerated, although patient is progressing slower than expected.      [x] Alter current plan (see comments)  (Continue PT 3 x weekly for 4 weeks)       Genesis Mooring with plan of care  [] Plan of care initiated [] Hold pending MD visit [] Discharge    Electronically signed by: Yusuf Hardy PTA    Note: If patient does not return for scheduled/recommended follow up visits, this note will serve as a discharge from care along with the most recent update on progress.

## 2021-06-11 ENCOUNTER — HOSPITAL ENCOUNTER (OUTPATIENT)
Dept: PHYSICAL THERAPY | Age: 66
Setting detail: THERAPIES SERIES
Discharge: HOME | End: 2021-06-11
Payer: MEDICARE

## 2021-06-11 PROCEDURE — 97110 THERAPEUTIC EXERCISES: CPT

## 2021-06-11 PROCEDURE — 97140 MANUAL THERAPY 1/> REGIONS: CPT

## 2021-06-11 NOTE — FLOWSHEET NOTE
[facility] - Outpatient Rehabilitation & Therapy  [address] [facility] [address]. [place] [address]  Phone: [phone]   Fax:     [phone]    Physical Therapy Treatment Note/ Progress Report:   Date:  2021    Patient Name:  Cl Stovall    :  1955  MRN: [id number]    Pertinent Medical History:  Previous reverse TSR, back issues, OA    Medical/Treatment Diagnosis Information:  Diagnosis:   M17.12 (ICD-10-CM) - Unilateral primary osteoarthritis, left knee   Z96.652 (ICD-10-CM) - Presence of left artificial knee joint     ·   · Treatment Diagnosis: Pain on left knee, limited knee mobility, weakness, difficulty with walking    Insurance/Certification information:  PT Insurance Information: Medicare  Physician Information:  Referring Practitioner: Michael Gamez MD  Plan of care signed (Y/N): Sent to Dr. Jona Bumpers on 21    Date of Patient follow up with Physician: 21     Progress Report: [x]  Yes  []  No     Date Range for reporting period:  Beginnin2021  Endin21  Progress report due (10 Rx/or 30 days whichever is less):    Recertification due (POC duration/ or 90 days whichever is less):      Visit # POC/Insurance Allowable Auth Needed   24 30 []Yes   [x]No     Latex Allergy:  [x]NO      []YES  Preferred Language for Healthcare:   [x]English       []Other:    Functional Scale:      Date assessed: at 21st visit on 21  Test: [facility] [employer]  Score: 49    Pain level:  3/10 on  Knee and 6/10 on left foot      History of Injury: Pt underwent a left TKR on 21. Went home and has not had any home therapy.  reports she is doing well at home. SUBJECTIVE:    21: Patient reports knee has been sore and swollen. 21  Pt states, \" Still really sore \"  21: Still having pain  21: Patient reports swelling is going down some,walking a little better overall.   21: Pt reports 7/10 pain on knee but also has left foot and ankle pain. Still taking 9 oxycodones per day. Edema is decreasing. 05/03/21: Patient reports her knee got very sore over the weekend,felt it almost locked up on her. States today is not too bad.   5/5/21: Pt reports that her heel is still bothering her more than the knee. 5/7/21: Sleeping is still difficult. Heel is \"not great\". Knee pain is slightly less. She saw DANNA Johnson's office Shy Diana CNP) and she removed the mesh. 5/10/21: Pt reports she had increased pain about 3 hours ago and took  meds and ice. She sometimes walks without any device. 5/12/21: Pt reports several episodes of the knee locking up on her during exercises yesterday and today. Osiel Lau She will see Dr. Liliana Klein next Tuesday for a possible injection into the heel. 5/14/21: Pt reports that she had some bad times yesterday with cramping and she thinks it is due to icing her left calf.  05/17/21: Patient reports knee has been sore and locking up on her several times a day,it takes her 30 min to get knee moving again. 05/19/21: 15 min late. Patient reports knee still has been locking up this am.States she seeing Dr. Sasha alfaro. 5/21/21:Pt reports that she feels better than on Wednesday. She was able to perform one set of exercises this morning although she was so stiff all during last night.  5/24/21: Pt reports that she felt better over the weekend. 5/26/21:Pt reports that her left heel and foot were more sore after last session. 5/28/21: Pt reports she feels less sore today. 06/01/21: Patient reports she is feeling better,swelling is going down some. 6/2/21: Pt reports she is doing better still, swelling is less still. Solonpas did some good. 6/4/21: Pt reports that she feels well today and has less  knee pain. 6/7/21: Pt reports that she feels well and is looking forward to returning to driving and swimming. 06/09/21: Patient reports her knee is feeling better,walking longer distances with a cane.   6/11/21: Pt reports that she is weaning herself off of pain meds and muscle relaxers.     OBJECTIVE:    Observation:    Test measurements:  4/30/21: ROM left knee: -8-94   05/03/21 left knee flex 98 ext -7   5/5/21: left knee flex 90    Ext:  -4/ -5   5/7/21: ROM left knee: -5-96    5/10/21: -3- 93   5/14/21:-9 - 97   05/17/21  -9 -92    05/19/21  -10 -85 decreased range.    5/21/21: - 10 - 95   5/24/21:  -10 -92   5/28/21:  -10 - 98 with OP        6/4/21: - with OP, encouraged pt to lie prone at home to stretch hamstrings        6/7/21: -6 -   103 with OP, encouraged her to lie prone,      RESTRICTIONS/PRECAUTIONS:  Exercises/Interventions:     Therapeutic Ex (57366)   Min:30 Reps/Resistance Notes/CUES   Nu step L3 x 5 min    Incline stretch 3 x 20 sec Added 4/30/21   Lunge stretch on step   3 x 20 sec each Added 4/30/21   Leg press 60# x 20 x2 Reinitiated on 5/26/21   Ankle pumps 30X    20X    20X    Heel slides 3 min donna fair   Knee stretch supine      20X    Seated ROM 20X     20X    Seated HS stretch 10 x 10 sec    Prone on wider mat near front of dept HS stretch with gravity assisting and STM to hamstrings, prone knee flexion also         30 x 5     Hep 4/23/21   Manual Intervention (23546)  Min: 15 min     Knee mobs/PROM Flex /ext  STM to quad,gastoc,HS donna fair   Tib/Fem Mobs     Patella Mobs left    Ankle mobs               NMR re-education (76826)  Min:  CUES NEEDED     Hold  LAQ 30X   SAQ 20X 2#   Education with use of single point cane  Needs cuing for proper sequence   SLR 20X    Therapeutic Activity (84429)  Min:10      assisted with      Steps up and down for 4 steps with and without hand rail Practice with 6 steps in stairwell next time    Wobble board 2 min     SLS x 5 sec x 5         Modalities  Min:     IFC with      CP after exercises 15 min To  left knee and heel   MH after exercises 15 min on left calf            Other Therapeutic Activities:   Pt was educated on PT POC, Diagnosis, Prognosis, pathomechanics as motor skill, proprioception of core, proximal hip and LE for self care, mobility, lifting, and ambulation/stair navigation      Manual Treatments:  PROM / STM / Oscillations-Mobs:  G-I, II, III, IV (PA's, Inf., Post.)  [x] (04752) Provided manual therapy to mobilize LE, proximal hip and/or LS spine soft tissue/joints for the purpose of modulating pain, promoting relaxation,  increasing ROM, reducing/eliminating soft tissue swelling/inflammation/restriction, improving soft tissue extensibility and allowing for proper ROM for normal function with self care, mobility, lifting and ambulation. Charges:  Timed Code Treatment Minutes: 60   Total Treatment Minutes: 75      [] EVAL (LOW) 01470 (typically 20 minutes face-to-face)  [] EVAL (MOD) 56320 (typically 30 minutes face-to-face)  [] EVAL (HIGH) 67278 (typically 45 minutes face-to-face)  [] RE-EVAL     [x] FL(98253) x 3[] Dry needle 1 or 2 Muscles (28168)  [] NMR (19750) x1 [] Dry needle 3+ Muscles (78115)  [x] Manual (31354) x  1   [] Ultrasound (97019) x  [] TA (83976) x 1    [] Mech Traction (78619)  [] ES(attended) (33716)     [] ES (un) (74482):   [] Vasopump (30095) [] Ionto (60177)   [] Other:    GOALS: Patient stated goal: \"Rehab after knee replacement\"  []? Progressing: []? Met: []? Not Met: []? Adjusted     Therapist goals for Patient:   Short Term Goals: To be achieved in: 2 weeks  1. Independent in HEP and progression per patient tolerance, in order to prevent re-injury. []? Progressing: [x]? Met: []? Not Met: []? Adjusted  2. Patient will have a decrease in pain to facilitate improvement in movement, function, and ADLs as indicated by Functional Deficits. []? Progressing: [x]? Met: []? Not Met: []? Adjusted     Long Term Goals: To be achieved in: 6 weeks  1. Disability index score of 20% or less for the LEFS to assist with reaching prior level of function. []? Progressing: [x]? Met: []? Not Met: []? Adjusted  2.  Patient will demonstrate increased AROM to 0-110 on left knee  to allow for proper joint functioning as indicated by patients Functional Deficits. []? Progressing: []? Met: []? Not Met: []? Adjusted  3. Patient will demonstrate an increase in Strength to good proximal hip strength and control, within 5lb HHD in LE to allow for proper functional mobility as indicated by patients Functional Deficits. []? Progressing: []? Met: []? Not Met: []? Adjusted  4. Patient will return to 80% functional activities without increased symptoms or restriction. []? Progressing: []? Met: []? Not Met: []? Adjusted  5. Pt  will ambulate safely with SPC on level surfaces.(patient specific functional goal)    []? Progressing: [x]? Met: []? Not Met: []? Adjusted          ASSESSMENT:  Patient has less pain and has increased range of motion over the past week. Flexion range is still difficult and she is progressing slowly overall. Less edema noted on knee. Treatment/Activity Tolerance:  [] Patient tolerated treatment well [] Patient limited by fatique  [] Patient limited by pain  [] Patient limited by other medical complications  [x] Other: 5/28/21 Patient arrived to PT stating that she felt better . Overall Progression Towards Functional goals/ Treatment Progress Update:  [] Patient is progressing as expected towards functional goals listed. [x] Progression is slowed due to complexities/Impairments listed. [] Progression has been slowed due to co-morbidities. [] Plan just implemented, too soon to assess goals progression <30days   [] Goals require adjustment due to lack of progress  [] Patient is not progressing as expected and requires additional follow up with physician  [] Other    Prognosis for POC: [x] Good [] Fair  [] Poor    Patient requires continued skilled intervention: [x] Yes  [] No        PLAN: Practice with cane on level surfaces and steps with 1 hand rail. . Monitor effect of e stim.  Progress as tolerated, although patient is progressing slower than expected. [x] Alter current plan (see comments)  (Continue PT 3 x weekly for 4 weeks)       XContinue with plan of care  [] Plan of care initiated [] Hold pending MD visit [] Discharge    Electronically signed by: Michelle Krause PT    Note: If patient does not return for scheduled/recommended follow up visits, this note will serve as a discharge from care along with the most recent update on progress.

## 2021-06-14 ENCOUNTER — HOSPITAL ENCOUNTER (OUTPATIENT)
Dept: PHYSICAL THERAPY | Age: 66
Setting detail: THERAPIES SERIES
Discharge: HOME | End: 2021-06-14
Payer: MEDICARE

## 2021-06-14 PROCEDURE — 97112 NEUROMUSCULAR REEDUCATION: CPT

## 2021-06-14 PROCEDURE — 97110 THERAPEUTIC EXERCISES: CPT

## 2021-06-14 PROCEDURE — 97140 MANUAL THERAPY 1/> REGIONS: CPT

## 2021-06-14 NOTE — FLOWSHEET NOTE
and ankle pain. Still taking 9 oxycodones per day. Edema is decreasing. 05/03/21: Patient reports her knee got very sore over the weekend,felt it almost locked up on her. States today is not too bad.   5/5/21: Pt reports that her heel is still bothering her more than the knee. 5/7/21: Sleeping is still difficult. Heel is \"not great\". Knee pain is slightly less. She saw DANNA Johnson's office Mauricia Habermann, CNP) and she removed the mesh. 5/10/21: Pt reports she had increased pain about 3 hours ago and took  meds and ice. She sometimes walks without any device. 5/12/21: Pt reports several episodes of the knee locking up on her during exercises yesterday and today. Sarah Akins She will see Dr. Verónica Leavitt next Tuesday for a possible injection into the heel. 5/14/21: Pt reports that she had some bad times yesterday with cramping and she thinks it is due to icing her left calf.  05/17/21: Patient reports knee has been sore and locking up on her several times a day,it takes her 30 min to get knee moving again. 05/19/21: 15 min late. Patient reports knee still has been locking up this am.States she seeing Dr. Ned alfaro. 5/21/21:Pt reports that she feels better than on Wednesday. She was able to perform one set of exercises this morning although she was so stiff all during last night.  5/24/21: Pt reports that she felt better over the weekend. 5/26/21:Pt reports that her left heel and foot were more sore after last session. 5/28/21: Pt reports she feels less sore today. 06/01/21: Patient reports she is feeling better,swelling is going down some. 6/2/21: Pt reports she is doing better still, swelling is less still. Solonpas did some good. 6/4/21: Pt reports that she feels well today and has less  knee pain. 6/7/21: Pt reports that she feels well and is looking forward to returning to driving and swimming. 06/09/21: Patient reports her knee is feeling better,walking longer distances with a cane.   6/11/21: Pt reports that she is weaning herself off of pain meds and muscle relaxers.  6/14/21: Pt reports that she had a good weekend, painwise, and was able to do her exercises at home well. She has been able to lie prone with greater ease for her exercises. She did  not have to take meds during the night last night, for the first time since surgery.     OBJECTIVE:    Observation:    Test measurements:  4/30/21: ROM left knee: -8-94   05/03/21 left knee flex 98 ext -7   5/5/21: left knee flex 90    Ext:  -4/ -5   5/7/21: ROM left knee: -5-96    5/10/21: -3- 93   5/14/21:-9 - 97   05/17/21  -9 -92    05/19/21  -10 -85 decreased range.    5/21/21: - 10 - 95   5/24/21:  -10 -92   5/28/21:  -10 - 98 with OP        6/4/21: - with OP, encouraged pt to lie prone at home to stretch hamstrings        6/7/21: -6 -   103 with OP, encouraged her to lie prone,          6/14/21:   RESTRICTIONS/PRECAUTIONS:  Exercises/Interventions:     Therapeutic Ex (71054)   Min:30 Reps/Resistance Notes/CUES   Nu step L3 x 5 min    Incline stretch 3 x 20 sec Added 4/30/21   Lunge stretch on step   3 x 20 sec each Added 4/30/21   Leg press 60# x 20 x2 Reinitiated on 5/26/21   Ankle pumps 30X    20X    20X    Heel slides 3 min donna fair   Knee stretch supine      20X    Seated ROM 20X     20X    Seated HS stretch 10 x 10 sec    Prone on wider mat near front of dept HS stretch with gravity assisting and STM to hamstrings, prone knee flexion also         30 x 5     Hep 4/23/21   Manual Intervention (96184)  Min: 15 min     Knee mobs/PROM Flex /ext  STM to quad,gastoc,HS donna fair   Tib/Fem Mobs     Patella Mobs left    Ankle mobs               NMR re-education (85727)  Min:  CUES NEEDED     Hold  LAQ 30X   SAQ 20X 2#   Education with use of single point cane  Needs cuing for proper sequence   SLR 20X    Therapeutic Activity (37488)  Min:10      assisted with      Steps up and down for 4 steps with and without hand rail Practice with 6 steps in stairwell next time Wobble board 2 min     Step ups , 5 with each side leading     Lucrecia Discs 1 min stance  and 1 mi of semisquat. SLS x 5 sec x 5         Modalities  Min:     IFC with      CP after exercises 15 min To  left knee and heel   MH after exercises 15 min on left calf            Other Therapeutic Activities:   Pt was educated on PT POC, Diagnosis, Prognosis, pathomechanics as well as frequency and duration of scheduling future physical therapy appointments. Time was also taken on this day to answer all patient questions and participation in PT. Reviewed appointment policy in detail with patient and patient verbalized understanding. Home Exercise Program:   Patient was instructed in the following for HEP:     . Patient verbalized/demonstrated understanding and was issued written handout. Continue HEP as given by [facility] PT during prehab. Therapeutic Exercise and NMR EXR  [x] (37490) Provided verbal/tactile cueing for activities related to strengthening, flexibility, endurance, ROM for improvements in LE, proximal hip, and core control with self care, mobility, lifting, ambulation.  [] (01568) Provided verbal/tactile cueing for activities related to improving balance, coordination, kinesthetic sense, posture, motor skill, proprioception  to assist with LE, proximal hip, and core control in self care, mobility, lifting, ambulation and eccentric single leg control.      NMR and Therapeutic Activities:    [x] (23319 or 54666) Provided verbal/tactile cueing for activities related to improving balance, coordination, kinesthetic sense, posture, motor skill, proprioception and motor activation to allow for proper function of core, proximal hip and LE with self care and ADLs and functional mobility.   [] (35675) Gait Re-education- Provided training and instruction to the patient for proper LE, core and proximal hip recruitment and positioning and eccentric body weight control with ambulation re-education including up and down stairs     Home Exercise Program:    [x] (25163) Reviewed/Progressed HEP activities related to strengthening, flexibility, endurance, ROM of core, proximal hip and LE for functional self-care, mobility, lifting and ambulation/stair navigation   [] (54266)Reviewed/Progressed HEP activities related to improving balance, coordination, kinesthetic sense, posture, motor skill, proprioception of core, proximal hip and LE for self care, mobility, lifting, and ambulation/stair navigation      Manual Treatments:  PROM / STM / Oscillations-Mobs:  G-I, II, III, IV (PA's, Inf., Post.)  [x] (51077) Provided manual therapy to mobilize LE, proximal hip and/or LS spine soft tissue/joints for the purpose of modulating pain, promoting relaxation,  increasing ROM, reducing/eliminating soft tissue swelling/inflammation/restriction, improving soft tissue extensibility and allowing for proper ROM for normal function with self care, mobility, lifting and ambulation. Charges:  Timed Code Treatment Minutes: 60   Total Treatment Minutes: 75      [] EVAL (LOW) 76935 (typically 20 minutes face-to-face)  [] EVAL (MOD) 59029 (typically 30 minutes face-to-face)  [] EVAL (HIGH) 63734 (typically 45 minutes face-to-face)  [] RE-EVAL     [x] IO(15524) x 2[] Dry needle 1 or 2 Muscles (74456)  [x] NMR (99114) x1 [] Dry needle 3+ Muscles (26256)  [x] Manual (99858) x  1   [] Ultrasound (56457) x  [] TA (51839) x 1    [] Mech Traction (16566)  [] ES(attended) (74907)     [] ES (un) (02773):   [] Vasopump (62642) [] Ionto (88692)   [] Other:    GOALS: Patient stated goal: \"Rehab after knee replacement\"  []? Progressing: []? Met: []? Not Met: []? Adjusted     Therapist goals for Patient:   Short Term Goals: To be achieved in: 2 weeks  1. Independent in HEP and progression per patient tolerance, in order to prevent re-injury. []? Progressing: [x]? Met: []? Not Met: []? Adjusted  2.  Patient will have a decrease in pain to facilitate improvement in movement, function, and ADLs as indicated by Functional Deficits. []? Progressing: [x]? Met: []? Not Met: []? Adjusted     Long Term Goals: To be achieved in: 6 weeks  1. Disability index score of 20% or less for the LEFS to assist with reaching prior level of function. []? Progressing: [x]? Met: []? Not Met: []? Adjusted  2. Patient will demonstrate increased AROM to 0-110 on left knee  to allow for proper joint functioning as indicated by patients Functional Deficits. []? Progressing: []? Met: []? Not Met: []? Adjusted  3. Patient will demonstrate an increase in Strength to good proximal hip strength and control, within 5lb HHD in LE to allow for proper functional mobility as indicated by patients Functional Deficits. []? Progressing: []? Met: []? Not Met: []? Adjusted  4. Patient will return to 80% functional activities without increased symptoms or restriction. []? Progressing: []? Met: []? Not Met: []? Adjusted  5. Pt  will ambulate safely with SPC on level surfaces.(patient specific functional goal)    []? Progressing: [x]? Met: []? Not Met: []? Adjusted          ASSESSMENT:  Patient has less pain and has increased range of motion over the past week. Flexion range is still difficult and she is progressing slowly overall. Less edema noted on knee. Treatment/Activity Tolerance:  [] Patient tolerated treatment well [] Patient limited by fatique  [] Patient limited by pain  [] Patient limited by other medical complications  [x] Other: 5/28/21 Patient arrived to PT stating that she felt better . Overall Progression Towards Functional goals/ Treatment Progress Update:  [] Patient is progressing as expected towards functional goals listed. [x] Progression is slowed due to complexities/Impairments listed. [] Progression has been slowed due to co-morbidities.   [] Plan just implemented, too soon to assess goals progression <30days   [] Goals require adjustment due to lack of progress  [] Patient is not progressing as expected and requires additional follow up with physician  [] Other    Prognosis for POC: [x] Good [] Fair  [] Poor    Patient requires continued skilled intervention: [x] Yes  [] No        PLAN: Practice with cane on level surfaces and steps with 1 hand rail. . Monitor effect of e stim. Progress as tolerated, although patient is progressing slower than expected. [x] Alter current plan (see comments)  (Continue PT 3 x weekly for 4 weeks)       XContinue with plan of care  [] Plan of care initiated [] Hold pending MD visit [] Discharge    Electronically signed by: Sara Bond PT    Note: If patient does not return for scheduled/recommended follow up visits, this note will serve as a discharge from care along with the most recent update on progress.

## 2021-06-16 ENCOUNTER — HOSPITAL ENCOUNTER (OUTPATIENT)
Dept: PHYSICAL THERAPY | Age: 66
Setting detail: THERAPIES SERIES
Discharge: HOME | End: 2021-06-16
Payer: MEDICARE

## 2021-06-16 PROCEDURE — 97112 NEUROMUSCULAR REEDUCATION: CPT

## 2021-06-16 PROCEDURE — 97110 THERAPEUTIC EXERCISES: CPT

## 2021-06-16 PROCEDURE — 97140 MANUAL THERAPY 1/> REGIONS: CPT

## 2021-06-16 NOTE — FLOWSHEET NOTE
[facility] - Outpatient Rehabilitation & Therapy  [address]. [place]  Phone: [phone]   Fax:     [phone]    Physical Therapy Treatment Note/ Progress Report:   Date:  2021    Patient Name:  Marques Sanchez    :  1955  MRN: [id number]    Pertinent Medical History:  Previous reverse TSR, back issues, OA    Medical/Treatment Diagnosis Information:  Diagnosis:   M17.12 (ICD-10-CM) - Unilateral primary osteoarthritis, left knee   Z96.652 (ICD-10-CM) - Presence of left artificial knee joint     ·   · Treatment Diagnosis: Pain on left knee, limited knee mobility, weakness, difficulty with walking    Insurance/Certification information:  PT Insurance Information: Medicare  Physician Information:  Referring Practitioner: Abelardo Guzman MD  Plan of care signed (Y/N): Sent to Dr. Jacquelin Mar on 21    Date of Patient follow up with Physician: 21     Progress Report: [x]  Yes  []  No     Date Range for reporting period:  Beginnin2021  Endin21  Progress report due (10 Rx/or 30 days whichever is less):    Recertification due (POC duration/ or 90 days whichever is less):      Visit # POC/Insurance Allowable Auth Needed    []Yes   [x]No     Latex Allergy:  [x]NO      []YES  Preferred Language for Healthcare:   [x]English       []Other:    Functional Scale:      Date assessed: at 21st visit on 21  Test: [employer]  Score: 49    Pain level:  2/10 on  Knee and 5/10 on left foot      History of Injury: Pt underwent a left TKR on 21. Went home and has not had any home therapy.  reports she is doing well at home. SUBJECTIVE:    21: Patient reports knee has been sore and swollen. 21  Pt states, \" Still really sore \"  21: Still having pain  21: Patient reports swelling is going down some,walking a little better overall.   21: Pt reports 7/10 pain on knee but also has left foot herself off of pain meds and muscle relaxers.  6/14/21: Pt reports that she had a good weekend, painwise, and was able to do her exercises at home well. She has been able to lie prone with greater ease for her exercises. She did  not have to take meds during the night last night, for the first time since surgery.  6/16/21: Pt reports that she is able to get in and out of the shower independently.     OBJECTIVE:    Observation:    Test measurements:  4/30/21: ROM left knee: -8-94   05/03/21 left knee flex 98 ext -7   5/5/21: left knee flex 90    Ext:  -4/ -5   5/7/21: ROM left knee: -5-96    5/10/21: -3- 93   5/14/21:-9 - 97   05/17/21  -9 -92    05/19/21  -10 -85 decreased range.    5/21/21: - 10 - 95   5/24/21:  -10 -92   5/28/21:  -10 - 98 with OP        6/4/21: - with OP, encouraged pt to lie prone at home to stretch hamstrings        6/7/21: -6 -   103 with OP, encouraged her to lie prone,          6/14/21: -6-7 - 103    RESTRICTIONS/PRECAUTIONS:  Exercises/Interventions:     Therapeutic Ex (63902)   Min:30 Reps/Resistance Notes/CUES   Nu step L3 x 5 min    Incline stretch 3 x 20 sec Added 4/30/21   Lunge stretch on step   3 x 20 sec each Added 4/30/21   Leg press 60# x 20 x2 Reinitiated on 5/26/21   Ankle pumps 30X    20X    20X    Heel slides 3 min donna fair   Knee stretch supine      20X    Seated ROM 20X     20X    Seated HS stretch 10 x 10 sec    Prone on wider mat near front of dept HS stretch with gravity assisting and STM to hamstrings, prone knee flexion also         30 x 5     Hep 4/23/21   Manual Intervention (39684)  Min: 15 min     Knee mobs/PROM Flex /ext  STM to quad,gastoc,HS donna fair   Tib/Fem Mobs     Patella Mobs left    Ankle mobs               NMR re-education (21147)  Min:  CUES NEEDED     Hold  LAQ 30X   SAQ 20X 2#   Education with use of single point cane  Needs cuing for proper sequence   SLR 20X    Therapeutic Activity (24396)  Min:10      assisted with      Steps up and down for 4 steps with and without hand rail Practice with 6 steps in stairwell next time    Wobble board 2 min     Step ups , 5 with each side leading     Lucrecia Discs 1 min stance  and 1 mi of semisquat. SLS x 5 sec x 5         Modalities  Min:     IFC with      CP after exercises 15 min To  left knee and heel   MH after exercises 15 min on left calf            Other Therapeutic Activities:   Pt was educated on PT POC, Diagnosis, Prognosis, pathomechanics as well as frequency and duration of scheduling future physical therapy appointments. Time was also taken on this day to answer all patient questions and participation in PT. Reviewed appointment policy in detail with patient and patient verbalized understanding. Home Exercise Program:   Patient was instructed in the following for HEP:     . Patient verbalized/demonstrated understanding and was issued written handout. Continue HEP as given by [facility] PT during prehab. Therapeutic Exercise and NMR EXR  [x] (72509) Provided verbal/tactile cueing for activities related to strengthening, flexibility, endurance, ROM for improvements in LE, proximal hip, and core control with self care, mobility, lifting, ambulation.  [] (03394) Provided verbal/tactile cueing for activities related to improving balance, coordination, kinesthetic sense, posture, motor skill, proprioception  to assist with LE, proximal hip, and core control in self care, mobility, lifting, ambulation and eccentric single leg control.      NMR and Therapeutic Activities:    [x] (19932 or 44286) Provided verbal/tactile cueing for activities related to improving balance, coordination, kinesthetic sense, posture, motor skill, proprioception and motor activation to allow for proper function of core, proximal hip and LE with self care and ADLs and functional mobility.   [] (76927) Gait Re-education- Provided training and instruction to the patient for proper LE, core and proximal hip recruitment and positioning and eccentric body weight control with ambulation re-education including up and down stairs     Home Exercise Program:    [x] (18902) Reviewed/Progressed HEP activities related to strengthening, flexibility, endurance, ROM of core, proximal hip and LE for functional self-care, mobility, lifting and ambulation/stair navigation   [] (83478)Reviewed/Progressed HEP activities related to improving balance, coordination, kinesthetic sense, posture, motor skill, proprioception of core, proximal hip and LE for self care, mobility, lifting, and ambulation/stair navigation      Manual Treatments:  PROM / STM / Oscillations-Mobs:  G-I, II, III, IV (PA's, Inf., Post.)  [x] (71313) Provided manual therapy to mobilize LE, proximal hip and/or LS spine soft tissue/joints for the purpose of modulating pain, promoting relaxation,  increasing ROM, reducing/eliminating soft tissue swelling/inflammation/restriction, improving soft tissue extensibility and allowing for proper ROM for normal function with self care, mobility, lifting and ambulation. Charges:  Timed Code Treatment Minutes: 60   Total Treatment Minutes: 75    6/16/21: Ashlie ibrahim applied today. Pt will benefit from continuation of skilled therapy to rehabilitate from total knee replacement, and increase range of motion and LE strength. [] EVAL (LOW) 34747 (typically 20 minutes face-to-face)  [] EVAL (MOD) 20789 (typically 30 minutes face-to-face)  [] EVAL (HIGH) 59225 (typically 45 minutes face-to-face)  [] RE-EVAL     [x] ZD(70004) x 2[] Dry needle 1 or 2 Muscles (76881)  [x] NMR (10602) x1 [] Dry needle 3+ Muscles (54767)  [x] Manual (53865) x  1   [] Ultrasound (73415) x  [] TA (57563) x 1    [] Mech Traction (02163)  [] ES(attended) (28619)     [] ES (un) (96729):   [] Vasopump (07614) [] Ionto (47435)   [] Other:    GOALS: Patient stated goal: \"Rehab after knee replacement\"  []? Progressing: []? Met: []?  Not Met: []? Adjusted     Therapist goals for Patient:   Short Term Goals: To be achieved in: 2 weeks  1. Independent in HEP and progression per patient tolerance, in order to prevent re-injury. []? Progressing: [x]? Met: []? Not Met: []? Adjusted  2. Patient will have a decrease in pain to facilitate improvement in movement, function, and ADLs as indicated by Functional Deficits. []? Progressing: [x]? Met: []? Not Met: []? Adjusted     Long Term Goals: To be achieved in: 6 weeks  1. Disability index score of 20% or less for the LEFS to assist with reaching prior level of function. []? Progressing: [x]? Met: []? Not Met: []? Adjusted  2. Patient will demonstrate increased AROM to 0-110 on left knee  to allow for proper joint functioning as indicated by patients Functional Deficits. []? Progressing: []? Met: []? Not Met: []? Adjusted  3. Patient will demonstrate an increase in Strength to good proximal hip strength and control, within 5lb HHD in LE to allow for proper functional mobility as indicated by patients Functional Deficits. []? Progressing: []? Met: []? Not Met: []? Adjusted  4. Patient will return to 80% functional activities without increased symptoms or restriction. []? Progressing: []? Met: []? Not Met: []? Adjusted  5. Pt  will ambulate safely with SPC on level surfaces.(patient specific functional goal)    []? Progressing: [x]? Met: []? Not Met: []? Adjusted          ASSESSMENT:  Patient has less pain and has increased range of motion over the past week. Flexion range is still difficult and she is progressing slowly overall. Less edema noted on knee. Treatment/Activity Tolerance:  [] Patient tolerated treatment well [] Patient limited by fatique  [] Patient limited by pain  [] Patient limited by other medical complications  [x] Other: 5/28/21 Patient arrived to PT stating that she felt better .      Overall Progression Towards Functional goals/ Treatment Progress Update:  [] Patient is progressing as

## 2021-06-18 ENCOUNTER — HOSPITAL ENCOUNTER (OUTPATIENT)
Dept: PHYSICAL THERAPY | Age: 66
Setting detail: THERAPIES SERIES
Discharge: HOME | End: 2021-06-18
Payer: MEDICARE

## 2021-06-18 PROCEDURE — 97110 THERAPEUTIC EXERCISES: CPT

## 2021-06-18 PROCEDURE — 97112 NEUROMUSCULAR REEDUCATION: CPT

## 2021-06-18 PROCEDURE — 97530 THERAPEUTIC ACTIVITIES: CPT

## 2021-06-18 PROCEDURE — 97140 MANUAL THERAPY 1/> REGIONS: CPT

## 2021-06-18 NOTE — FLOWSHEET NOTE
[facility] - Outpatient Rehabilitation & Therapy  [address] [place] [address]  Phone: [phone]   Fax:     [phone]    Physical Therapy Treatment Note/ Progress Report:   Date:  2021    Patient Name:  Angel Fernandez    :  1955  MRN: [id number]    Pertinent Medical History:  Previous reverse TSR, back issues, OA    Medical/Treatment Diagnosis Information:  Diagnosis:   M17.12 (ICD-10-CM) - Unilateral primary osteoarthritis, left knee   Z96.652 (ICD-10-CM) - Presence of left artificial knee joint     ·   · Treatment Diagnosis: Pain on left knee, limited knee mobility, weakness, difficulty with walking    Insurance/Certification information:  PT Insurance Information: Medicare  Physician Information:  Referring Practitioner: Miranda Sue MD  Plan of care signed (Y/N): Sent to Dr. Milena Castillo on 21    Date of Patient follow up with Physician: 21     Progress Report: [x]  Yes  []  No     Date Range for reporting period:  Beginnin2021  Endin21  Progress report due (10 Rx/or 30 days whichever is less):    Recertification due (POC duration/ or 90 days whichever is less):      Visit # POC/Insurance Allowable Auth Needed    []Yes   [x]No     Latex Allergy:  [x]NO      []YES  Preferred Language for Healthcare:   [x]English       []Other:    Functional Scale:      Date assessed: at 21st visit on 21  Test: [employer]  Score: 49    Pain level:  2/10 on  Knee and 6/10 on left foot      History of Injury: Pt underwent a left TKR on 21. Went home and has not had any home therapy.  reports she is doing well at home. SUBJECTIVE:    21: Patient reports knee has been sore and swollen. 21  Pt states, \" Still really sore \"  21: Still having pain  21: Patient reports swelling is going down some,walking a little better overall.   21: Pt reports 7/10 pain on knee but also has left foot herself off of pain meds and muscle relaxers.  6/14/21: Pt reports that she had a good weekend, painwise, and was able to do her exercises at home well. She has been able to lie prone with greater ease for her exercises. She did  not have to take meds during the night last night, for the first time since surgery.  6/16/21: Pt reports that she is able to get in and out of the shower independently.  6/18/21: Pt reports that she does not use a cane at home. She also stood a lot to [employer].  OBJECTIVE:    Observation:    Test measurements:  4/30/21: ROM left knee: -8-94   05/03/21 left knee flex 98 ext -7   5/5/21: left knee flex 90    Ext:  -4/ -5   5/7/21: ROM left knee: -5-96    5/10/21: -3- 93   5/14/21:-9 - 97   05/17/21  -9 -92    05/19/21  -10 -85 decreased range.    5/21/21: - 10 - 95   5/24/21:  -10 -92   5/28/21:  -10 - 98 with OP        6/4/21: - with OP, encouraged pt to lie prone at home to stretch hamstrings        6/7/21: -6 -   103 with OP, encouraged her to lie prone,          6/14/21: -6-7 - 103  6/18/21: Strength : hip flexor, quad and hamstrings: 4+/5 -5-/5    RESTRICTIONS/PRECAUTIONS:  Exercises/Interventions:     Therapeutic Ex (95048)   Min:30 Reps/Resistance Notes/CUES   Nu step L3 x 5 min    Incline stretch 3 x 20 sec Added 4/30/21   Lunge stretch on step   3 x 20 sec each Added 4/30/21   Leg press 60# x 20 x2 Reinitiated on 5/26/21   Ankle pumps 30X    20X    20X    Heel slides 3 min donna fair   Knee stretch supine      20X    Seated ROM 20X    Partial revolution on bike 5 min 6/18/21    20X    Seated HS stretch 10 x 10 sec    Prone on wider mat near front of dept HS stretch with gravity assisting and STM to hamstrings, prone knee flexion also         30 x 5     Hep 4/23/21   Manual Intervention (53586)  Min: 15 min     Knee mobs/PROM Flex /ext  STM to quad,gastoc,HS donna fair   Tib/Fem Mobs     Patella Mobs left    Ankle mobs               NMR re-education (85061)  Min: ambulation. Charges:  Timed Code Treatment Minutes: 60   Total Treatment Minutes: 75    6/16/21: [address] [place] [address] modifier applied today. Pt will benefit from continuation of skilled therapy to rehabilitate from total knee replacement, and increase range of motion and LE strength. [] EVAL (LOW) 90992 (typically 20 minutes face-to-face)  [] EVAL (MOD) 10370 (typically 30 minutes face-to-face)  [] EVAL (HIGH) 89366 (typically 45 minutes face-to-face)  [] RE-EVAL     [x] PX(28544) x 1[] Dry needle 1 or 2 Muscles (78533)  [x] NMR (59255) x1 [] Dry needle 3+ Muscles (29552)  [x] Manual (84507) x  1   [] Ultrasound (37540) x  [x] TA (55071) x 1    [] Mech Traction (07955)  [] ES(attended) (07074)     [] ES (un) (36567):   [] Vasopump (46421) [] Ionto (08178)   [] Other:    GOALS: Patient stated goal: \"Rehab after knee replacement\"  []? Progressing: []? Met: []? Not Met: []? Adjusted     Therapist goals for Patient:   Short Term Goals: To be achieved in: 2 weeks  1. Independent in HEP and progression per patient tolerance, in order to prevent re-injury. []? Progressing: [x]? Met: []? Not Met: []? Adjusted  2. Patient will have a decrease in pain to facilitate improvement in movement, function, and ADLs as indicated by Functional Deficits. []? Progressing: [x]? Met: []? Not Met: []? Adjusted     Long Term Goals: To be achieved in: 6 weeks  1. Disability index score of 20% or less for the LEFS to assist with reaching prior level of function. []? Progressing: [x]? Met: []? Not Met: []? Adjusted  2. Patient will demonstrate increased AROM to 0-110 on left knee  to allow for proper joint functioning as indicated by patients Functional Deficits. []? Progressing: []? Met: []? Not Met: []? Adjusted  3. Patient will demonstrate an increase in Strength to good proximal hip strength and control, within 5lb HHD in LE to allow for proper functional mobility as indicated by patients Functional Deficits. []? Progressing: [x]?  Met: return for scheduled/recommended follow up visits, this note will serve as a discharge from care along with the most recent update on progress.

## 2021-06-21 ENCOUNTER — HOSPITAL ENCOUNTER (OUTPATIENT)
Dept: PHYSICAL THERAPY | Age: 66
Setting detail: THERAPIES SERIES
Discharge: HOME | End: 2021-06-21
Payer: MEDICARE

## 2021-06-21 PROCEDURE — 97112 NEUROMUSCULAR REEDUCATION: CPT

## 2021-06-21 PROCEDURE — 97530 THERAPEUTIC ACTIVITIES: CPT

## 2021-06-21 PROCEDURE — 97110 THERAPEUTIC EXERCISES: CPT

## 2021-06-21 PROCEDURE — 97140 MANUAL THERAPY 1/> REGIONS: CPT

## 2021-06-21 NOTE — FLOWSHEET NOTE
foot and ankle pain. Still taking 9 oxycodones per day. Edema is decreasing. 05/03/21: Patient reports her knee got very sore over the weekend,felt it almost locked up on her. States today is not too bad.   5/5/21: Pt reports that her heel is still bothering her more than the knee. 5/7/21: Sleeping is still difficult. Heel is \"not great\". Knee pain is slightly less. She saw DANNA Johnson's office [facility], CNP) and she removed the mesh. 5/10/21: Pt reports she had increased pain about 3 hours ago and took  meds and ice. She sometimes walks without any device. 5/12/21: Pt reports several episodes of the knee locking up on her during exercises yesterday and today. Deana Yu She will see Dr. Jair Shah next Tuesday for a possible injection into the heel. 5/14/21: Pt reports that she had some bad times yesterday with cramping and she thinks it is due to icing her left calf.  05/17/21: Patient reports knee has been sore and locking up on her several times a day,it takes her 30 min to get knee moving again. 05/19/21: 15 min late. Patient reports knee still has been locking up this am.States she seeing Dr. Roselia alfaro. 5/21/21:Pt reports that she feels better than on Wednesday. She was able to perform one set of exercises this morning although she was so stiff all during last night.  5/24/21: Pt reports that she felt better over the weekend. 5/26/21:Pt reports that her left heel and foot were more sore after last session. 5/28/21: Pt reports she feels less sore today. 06/01/21: Patient reports she is feeling better,swelling is going down some. 6/2/21: Pt reports she is doing better still, swelling is less still. Ramirezchristophe did some good. 6/4/21: Pt reports that she feels well today and has less  knee pain. 6/7/21: Pt reports that she feels well and is looking forward to returning to driving and swimming. 06/09/21: Patient reports her knee is feeling better,walking longer distances with a cane.   6/11/21: Pt reports that she is weaning herself off of pain meds and muscle relaxers.  6/14/21: Pt reports that she had a good weekend, painwise, and was able to do her exercises at home well. She has been able to lie prone with greater ease for her exercises. She did  not have to take meds during the night last night, for the first time since surgery.  6/16/21: Pt reports that she is able to get in and out of the shower independently.  6/18/21: Pt reports that she does not use a cane at home. She also stood a lot to Claire Toura.  6/21/21: Pt reports that she did a lot of walking yesterday. She walked without a cane.     OBJECTIVE:    Observation:    Test measurements:  4/30/21: ROM left knee: -8-94   05/03/21 left knee flex 98 ext -7   5/5/21: left knee flex 90    Ext:  -4/ -5   5/7/21: ROM left knee: -5-96    5/10/21: -3- 93   5/14/21:-9 - 97   05/17/21  -9 -92    05/19/21  -10 -85 decreased range.    5/21/21: - 10 - 95   5/24/21:  -10 -92   5/28/21:  -10 - 98 with OP        6/4/21: - with OP, encouraged pt to lie prone at home to stretch hamstrings        6/7/21: -6 -   103 with OP, encouraged her to lie prone,          6/14/21: -6-7 - 103  6/18/21: Strength : hip flexor, quad and hamstrings: 4+/5 -5-/5  6/21/21: ROM:  -4-110    RESTRICTIONS/PRECAUTIONS:  Exercises/Interventions:     Therapeutic Ex (53115)   Min:30 Reps/Resistance Notes/CUES   Nu step L3 x 5 min    Incline stretch 3 x 20 sec Added 4/30/21   Lunge stretch on step   3 x 20 sec each Added 4/30/21   Leg press 65# x 20 x2 Reinitiated on 5/26/21   Ankle pumps 30X    20X    20X    Heel slides 3 min donna fair   Knee stretch supine      20X    Seated ROM 20X    Partial revolution on bike 5 min 6/18/21    20X    Seated HS stretch 10 x 10 sec    Prone on wider mat near front of dept HS stretch with gravity assisting and STM to hamstrings, prone knee flexion also         30 x 5     Hep 4/23/21   Manual Intervention (65953)  Min: 15 min     Knee mobs/PROM Flex /ext  STM to quad,gastoc,HS donna fair   Tib/Fem Mobs     Patella Mobs left    Ankle mobs               NMR re-education (44027)  Min:  CUES NEEDED     Hold  LAQ 30X 2#   SAQ 20X 2#   Education with use of single point cane  Needs cuing for proper sequence   SLR 20X    Therapeutic Activity (47447)  Min:10      assisted with      Steps up and down for 4 steps with and without hand rail 6/21/21: Practiced with 12 steps in stairwell x2    Wobble board 2 min          Lucrecia Discs 1 min stance  and 1 mi of semisquat. SLS x 5 sec x 5         Modalities  Min:     IFC with      CP after exercises 15 min To  left knee and heel   MH after exercises 15 min on left calf            Other Therapeutic Activities:   Pt was educated on PT POC, Diagnosis, Prognosis, pathomechanics as well as frequency and duration of scheduling future physical therapy appointments. Time was also taken on this day to answer all patient questions and participation in PT. Reviewed appointment policy in detail with patient and patient verbalized understanding. Home Exercise Program:   Patient was instructed in the following for HEP:     . Patient verbalized/demonstrated understanding and was issued written handout. Continue HEP as given by [facility] PT during prehab. Access Code: [email or web address]  URL: [email or web address]  Date: 06/18/2021  Prepared by:  Yo Rizzo    Exercises  Clamshell - 1 x daily - 7 x weekly - 3 sets - 10 reps  Supine Active Straight Leg Raise - 1 x daily - 7 x weekly - 3 sets - 10 reps  Seated Long Arc Quad - 1 x daily - 7 x weekly - 3 sets - 10 reps  Supine Hip Adduction Isometric with Ball - 1 x daily - 7 x weekly - 3 sets - 10 reps  Supine Heel Slide - 1 x daily - 7 x weekly - 3 sets - 10 reps  Step Up - 1 x daily - 7 x weekly - 3 sets - 10 reps  Mini Squat with Counter Support - 1 x daily - 7 x weekly - 3 sets - 10 reps  Side Stepping with Resistance at Ankles - 1 x daily - 7 x weekly - 3 sets - 10 for proper functional mobility as indicated by patients Functional Deficits. []? Progressing: [x]? Met: []? Not Met: []? Adjusted  4. Patient will return to 80% functional activities without increased symptoms or restriction. []? Progressing: []? Met: []? Not Met: []? Adjusted  5. Pt  will ambulate safely with SPC on level surfaces.(patient specific functional goal)    []? Progressing: [x]? Met: []? Not Met: []? Adjusted          ASSESSMENT:  Patient has less pain and has increased range of motion over the past week. Flexion range is still difficult and she is progressing slowly overall. Less edema noted on knee. Personal goals for this week: driving, swimming and attending [employer] meeting. Treatment/Activity Tolerance:  [] Patient tolerated treatment well [] Patient limited by fatique  [] Patient limited by pain  [] Patient limited by other medical complications  [x] Other: 5/28/21 Patient arrived to PT stating that she felt better . Overall Progression Towards Functional goals/ Treatment Progress Update:  [] Patient is progressing as expected towards functional goals listed. [x] Progression is slowed due to complexities/Impairments listed. [] Progression has been slowed due to co-morbidities. [] Plan just implemented, too soon to assess goals progression <30days   [] Goals require adjustment due to lack of progress  [] Patient is not progressing as expected and requires additional follow up with physician  [] Other    Prognosis for POC: [x] Good [] Fair  [] Poor    Patient requires continued skilled intervention: [x] Yes  [] No        PLAN: Practice on bike for full revolutions. Discuss what she can use for gym exercises after DC. (Bike, leg press, hamstring curl, elliptical, stair master or stair climber, swimming.) Prepare for DC. Torie Brittle Progress as tolerated, although patient is progressing slower than expected.      [] Alter current plan (see comments)  (Continue PT 3 x weekly for 4 weeks) XContinue with plan of care  [] Plan of care initiated [] Hold pending MD visit [] Discharge    Electronically signed by: Olita Hamman, PT    Note: If patient does not return for scheduled/recommended follow up visits, this note will serve as a discharge from care along with the most recent update on progress.

## 2021-06-23 ENCOUNTER — HOSPITAL ENCOUNTER (OUTPATIENT)
Dept: PHYSICAL THERAPY | Age: 66
Setting detail: THERAPIES SERIES
Discharge: HOME | End: 2021-06-23
Payer: MEDICARE

## 2021-06-23 PROCEDURE — 97530 THERAPEUTIC ACTIVITIES: CPT

## 2021-06-23 PROCEDURE — 97112 NEUROMUSCULAR REEDUCATION: CPT

## 2021-06-23 PROCEDURE — 97110 THERAPEUTIC EXERCISES: CPT

## 2021-06-23 PROCEDURE — 97140 MANUAL THERAPY 1/> REGIONS: CPT

## 2021-06-23 NOTE — PLAN OF CARE
[facility] - Outpatient Rehabilitation & Therapy  [address] [facility]. Y[place] Carter  Phone: [phone]   Fax:     [phone]     Physical Therapy Discharge Summary    Dear  Dr. Lawyer Banks,    We had the pleasure of treating the following patient for physical therapy services at [address] [place] [address]. A summary of our findings can be found in the discharge summary below. If you have any questions or concerns regarding these findings, please do not hesitate to contact me at the office phone number above.   Thank you for the referral.     Physician Signature:________________________________Date:__________________  By signing above (or electronic signature), therapists plan is approved by physician      Overall Response to Treatment:   [x]Patient is responding well to treatment and improvement is noted with regards  to goals   []Patient should continue to improve in reasonable time if they continue HEP   []Patient has plateaued and is no longer responding to skilled PT intervention    []Patient is getting worse and would benefit from return to referring MD   []Patient unable to adhere to initial POC   []Other:    Date range of Visits: 21-21  Total Visits: 29  t:   Date:  2021    Patient Name:  Melanie Hoover    :  1955  MRN: [id number]    Pertinent Medical History:  Previous reverse TSR, back issues, OA    Medical/Treatment Diagnosis Information:  Diagnosis:   M17.12 (ICD-10-CM) - Unilateral primary osteoarthritis, left knee   Z96.652 (ICD-10-CM) - Presence of left artificial knee joint     ·   · Treatment Diagnosis: Pain on left knee, limited knee mobility, weakness, difficulty with walking    Insurance/Certification information:  PT Insurance Information: Medicare  Physician Information:  Referring Practitioner: Jillian Novak MD  Plan of care signed (Y/N): Sent to Dr. Lawyer Banks on 21    Date of Patient follow up with Physician: 21     Progress Report: [x]  Yes  []  No     Date Range for reporting period:  Beginnin2021  Endin21  Progress report due (10 Rx/or 30 days whichever is less):    Recertification due (POC duration/ or 90 days whichever is less):      Visit # POC/Insurance Allowable Auth Needed   29 30 []Yes   [x]No     Latex Allergy:  [x]NO      []YES  Preferred Language for Healthcare:   [x]English       []Other:    Functional Scale:      Date assessed: at  visit DC  Test: [facility]  Score: 34    Pain level:  2/10 on  Knee and 5-6/10 on left foot      History of Injury: Pt underwent a left TKR on 21. Went home and has not had any home therapy.  reports she is doing well at home. SUBJECTIVE:    21: Patient reports knee has been sore and swollen. 21  Pt states, \" Still really sore \"  21: Still having pain  21: Patient reports swelling is going down some,walking a little better overall. 21: Pt reports 7/10 pain on knee but also has left foot and ankle pain. Still taking 9 oxycodones per day. Edema is decreasing. 21: Patient reports her knee got very sore over the weekend,felt it almost locked up on her. States today is not too bad.   21: Pt reports that her heel is still bothering her more than the knee. 21: Sleeping is still difficult. Heel is \"not great\". Knee pain is slightly less. She saw DANNA Johnson's office Manjinder Hartman, CARLOS) and she removed the mesh. 5/10/21: Pt reports she had increased pain about 3 hours ago and took  meds and ice. She sometimes walks without any device. 21: Pt reports several episodes of the knee locking up on her during exercises yesterday and today. Renny Arteaga She will see Dr. Cielo Argueta next Tuesday for a possible injection into the heel.   21: Pt reports that she had some bad times yesterday with cramping and she thinks it is due to icing her left calf.  21: Patient reports knee has been sore and locking up on her several times a day,it takes her 30 min to get knee moving again. 05/19/21: 15 min late. Patient reports knee still has been locking up this am.States she seeing Dr. Megan alfaro. 5/21/21:Pt reports that she feels better than on Wednesday. She was able to perform one set of exercises this morning although she was so stiff all during last night.  5/24/21: Pt reports that she felt better over the weekend. 5/26/21:Pt reports that her left heel and foot were more sore after last session. 5/28/21: Pt reports she feels less sore today. 06/01/21: Patient reports she is feeling better,swelling is going down some. 6/2/21: Pt reports she is doing better still, swelling is less still. Yamilka did some good. 6/4/21: Pt reports that she feels well today and has less  knee pain. 6/7/21: Pt reports that she feels well and is looking forward to returning to driving and swimming. 06/09/21: Patient reports her knee is feeling better,walking longer distances with a cane. 6/11/21: Pt reports that she is weaning herself off of pain meds and muscle relaxers.  6/14/21: Pt reports that she had a good weekend, painwise, and was able to do her exercises at home well. She has been able to lie prone with greater ease for her exercises. She did  not have to take meds during the night last night, for the first time since surgery.  6/16/21: Pt reports that she is able to get in and out of the shower independently.  6/18/21: Pt reports that she does not use a cane at home. She also stood a lot to [place].  6/21/21: Pt reports that she did a lot of walking yesterday. She walked without a cane.  6/23/21: Pt reports that she drove here today and feels about the same as two days ago.       OBJECTIVE:    Observation:    Test measurements:  4/30/21: ROM left knee: -8-94   05/03/21 left knee flex 98 ext -7   5/5/21: left knee flex 90    Ext:  -4/ -5   5/7/21: ROM left knee: -5-96    5/10/21: -3- 93   5/14/21:-9 - 97   21  -9 -92    21  -10 -85 decreased range.  21: - 10 - 95   21:  -10 -92   21:  -10 - 98 with OP        21: - with OP, encouraged pt to lie prone at home to stretch hamstrings        21: -6 -   103 with OP, encouraged her to lie prone,          21: -6-7 - 103  21: Strength : hip flexor, quad and hamstrings: 4+/5 -5-/5  21: ROM:  -4-110  21: ROM: passively =  -3  to  104                           Actively   =  -10- to 103                Strength Left hip flexors: 4+/5  Left quad: 5-/5    Left hamstrin-/5      RESTRICTIONS/PRECAUTIONS:  Exercises/Interventions:     Therapeutic Ex (50551)   Min:30 Reps/Resistance Notes/CUES   Nu step L3 x 5 min    Incline stretch 3 x 20 sec Added 21   Lunge stretch on step   3 x 20 sec each Added 21   Leg press 65# x 20 x2 Reinitiated on 21   Ankle pumps 30X    20X    20X    Heel slides 3 min donna fair   Knee stretch supine      20X    Seated ROM 20X    Partial revolution on bike 5 min 21    20X    Seated HS stretch 10 x 10 sec    Prone on wider mat near front of dept HS stretch with gravity assisting and STM to hamstrings, prone knee flexion also         30 x 5     Hep 21   Manual Intervention (10091)  Min: 10 min     Knee mobs/PROM Flex /ext  STM to quad,gastoc,HS donna fair   Tib/Fem Mobs     Patella Mobs left    Ankle mobs               NMR re-education (35814)  Min:  CUES NEEDED     Hold  LAQ 30X 2#   SAQ 20X 2#   Education with use of single point cane  Needs cuing for proper sequence   SLR 20X    Therapeutic Activity (54994)  Min:10      assisted with      Steps up and down for 4 steps with and without hand rail 21: Practiced with 12 steps in stairwell x2    Wobble board 2 min          Lucrecia Discs 1 min stance  and 1 mi of semisquat.       SLS x 5 sec x 5         Modalities  Min:     IFC with      CP after exercises 15 min To  left knee and heel   MH after exercises 15 min on left calf            Other Therapeutic Activities:   Pt was educated on PT POC, Diagnosis, Prognosis, pathomechanics as well as frequency and duration of scheduling future physical therapy appointments. Time was also taken on this day to answer all patient questions and participation in PT. Reviewed appointment policy in detail with patient and patient verbalized understanding. Home Exercise Program:   Patient was instructed in the following for HEP:     . Patient verbalized/demonstrated understanding and was issued written handout. Continue HEP as given by [facility] PT during prehab. Access Code: [email or web address]  URL: Smart Panel/  Date: 06/18/2021, reviewed on 6/23/21. Prepared by: Terri Rivera    Exercises  Clamshell - 1 x daily - 7 x weekly - 3 sets - 10 reps  Supine Active Straight Leg Raise - 1 x daily - 7 x weekly - 3 sets - 10 reps  Seated Long Arc Quad - 1 x daily - 7 x weekly - 3 sets - 10 reps  Supine Hip Adduction Isometric with Ball - 1 x daily - 7 x weekly - 3 sets - 10 reps  Supine Heel Slide - 1 x daily - 7 x weekly - 3 sets - 10 reps  Step Up - 1 x daily - 7 x weekly - 3 sets - 10 reps  Mini Squat with Counter Support - 1 x daily - 7 x weekly - 3 sets - 10 reps  Side Stepping with Resistance at Ankles - 1 x daily - 7 x weekly - 3 sets - 10 reps    Also gave her advice on machines at the gym: bike, leg press, hamstring curl, and elliptical/stairmeaster if tolerated by right knee. Also gave pictures on aquatic exercises.       Therapeutic Exercise and NMR EXR  [x] (28022) Provided verbal/tactile cueing for activities related to strengthening, flexibility, endurance, ROM for improvements in LE, proximal hip, and core control with self care, mobility, lifting, ambulation.  [] (30341) Provided verbal/tactile cueing for activities related to improving balance, coordination, kinesthetic sense, posture, motor skill, proprioception  to assist with LE, proximal hip, and core 90225 (typically 30 minutes face-to-face)  [] EVAL (HIGH) 27357 (typically 45 minutes face-to-face)  [] RE-EVAL     [x] XM(90158) x 2[] Dry needle 1 or 2 Muscles (53049)  [x] NMR (25007) x1 [] Dry needle 3+ Muscles (17955)  [x] Manual (55825) x  1   [] Ultrasound (36275) x  [x] TA (13701) x 1    [] Mech Traction (77161)  [] ES(attended) (28290)     [] ES (un) (87991):   [] Vasopump (81311) [] Ionto (03537)   [] Other:    GOALS: Patient stated goal: \"Rehab after knee replacement\"  []? Progressing: [x]? Met: []? Not Met: []? Adjusted     Therapist goals for Patient:   Short Term Goals: To be achieved in: 2 weeks  1. Independent in HEP and progression per patient tolerance, in order to prevent re-injury. []? Progressing: [x]? Met: []? Not Met: []? Adjusted  2. Patient will have a decrease in pain to facilitate improvement in movement, function, and ADLs as indicated by Functional Deficits. []? Progressing: [x]? Met: []? Not Met: []? Adjusted     Long Term Goals: To be achieved in: 6 weeks  1. Disability index score of 20% or less for the LEFS to assist with reaching prior level of function. []? Progressing: [x]? Met: []? Not Met: []? Adjusted  2. Patient will demonstrate increased AROM to 0-110 on left knee  to allow for proper joint functioning as indicated by patients Functional Deficits. [x]? Progressing: []? Met: []? Not Met: []? Adjusted  3. Patient will demonstrate an increase in Strength to good proximal hip strength and control, within 5lb HHD in LE to allow for proper functional mobility as indicated by patients Functional Deficits. []? Progressing: [x]? Met: []? Not Met: []? Adjusted  4. Patient will return to 80% functional activities without increased symptoms or restriction. [x]? Progressing: []? Met: []? Not Met: []? Adjusted  5. Pt  will ambulate safely with SPC on level surfaces.(patient specific functional goal)    []? Progressing: [x]? Met: []? Not Met: []?  Adjusted          ASSESSMENT: Patient has less pain and has progressed on functional goals over the past week. Knee Flexion and extension range is still limited and she is progressing slowly overall. Less edema noted on knee. Personal goals for this week: driving, swimming and attending [employer] watchers meeting. She has met or is progressing on  most PT goals. She is ambulating without any asst device and has resumed driving. She plans to begin swimming on Friday of this week. Treatment/Activity Tolerance:  [x] Patient tolerated treatment well [] Patient limited by fatique  [] Patient limited by pain  [] Patient limited by other medical complications  [] Other:     Overall Progression Towards Functional goals/ Treatment Progress Update:  [x] Patient is progressing as expected towards functional goals listed. [] Progression is slowed due to complexities/Impairments listed. [] Progression has been slowed due to co-morbidities. [] Plan just implemented, too soon to assess goals progression <30days   [] Goals require adjustment due to lack of progress  [] Patient is not progressing as expected and requires additional follow up with physician  [] Other    Prognosis for POC: [x] Good [] Fair  [] Poor    Patient requires continued skilled intervention: [] Yes  [x] No        PLAN: DC from PT . Progress as tolerated, although patient is progressing slower than expected. Continue with Home exercise program. Will see Dr. Frank Latif on 6/24/21. [] Alter current plan (see comments)  (Continue PT 3 x weekly for 4 weeks)       Continue with plan of care  [] Plan of care initiated [] Hold pending MD visit [x] Discharge    Electronically signed by: Sara Bond PT    Note: If patient does not return for scheduled/recommended follow up visits, this note will serve as a discharge from care along with the most recent update on progress.

## 2021-10-11 ENCOUNTER — HOSPITAL ENCOUNTER (OUTPATIENT)
Dept: PHYSICAL THERAPY | Age: 66
Setting detail: THERAPIES SERIES
Discharge: HOME | End: 2021-10-11
Payer: MEDICARE

## 2021-10-11 PROCEDURE — 97110 THERAPEUTIC EXERCISES: CPT

## 2021-10-11 PROCEDURE — 97016 VASOPNEUMATIC DEVICE THERAPY: CPT

## 2021-10-11 PROCEDURE — 97140 MANUAL THERAPY 1/> REGIONS: CPT

## 2021-10-11 PROCEDURE — 97161 PT EVAL LOW COMPLEX 20 MIN: CPT

## 2021-10-11 NOTE — FLOWSHEET NOTE
[facility] - Outpatient Rehabilitation & Therapy  [address] [facility] [address]. [place]  Phone: [phone]   Fax:     [phone]      Physical Therapy Treatment Note/ Progress Report:     Date:  10/11/2021    Patient Name:  Javier De La Torre    :  1955  MRN: [id number]    Pertinent Medical History:     Medical/Treatment Diagnosis Information:  Diagnosis: OA right knee M17.11, s/p R TKA Z96.651  Treatment Diagnosis: Gait and mobility disturbance s/p R TKA    Insurance/Certification information:  PT Insurance Information: Medicare  Physician Information:  Referring Practitioner: Mark Briseno MD  Plan of care signed (Y/N): routed 10/11/21    Date of Patient follow up with Physician:      Progress Report: []  Yes  [x]  No     Date Range for reporting period:  Beginning:  10/11/2021  Ending:      Progress report due (10 Rx/or 30 days whichever is less):       Recertification due (POC duration/ or 90 days whichever is less):     Visit # POC/Insurance Allowable Auth Needed   1 BOMN []Yes   [x]No     Latex Allergy:  [x]NO      []YES  Preferred Language for Healthcare:   [x]English       []Other:    Functional Scale:      Date assessed: at eval  Test: LEFS  Score:    Pain level:  4-7/10     History of Injury:Patient stated complaint: h/o right knee pain ~10 years with resultant RKA, L TKA , lives with with her  in a 1 story ranch home, one step to enter, basement with washer/ dryer     SUBJECTIVE:  See eval    OBJECTIVE:   Observation:    Test measurements:      RESTRICTIONS/PRECAUTIONS:     Exercises/Interventions:     Therapeutic Ex (87657)   Min: Reps/Resistance Notes/CUES   NU step     Leg press     QSS     Incline                         Mat ex     Strap assist knee   felx  Quad setting      TKE     LAQ     Hams curls                          Manual Intervention ([address])  Min: 10 min      Knee mobs/PROM Knee flexion and ext. Tib/Fem Mobs     Patella Mobs     Ankle mobs               NMR re-education (72761)  Min:  CUES NEEDED   SLS     Wobble     Step ups fwd/ lat                    Therapeutic Activity (10808)  Min:               Modalities  Min:     Game ready  Low compression 15 min     CP after exercises     MH after exercises            Other Therapeutic Activities: Pt was educated on PT POC, Diagnosis, Prognosis, pathomechanics as well as frequency and duration of scheduling future physical therapy appointments. Time was also taken on this day to answer all patient questions and participation in PT. Reviewed appointment policy in detail with patient and patient verbalized understanding. Home Exercise Program:   Access Code: [email or web address]: [employer]/Date: 10/11/2021Prepared by: Renny Marking   Long Sitting Quad Set - 1-2 x daily - 7 x weekly - 1-2 sets - 20 reps - 5 hold   Supine Active Straight Leg Raise - 1-2 x daily - 7 x weekly - 1-2 sets - 10 reps - 1 hold   Sitting Heel Slide with Towel - 1-2 x daily - 7 x weekly - 1 sets - 12 reps - 10 hold   Seated Long Arc Quad - 1-2 x daily - 7 x weekly - 1-2 sets - 15 reps - 3 hold   Long Sitting Calf Stretch with Strap - 1-2 x daily - 7 x weekly - 1 sets - 12 reps - 10 hold   Ankle Pumps in Elevation - 1-2 x daily - 7 x weekly - 1-2 sets - 30 reps - 1 hold   Seated Table Hamstring Stretch - 1-2 x daily - 7 x weekly - 1 sets - 4 reps - 30 hold    Patient was instructed in the following for HEP:     . Patient verbalized/demonstrated understanding and was issued written handout.       Therapeutic Exercise and NMR EXR  [] (56742) Provided verbal/tactile cueing for activities related to strengthening, flexibility, endurance, ROM for improvements in LE, proximal hip, and core control with self care, mobility, lifting, ambulation.  [] (88444) Provided verbal/tactile cueing for activities related to improving balance, coordination, kinesthetic sense, posture, motor RN(30787) x     [] Dry needle 1 or 2 Muscles (80136)  [] NMR (22772) x     [] Dry needle 3+ Muscles (34854)  [x] Manual (42991) x     [] Ultrasound (14867) x  [] TA (24803) x     [] Mech Traction (93748)  [] ES(attended) (41992)     [] ES (un) (30323):   [x] Vasopump (03135) [] Ionto (23751)   [] Other:    uJlian Mckee stated goal: Decrease pain, improve strength and normal walking  []? Progressing: []? Met: []? Not Met: []? Adjusted     Therapist goals for Patient:   Short Term Goals: To be achieved in: 2 weeks  1. Independent in HEP and progression per patient tolerance, in order to prevent re-injury. []? Progressing: []? Met: []? Not Met: []? Adjusted        Long Term Goals: To be achieved in: 4-6 weeks  1. score of 50 or betterfor the LEFS to assist with reaching prior level of function. []? Progressing: []? Met: []? Not Met: []? Adjusted  2. Patient will demonstrate increased AROM to 2-115 or better at right knee  to allow for proper joint functioning as indicated by patients Functional Deficits. []? Progressing: []? Met: []? Not Met: []? Adjusted  3. Patient will demonstrate an increase in Strength to good proximal hip strength and control in LE to allow for proper functional mobility as indicated by patients Functional Deficits. []? Progressing: []? Met: []? Not Met: []? Adjusted  4. Patient will return to normal walking, stair climbing  and household functional activities without increased symptoms or restriction. []? Progressing: []? Met: []? Not Met: []? Adjusted  5. Patient will have a decrease in pain 0-3/10 to facilitate improvement in movement, function, and ADLs as indicated by Functional Deficits. []? Progressing: []? Met: []? Not Met: []?  Adjusted    ASSESSMENT:  See eval    Treatment/Activity Tolerance:  [x] Patient tolerated treatment well [] Patient limited by fatique  [] Patient limited by pain  [] Patient limited by other medical complications  [] Other:     Overall Progression Towards Functional goals/ Treatment Progress Update:  [] Patient is progressing as expected towards functional goals listed. [] Progression is slowed due to complexities/Impairments listed. [] Progression has been slowed due to co-morbidities. [x] Plan just implemented, too soon to assess goals progression <30days   [] Goals require adjustment due to lack of progress  [] Patient is not progressing as expected and requires additional follow up with physician  [] Other    Prognosis for POC: [x] Good [] Fair  [] Poor    Patient requires continued skilled intervention: [x] Yes  [] No        PLAN: Gait, balance training, proprioception, rom, strengthening, decrease swelling/ pain  [] Continue per plan of care [] Alter current plan (see comments)  [x] Plan of care initiated [] Hold pending MD visit [] Discharge    Electronically signed by: Veronica May PT    Note: If patient does not return for scheduled/recommended follow up visits, this note will serve as a discharge from care along with the most recent update on progress.

## 2021-10-11 NOTE — PROGRESS NOTES
[address] [facility]. [place] [address]  Phone: [phone]   Fax:     [phone]                                                       Physical Therapy Certification    Dear Referring Practitioner: Pola Zurita MD,    We had the pleasure of evaluating the following patient for physical therapy services at [facility]. A summary of our findings can be found in the initial assessment below. This includes our plan of care. If you have any questions or concerns regarding these findings, please do not hesitate to contact me at the office phone number checked above.   Thank you for the referral.       Physician Signature:_______________________________Date:__________________  By signing above (or electronic signature), therapists plan is approved by physician          Patient: Katie Nazario   : 1955   MRN: [id number]  Referring Physician: Referring Practitioner: Pola Zurita MD      Evaluation Date: 10/11/2021      Medical Diagnosis Information:  Diagnosis: OA right knee M17.11, s/p R TKA Z96.651   Treatment Diagnosis: Gait and mobility disturbance s/p R TKA                                         Insurance information: PT Insurance Information: Medicare     Precautions/ Contra-indications:   Latex Allergy:  [x]NO      []YES  Preferred Language for Healthcare:   [x]English       []other:    C-SSRS Triggered by Intake questionnaire (Past 2 wk assessment ):   [x] No, Questionnaire did not trigger screening.   [] Yes, Patient intake triggered C-SSRS Screening      [] C-SSRS Screening completed  [] PCP notified via Epic     SUBJECTIVE: Patient stated complaint: h/o right knee pain ~10 years with resultant RKA, L TKA , lives with with her  in a 1 story ranch home, one step to enter, basement with washer/ dryer     Relevant Medical History: Functional Outcome Measure: LEFS =16     Pain Scale: 4-7/10  Easing factors: meds and ice   Provocative factors: standing, walking bending knee    Type: [x]Constant   []Intermittent  []Radiating []Localized []other:     Numbness/Tingling: none    Occupation/School:retired    Living Status/Prior Level of Function: pt has assist with bathing from  but got dressed this am     OBJECTIVE:     Posture: wfl's    Functional Mobility/Transfers: independent with transfers and bed mobility     Palpation: RLE swollen     Bandages/Dressings/Incisions: post surgical bandage intact, no sign of infection     Gait: pt walks with a rolling walker , decreased step length  And stance phase at RLE     ROM LEFT RIGHT   HIP Flex     HIP Abd     HIP Ext     HIP IR     HIP ER     Knee ext  -10, prom-4   Knee Flex  86, prom 95   Ankle PF     Ankle DF     Ankle In     Ankle Ev     Strength  LEFT RIGHT   HIP Flexors 5/6 4/5   HIP Abductors     HIP Ext     Hip ER     Knee EXT (quad) 4+/5 3-/5   Knee Flex (HS) 5/5 3+/5   Ankle DF     Ankle PF     Ankle Inv     Ankle EV          Circumference  Mid apex  7 cm prox             Reflexes/Sensation:    [x]Dermatomes/Myotomes intact    [x]Reflexes equal and normal bilaterally   []Other:    Joint mobility: right knee   []Normal    [x]Hypo   []Hyper    Orthopedic Special Tests: Jorge's negative                        [x] Patient history, allergies, meds reviewed. Medical chart reviewed. See intake form. Review Of Systems (ROS):  [x]Performed Review of systems (Integumentary, CardioPulmonary, Neurological) by intake and observation. Intake form has been scanned into medical record. Patient has been instructed to contact their primary care physician regarding ROS issues if not already being addressed at this time.       Co-morbidities/Complexities (which will affect course of rehabilitation):   []None           Arthritic conditions   []Rheumatoid arthritis (M05.9)  [x]Osteoarthritis (M19.91) Cardiovascular conditions   []Hypertension (I10)  []Hyperlipidemia (E78.5)  []Angina pectoris (I20)  []Atherosclerosis (I70)  []CVA Musculoskeletal conditions   []Disc pathology   []Congenital spine pathologies   []Prior surgical intervention  []Osteoporosis (M81.8)  []Osteopenia (M85.8)   Endocrine conditions   []Hypothyroid (E03.9)  []Hyperthyroid Gastrointestinal conditions   []Constipation (P59.46)   Metabolic conditions   []Morbid obesity (E66.01)  []Diabetes type 1(E10.65) or 2 (E11.65)   []Neuropathy (G60.9)     Pulmonary conditions   []Asthma (J45)  []Coughing   []COPD (J44.9)   Psychological Disorders  []Anxiety (F41.9)  []Depression (F32.9)   []Other:   [x]Other:    L/R r TSA's, LTKA   Thyroid CA       Barriers to/and or personal factors that will affect rehab potential:              [x]Age  []Sex    []Smoker              []Motivation/Lack of Motivation                        [x]Co-Morbidities              []Cognitive Function, education/learning barriers              []Environmental, home barriers              []profession/work barriers  []past PT/medical experience  []other:  Justification:     Falls Risk Assessment (30 days):   [x] Falls Risk assessed and no intervention required.   [] Falls Risk assessed and Patient requires intervention due to being higher risk   TUG score (>12s at risk):     [] Falls education provided, including         ASSESSMENT: Skilled PT services are required to address the following impairments   Functional Impairments:     []Noted lumbar/proximal hip/LE hypomobility   [x]Decreased LE functional ROM   []Decreased core/proximal hip strength and neuromuscular control   [x]Decreased LE functional strength   [x]Reduced balance/proprioceptive control   []other:      Functional Activity Limitations (from functional questionnaire and intake)   [x]Reduced ability to tolerate prolonged functional positions   []Reduced ability or difficulty with changes of positions or transfers between positions   []Reduced ability to maintain good posture and demonstrate good body mechanics with sitting, bending, and lifting   [x]Reduced ability to sleep   [x] Reduced ability or tolerance with driving and/or computer work   [x]Reduced ability to perform lifting, carrying tasks   [x]Reduced ability to squat   []Reduced ability to forward bend   [x]Reduced ability to ambulate prolonged functional periods/distances/surfaces   [x]Reduced ability to ascend/descend stairs   []Reduced ability to run, hop or jump   []other:     Participation Restrictions   [x]Reduced participation in self care activities   [x]Reduced participation in home management activities   []Reduced participation in work activities   [x]Reduced participation in social activities. []Reduced participation in sport activities. Classification :    [x]Signs/symptoms consistent with post-surgical status including decreased ROM, strength and function.    []Signs/symptoms consistent with joint sprain/strain   []Signs/symptoms consistent with patella-femoral syndrome   []Signs/symptoms consistent with knee OA/hip OA   []Signs/symptoms consistent with internal derangement of knee/Hip   []Signs/symptoms consistent with functional hip weakness/NMR control      []Signs/symptoms consistent with tendinitis/tendinosis    []signs/symptoms consistent with pathology which may benefit from Dry needling      []other:      Prognosis/Rehab Potential:      []Excellent   [x]Good    []Fair   []Poor    Tolerance of evaluation/treatment:    []Excellent   [x]Good    []Fair   []Poor    Physical Therapy Evaluation Complexity Justification  [x] A history of present problem with:  [] no personal factors and/or comorbidities that impact the plan of care;  [x]1-2 personal factors and/or comorbidities that impact the plan of care  []3 personal factors and/or comorbidities that impact the plan of care  [x] An examination of body systems using standardized tests and measures addressing any of the following: body structures and functions (impairments), activity limitations, and/or participation restrictions;:  [x] a total of 1-2 or more elements   [] a total of 3 or more elements   [] a total of 4 or more elements   [x] A clinical presentation with:  [x] stable and/or uncomplicated characteristics   [] evolving clinical presentation with changing characteristics  [] unstable and unpredictable characteristics;   [x] Clinical decision making of [x] low, [] moderate, [] high complexity using standardized patient assessment instrument and/or measurable assessment of functional outcome. [x] EVAL (LOW) 27500 (typically 20 minutes face-to-face)  [] EVAL (MOD) 13138 (typically 30 minutes face-to-face)  [] EVAL (HIGH) 83201 (typically 45 minutes face-to-face)  [] RE-EVAL     PLAN:  Frequency/Duration:  2-3 days per week for 4-6 Weeks:  Interventions:  [x]  Therapeutic exercise including: strength training, ROM, for Lower extremity and core   [x]  NMR activation and proprioception for LE, Glutes and Core   [x]  Manual therapy as indicated for LE, Hip and spine to include: Dry Needling/IASTM, STM, PROM, Gr I-IV mobilizations, manipulation. [x] Modalities as needed that may include: thermal agents, E-stim, Biofeedback, US, iontophoresis as indicated  [x] Patient education on joint protection, postural re-education, activity modification, progression of HEP. HEP instruction:     GOALS:  Patient stated goal: Decrease pain, improve strength and normal walking  [] Progressing: [] Met: [] Not Met: [] Adjusted    Therapist goals for Patient:   Short Term Goals: To be achieved in: 2 weeks  1. Independent in HEP and progression per patient tolerance, in order to prevent re-injury. [] Progressing: [] Met: [] Not Met: [] Adjusted      Long Term Goals: To be achieved in: 4-6 weeks  1. score of 50 or betterfor the LEFS to assist with reaching prior level of function.    [] Progressing: [] Met: [] Not Met: [] Adjusted  2. Patient will demonstrate increased AROM to 2-115 or better at right knee  to allow for proper joint functioning as indicated by patients Functional Deficits. [] Progressing: [] Met: [] Not Met: [] Adjusted  3. Patient will demonstrate an increase in Strength to good proximal hip strength and control in LE to allow for proper functional mobility as indicated by patients Functional Deficits. [] Progressing: [] Met: [] Not Met: [] Adjusted  4. Patient will return to normal walking, stair climbing  and household functional activities without increased symptoms or restriction. [] Progressing: [] Met: [] Not Met: [] Adjusted  5. Patient will have a decrease in pain 0-3/10 to facilitate improvement in movement, function, and ADLs as indicated by Functional Deficits. [] Progressing: [] Met: [] Not Met: [] Adjusted    Electronically signed by:  Lg Damian PT      Note: If patient does not return for scheduled/recommended follow up visits, this note will serve as a discharge from care along with the most recent update on progress.

## 2021-10-13 ENCOUNTER — HOSPITAL ENCOUNTER (OUTPATIENT)
Dept: PHYSICAL THERAPY | Age: 66
Setting detail: THERAPIES SERIES
Discharge: HOME | End: 2021-10-13
Payer: MEDICARE

## 2021-10-13 PROCEDURE — 97140 MANUAL THERAPY 1/> REGIONS: CPT

## 2021-10-13 PROCEDURE — 97016 VASOPNEUMATIC DEVICE THERAPY: CPT

## 2021-10-13 PROCEDURE — 97110 THERAPEUTIC EXERCISES: CPT

## 2021-10-13 NOTE — FLOWSHEET NOTE
[facility]  [address]. [place]  Phone: [phone]   Fax:     [phone]      Physical Therapy Treatment Note/ Progress Report:     Date:  10/13/2021    Patient Name:  Shwetha Arce    :  1955  MRN: [id number]    Pertinent Medical History:     Medical/Treatment Diagnosis Information:  Diagnosis: OA right knee M17.11, s/p R TKA Z96.651  Treatment Diagnosis: Gait and mobility disturbance s/p R TKA    Insurance/Certification information:  PT Insurance Information: Medicare  Physician Information:  Referring Practitioner: Farzad De Jesus MD  Plan of care signed (Y/N): routed 10/11/21    Date of Patient follow up with Physician:      Progress Report: []  Yes  [x]  No     Date Range for reporting period:  Beginning:  10/13/2021  Ending:      Progress report due (10 Rx/or 30 days whichever is less):       Recertification due (POC duration/ or 90 days whichever is less):     Visit # POC/Insurance Allowable Auth Needed   2 BOMN []Yes   [x]No     Latex Allergy:  [x]NO      []YES  Preferred Language for Healthcare:   [x]English       []Other:    Functional Scale:      Date assessed: at eval  Test: LEFS  Score:    Pain level:  4-7/10     History of Injury:Patient stated complaint: h/o right knee pain ~10 years with resultant RKA, L TKA , lives with with her  in a 1 story ranch home, one step to enter, basement with washer/ dryer     SUBJECTIVE:    10/13/21 Pt reports her right leg has been aggravated by the swelling over the last day. OBJECTIVE:   Observation:    Test measurements:      RESTRICTIONS/PRECAUTIONS:     Exercises/Interventions:     Therapeutic Ex (87747)   Min: Reps/Resistance Notes/CUES   NU step L4 x 5 min    Leg press Bilat.  55# 20 x  RLE 45# 10 x 3     QSS 5\" 3 min     Incline 1 min x 2 @20%                        Mat ex     Strap assist knee   felx  Quad for improvements in LE, proximal hip, and core control with self care, mobility, lifting, ambulation.  [] (22502) Provided verbal/tactile cueing for activities related to improving balance, coordination, kinesthetic sense, posture, motor skill, proprioception  to assist with LE, proximal hip, and core control in self care, mobility, lifting, ambulation and eccentric single leg control. NMR and Therapeutic Activities:    [] (22261 or 59424) Provided verbal/tactile cueing for activities related to improving balance, coordination, kinesthetic sense, posture, motor skill, proprioception and motor activation to allow for proper function of core, proximal hip and LE with self care and ADLs and functional mobility.   [] (16303) Gait Re-education- Provided training and instruction to the patient for proper LE, core and proximal hip recruitment and positioning and eccentric body weight control with ambulation re-education including up and down stairs     Home Exercise Program:    [x] (63474) Reviewed/Progressed HEP activities related to strengthening, flexibility, endurance, ROM of core, proximal hip and LE for functional self-care, mobility, lifting and ambulation/stair navigation   [] (78274)Reviewed/Progressed HEP activities related to improving balance, coordination, kinesthetic sense, posture, motor skill, proprioception of core, proximal hip and LE for self care, mobility, lifting, and ambulation/stair navigation      Manual Treatments:  PROM / STM / Oscillations-Mobs:  G-I, II, III, IV (PA's, Inf., Post.)  [] (38102) Provided manual therapy to mobilize LE, proximal hip and/or LS spine soft tissue/joints for the purpose of modulating pain, promoting relaxation,  increasing ROM, reducing/eliminating soft tissue swelling/inflammation/restriction, improving soft tissue extensibility and allowing for proper ROM for normal function with self care, mobility, lifting and ambulation.        Charges:  Timed Code Treatment Minutes: 62   Total Treatment Minutes: 65      [] EVAL (LOW) 55188 (typically 20 minutes face-to-face)  [] EVAL (MOD) 44648 (typically 30 minutes face-to-face)  [] EVAL (HIGH) 10677 (typically 45 minutes face-to-face)  [] RE-EVAL     [x] OF(61265) x 2    [] Dry needle 1 or 2 Muscles (59675)  [] NMR (39518) x     [] Dry needle 3+ Muscles (65019)  [x] Manual (42104) x     [] Ultrasound (80933) x  [] TA (74054) x     [] Mech Traction (66519)  [] ES(attended) (58811)     [] ES (un) (04928):   [x] Vasopump (33133) [] Ionto (61351)   [] Other:    Iona Gomez stated goal: Decrease pain, improve strength and normal walking  []? Progressing: []? Met: []? Not Met: []? Adjusted     Therapist goals for Patient:   Short Term Goals: To be achieved in: 2 weeks  1. Independent in HEP and progression per patient tolerance, in order to prevent re-injury. []? Progressing: []? Met: []? Not Met: []? Adjusted        Long Term Goals: To be achieved in: 4-6 weeks  1. score of 50 or betterfor the LEFS to assist with reaching prior level of function. []? Progressing: []? Met: []? Not Met: []? Adjusted  2. Patient will demonstrate increased AROM to 2-115 or better at right knee  to allow for proper joint functioning as indicated by patients Functional Deficits. []? Progressing: []? Met: []? Not Met: []? Adjusted  3. Patient will demonstrate an increase in Strength to good proximal hip strength and control in LE to allow for proper functional mobility as indicated by patients Functional Deficits. []? Progressing: []? Met: []? Not Met: []? Adjusted  4. Patient will return to normal walking, stair climbing  and household functional activities without increased symptoms or restriction. []? Progressing: []? Met: []? Not Met: []? Adjusted  5. Patient will have a decrease in pain 0-3/10 to facilitate improvement in movement, function, and ADLs as indicated by Functional Deficits. []? Progressing: []? Met: []?  Not Met: []? Adjusted    ASSESSMENT:  See eval    Treatment/Activity Tolerance:  [x] Patient tolerated treatment well [] Patient limited by fatique  [] Patient limited by pain  [] Patient limited by other medical complications  [] Other:     Overall Progression Towards Functional goals/ Treatment Progress Update:  [] Patient is progressing as expected towards functional goals listed. [] Progression is slowed due to complexities/Impairments listed. [] Progression has been slowed due to co-morbidities. [x] Plan just implemented, too soon to assess goals progression <30days   [] Goals require adjustment due to lack of progress  [] Patient is not progressing as expected and requires additional follow up with physician  [] Other    Prognosis for POC: [x] Good [] Fair  [] Poor    Patient requires continued skilled intervention: [x] Yes  [] No        PLAN: Gait, balance training, proprioception, rom, strengthening, decrease swelling/ pain  [] Continue per plan of care [] Alter current plan (see comments)  [x] Plan of care initiated [] Hold pending MD visit [] Discharge    Electronically signed by: Veronica May PT    Note: If patient does not return for scheduled/recommended follow up visits, this note will serve as a discharge from care along with the most recent update on progress.

## 2021-10-15 ENCOUNTER — HOSPITAL ENCOUNTER (OUTPATIENT)
Dept: PHYSICAL THERAPY | Age: 66
Setting detail: THERAPIES SERIES
Discharge: HOME | End: 2021-10-15
Payer: MEDICARE

## 2021-10-15 PROCEDURE — 97140 MANUAL THERAPY 1/> REGIONS: CPT

## 2021-10-15 PROCEDURE — 97110 THERAPEUTIC EXERCISES: CPT

## 2021-10-15 PROCEDURE — 97016 VASOPNEUMATIC DEVICE THERAPY: CPT

## 2021-10-15 NOTE — FLOWSHEET NOTE
sets - 30 reps - 1 hold   Seated Table Hamstring Stretch - 1-2 x daily - 7 x weekly - 1 sets - 4 reps - 30 hold  Access Code: [email or web address]: 10/15/2021Prepared by: Zhao Hardy BloomExercises   Hooklying Active Hamstring Stretch - 2 x daily - 7 x weekly - 1-2 sets - 10 reps - 5 hold   Seated Table Hamstring Stretch - 2 x daily - 7 x weekly - 1 sets - 2-4 reps - 30 hold    Patient was instructed in the following for HEP:     . Patient verbalized/demonstrated understanding and was issued written handout. Therapeutic Exercise and NMR EXR  [] (86307) Provided verbal/tactile cueing for activities related to strengthening, flexibility, endurance, ROM for improvements in LE, proximal hip, and core control with self care, mobility, lifting, ambulation.  [] (88366) Provided verbal/tactile cueing for activities related to improving balance, coordination, kinesthetic sense, posture, motor skill, proprioception  to assist with LE, proximal hip, and core control in self care, mobility, lifting, ambulation and eccentric single leg control.      NMR and Therapeutic Activities:    [] (41730 or 88338) Provided verbal/tactile cueing for activities related to improving balance, coordination, kinesthetic sense, posture, motor skill, proprioception and motor activation to allow for proper function of core, proximal hip and LE with self care and ADLs and functional mobility.   [] (78740) Gait Re-education- Provided training and instruction to the patient for proper LE, core and proximal hip recruitment and positioning and eccentric body weight control with ambulation re-education including up and down stairs     Home Exercise Program:    [x] (56884) Reviewed/Progressed HEP activities related to strengthening, flexibility, endurance, ROM of core, proximal hip and LE for functional self-care, mobility, lifting and ambulation/stair navigation   [] (70804)Reviewed/Progressed HEP activities related to improving balance, coordination, kinesthetic sense, posture, motor skill, proprioception of core, proximal hip and LE for self care, mobility, lifting, and ambulation/stair navigation      Manual Treatments:  PROM / STM / Oscillations-Mobs:  G-I, II, III, IV (PA's, Inf., Post.)  [] (52738) Provided manual therapy to mobilize LE, proximal hip and/or LS spine soft tissue/joints for the purpose of modulating pain, promoting relaxation,  increasing ROM, reducing/eliminating soft tissue swelling/inflammation/restriction, improving soft tissue extensibility and allowing for proper ROM for normal function with self care, mobility, lifting and ambulation. Charges:  Timed Code Treatment Minutes: 62   Total Treatment Minutes: 65      [] EVAL (LOW) 20780 (typically 20 minutes face-to-face)  [] EVAL (MOD) 29840 (typically 30 minutes face-to-face)  [] EVAL (HIGH) 16817 (typically 45 minutes face-to-face)  [] RE-EVAL     [x] NC(55225) x 2    [] Dry needle 1 or 2 Muscles (64629)  [] NMR (90739) x     [] Dry needle 3+ Muscles (40142)  [x] Manual (94317) x     [] Ultrasound (10969) x  [] TA (51827) x     [] Mech Traction (00469)  [] ES(attended) (08692)     [] ES (un) (42893):   [x] Vasopump (63821) [] Ionto (01659)   [] Other:    Olman James stated goal: Decrease pain, improve strength and normal walking  []? Progressing: []? Met: []? Not Met: []? Adjusted     Therapist goals for Patient:   Short Term Goals: To be achieved in: 2 weeks  1. Independent in HEP and progression per patient tolerance, in order to prevent re-injury. []? Progressing: []? Met: []? Not Met: []? Adjusted        Long Term Goals: To be achieved in: 4-6 weeks  1. score of 50 or betterfor the LEFS to assist with reaching prior level of function. []? Progressing: []? Met: []? Not Met: []? Adjusted  2. Patient will demonstrate increased AROM to 2-115 or better at right knee  to allow for proper joint functioning as indicated by patients Functional Deficits.    []? on progress.

## 2021-10-18 ENCOUNTER — HOSPITAL ENCOUNTER (OUTPATIENT)
Dept: PHYSICAL THERAPY | Age: 66
Setting detail: THERAPIES SERIES
Discharge: HOME | End: 2021-10-18
Payer: MEDICARE

## 2021-10-18 PROCEDURE — 97110 THERAPEUTIC EXERCISES: CPT

## 2021-10-18 PROCEDURE — 97016 VASOPNEUMATIC DEVICE THERAPY: CPT

## 2021-10-18 PROCEDURE — 97140 MANUAL THERAPY 1/> REGIONS: CPT

## 2021-10-18 NOTE — FLOWSHEET NOTE
[facility] - Outpatient Rehabilitation & Therapy  [address] [facility]. [place]  Phone: [phone]   Fax:     [phone]      Physical Therapy Treatment Note/ Progress Report:     Date:  10/18/2021    Patient Name:  Pedro Parmar    :  1955  MRN: [id number]    Pertinent Medical History:     Medical/Treatment Diagnosis Information:  Diagnosis: OA right knee M17.11, s/p R TKA Z96.651  Treatment Diagnosis: Gait and mobility disturbance s/p R TKA    Insurance/Certification information:  PT Insurance Information: Medicare  Physician Information:  Referring Practitioner: Dorian Jeronimo MD  Plan of care signed (Y/N): routed 10/11/21    Date of Patient follow up with Physician:      Progress Report: []  Yes  [x]  No     Date Range for reporting period:  Beginning:  10/18/2021  Ending:      Progress report due (10 Rx/or 30 days whichever is less):       Recertification due (POC duration/ or 90 days whichever is less):     Visit # POC/Insurance Allowable Auth Needed   4 BOMN []Yes   [x]No     Latex Allergy:  [x]NO      []YES  Preferred Language for Healthcare:   [x]English       []Other:    Functional Scale:      Date assessed: at eval  Test: LEFS  Score:    Pain level:  7/10     History of Injury:Patient stated complaint: h/o right knee pain ~10 years with resultant RKA, L TKA , lives with with her  in a 1 story ranch home, one step to enter, basement with washer/ dryer     SUBJECTIVE:    10/13/21 Pt reports her right leg has been aggravated by the swelling over the last day. 10/15/21 Pt reports having experienced diarrhea  the last 2 days she is not feeling as well today. 10/18/21: Pt reports that she had a rough day on Saturday and did not do any exercises. She took meds today prior to therapy. Notes edema in R calf and ankle.     OBJECTIVE:   Observation:    Test measurements:    ROM:  Date           Knee Flexion Knee Extension    Active Passive Active Passive    Eval       10/18/21 98  0                             Strength:  Date Hip flexion Hip abduction Quad Hamstring Ankle DF Ankle PF   Eval                                                 RESTRICTIONS/PRECAUTIONS:     Exercises/Interventions:     Therapeutic Ex (95393)   Min: Reps/Resistance Notes/CUES   NU step L4 x 5 min    Leg press Bilat. 55# 20 x  RLE 45# 10 x 3     QSS 5\" 3 min     Incline 1 min x 2 @20%                        Mat ex     Strap assist knee   flex  Quad setting  5\" 25 x   5\" 25 x     TKE Bilat 2.5# L and 1.5# on R 30 x ea     LAQ     Hams curls      Hams sets  5\" 10 x                    Manual Intervention (96282)  Min: 10 min      Knee mobs/PROM Knee flexion and ext. And tibial femoral IR/ER     Tib/Fem Mobs     Patella Mobs     Ankle mobs     stretch To right rectus fem. And gastroc. NMR re-education (53560)  Min:  CUES NEEDED   SLS     Wobble     Step ups fwd/ lat                    Therapeutic Activity (77049)  Min:               Modalities  Min:     Game ready  Medium compression 15 min     CP after exercises     MH after exercises            Other Therapeutic Activities: Pt was educated on PT POC, Diagnosis, Prognosis, pathomechanics as well as frequency and duration of scheduling future physical therapy appointments. Time was also taken on this day to answer all patient questions and participation in PT. Reviewed appointment policy in detail with patient and patient verbalized understanding. Home Exercise Program:   Access Code: [email or web address]: [email or web address]/Date: 10/11/2021Prepared by: Sebastien Gaona BloomExercises   Long Sitting Quad Set - 1-2 x daily - 7 x weekly - 1-2 sets - 20 reps - 5 hold   Supine Active Straight Leg Raise - 1-2 x daily - 7 x weekly - 1-2 sets - 10 reps - 1 hold   Sitting Heel Slide with Towel - 1-2 x daily - 7 x weekly - 1 sets - 12 reps - 10 hold   Seated Long Arc Quad - 1-2 x daily - 7 x weekly - 1-2 sets - 15 reps - 3 hold   Long Sitting Calf Stretch with Strap - 1-2 x daily - 7 x weekly - 1 sets - 12 reps - 10 hold   Ankle Pumps in Elevation - 1-2 x daily - 7 x weekly - 1-2 sets - 30 reps - 1 hold   Seated Table Hamstring Stretch - 1-2 x daily - 7 x weekly - 1 sets - 4 reps - 30 hold  Access Code: [email or web address]: [employer]/Date: 10/15/2021Prepared by: Solitario Gutierrez BloomExercises   Hooklying Active Hamstring Stretch - 2 x daily - 7 x weekly - 1-2 sets - 10 reps - 5 hold   Seated Table Hamstring Stretch - 2 x daily - 7 x weekly - 1 sets - 2-4 reps - 30 hold    Patient was instructed in the following for HEP:     . Patient verbalized/demonstrated understanding and was issued written handout. Therapeutic Exercise and NMR EXR  [] (52027) Provided verbal/tactile cueing for activities related to strengthening, flexibility, endurance, ROM for improvements in LE, proximal hip, and core control with self care, mobility, lifting, ambulation.  [] (65006) Provided verbal/tactile cueing for activities related to improving balance, coordination, kinesthetic sense, posture, motor skill, proprioception  to assist with LE, proximal hip, and core control in self care, mobility, lifting, ambulation and eccentric single leg control.      NMR and Therapeutic Activities:    [] (27026 or 93789) Provided verbal/tactile cueing for activities related to improving balance, coordination, kinesthetic sense, posture, motor skill, proprioception and motor activation to allow for proper function of core, proximal hip and LE with self care and ADLs and functional mobility.   [] (71249) Gait Re-education- Provided training and instruction to the patient for proper LE, core and proximal hip recruitment and positioning and eccentric body weight control with ambulation re-education including up and down stairs     Home Exercise Program:    [x] (00032) Reviewed/Progressed HEP activities related to strengthening, flexibility, endurance, ROM of core, proximal hip and LE for functional self-care, mobility, lifting and ambulation/stair navigation   [] (13233)Reviewed/Progressed HEP activities related to improving balance, coordination, kinesthetic sense, posture, motor skill, proprioception of core, proximal hip and LE for self care, mobility, lifting, and ambulation/stair navigation      Manual Treatments:  PROM / STM / Oscillations-Mobs:  G-I, II, III, IV (PA's, Inf., Post.)  [] (71734) Provided manual therapy to mobilize LE, proximal hip and/or LS spine soft tissue/joints for the purpose of modulating pain, promoting relaxation,  increasing ROM, reducing/eliminating soft tissue swelling/inflammation/restriction, improving soft tissue extensibility and allowing for proper ROM for normal function with self care, mobility, lifting and ambulation. Charges:  Timed Code Treatment Minutes: 58   Total Treatment Minutes: 73    10/18/21: More time available today  [] EVAL (LOW) 11836 (typically 20 minutes face-to-face)  [] EVAL (MOD) 83393 (typically 30 minutes face-to-face)  [] EVAL (HIGH) 36064 (typically 45 minutes face-to-face)  [] RE-EVAL     [x] AS(98910) x 2    [] Dry needle 1 or 2 Muscles (17861)  [] NMR (42733) x     [] Dry needle 3+ Muscles (05418)  [x] Manual (30822) x  2   [] Ultrasound (18995) x  [] TA (88853) x     [] Mech Traction (79630)  [] ES(attended) (33180)     [] ES (un) (39470):   [x] Vasopump (80675) [] Ionto (52232)   [] Other:    Jeff Oerilly stated goal: Decrease pain, improve strength and normal walking  []? Progressing: []? Met: []? Not Met: []? Adjusted     Therapist goals for Patient:   Short Term Goals: To be achieved in: 2 weeks  1. Independent in HEP and progression per patient tolerance, in order to prevent re-injury. []? Progressing: []? Met: []? Not Met: []? Adjusted        Long Term Goals:  To be achieved in: 4-6 weeks  1. score of 50 or betterfor the LEFS to assist with reaching prior level of function. []? Progressing: []? Met: []? Not Met: []? Adjusted  2. Patient will demonstrate increased AROM to 2-115 or better at right knee  to allow for proper joint functioning as indicated by patients Functional Deficits. []? Progressing: []? Met: []? Not Met: []? Adjusted  3. Patient will demonstrate an increase in Strength to good proximal hip strength and control in LE to allow for proper functional mobility as indicated by patients Functional Deficits. []? Progressing: []? Met: []? Not Met: []? Adjusted  4. Patient will return to normal walking, stair climbing  and household functional activities without increased symptoms or restriction. []? Progressing: []? Met: []? Not Met: []? Adjusted  5. Patient will have a decrease in pain 0-3/10 to facilitate improvement in movement, function, and ADLs as indicated by Functional Deficits. []? Progressing: []? Met: []? Not Met: []? Adjusted    ASSESSMENT:  KX modifier applied today, as patient had a right TKR on 10/8/21 and requires skilled therapy to maximize outcome of surgery and achieve maximum benefit. Treatment/Activity Tolerance:  [x] Patient tolerated treatment well [] Patient limited by fatique  [] Patient limited by pain  [] Patient limited by other medical complications  [] Other:     Overall Progression Towards Functional goals/ Treatment Progress Update:  [] Patient is progressing as expected towards functional goals listed. [] Progression is slowed due to complexities/Impairments listed. [] Progression has been slowed due to co-morbidities.   [x] Plan just implemented, too soon to assess goals progression <30days   [] Goals require adjustment due to lack of progress  [] Patient is not progressing as expected and requires additional follow up with physician  [] Other    Prognosis for POC: [x] Good [] Fair  [] Poor    Patient requires continued skilled intervention: [x] Yes  [] No        PLAN: Gait, balance training, proprioception, rom, strengthening, decrease swelling/ pain  [x] Continue per plan of care [] Alter current plan (see comments)  [] Plan of care initiated [] Hold pending MD visit [] Discharge    Electronically signed by: Yovany Dan PT    Note: If patient does not return for scheduled/recommended follow up visits, this note will serve as a discharge from care along with the most recent update on progress.

## 2021-10-20 ENCOUNTER — HOSPITAL ENCOUNTER (OUTPATIENT)
Dept: PHYSICAL THERAPY | Age: 66
Setting detail: THERAPIES SERIES
Discharge: HOME | End: 2021-10-20
Payer: MEDICARE

## 2021-10-20 PROCEDURE — 97016 VASOPNEUMATIC DEVICE THERAPY: CPT

## 2021-10-20 PROCEDURE — 97140 MANUAL THERAPY 1/> REGIONS: CPT

## 2021-10-20 PROCEDURE — 97110 THERAPEUTIC EXERCISES: CPT

## 2021-10-20 NOTE — FLOWSHEET NOTE
[facility] - Outpatient Rehabilitation & Therapy  [address]. [place]  Phone: [phone]   Fax:     [phone]      Physical Therapy Treatment Note/ Progress Report:     Date:  10/20/2021    Patient Name:  Vashti Collins    :  1955  MRN: [id number]    Pertinent Medical History:     Medical/Treatment Diagnosis Information:  Diagnosis: OA right knee M17.11, s/p R TKA Z96.651  Treatment Diagnosis: Gait and mobility disturbance s/p R TKA    Insurance/Certification information:  PT Insurance Information: Medicare  Physician Information:  Referring Practitioner: Camila Benson MD  Plan of care signed (Y/N): routed 10/11/21    Date of Patient follow up with Physician:      Progress Report: []  Yes  [x]  No     Date Range for reporting period:  Beginning:  10/20/2021  Ending:      Progress report due (10 Rx/or 30 days whichever is less):       Recertification due (POC duration/ or 90 days whichever is less):     Visit # POC/Insurance Allowable Auth Needed   5 BOMN []Yes   [x]No     Latex Allergy:  [x]NO      []YES  Preferred Language for Healthcare:   [x]English       []Other:    Functional Scale:      Date assessed: at eval  Test: LEFS  Score:    Pain level:  6/10     History of Injury:Patient stated complaint: h/o right knee pain ~10 years with resultant RKA, L TKA , lives with with her  in a 1 story ranch home, one step to enter, basement with washer/ dryer     SUBJECTIVE:    10/13/21 Pt reports her right leg has been aggravated by the swelling over the last day. 10/15/21 Pt reports having experienced diarrhea  the last 2 days she is not feeling as well today. 10/18/21: Pt reports that she had a rough day on Saturday and did not do any exercises. She took meds today prior to therapy. Notes edema in R calf and ankle. 10/20/21: Pt reports that she did not sleep well last night and still has swelling.  She did her HEP with greater ease yesterday. OBJECTIVE:   Observation:    Test measurements:    ROM:  Date           Knee Flexion       Knee Extension    Active Passive Active Passive    Eval       10/18/21 98  0                             Strength:  Date Hip flexion Hip abduction Quad Hamstring Ankle DF Ankle PF   Eval                                                 RESTRICTIONS/PRECAUTIONS:     Exercises/Interventions:     Therapeutic Ex (21388)   Min: Reps/Resistance Notes/CUES   NU step L4 x 5 min    Leg press Bilat. 55# 20 x  RLE 45# 10 x 3     QSS 5\" 3 min     Incline 1 min x 2 @20%    minisquats 10X                   Mat ex     Strap assist knee   flex  Quad setting  5\" 25 x   5\" 25 x     TKE Bilat 2.5# L and 1.5# on R 30 x ea     LAQ 20X    Hams curls - standing 10X 5 sec       SLR 2 x 10              Manual Intervention (66698)  Min: 10 min      Knee mobs/PROM Knee flexion and ext. And tibial femoral IR/ER     Tib/Fem Mobs     Patella Mobs     Ankle mobs     stretch To right rectus fem. And gastroc. NMR re-education (32094)  Min:  CUES NEEDED   SLS     Wobble     Step ups fwd/ lat                    Therapeutic Activity (45332)  Min:               Modalities  Min:     Game ready  Medium compression 15 min     CP after exercises     MH after exercises            Other Therapeutic Activities: Pt was educated on PT POC, Diagnosis, Prognosis, pathomechanics as well as frequency and duration of scheduling future physical therapy appointments. Time was also taken on this day to answer all patient questions and participation in PT. Reviewed appointment policy in detail with patient and patient verbalized understanding. Home Exercise Program:   Access Code: [email or web address]: [employer]. [employer]/Date: 10/11/2021Prepared by: Bandar Samayoa   Long Sitting Quad Set - 1-2 x daily - 7 x weekly - 1-2 sets - 20 reps - 5 hold   Supine Active Straight Leg Raise - 1-2 x daily - 7 x weekly - 1-2 sets - 10 reps - 1 hold   Sitting Heel Slide with Towel - 1-2 x daily - 7 x weekly - 1 sets - 12 reps - 10 hold   Seated Long Arc Quad - 1-2 x daily - 7 x weekly - 1-2 sets - 15 reps - 3 hold   Long Sitting Calf Stretch with Strap - 1-2 x daily - 7 x weekly - 1 sets - 12 reps - 10 hold   Ankle Pumps in Elevation - 1-2 x daily - 7 x weekly - 1-2 sets - 30 reps - 1 hold   Seated Table Hamstring Stretch - 1-2 x daily - 7 x weekly - 1 sets - 4 reps - 30 hold  Access Code: [email or web address]: [employer]/Date: 10/15/2021Prepared by: Bandar Fritz BloomExercises   Hooklying Active Hamstring Stretch - 2 x daily - 7 x weekly - 1-2 sets - 10 reps - 5 hold   Seated Table Hamstring Stretch - 2 x daily - 7 x weekly - 1 sets - 2-4 reps - 30 hold    Patient was instructed in the following for HEP:     . Patient verbalized/demonstrated understanding and was issued written handout. Access Code: [email or web address]  URL: [employer]/  Date: 10/20/2021  Prepared by: Elsy Gallo    Exercises  Standing Knee Flexion AROM with Chair Support - 1 x daily - 7 x weekly - 3 sets - 10 reps    Therapeutic Exercise and NMR EXR  [x] (25307) Provided verbal/tactile cueing for activities related to strengthening, flexibility, endurance, ROM for improvements in LE, proximal hip, and core control with self care, mobility, lifting, ambulation.  [] (12361) Provided verbal/tactile cueing for activities related to improving balance, coordination, kinesthetic sense, posture, motor skill, proprioception  to assist with LE, proximal hip, and core control in self care, mobility, lifting, ambulation and eccentric single leg control.      NMR and Therapeutic Activities:    [] (63183 or 22415) Provided verbal/tactile cueing for activities related to improving balance, coordination, kinesthetic sense, posture, motor skill, proprioception and motor activation to allow for proper function of core, proximal hip and LE with self care and ADLs and functional mobility.   [] (15356) Gait Re-education- Provided training and instruction to the patient for proper LE, core and proximal hip recruitment and positioning and eccentric body weight control with ambulation re-education including up and down stairs     Home Exercise Program:    [x] (03792) Reviewed/Progressed HEP activities related to strengthening, flexibility, endurance, ROM of core, proximal hip and LE for functional self-care, mobility, lifting and ambulation/stair navigation   [] (30611)Reviewed/Progressed HEP activities related to improving balance, coordination, kinesthetic sense, posture, motor skill, proprioception of core, proximal hip and LE for self care, mobility, lifting, and ambulation/stair navigation      Manual Treatments:  PROM / STM / Oscillations-Mobs:  G-I, II, III, IV (PA's, Inf., Post.)  [x] (28586) Provided manual therapy to mobilize LE, proximal hip and/or LS spine soft tissue/joints for the purpose of modulating pain, promoting relaxation,  increasing ROM, reducing/eliminating soft tissue swelling/inflammation/restriction, improving soft tissue extensibility and allowing for proper ROM for normal function with self care, mobility, lifting and ambulation. Charges:  Timed Code Treatment Minutes: 65   Total Treatment Minutes: 80    10/18/21: More time available today  [] EVAL (LOW) 09544 (typically 20 minutes face-to-face)  [] EVAL (MOD) 50403 (typically 30 minutes face-to-face)  [] EVAL (HIGH) 32361 (typically 45 minutes face-to-face)  [] RE-EVAL     [x] GB(17857) x 3    [] Dry needle 1 or 2 Muscles (80258)  [] NMR (37548) x     [] Dry needle 3+ Muscles (37460)  [x] Manual (50307) x  1   [] Ultrasound (34716) x  [] TA (73737) x     [] Mech Traction (09831)  [] ES(attended) (59849)     [] ES (un) (22522):   [x] Vasopump (57938)  [] Ionto (64116)   [] Other:    Robert Llanes stated goal: Decrease pain, improve strength and normal walking  []? Progressing: []? Met: []?  Not Met: []? Adjusted     Therapist goals for Patient:   Short Term Goals: To be achieved in: 2 weeks  1. Independent in HEP and progression per patient tolerance, in order to prevent re-injury. []? Progressing: []? Met: []? Not Met: []? Adjusted        Long Term Goals: To be achieved in: 4-6 weeks  1. score of 50 or betterfor the LEFS to assist with reaching prior level of function. []? Progressing: []? Met: []? Not Met: []? Adjusted  2. Patient will demonstrate increased AROM to 2-115 or better at right knee  to allow for proper joint functioning as indicated by patients Functional Deficits. []? Progressing: []? Met: []? Not Met: []? Adjusted  3. Patient will demonstrate an increase in Strength to good proximal hip strength and control in LE to allow for proper functional mobility as indicated by patients Functional Deficits. []? Progressing: []? Met: []? Not Met: []? Adjusted  4. Patient will return to normal walking, stair climbing  and household functional activities without increased symptoms or restriction. []? Progressing: []? Met: []? Not Met: []? Adjusted  5. Patient will have a decrease in pain 0-3/10 to facilitate improvement in movement, function, and ADLs as indicated by Functional Deficits. []? Progressing: []? Met: []? Not Met: []? Adjusted    ASSESSMENT:  KX modifier applied 10/18/21, as patient had a right TKR on 10/8/21 and requires skilled therapy to maximize outcome of surgery and achieve maximum benefit. PT reviewed HEP that patient had been given previously. Treatment/Activity Tolerance:  [x] Patient tolerated treatment well [] Patient limited by fatique  [] Patient limited by pain  [] Patient limited by other medical complications  [] Other:     Overall Progression Towards Functional goals/ Treatment Progress Update:  [] Patient is progressing as expected towards functional goals listed. [] Progression is slowed due to complexities/Impairments listed.   [] Progression has been slowed due to co-morbidities. [x] Plan just implemented, too soon to assess goals progression <30days   [] Goals require adjustment due to lack of progress  [] Patient is not progressing as expected and requires additional follow up with physician  [] Other    Prognosis for POC: [x] Good [] Fair  [] Poor    Patient requires continued skilled intervention: [x] Yes  [] No        PLAN: Gait, balance training, proprioception, rom, strengthening, decrease swelling/ pain  [x] Continue per plan of care [] Alter current plan (see comments)  [] Plan of care initiated [] Hold pending MD visit [] Discharge    Electronically signed by: Luke Pelletier PT    Note: If patient does not return for scheduled/recommended follow up visits, this note will serve as a discharge from care along with the most recent update on progress.

## 2021-10-22 ENCOUNTER — HOSPITAL ENCOUNTER (OUTPATIENT)
Dept: PHYSICAL THERAPY | Age: 66
Setting detail: THERAPIES SERIES
Discharge: HOME | End: 2021-10-22
Payer: MEDICARE

## 2021-10-22 PROCEDURE — 97016 VASOPNEUMATIC DEVICE THERAPY: CPT

## 2021-10-22 PROCEDURE — 97140 MANUAL THERAPY 1/> REGIONS: CPT

## 2021-10-22 PROCEDURE — 97110 THERAPEUTIC EXERCISES: CPT

## 2021-10-22 NOTE — FLOWSHEET NOTE
[facility] - Outpatient Rehabilitation & Therapy  [address] [facility]. [address] [place] [address]  Phone: [phone]   Fax:     [phone]      Physical Therapy Treatment Note/ Progress Report:     Date:  10/22/2021    Patient Name:  Jared Duran    :  1955  MRN: [id number]    Pertinent Medical History:     Medical/Treatment Diagnosis Information:  Diagnosis: OA right knee M17.11, s/p R TKA Z96.651  Treatment Diagnosis: Gait and mobility disturbance s/p R TKA    Insurance/Certification information:  PT Insurance Information: Medicare  Physician Information:  Referring Practitioner: Dina Carlos MD  Plan of care signed (Y/N): routed 10/11/21    Date of Patient follow up with Physician:      Progress Report: []  Yes  [x]  No     Date Range for reporting period:  Beginning:  10/22/2021  Ending:      Progress report due (10 Rx/or 30 days whichever is less):       Recertification due (POC duration/ or 90 days whichever is less):     Visit # POC/Insurance Allowable Auth Needed   6 BOMN []Yes   [x]No     Latex Allergy:  [x]NO      []YES  Preferred Language for Healthcare:   [x]English       []Other:    Functional Scale:      Date assessed: at eval  Test: LEFS  Score:    Pain level:  6/10     History of Injury:Patient stated complaint: h/o right knee pain ~10 years with resultant RKA, L TKA , lives with with her  in a 1 story ranch home, one step to enter, basement with washer/ dryer     SUBJECTIVE:    10/13/21 Pt reports her right leg has been aggravated by the swelling over the last day. 10/15/21 Pt reports having experienced diarrhea  the last 2 days she is not feeling as well today. 10/18/21: Pt reports that she had a rough day on Saturday and did not do any exercises. She took meds today prior to therapy. Notes edema in R calf and ankle. 10/20/21: Pt reports that she did not sleep well last night and still has swelling.  She did her HEP with greater ease yesterday. 10/22/21: Pt reports that she had a rough morning due to constipation, bad night last night, and swelling is down though. OBJECTIVE:   Observation:    Test measurements:    ROM:  Date           Knee Flexion       Knee Extension    Active Passive Active Passive    Eval       10/18/21 98  0    10/22 94  -5                      Strength:  Date Hip flexion Hip abduction Quad Hamstring Ankle DF Ankle PF   Eval                                                 RESTRICTIONS/PRECAUTIONS:     Exercises/Interventions:     Therapeutic Ex (44593)   Min: Reps/Resistance Notes/CUES   NU step L4 x 5 min    Leg press Bilat. 65# 20 x  RLE 45# 10 x 3     QSS/HSS 5\" 3 min     Incline 1 min x 2 @20%    minisquats 10X                   Mat ex     Strap assist knee   flex  Quad setting  5\" 25 x   5\" 25 x     TKE Bilat 2.5# L and 1.5# on R 30 x ea     LAQ 20X    Hams curls - standing 10X 5 sec       SLR 2 x 10              Manual Intervention (95761)  Min: 10 min      Knee mobs/PROM Knee flexion and ext. And tibial femoral IR/ER     Tib/Fem Mobs     Patella Mobs     Ankle mobs     stretch To right rectus fem. And gastroc. NMR re-education (74579)  Min:  CUES NEEDED   SLS     Wobble     Step ups fwd/ lat                    Therapeutic Activity (35482)  Min:               Modalities  Min:     Game ready  Medium compression 15 min     CP after exercises     MH after exercises            Other Therapeutic Activities: Pt was educated on PT POC, Diagnosis, Prognosis, pathomechanics as well as frequency and duration of scheduling future physical therapy appointments. Time was also taken on this day to answer all patient questions and participation in PT. Reviewed appointment policy in detail with patient and patient verbalized understanding. Home Exercise Program:   Access Code: [email or web address]: [employer]. com/Date: 10/11/2021Prepared by: Rush Garcia   Long Sitting [employer] Set - 1-2 x daily - 7 x weekly - 1-2 sets - 20 reps - 5 hold   Supine Active Straight Leg Raise - 1-2 x daily - 7 x weekly - 1-2 sets - 10 reps - 1 hold   Sitting Heel Slide with Towel - 1-2 x daily - 7 x weekly - 1 sets - 12 reps - 10 hold   Seated Long Arc Quad - 1-2 x daily - 7 x weekly - 1-2 sets - 15 reps - 3 hold   Long Sitting Calf Stretch with Strap - 1-2 x daily - 7 x weekly - 1 sets - 12 reps - 10 hold   Ankle Pumps in Elevation - 1-2 x daily - 7 x weekly - 1-2 sets - 30 reps - 1 hold   Seated Table Hamstring Stretch - 1-2 x daily - 7 x weekly - 1 sets - 4 reps - 30 hold  Access Code: [email or web address]: [employer]/Date: 10/15/2021Prepared by: Denis Side BloomExercises   Hooklying Active Hamstring Stretch - 2 x daily - 7 x weekly - 1-2 sets - 10 reps - 5 hold   Seated Table Hamstring Stretch - 2 x daily - 7 x weekly - 1 sets - 2-4 reps - 30 hold    Patient was instructed in the following for HEP:     . Patient verbalized/demonstrated understanding and was issued written handout. Access Code: [email or web address]  URL: [employer]/  Date: 10/20/2021  Prepared by: Cathrine Holter    Exercises  Standing Knee Flexion AROM with Chair Support - 1 x daily - 7 x weekly - 3 sets - 10 reps    Therapeutic Exercise and NMR EXR  [x] (30438) Provided verbal/tactile cueing for activities related to strengthening, flexibility, endurance, ROM for improvements in LE, proximal hip, and core control with self care, mobility, lifting, ambulation.  [] (06684) Provided verbal/tactile cueing for activities related to improving balance, coordination, kinesthetic sense, posture, motor skill, proprioception  to assist with LE, proximal hip, and core control in self care, mobility, lifting, ambulation and eccentric single leg control.      NMR and Therapeutic Activities:    [] (09222 or 70419) Provided verbal/tactile cueing for activities related to improving balance, coordination, kinesthetic sense, posture, motor skill, proprioception and motor activation to allow for proper function of core, proximal hip and LE with self care and ADLs and functional mobility.   [] (20418) Gait Re-education- Provided training and instruction to the patient for proper LE, core and proximal hip recruitment and positioning and eccentric body weight control with ambulation re-education including up and down stairs     Home Exercise Program:    [x] (06909) Reviewed/Progressed HEP activities related to strengthening, flexibility, endurance, ROM of core, proximal hip and LE for functional self-care, mobility, lifting and ambulation/stair navigation   [] (25560)Reviewed/Progressed HEP activities related to improving balance, coordination, kinesthetic sense, posture, motor skill, proprioception of core, proximal hip and LE for self care, mobility, lifting, and ambulation/stair navigation      Manual Treatments:  PROM / STM / Oscillations-Mobs:  G-I, II, III, IV (PA's, Inf., Post.)  [x] (61983) Provided manual therapy to mobilize LE, proximal hip and/or LS spine soft tissue/joints for the purpose of modulating pain, promoting relaxation,  increasing ROM, reducing/eliminating soft tissue swelling/inflammation/restriction, improving soft tissue extensibility and allowing for proper ROM for normal function with self care, mobility, lifting and ambulation.        Charges:  Timed Code Treatment Minutes: 65   Total Treatment Minutes: 80    10/18/21: More time available today  [] EVAL (LOW) 12542 (typically 20 minutes face-to-face)  [] EVAL (MOD) 47452 (typically 30 minutes face-to-face)  [] EVAL (HIGH) 39996 (typically 45 minutes face-to-face)  [] RE-EVAL     [x] TI(20179) x 3    [] Dry needle 1 or 2 Muscles (36509)  [] NMR (71694) x     [] Dry needle 3+ Muscles (50146)  [x] Manual (38684) x  1   [] Ultrasound (31135) x  [] TA (54699) x     [] Mech Traction (19916)  [] ES(attended) (26296)     [] ES (un) (79401):   [x] Vasopump (18426)  [] Ionto (13376)   [] Other:    Dk Mason stated goal: Decrease pain, improve strength and normal walking  []? Progressing: []? Met: []? Not Met: []? Adjusted     Therapist goals for Patient:   Short Term Goals: To be achieved in: 2 weeks  1. Independent in HEP and progression per patient tolerance, in order to prevent re-injury. []? Progressing: []? Met: []? Not Met: []? Adjusted        Long Term Goals: To be achieved in: 4-6 weeks  1. score of 50 or betterfor the LEFS to assist with reaching prior level of function. []? Progressing: []? Met: []? Not Met: []? Adjusted  2. Patient will demonstrate increased AROM to 2-115 or better at right knee  to allow for proper joint functioning as indicated by patients Functional Deficits. []? Progressing: []? Met: []? Not Met: []? Adjusted  3. Patient will demonstrate an increase in Strength to good proximal hip strength and control in LE to allow for proper functional mobility as indicated by patients Functional Deficits. []? Progressing: []? Met: []? Not Met: []? Adjusted  4. Patient will return to normal walking, stair climbing  and household functional activities without increased symptoms or restriction. []? Progressing: []? Met: []? Not Met: []? Adjusted  5. Patient will have a decrease in pain 0-3/10 to facilitate improvement in movement, function, and ADLs as indicated by Functional Deficits. []? Progressing: []? Met: []? Not Met: []? Adjusted    ASSESSMENT:  KX modifier applied 10/18/21, as patient had a right TKR on 10/8/21 and requires skilled therapy to maximize outcome of surgery and achieve maximum benefit. PT reviewed HEP that patient had been given previously.     Treatment/Activity Tolerance:  [x] Patient tolerated treatment well [] Patient limited by fatique  [] Patient limited by pain  [] Patient limited by other medical complications  [] Other:     Overall Progression Towards Functional goals/ Treatment Progress Update:  [] Patient is progressing as expected towards functional goals listed. [] Progression is slowed due to complexities/Impairments listed. [] Progression has been slowed due to co-morbidities. [x] Plan just implemented, too soon to assess goals progression <30days   [] Goals require adjustment due to lack of progress  [] Patient is not progressing as expected and requires additional follow up with physician  [] Other    Prognosis for POC: [x] Good [] Fair  [] Poor    Patient requires continued skilled intervention: [x] Yes  [] No        PLAN: Gait, balance training, proprioception, rom, strengthening, decrease swelling/ pain  [x] Continue per plan of care [] Alter current plan (see comments)  [] Plan of care initiated [] Hold pending MD visit [] Discharge    Electronically signed by: Stephen Golden PT    Note: If patient does not return for scheduled/recommended follow up visits, this note will serve as a discharge from care along with the most recent update on progress.

## 2021-10-25 ENCOUNTER — HOSPITAL ENCOUNTER (OUTPATIENT)
Dept: PHYSICAL THERAPY | Age: 66
Setting detail: THERAPIES SERIES
Discharge: HOME | End: 2021-10-25
Payer: MEDICARE

## 2021-10-25 PROCEDURE — 97530 THERAPEUTIC ACTIVITIES: CPT

## 2021-10-25 PROCEDURE — 97110 THERAPEUTIC EXERCISES: CPT

## 2021-10-25 PROCEDURE — 97016 VASOPNEUMATIC DEVICE THERAPY: CPT

## 2021-10-25 PROCEDURE — 97140 MANUAL THERAPY 1/> REGIONS: CPT

## 2021-10-25 NOTE — FLOWSHEET NOTE
[facility] - Outpatient Rehabilitation & Therapy  [address] [facility]. Yoni Carter  Phone: [phone]   Fax:     [phone]      Physical Therapy Treatment Note/ Progress Report:     Date:  10/25/2021    Patient Name:  Miquel Rudolph    :  1955  MRN: [id number]    Pertinent Medical History:     Medical/Treatment Diagnosis Information:  Diagnosis: OA right knee M17.11, s/p R TKA Z96.651  Treatment Diagnosis: Gait and mobility disturbance s/p R TKA    Insurance/Certification information:  PT Insurance Information: Medicare  Physician Information:  Referring Practitioner: Brendan Álvarez MD  Plan of care signed (Y/N): routed 10/11/21    Date of Patient follow up with Physician:      Progress Report: []  Yes  [x]  No     Date Range for reporting period:  Beginning:  10/25/2021  Ending:      Progress report due (10 Rx/or 30 days whichever is less):       Recertification due (POC duration/ or 90 days whichever is less):     Visit # POC/Insurance Allowable Auth Needed   7 BOMN []Yes   [x]No     Latex Allergy:  [x]NO      []YES  Preferred Language for Healthcare:   [x]English       []Other:    Functional Scale:      Date assessed: at eval  Test: LEFS  Score:    Pain level:  6/10     History of Injury:Patient stated complaint: h/o right knee pain ~10 years with resultant RKA, L TKA , lives with with her  in a 1 story ranch home, one step to enter, basement with washer/ dryer     SUBJECTIVE:    10/13/21 Pt reports her right leg has been aggravated by the swelling over the last day. 10/15/21 Pt reports having experienced diarrhea  the last 2 days she is not feeling as well today. 10/18/21: Pt reports that she had a rough day on Saturday and did not do any exercises. She took meds today prior to therapy. Notes edema in R calf and ankle. 10/20/21: Pt reports that she did not sleep well last night and still has swelling.  She did her HEP with greater ease yesterday. 10/22/21: Pt reports that she had a rough morning due to constipation, bad night last night, and swelling is down though. 10/25/21 Pt reports no change with her pain from last session but c/o increased stiffness today. OBJECTIVE:   Observation:    Test measurements:    ROM:  Date           Knee Flexion       Knee Extension    Active Passive Active Passive    Eval       10/18/21 98  0    10/22 94  -5                      Strength:  Date Hip flexion Hip abduction Quad Hamstring Ankle DF Ankle PF   Eval                                                 RESTRICTIONS/PRECAUTIONS:     Exercises/Interventions:     Therapeutic Ex (73993)   Min: Reps/Resistance Notes/CUES   NU step L4 x 5 min    Leg press Bilat. 65# 20 x  RLE 40# 10 x 3     QSS/HSS 5\" 3 min     Incline 1 min x 2 @20% with right quad setting     minisquats 10X                   Mat ex     Strap assist knee   flex  Quad setting      TKE    LAQ    Hams curls - standing       SLR              Manual Intervention (77520)  Min: 15 min      Knee mobs/PROM Knee flexion and ext. And tibial femoral IR/ER     Tib/Fem Mobs     Patella Mobs Sup. And inf. Gr 3 ea    Ankle mobs     stretch To right rectus fem. , hams and   gastroc. NMR re-education (04961)  Min:  CUES NEEDED   SLS     Wobble     Step ups fwd/ lat                    Therapeutic Activity (14681)  Min:15     Gait 10/25/21 With spc 2 point step through, cues for tech. Posture and sequence, pt demonstrated fair tech. And good balance Advised pt to continue use of walker         Modalities  Min:     Game ready  Medium compression 15 min     CP after exercises     MH after exercises            Other Therapeutic Activities: Pt was educated on PT POC, Diagnosis, Prognosis, pathomechanics as well as frequency and duration of scheduling future physical therapy appointments.  Time was also taken on this day to answer all patient questions and participation in PT. Reviewed appointment policy in detail with patient and patient verbalized understanding. Home Exercise Program:   Access Code: [email or web address]: [employer]. com/Date: 10/11/2021Prepared by: Opal Sandhu   Long Sitting Quad Set - 1-2 x daily - 7 x weekly - 1-2 sets - 20 reps - 5 hold   Supine Active Straight Leg Raise - 1-2 x daily - 7 x weekly - 1-2 sets - 10 reps - 1 hold   Sitting Heel Slide with Towel - 1-2 x daily - 7 x weekly - 1 sets - 12 reps - 10 hold   Seated Long Arc Quad - 1-2 x daily - 7 x weekly - 1-2 sets - 15 reps - 3 hold   Long Sitting Calf Stretch with Strap - 1-2 x daily - 7 x weekly - 1 sets - 12 reps - 10 hold   Ankle Pumps in Elevation - 1-2 x daily - 7 x weekly - 1-2 sets - 30 reps - 1 hold   Seated Table Hamstring Stretch - 1-2 x daily - 7 x weekly - 1 sets - 4 reps - 30 hold  Access Code: [email or web address]: [employer]Date: 10/15/2021Prepared by: Can Hart BloomExercises   Hooklying Active Hamstring Stretch - 2 x daily - 7 x weekly - 1-2 sets - 10 reps - 5 hold   Seated Table Hamstring Stretch - 2 x daily - 7 x weekly - 1 sets - 2-4 reps - 30 hold    Patient was instructed in the following for HEP:     . Patient verbalized/demonstrated understanding and was issued written handout. Access Code: [email or web address]  URL: [employer]  Date: 10/20/2021  Prepared by:  Radha Cason    Exercises  Standing Knee Flexion AROM with Chair Support - 1 x daily - 7 x weekly - 3 sets - 10 reps    Therapeutic Exercise and NMR EXR  [x] (07419) Provided verbal/tactile cueing for activities related to strengthening, flexibility, endurance, ROM for improvements in LE, proximal hip, and core control with self care, mobility, lifting, ambulation.  [] (18041) Provided verbal/tactile cueing for activities related to improving balance, coordination, kinesthetic sense, posture, motor skill, proprioception  to assist with LE, proximal hip, and core control in self care,

## 2021-10-27 ENCOUNTER — HOSPITAL ENCOUNTER (OUTPATIENT)
Dept: PHYSICAL THERAPY | Age: 66
Setting detail: THERAPIES SERIES
Discharge: HOME | End: 2021-10-27
Payer: MEDICARE

## 2021-10-27 PROCEDURE — 97140 MANUAL THERAPY 1/> REGIONS: CPT

## 2021-10-27 PROCEDURE — 97016 VASOPNEUMATIC DEVICE THERAPY: CPT

## 2021-10-27 PROCEDURE — 97110 THERAPEUTIC EXERCISES: CPT

## 2021-10-27 NOTE — FLOWSHEET NOTE
[facility] - Outpatient Rehabilitation & Therapy  [address] [facility]. [place]  Phone: [phone]   Fax:     [phone]      Physical Therapy Treatment Note/ Progress Report:     Date:  10/27/2021    Patient Name:  Yvonne Quitnana    :  1955  MRN: [id number]    Pertinent Medical History:     Medical/Treatment Diagnosis Information:  Diagnosis: OA right knee M17.11, s/p R TKA Z96.651  Treatment Diagnosis: Gait and mobility disturbance s/p R TKA    Insurance/Certification information:  PT Insurance Information: Medicare  Physician Information:  Referring Practitioner: Lucita aBteman MD  Plan of care signed (Y/N): routed 10/11/21    Date of Patient follow up with Physician:      Progress Report: []  Yes  [x]  No     Date Range for reporting period:  Beginning:  10/27/2021  Ending:      Progress report due (10 Rx/or 30 days whichever is less): 46      Recertification due (POC duration/ or 90 days whichever is less):     Visit # POC/Insurance Allowable Auth Needed   8 BOMN []Yes   [x]No     Latex Allergy:  [x]NO      []YES  Preferred Language for Healthcare:   [x]English       []Other:    Functional Scale:      Date assessed: at eval  Test: LEFS  Score:    Pain level:  6/10     History of Injury:Patient stated complaint: h/o right knee pain ~10 years with resultant RKA, L TKA , lives with with her  in a 1 story ranch home, one step to enter, basement with washer/ dryer     SUBJECTIVE:    10/13/21 Pt reports her right leg has been aggravated by the swelling over the last day. 10/15/21 Pt reports having experienced diarrhea  the last 2 days she is not feeling as well today. 10/18/21: Pt reports that she had a rough day on Saturday and did not do any exercises. She took meds today prior to therapy. Notes edema in R calf and ankle. 10/20/21: Pt reports that she did not sleep well last night and still has swelling.  She did her HEP with greater ease yesterday. 10/22/21: Pt reports that she had a rough morning due to constipation, bad night last night, and swelling is down though. 10/25/21 Pt reports no change with her pain from last session but c/o increased stiffness today. 10/27/21 Patient reports knee feels stiff and sore,still having difficulty sleeping at night. OBJECTIVE:   Observation:    Test measurements:    ROM:  Date           Knee Flexion       Knee Extension    Active Passive Active Passive    Eval       10/18/21 98  0    10/22 94  -5                      Strength:  Date Hip flexion Hip abduction Quad Hamstring Ankle DF Ankle PF   Eval                                                 RESTRICTIONS/PRECAUTIONS:     Exercises/Interventions:     Therapeutic Ex (89321)   Min: Reps/Resistance Notes/CUES   NU step L4 x 5 min    Leg press Bilat. 65# 20 x  RLE 40# 10 x 3     QSS/HSS 5\" 3 min     Incline 1 min x 2 @20% with right quad setting     minisquats 10X                   Mat ex     Strap assist knee   flex  Quad setting  5\" 25 x 5\" 25 x     TKE    LAQ 20X   Hams curls - standing       SLR              Manual Intervention (04118)  Min: 15 min      Knee mobs/PROM Knee flexion and ext. And tibial femoral IR/ER     Tib/Fem Mobs     Patella Mobs Sup. And inf. Gr 3 ea    Ankle mobs     stretch To right rectus fem. , hams and   gastroc. NMR re-education (78244)  Min:  CUES NEEDED   SLS     Wobble     Step ups fwd/ lat                    Therapeutic Activity (77705)  Min:15     Gait  Advised pt to continue use of walker         Modalities  Min:     Game ready  Medium compression 15 min     CP after exercises     MH after exercises            Other Therapeutic Activities: Pt was educated on PT POC, Diagnosis, Prognosis, pathomechanics as well as frequency and duration of scheduling future physical therapy appointments.  Time was also taken on this day to answer all patient questions and participation in PT. Reviewed appointment policy in detail with patient and patient verbalized understanding. Home Exercise Program:   Access Code: [email or web address]: [employer]. com/Date: 10/11/2021Prepared by: Sasha Tsang   Long Sitting Quad Set - 1-2 x daily - 7 x weekly - 1-2 sets - 20 reps - 5 hold   Supine Active Straight Leg Raise - 1-2 x daily - 7 x weekly - 1-2 sets - 10 reps - 1 hold   Sitting Heel Slide with Towel - 1-2 x daily - 7 x weekly - 1 sets - 12 reps - 10 hold   Seated Long Arc Quad - 1-2 x daily - 7 x weekly - 1-2 sets - 15 reps - 3 hold   Long Sitting Calf Stretch with Strap - 1-2 x daily - 7 x weekly - 1 sets - 12 reps - 10 hold   Ankle Pumps in Elevation - 1-2 x daily - 7 x weekly - 1-2 sets - 30 reps - 1 hold   Seated Table Hamstring Stretch - 1-2 x daily - 7 x weekly - 1 sets - 4 reps - 30 hold  Access Code: [email or web address]: [employer]/Date: 10/15/2021Prepared by: Rajiv YiExmaryam   Hooklying Active Hamstring Stretch - 2 x daily - 7 x weekly - 1-2 sets - 10 reps - 5 hold   Seated Table Hamstring Stretch - 2 x daily - 7 x weekly - 1 sets - 2-4 reps - 30 hold    Patient was instructed in the following for HEP:     . Patient verbalized/demonstrated understanding and was issued written handout. Access Code: [email or web address]  URL: [employer]/  Date: 10/20/2021  Prepared by:  Micah Oliva    Exercises  Standing Knee Flexion AROM with Chair Support - 1 x daily - 7 x weekly - 3 sets - 10 reps    Therapeutic Exercise and NMR EXR  [x] (95156) Provided verbal/tactile cueing for activities related to strengthening, flexibility, endurance, ROM for improvements in LE, proximal hip, and core control with self care, mobility, lifting, ambulation.  [] (44503) Provided verbal/tactile cueing for activities related to improving balance, coordination, kinesthetic sense, posture, motor skill, proprioception  to assist with LE, proximal hip, and core control in self care, (34982)  [] NMR (73245) x     [] Dry needle 3+ Muscles (19907)  [x] Manual (65856) x  1   [] Ultrasound (82784) x  [] TA (08104) x     [] Mech Traction (62665)  [] ES(attended) (92858)     [] ES (un) (55264):   [x] Vasopump (66701)  [] Ionto (67459)   [] Other:    Alejandro Mcgee stated goal: Decrease pain, improve strength and normal walking  []? Progressing: []? Met: []? Not Met: []? Adjusted     Therapist goals for Patient:   Short Term Goals: To be achieved in: 2 weeks  1. Independent in HEP and progression per patient tolerance, in order to prevent re-injury. []? Progressing: []? Met: []? Not Met: []? Adjusted        Long Term Goals: To be achieved in: 4-6 weeks  1. score of 50 or betterfor the LEFS to assist with reaching prior level of function. []? Progressing: []? Met: []? Not Met: []? Adjusted  2. Patient will demonstrate increased AROM to 2-115 or better at right knee  to allow for proper joint functioning as indicated by patients Functional Deficits. []? Progressing: []? Met: []? Not Met: []? Adjusted  3. Patient will demonstrate an increase in Strength to good proximal hip strength and control in LE to allow for proper functional mobility as indicated by patients Functional Deficits. []? Progressing: []? Met: []? Not Met: []? Adjusted  4. Patient will return to normal walking, stair climbing  and household functional activities without increased symptoms or restriction. []? Progressing: []? Met: []? Not Met: []? Adjusted  5. Patient will have a decrease in pain 0-3/10 to facilitate improvement in movement, function, and ADLs as indicated by Functional Deficits. []? Progressing: []? Met: []? Not Met: []? Adjusted    ASSESSMENT:  KX modifier applied 10/18/21, as patient had a right TKR on 10/8/21 and requires skilled therapy to maximize outcome of surgery and achieve maximum benefit. PT reviewed HEP that patient had been given previously.     Treatment/Activity Tolerance:  [x] Patient tolerated treatment well [] Patient limited by fatique  [] Patient limited by pain  [] Patient limited by other medical complications  [] Other:     Overall Progression Towards Functional goals/ Treatment Progress Update:  [] Patient is progressing as expected towards functional goals listed. [] Progression is slowed due to complexities/Impairments listed. [] Progression has been slowed due to co-morbidities. [x] Plan just implemented, too soon to assess goals progression <30days   [] Goals require adjustment due to lack of progress  [] Patient is not progressing as expected and requires additional follow up with physician  [] Other    Prognosis for POC: [x] Good [] Fair  [] Poor    Patient requires continued skilled intervention: [x] Yes  [] No        PLAN: Reassess   Gait, balance training, proprioception, rom, strengthening, decrease swelling/ pain  [x] Continue per plan of care [] Alter current plan (see comments)  [] Plan of care initiated [] Hold pending MD visit [] Discharge    Electronically signed by: Fabian Hanley PTA    Note: If patient does not return for scheduled/recommended follow up visits, this note will serve as a discharge from care along with the most recent update on progress.

## 2021-10-29 ENCOUNTER — HOSPITAL ENCOUNTER (OUTPATIENT)
Dept: PHYSICAL THERAPY | Age: 66
Setting detail: THERAPIES SERIES
Discharge: HOME | End: 2021-10-29
Payer: MEDICARE

## 2021-10-29 PROCEDURE — 97016 VASOPNEUMATIC DEVICE THERAPY: CPT

## 2021-10-29 PROCEDURE — 97140 MANUAL THERAPY 1/> REGIONS: CPT

## 2021-10-29 PROCEDURE — 97110 THERAPEUTIC EXERCISES: CPT

## 2021-10-29 NOTE — FLOWSHEET NOTE
[facility] - Outpatient Rehabilitation & Therapy  [address] [facility]. [place]  Phone: [phone]   Fax:     [phone]      Physical Therapy Treatment Note/ Progress Report:     Date:  10/29/2021    Patient Name:  Regis Hinds    :  1955  MRN: [id number]    Pertinent Medical History:     Medical/Treatment Diagnosis Information:  Diagnosis: OA right knee M17.11, s/p R TKA Z96.651  Treatment Diagnosis: Gait and mobility disturbance s/p R TKA    Insurance/Certification information:  PT Insurance Information: Medicare  Physician Information:  Referring Practitioner: Pola Moffett MD  Plan of care signed (Y/N): routed 10/11/21    Date of Patient follow up with Physician:      Progress Report: []  Yes  [x]  No     Date Range for reporting period:  Beginning:  10/29/2021  Ending:      Progress report due (10 Rx/or 30 days whichever is less):       Recertification due (POC duration/ or 90 days whichever is less):     Visit # POC/Insurance Allowable Auth Needed   9 BOMN []Yes   [x]No     Latex Allergy:  [x]NO      []YES  Preferred Language for Healthcare:   [x]English       []Other:    Functional Scale:      Date assessed: at eval  Test: LEFS  Score:    Pain level:  6/10     History of Injury:Patient stated complaint: h/o right knee pain ~10 years with resultant RKA, L TKA , lives with with her  in a 1 story ranch home, one step to enter, basement with washer/ dryer     SUBJECTIVE:    10/13/21 Pt reports her right leg has been aggravated by the swelling over the last day. 10/15/21 Pt reports having experienced diarrhea  the last 2 days she is not feeling as well today. 10/18/21: Pt reports that she had a rough day on Saturday and did not do any exercises. She took meds today prior to therapy. Notes edema in R calf and ankle. 10/20/21: Pt reports that she did not sleep well last night and still has swelling.  She did her HEP with greater ease yesterday. 10/22/21: Pt reports that she had a rough morning due to constipation, bad night last night, and swelling is down though. 10/25/21 Pt reports no change with her pain from last session but c/o increased stiffness today. 10/27/21 Patient reports knee feels stiff and sore,still having difficulty sleeping at night. 10/29/21 Patient reports knee is doing ok,sleeping a little better at night. OBJECTIVE:   Observation:    Test measurements:    ROM:  Date           Knee Flexion       Knee Extension    Active Passive Active Passive    Eval       10/18/21 98  0    10/22 94  -5    10/29  100 -5               Strength:  Date Hip flexion Hip abduction Quad Hamstring Ankle DF Ankle PF   Eval                                                 RESTRICTIONS/PRECAUTIONS:     Exercises/Interventions:     Therapeutic Ex (29331)   Min: Reps/Resistance Notes/CUES   NU step L4 x 5 min    Leg press Bilat. 70# 20 x 2  RLE 45# 10 x 3     QSS/HSS 5\" 3 min     Incline 1 min x 2 @20% with right quad setting     minisquats 15X                   Mat ex     Strap assist knee   flex  Quad setting  5\" 25 x 5\" 25 x     TKE    LAQ 20X   Hams curls - standing       SLR              Manual Intervention (15854)  Min: 15 min      Knee mobs/PROM Knee flexion and ext. And tibial femoral IR/ER     Tib/Fem Mobs     Patella Mobs Sup. And inf. Gr 3 ea    Ankle mobs     stretch To right rectus fem. , hams and   gastroc. NMR re-education (87233)  Min:  CUES NEEDED   SLS     Wobble     Step ups fwd/ lat                    Therapeutic Activity (08333)  Min:15     Gait  Advised pt to continue use of walker         Modalities  Min:     Game ready  Medium compression 15 min     CP after exercises     MH after exercises            Other Therapeutic Activities: Pt was educated on PT POC, Diagnosis, Prognosis, pathomechanics as well as frequency and duration of scheduling future physical therapy appointments.  Time was also taken on this day to answer all patient questions and participation in PT. Reviewed appointment policy in detail with patient and patient verbalized understanding. Home Exercise Program:   Access Code: [email or web address]: [employer]. com/Date: 10/11/2021Prepared by: Merlene Landau   Long Sitting Quad Set - 1-2 x daily - 7 x weekly - 1-2 sets - 20 reps - 5 hold   Supine Active Straight Leg Raise - 1-2 x daily - 7 x weekly - 1-2 sets - 10 reps - 1 hold   Sitting Heel Slide with Towel - 1-2 x daily - 7 x weekly - 1 sets - 12 reps - 10 hold   Seated Long Arc Quad - 1-2 x daily - 7 x weekly - 1-2 sets - 15 reps - 3 hold   Long Sitting Calf Stretch with Strap - 1-2 x daily - 7 x weekly - 1 sets - 12 reps - 10 hold   Ankle Pumps in Elevation - 1-2 x daily - 7 x weekly - 1-2 sets - 30 reps - 1 hold   Seated Table Hamstring Stretch - 1-2 x daily - 7 x weekly - 1 sets - 4 reps - 30 hold  Access Code: [email or web address]: [employer]. com/Date: 10/15/2021Prepared by: Fran YiExmaryam   Hooklying Active Hamstring Stretch - 2 x daily - 7 x weekly - 1-2 sets - 10 reps - 5 hold   Seated Table Hamstring Stretch - 2 x daily - 7 x weekly - 1 sets - 2-4 reps - 30 hold    Patient was instructed in the following for HEP:     . Patient verbalized/demonstrated understanding and was issued written handout. Access Code: [email or web address]  URL: [employer]/  Date: 10/20/2021  Prepared by:  Juliana Booth    Exercises  Standing Knee Flexion AROM with Chair Support - 1 x daily - 7 x weekly - 3 sets - 10 reps    Therapeutic Exercise and NMR EXR  [x] (75817) Provided verbal/tactile cueing for activities related to strengthening, flexibility, endurance, ROM for improvements in LE, proximal hip, and core control with self care, mobility, lifting, ambulation.  [] (11064) Provided verbal/tactile cueing for activities related to improving balance, coordination, kinesthetic sense, posture, motor skill, proprioception  to assist with LE, proximal hip, and core control in self care, mobility, lifting, ambulation and eccentric single leg control. NMR and Therapeutic Activities:    [] (93971 or 46533) Provided verbal/tactile cueing for activities related to improving balance, coordination, kinesthetic sense, posture, motor skill, proprioception and motor activation to allow for proper function of core, proximal hip and LE with self care and ADLs and functional mobility.   [] (56817) Gait Re-education- Provided training and instruction to the patient for proper LE, core and proximal hip recruitment and positioning and eccentric body weight control with ambulation re-education including up and down stairs     Home Exercise Program:    [x] (12628) Reviewed/Progressed HEP activities related to strengthening, flexibility, endurance, ROM of core, proximal hip and LE for functional self-care, mobility, lifting and ambulation/stair navigation   [] (69148)Reviewed/Progressed HEP activities related to improving balance, coordination, kinesthetic sense, posture, motor skill, proprioception of core, proximal hip and LE for self care, mobility, lifting, and ambulation/stair navigation      Manual Treatments:  PROM / STM / Oscillations-Mobs:  G-I, II, III, IV (PA's, Inf., Post.)  [x] (79991) Provided manual therapy to mobilize LE, proximal hip and/or LS spine soft tissue/joints for the purpose of modulating pain, promoting relaxation,  increasing ROM, reducing/eliminating soft tissue swelling/inflammation/restriction, improving soft tissue extensibility and allowing for proper ROM for normal function with self care, mobility, lifting and ambulation.        Charges:  Timed Code Treatment Minutes: 45   Total Treatment Minutes: 60    10/25/21: More time available today  [] EVAL (LOW) 71622 (typically 20 minutes face-to-face)  [] EVAL (MOD) 42679 (typically 30 minutes face-to-face)  [] EVAL (HIGH) 87147 (typically 45 minutes face-to-face)  [] RE-EVAL     [x] BN(09056) x 2    [] Dry needle 1 or 2 Muscles (73822)  [] NMR (74474) x     [] Dry needle 3+ Muscles (30473)  [x] Manual (33811) x  1   [] Ultrasound (09612) x  [] TA (08502) x     [] Mech Traction (21334)  [] ES(attended) (26691)     [] ES (un) (60760):   [x] Vasopump (55606)  [] Ionto (24472)   [] Other:    Domo Davis stated goal: Decrease pain, improve strength and normal walking  []? Progressing: []? Met: []? Not Met: []? Adjusted     Therapist goals for Patient:   Short Term Goals: To be achieved in: 2 weeks  1. Independent in HEP and progression per patient tolerance, in order to prevent re-injury. []? Progressing: []? Met: []? Not Met: []? Adjusted        Long Term Goals: To be achieved in: 4-6 weeks  1. score of 50 or betterfor the LEFS to assist with reaching prior level of function. []? Progressing: []? Met: []? Not Met: []? Adjusted  2. Patient will demonstrate increased AROM to 2-115 or better at right knee  to allow for proper joint functioning as indicated by patients Functional Deficits. []? Progressing: []? Met: []? Not Met: []? Adjusted  3. Patient will demonstrate an increase in Strength to good proximal hip strength and control in LE to allow for proper functional mobility as indicated by patients Functional Deficits. []? Progressing: []? Met: []? Not Met: []? Adjusted  4. Patient will return to normal walking, stair climbing  and household functional activities without increased symptoms or restriction. []? Progressing: []? Met: []? Not Met: []? Adjusted  5. Patient will have a decrease in pain 0-3/10 to facilitate improvement in movement, function, and ADLs as indicated by Functional Deficits. []? Progressing: []? Met: []? Not Met: []? Adjusted    ASSESSMENT:  KX modifier applied 10/18/21, as patient had a right TKR on 10/8/21 and requires skilled therapy to maximize outcome of surgery and achieve maximum benefit.  PT reviewed HEP that patient had been given previously. Treatment/Activity Tolerance:  [x] Patient tolerated treatment well [] Patient limited by fatique  [] Patient limited by pain  [] Patient limited by other medical complications  [] Other:     Overall Progression Towards Functional goals/ Treatment Progress Update:  [] Patient is progressing as expected towards functional goals listed. [] Progression is slowed due to complexities/Impairments listed. [] Progression has been slowed due to co-morbidities. [x] Plan just implemented, too soon to assess goals progression <30days   [] Goals require adjustment due to lack of progress  [] Patient is not progressing as expected and requires additional follow up with physician  [] Other    Prognosis for POC: [x] Good [] Fair  [] Poor    Patient requires continued skilled intervention: [x] Yes  [] No        PLAN: Reassess   Gait, balance training, proprioception, rom, strengthening, decrease swelling/ pain  [x] Continue per plan of care [] Alter current plan (see comments)  [] Plan of care initiated [] Hold pending MD visit [] Discharge    Electronically signed by: Jose Angel Burleson PTA    Note: If patient does not return for scheduled/recommended follow up visits, this note will serve as a discharge from care along with the most recent update on progress.

## 2021-11-01 ENCOUNTER — HOSPITAL ENCOUNTER (OUTPATIENT)
Dept: PHYSICAL THERAPY | Age: 66
Setting detail: THERAPIES SERIES
Discharge: HOME | End: 2021-11-01
Payer: MEDICARE

## 2021-11-01 PROCEDURE — 97140 MANUAL THERAPY 1/> REGIONS: CPT

## 2021-11-01 PROCEDURE — 97110 THERAPEUTIC EXERCISES: CPT

## 2021-11-01 NOTE — FLOWSHEET NOTE
her HEP with greater ease yesterday. 10/22/21: Pt reports that she had a rough morning due to constipation, bad night last night, and swelling is down though. 10/25/21 Pt reports no change with her pain from last session but c/o increased stiffness today. 10/27/21 Patient reports knee feels stiff and sore,still having difficulty sleeping at night. 10/29/21 Patient reports knee is doing ok,sleeping a little better at night. 11/1/21 Pt reports feeling sore and stiff today. OBJECTIVE:   Observation:    Test measurements:    ROM:  Date           Knee Flexion       Knee Extension    Active Passive Active Passive    Eval       10/18/21 98  0    10/22 94  -5    10/29  100 -5               Strength:  Date Hip flexion Hip abduction Quad Hamstring Ankle DF Ankle PF   Eval                                                 RESTRICTIONS/PRECAUTIONS:     Exercises/Interventions:     Therapeutic Ex (96367)   Min: Reps/Resistance Notes/CUES   NU step L4 x 5 min    Leg press Bilat. 75# 10 x, 65# 10 x 2  RLE 45# 10 x 3  C/o medial right knee pain    QSS/HSS 5\" 3 min     Incline 1 min x 2 @20% with right quad setting     minisquats 15X                   Mat ex     Strap assist knee   flex  Quad setting       TKE R  3.0# 30 x     LAQ R 3# 30 x    Hams curls - standing       SLR Resume 2             Manual Intervention (93990)  Min: 15 min      Knee mobs/PROM Knee flexion and ext. And tibial femoral IR/ER     Tib/Fem Mobs     Patella Mobs Sup. And inf. Gr 3 ea    Ankle mobs     stretch To right rectus fem. , hams and   gastroc. NMR re-education (27662)  Min:  CUES NEEDED   SLS ADD    Wobble     Step ups fwd/ lat ADD .  Begin with 4\"                   Therapeutic Activity (53433)  Min:15     Gait  Advised pt to continue use of walker         Modalities  Min:     Game ready       CP after exercises 15 min    MH after exercises            Other Therapeutic Activities: Pt was educated on PT POC, Diagnosis, Prognosis, pathomechanics as well as frequency and duration of scheduling future physical therapy appointments. Time was also taken on this day to answer all patient questions and participation in PT. Reviewed appointment policy in detail with patient and patient verbalized understanding. Home Exercise Program:   Access Code: [email or web address]: [employer]/Date: 10/11/2021Prepared by: Everton Romerofast   Long Sitting Quad Set - 1-2 x daily - 7 x weekly - 1-2 sets - 20 reps - 5 hold   Supine Active Straight Leg Raise - 1-2 x daily - 7 x weekly - 1-2 sets - 10 reps - 1 hold   Sitting Heel Slide with Towel - 1-2 x daily - 7 x weekly - 1 sets - 12 reps - 10 hold   Seated Long Arc Quad - 1-2 x daily - 7 x weekly - 1-2 sets - 15 reps - 3 hold   Long Sitting Calf Stretch with Strap - 1-2 x daily - 7 x weekly - 1 sets - 12 reps - 10 hold   Ankle Pumps in Elevation - 1-2 x daily - 7 x weekly - 1-2 sets - 30 reps - 1 hold   Seated Table Hamstring Stretch - 1-2 x daily - 7 x weekly - 1 sets - 4 reps - 30 hold  Access Code: [email or web address]: [email or web address]/Date: 10/15/2021Prepared by: Zhao Samayoa   Hooklying Active Hamstring Stretch - 2 x daily - 7 x weekly - 1-2 sets - 10 reps - 5 hold   Seated Table Hamstring Stretch - 2 x daily - 7 x weekly - 1 sets - 2-4 reps - 30 hold    Patient was instructed in the following for HEP:     . Patient verbalized/demonstrated understanding and was issued written handout. Access Code: [email or web address]  URL: [email or web address]/  Date: 10/20/2021  Prepared by:  Sarah German    Exercises  Standing Knee Flexion AROM with Chair Support - 1 x daily - 7 x weekly - 3 sets - 10 reps    Therapeutic Exercise and NMR EXR  [x] (97741) Provided verbal/tactile cueing for activities related to strengthening, flexibility, endurance, ROM for improvements in LE, proximal hip, and core control with self care, mobility, lifting, ambulation.  [] (29449) Provided verbal/tactile cueing for activities related to improving balance, coordination, kinesthetic sense, posture, motor skill, proprioception  to assist with LE, proximal hip, and core control in self care, mobility, lifting, ambulation and eccentric single leg control. NMR and Therapeutic Activities:    [] (91372 or 54175) Provided verbal/tactile cueing for activities related to improving balance, coordination, kinesthetic sense, posture, motor skill, proprioception and motor activation to allow for proper function of core, proximal hip and LE with self care and ADLs and functional mobility.   [] (74156) Gait Re-education- Provided training and instruction to the patient for proper LE, core and proximal hip recruitment and positioning and eccentric body weight control with ambulation re-education including up and down stairs     Home Exercise Program:    [x] (62276) Reviewed/Progressed HEP activities related to strengthening, flexibility, endurance, ROM of core, proximal hip and LE for functional self-care, mobility, lifting and ambulation/stair navigation   [] (38359)Reviewed/Progressed HEP activities related to improving balance, coordination, kinesthetic sense, posture, motor skill, proprioception of core, proximal hip and LE for self care, mobility, lifting, and ambulation/stair navigation      Manual Treatments:  PROM / STM / Oscillations-Mobs:  G-I, II, III, IV (PA's, Inf., Post.)  [x] (81308) Provided manual therapy to mobilize LE, proximal hip and/or LS spine soft tissue/joints for the purpose of modulating pain, promoting relaxation,  increasing ROM, reducing/eliminating soft tissue swelling/inflammation/restriction, improving soft tissue extensibility and allowing for proper ROM for normal function with self care, mobility, lifting and ambulation.        Charges:  Timed Code Treatment Minutes: 45   Total Treatment Minutes: 60    10/25/21: More time available today  [] EVAL (LOW) 70212 (typically 20 minutes face-to-face)  [] EVAL (MOD) 01414 (typically 30 minutes face-to-face)  [] EVAL (HIGH) 62294 (typically 45 minutes face-to-face)  [] RE-EVAL     [x] BX(36140) x 2    [] Dry needle 1 or 2 Muscles (02611)  [] NMR (71395) x     [] Dry needle 3+ Muscles (14611)  [x] Manual (93630) x  1   [] Ultrasound (73718) x  [] TA (58690) x     [] Mech Traction (01909)  [] ES(attended) (16646)     [] ES (un) (16777):   [x] Vasopump (77257)  [] Ionto (03088)   [] Other:    Domo Davis stated goal: Decrease pain, improve strength and normal walking  []? Progressing: []? Met: []? Not Met: []? Adjusted     Therapist goals for Patient:   Short Term Goals: To be achieved in: 2 weeks  1. Independent in HEP and progression per patient tolerance, in order to prevent re-injury. []? Progressing: []? Met: []? Not Met: []? Adjusted        Long Term Goals: To be achieved in: 4-6 weeks  1. score of 50 or betterfor the LEFS to assist with reaching prior level of function. []? Progressing: []? Met: []? Not Met: []? Adjusted  2. Patient will demonstrate increased AROM to 2-115 or better at right knee  to allow for proper joint functioning as indicated by patients Functional Deficits. []? Progressing: []? Met: []? Not Met: []? Adjusted  3. Patient will demonstrate an increase in Strength to good proximal hip strength and control in LE to allow for proper functional mobility as indicated by patients Functional Deficits. []? Progressing: []? Met: []? Not Met: []? Adjusted  4. Patient will return to normal walking, stair climbing  and household functional activities without increased symptoms or restriction. []? Progressing: []? Met: []? Not Met: []? Adjusted  5. Patient will have a decrease in pain 0-3/10 to facilitate improvement in movement, function, and ADLs as indicated by Functional Deficits. []? Progressing: []? Met: []? Not Met: []?  Adjusted    ASSESSMENT:  KX modifier applied 10/18/21, as patient had a right TKR on 10/8/21 and requires skilled therapy to maximize outcome of surgery and achieve maximum benefit. PT reviewed HEP that patient had been given previously. Treatment/Activity Tolerance:  [x] Patient tolerated treatment well [] Patient limited by fatique  [] Patient limited by pain  [] Patient limited by other medical complications  [] Other:     Overall Progression Towards Functional goals/ Treatment Progress Update:  [] Patient is progressing as expected towards functional goals listed. [] Progression is slowed due to complexities/Impairments listed. [] Progression has been slowed due to co-morbidities. [x] Plan just implemented, too soon to assess goals progression <30days   [] Goals require adjustment due to lack of progress  [] Patient is not progressing as expected and requires additional follow up with physician  [] Other    Prognosis for POC: [x] Good [] Fair  [] Poor    Patient requires continued skilled intervention: [x] Yes  [] No        PLAN: Reassess   Gait, balance training, proprioception, rom, strengthening, decrease swelling/ pain  [x] Continue per plan of care [] Alter current plan (see comments)  [] Plan of care initiated [] Hold pending MD visit [] Discharge    Electronically signed by: Lg Damian PT    Note: If patient does not return for scheduled/recommended follow up visits, this note will serve as a discharge from care along with the most recent update on progress.

## 2021-11-03 ENCOUNTER — HOSPITAL ENCOUNTER (OUTPATIENT)
Dept: PHYSICAL THERAPY | Age: 66
Setting detail: THERAPIES SERIES
Discharge: HOME | End: 2021-11-03
Payer: MEDICARE

## 2021-11-03 PROCEDURE — 97110 THERAPEUTIC EXERCISES: CPT

## 2021-11-03 PROCEDURE — 97140 MANUAL THERAPY 1/> REGIONS: CPT

## 2021-11-03 NOTE — FLOWSHEET NOTE
[facility] - Outpatient Rehabilitation & Therapy  [address] [place] [address]  Phone: [phone]   Fax:     [phone]      Physical Therapy Treatment Note/ Progress Report:     Date:  11/3/2021    Patient Name:  Hiren De La Cruz    :  1955  MRN: [id number]    Pertinent Medical History:     Medical/Treatment Diagnosis Information:  Diagnosis: OA right knee M17.11, s/p R TKA Z96.651  Treatment Diagnosis: Gait and mobility disturbance s/p R TKA    Insurance/Certification information:  PT Insurance Information: Medicare  Physician Information:  Referring Practitioner: Jane Garcia MD  Plan of care signed (Y/N): routed 10/11/21    Date of Patient follow up with Physician:      Progress Report: []  Yes  [x]  No     Date Range for reporting period:  Beginnin/3/2021  Ending:      Progress report due (10 Rx/or 30 days whichever is less): 00      Recertification due (POC duration/ or 90 days whichever is less):     Visit # POC/Insurance Allowable Auth Needed   11 BOMN []Yes   [x]No     Latex Allergy:  [x]NO      []YES  Preferred Language for Healthcare:   [x]English       []Other:    Functional Scale:      Date assessed: at eval  Test: LEFS  Score:    Pain level:  6/10     History of Injury:Patient stated complaint: h/o right knee pain ~10 years with resultant RKA, L TKA , lives with with her  in a 1 story ranch home, one step to enter, basement with washer/ dryer     SUBJECTIVE:    10/13/21 Pt reports her right leg has been aggravated by the swelling over the last day. 10/15/21 Pt reports having experienced diarrhea  the last 2 days she is not feeling as well today. 10/18/21: Pt reports that she had a rough day on Saturday and did not do any exercises. She took meds today prior to therapy. Notes edema in R calf and ankle. 10/20/21: Pt reports that she did not sleep well last night and still has swelling.  She did her HEP with greater ease yesterday. 10/22/21: Pt reports that she had a rough morning due to constipation, bad night last night, and swelling is down though. 10/25/21 Pt reports no change with her pain from last session but c/o increased stiffness today. 10/27/21 Patient reports knee feels stiff and sore,still having difficulty sleeping at night. 10/29/21 Patient reports knee is doing ok,sleeping a little better at night. 11/1/21 Pt reports feeling sore and stiff today. 11/03/21 Patient reports knee feels sore and swollen. States still not sleeping well at night. OBJECTIVE:   Observation:    Test measurements:    ROM:  Date           Knee Flexion       Knee Extension    Active Passive Active Passive    Eval       10/18/21 98  0    10/22 94  -5    10/29  100 -5               Strength:  Date Hip flexion Hip abduction Quad Hamstring Ankle DF Ankle PF   Eval                                                 RESTRICTIONS/PRECAUTIONS:     Exercises/Interventions:     Therapeutic Ex (98815)   Min: Reps/Resistance Notes/CUES   NU step L4 x 5 min    Leg press Bilat. 75# 10 x, 65# 10 x 2  RLE 45# 10 x 3  C/o medial right knee pain    QSS/HSS 5\" 3 min     Incline 1 min x 2 @20% with right quad setting     minisquats 15X                   Mat ex     Strap assist knee   flex  Quad setting  5\" 25 x   5\" 25 x     TKE     LAQ R 3# 30 x    Hams curls - standing       SLR Resume 2             Manual Intervention (45643)  Min: 15 min      Knee mobs/PROM Knee flexion and ext. And tibial femoral IR/ER     Tib/Fem Mobs     Patella Mobs Sup. And inf. Gr 3 ea    Ankle mobs     stretch To right rectus fem. , hams and   gastroc.          NMR re-education (25081)  Min:  CUES NEEDED   SLS ADD    Wobble     Step ups fwd/ lat  4\" 10 x                    Therapeutic Activity (67960)  Min:15     Gait  Advised pt to continue use of walker         Modalities  Min:     Game ready       CP after exercises 15 min    MH after exercises Other Therapeutic Activities: Pt was educated on PT POC, Diagnosis, Prognosis, pathomechanics as well as frequency and duration of scheduling future physical therapy appointments. Time was also taken on this day to answer all patient questions and participation in PT. Reviewed appointment policy in detail with patient and patient verbalized understanding. Home Exercise Program:   Access Code: [email or web address]: [employer]/Date: 10/11/2021Prepared by: Jose Galarza   Long Sitting Quad Set - 1-2 x daily - 7 x weekly - 1-2 sets - 20 reps - 5 hold   Supine Active Straight Leg Raise - 1-2 x daily - 7 x weekly - 1-2 sets - 10 reps - 1 hold   Sitting Heel Slide with Towel - 1-2 x daily - 7 x weekly - 1 sets - 12 reps - 10 hold   Seated Long Arc Quad - 1-2 x daily - 7 x weekly - 1-2 sets - 15 reps - 3 hold   Long Sitting Calf Stretch with Strap - 1-2 x daily - 7 x weekly - 1 sets - 12 reps - 10 hold   Ankle Pumps in Elevation - 1-2 x daily - 7 x weekly - 1-2 sets - 30 reps - 1 hold   Seated Table Hamstring Stretch - 1-2 x daily - 7 x weekly - 1 sets - 4 reps - 30 hold  Access Code: [email or web address]: [employer]/Date: 10/15/2021Prepared by: Christie YiExmaryam   Hooklying Active Hamstring Stretch - 2 x daily - 7 x weekly - 1-2 sets - 10 reps - 5 hold   Seated Table Hamstring Stretch - 2 x daily - 7 x weekly - 1 sets - 2-4 reps - 30 hold    Patient was instructed in the following for HEP:     . Patient verbalized/demonstrated understanding and was issued written handout. Access Code: [email or web address]  URL: [email or web address]/  Date: 10/20/2021  Prepared by:  Judy Iyer    Exercises  Standing Knee Flexion AROM with Chair Support - 1 x daily - 7 x weekly - 3 sets - 10 reps    Therapeutic Exercise and NMR EXR  [x] (69437) Provided verbal/tactile cueing for activities related to strengthening, flexibility, endurance, ROM for improvements in LE, proximal hip, and core control with self care, mobility, lifting, ambulation.  [] (20119) Provided verbal/tactile cueing for activities related to improving balance, coordination, kinesthetic sense, posture, motor skill, proprioception  to assist with LE, proximal hip, and core control in self care, mobility, lifting, ambulation and eccentric single leg control. NMR and Therapeutic Activities:    [] (94658 or 04583) Provided verbal/tactile cueing for activities related to improving balance, coordination, kinesthetic sense, posture, motor skill, proprioception and motor activation to allow for proper function of core, proximal hip and LE with self care and ADLs and functional mobility.   [] (37703) Gait Re-education- Provided training and instruction to the patient for proper LE, core and proximal hip recruitment and positioning and eccentric body weight control with ambulation re-education including up and down stairs     Home Exercise Program:    [x] (52295) Reviewed/Progressed HEP activities related to strengthening, flexibility, endurance, ROM of core, proximal hip and LE for functional self-care, mobility, lifting and ambulation/stair navigation   [] (54309)Reviewed/Progressed HEP activities related to improving balance, coordination, kinesthetic sense, posture, motor skill, proprioception of core, proximal hip and LE for self care, mobility, lifting, and ambulation/stair navigation      Manual Treatments:  PROM / STM / Oscillations-Mobs:  G-I, II, III, IV (PA's, Inf., Post.)  [x] (80098) Provided manual therapy to mobilize LE, proximal hip and/or LS spine soft tissue/joints for the purpose of modulating pain, promoting relaxation,  increasing ROM, reducing/eliminating soft tissue swelling/inflammation/restriction, improving soft tissue extensibility and allowing for proper ROM for normal function with self care, mobility, lifting and ambulation.        Charges:  Timed Code Treatment Minutes: 45   Total Treatment Minutes: 60    10/25/21: More time available today  [] EVAL (LOW) 75360 (typically 20 minutes face-to-face)  [] EVAL (MOD) 97116 (typically 30 minutes face-to-face)  [] EVAL (HIGH) 11045 (typically 45 minutes face-to-face)  [] RE-EVAL     [x] TB(63331) x 2    [] Dry needle 1 or 2 Muscles (74085)  [] NMR (70759) x     [] Dry needle 3+ Muscles (25843)  [x] Manual (53364) x  1   [] Ultrasound (23237) x  [] TA (18345) x     [] Mech Traction (65298)  [] ES(attended) (93982)     [] ES (un) (61962):   [] Vasopump (45520)  [] Ionto (41386)   [] Other:    Ted Males stated goal: Decrease pain, improve strength and normal walking  []? Progressing: []? Met: []? Not Met: []? Adjusted     Therapist goals for Patient:   Short Term Goals: To be achieved in: 2 weeks  1. Independent in HEP and progression per patient tolerance, in order to prevent re-injury. []? Progressing: []? Met: []? Not Met: []? Adjusted        Long Term Goals: To be achieved in: 4-6 weeks  1. score of 50 or betterfor the LEFS to assist with reaching prior level of function. []? Progressing: []? Met: []? Not Met: []? Adjusted  2. Patient will demonstrate increased AROM to 2-115 or better at right knee  to allow for proper joint functioning as indicated by patients Functional Deficits. []? Progressing: []? Met: []? Not Met: []? Adjusted  3. Patient will demonstrate an increase in Strength to good proximal hip strength and control in LE to allow for proper functional mobility as indicated by patients Functional Deficits. []? Progressing: []? Met: []? Not Met: []? Adjusted  4. Patient will return to normal walking, stair climbing  and household functional activities without increased symptoms or restriction. []? Progressing: []? Met: []? Not Met: []? Adjusted  5. Patient will have a decrease in pain 0-3/10 to facilitate improvement in movement, function, and ADLs as indicated by Functional Deficits. []? Progressing: []? Met: []? Not Met: []?  Adjusted    ASSESSMENT:  Austen Patino modifier applied 10/18/21, as patient had a right TKR on 10/8/21 and requires skilled therapy to maximize outcome of surgery and achieve maximum benefit. PT reviewed HEP that patient had been given previously. Treatment/Activity Tolerance:  [x] Patient tolerated treatment well [] Patient limited by fatique  [] Patient limited by pain  [] Patient limited by other medical complications  [] Other:     Overall Progression Towards Functional goals/ Treatment Progress Update:  [] Patient is progressing as expected towards functional goals listed. [] Progression is slowed due to complexities/Impairments listed. [] Progression has been slowed due to co-morbidities. [x] Plan just implemented, too soon to assess goals progression <30days   [] Goals require adjustment due to lack of progress  [] Patient is not progressing as expected and requires additional follow up with physician  [] Other    Prognosis for POC: [x] Good [] Fair  [] Poor    Patient requires continued skilled intervention: [x] Yes  [] No        PLAN: Reassess   Gait, balance training, proprioception, rom, strengthening, decrease swelling/ pain  [x] Continue per plan of care [] Alter current plan (see comments)  [] Plan of care initiated [] Hold pending MD visit [] Discharge    Electronically signed by: Mookie Menard PTA    Note: If patient does not return for scheduled/recommended follow up visits, this note will serve as a discharge from care along with the most recent update on progress.

## 2021-11-05 ENCOUNTER — HOSPITAL ENCOUNTER (OUTPATIENT)
Dept: PHYSICAL THERAPY | Age: 66
Setting detail: THERAPIES SERIES
Discharge: HOME | End: 2021-11-05
Payer: MEDICARE

## 2021-11-05 PROCEDURE — 97110 THERAPEUTIC EXERCISES: CPT

## 2021-11-05 PROCEDURE — 97140 MANUAL THERAPY 1/> REGIONS: CPT

## 2021-11-05 PROCEDURE — 97016 VASOPNEUMATIC DEVICE THERAPY: CPT

## 2021-11-05 NOTE — FLOWSHEET NOTE
[facility] - Outpatient Rehabilitation & Therapy  [address]. [place]  Phone: [phone]   Fax:     [phone]      Physical Therapy Treatment Note/ Progress Report:     Date:  2021    Patient Name:  Yasmany Banegas    :  1955  MRN: [id number]    Pertinent Medical History:     Medical/Treatment Diagnosis Information:  Diagnosis: OA right knee M17.11, s/p R TKA Z96.651  Treatment Diagnosis: Gait and mobility disturbance s/p R TKA    Insurance/Certification information:  PT Insurance Information: Medicare  Physician Information:  Referring Practitioner: Maria De Jesus Durham MD  Plan of care signed (Y/N): routed 10/11/21    Date of Patient follow up with Physician:      Progress Report: []  Yes  [x]  No     Date Range for reporting period:  Beginnin2021  Ending:      Progress report due (10 Rx/or 30 days whichever is less):       Recertification due (POC duration/ or 90 days whichever is less):     Visit # POC/Insurance Allowable Auth Needed   12 BOMN []Yes   [x]No     Latex Allergy:  [x]NO      []YES  Preferred Language for Healthcare:   [x]English       []Other:    Functional Scale:      Date assessed: at eval  Test: LEFS  Score:    Pain level:  6/10     History of Injury:Patient stated complaint: h/o right knee pain ~10 years with resultant RKA, L TKA , lives with with her  in a 1 story ranch home, one step to enter, basement with washer/ dryer     SUBJECTIVE:    10/13/21 Pt reports her right leg has been aggravated by the swelling over the last day. 10/15/21 Pt reports having experienced diarrhea  the last 2 days she is not feeling as well today. 10/18/21: Pt reports that she had a rough day on Saturday and did not do any exercises. She took meds today prior to therapy. Notes edema in R calf and ankle. 10/20/21: Pt reports that she did not sleep well last night and still has swelling.  She did her HEP with greater ease yesterday. 10/22/21: Pt reports that she had a rough morning due to constipation, bad night last night, and swelling is down though. 10/25/21 Pt reports no change with her pain from last session but c/o increased stiffness today. 10/27/21 Patient reports knee feels stiff and sore,still having difficulty sleeping at night. 10/29/21 Patient reports knee is doing ok,sleeping a little better at night. 11/1/21 Pt reports feeling sore and stiff today. 11/03/21 Patient reports knee feels sore and swollen. States still not sleeping well at night. 11/05/21 Patient reports knee soreness is about the same,pulling on top of the knee. OBJECTIVE:   Observation:    Test measurements:    ROM:  Date           Knee Flexion       Knee Extension    Active Passive Active Passive    Eval       10/18/21 98  0    10/22 94  -5    10/29  100 -5               Strength:  Date Hip flexion Hip abduction Quad Hamstring Ankle DF Ankle PF   Eval                                                 RESTRICTIONS/PRECAUTIONS:     Exercises/Interventions:     Therapeutic Ex (76310)   Min: Reps/Resistance Notes/CUES   NU step L4 x 5 min    Leg press Bilat. , 65# 10 x 3  RLE 45# 10 x 3  C/o medial right knee pain    QSS/HSS 5\" 3 min     Incline 1 min x 2 @20% with right quad setting     minisquats 15X                   Mat ex     Strap assist knee   flex  Quad setting  5\" 25 x   5\" 25 x     TKE     LAQ R 0# 30 x 11/05/21 Decreased weight per patients request   Hams curls - standing       SLR Resume 2             Manual Intervention (92309)  Min: 15 min      Knee mobs/PROM Knee flexion and ext. And tibial femoral IR/ER     Tib/Fem Mobs     Patella Mobs Sup. And inf. Gr 3 ea    Ankle mobs     stretch To right rectus fem. , hams and   gastroc.          NMR re-education (46831)  Min:  CUES NEEDED   SLS ADD    Wobble     Step ups fwd/ lat  4\" 10 x                    Therapeutic Activity (22706)  Min:15     Gait  Advised pt to continue use of walker         Modalities  Min:     Game ready  Medium compression 15 min     CP after exercises     MH after exercises            Other Therapeutic Activities: Pt was educated on PT POC, Diagnosis, Prognosis, pathomechanics as well as frequency and duration of scheduling future physical therapy appointments. Time was also taken on this day to answer all patient questions and participation in PT. Reviewed appointment policy in detail with patient and patient verbalized understanding. Home Exercise Program:   Access Code: [email or web address]: [employer]/Date: 10/11/2021Prepared by: Opal Brooksin   Long Sitting Quad Set - 1-2 x daily - 7 x weekly - 1-2 sets - 20 reps - 5 hold   Supine Active Straight Leg Raise - 1-2 x daily - 7 x weekly - 1-2 sets - 10 reps - 1 hold   Sitting Heel Slide with Towel - 1-2 x daily - 7 x weekly - 1 sets - 12 reps - 10 hold   Seated Long Arc Quad - 1-2 x daily - 7 x weekly - 1-2 sets - 15 reps - 3 hold   Long Sitting Calf Stretch with Strap - 1-2 x daily - 7 x weekly - 1 sets - 12 reps - 10 hold   Ankle Pumps in Elevation - 1-2 x daily - 7 x weekly - 1-2 sets - 30 reps - 1 hold   Seated Table Hamstring Stretch - 1-2 x daily - 7 x weekly - 1 sets - 4 reps - 30 hold  Access Code: [email or web address]: [employer]/Date: 10/15/2021Prepared by: Adeelmia Shell BloomExercises   Hooklying Active Hamstring Stretch - 2 x daily - 7 x weekly - 1-2 sets - 10 reps - 5 hold   Seated Table Hamstring Stretch - 2 x daily - 7 x weekly - 1 sets - 2-4 reps - 30 hold    Patient was instructed in the following for HEP:     . Patient verbalized/demonstrated understanding and was issued written handout. Access Code: [email or web address]  URL: [employer]/  Date: 10/20/2021  Prepared by:  [facility]    Exercises  Standing Knee Flexion AROM with Chair Support - 1 x daily - 7 x weekly - 3 sets - 10 reps    Therapeutic Exercise and NMR EXR  [x] (14356) Provided verbal/tactile cueing for activities related to strengthening, flexibility, endurance, ROM for improvements in LE, proximal hip, and core control with self care, mobility, lifting, ambulation.  [] (66989) Provided verbal/tactile cueing for activities related to improving balance, coordination, kinesthetic sense, posture, motor skill, proprioception  to assist with LE, proximal hip, and core control in self care, mobility, lifting, ambulation and eccentric single leg control.      NMR and Therapeutic Activities:    [] (72311 or 96530) Provided verbal/tactile cueing for activities related to improving balance, coordination, kinesthetic sense, posture, motor skill, proprioception and motor activation to allow for proper function of core, proximal hip and LE with self care and ADLs and functional mobility.   [] (76841) Gait Re-education- Provided training and instruction to the patient for proper LE, core and proximal hip recruitment and positioning and eccentric body weight control with ambulation re-education including up and down stairs     Home Exercise Program:    [x] (56860) Reviewed/Progressed HEP activities related to strengthening, flexibility, endurance, ROM of core, proximal hip and LE for functional self-care, mobility, lifting and ambulation/stair navigation   [] (68247)Reviewed/Progressed HEP activities related to improving balance, coordination, kinesthetic sense, posture, motor skill, proprioception of core, proximal hip and LE for self care, mobility, lifting, and ambulation/stair navigation      Manual Treatments:  PROM / STM / Oscillations-Mobs:  G-I, II, III, IV (PA's, Inf., Post.)  [x] (87768) Provided manual therapy to mobilize LE, proximal hip and/or LS spine soft tissue/joints for the purpose of modulating pain, promoting relaxation,  increasing ROM, reducing/eliminating soft tissue swelling/inflammation/restriction, improving soft tissue extensibility and allowing for proper ROM for normal function with self care, mobility, lifting and ambulation. Charges:  Timed Code Treatment Minutes: 45   Total Treatment Minutes: 60    10/25/21: More time available today  [] EVAL (LOW) 66254 (typically 20 minutes face-to-face)  [] EVAL (MOD) 41168 (typically 30 minutes face-to-face)  [] EVAL (HIGH) 68951 (typically 45 minutes face-to-face)  [] RE-EVAL     [x] JS(74027) x 2    [] Dry needle 1 or 2 Muscles (77557)  [] NMR (85922) x     [] Dry needle 3+ Muscles (23626)  [x] Manual (48533) x  1   [] Ultrasound (37339) x  [] TA (88088) x     [] Mech Traction (52996)  [] ES(attended) (79081)     [] ES (un) (59446):   [] Vasopump (54860)  [] Ionto (97869)   [] Other:    Gina Schwab stated goal: Decrease pain, improve strength and normal walking  []? Progressing: []? Met: []? Not Met: []? Adjusted     Therapist goals for Patient:   Short Term Goals: To be achieved in: 2 weeks  1. Independent in HEP and progression per patient tolerance, in order to prevent re-injury. []? Progressing: []? Met: []? Not Met: []? Adjusted        Long Term Goals: To be achieved in: 4-6 weeks  1. score of 50 or betterfor the LEFS to assist with reaching prior level of function. []? Progressing: []? Met: []? Not Met: []? Adjusted  2. Patient will demonstrate increased AROM to 2-115 or better at right knee  to allow for proper joint functioning as indicated by patients Functional Deficits. []? Progressing: []? Met: []? Not Met: []? Adjusted  3. Patient will demonstrate an increase in Strength to good proximal hip strength and control in LE to allow for proper functional mobility as indicated by patients Functional Deficits. []? Progressing: []? Met: []? Not Met: []? Adjusted  4. Patient will return to normal walking, stair climbing  and household functional activities without increased symptoms or restriction. []? Progressing: []? Met: []? Not Met: []? Adjusted  5.   Patient will have a decrease in pain 0-3/10 to facilitate

## 2021-11-08 ENCOUNTER — HOSPITAL ENCOUNTER (OUTPATIENT)
Dept: PHYSICAL THERAPY | Age: 66
Setting detail: THERAPIES SERIES
Discharge: HOME | End: 2021-11-08
Payer: MEDICARE

## 2021-11-08 PROCEDURE — 97140 MANUAL THERAPY 1/> REGIONS: CPT

## 2021-11-08 PROCEDURE — 97110 THERAPEUTIC EXERCISES: CPT

## 2021-11-08 PROCEDURE — 97016 VASOPNEUMATIC DEVICE THERAPY: CPT

## 2021-11-08 NOTE — FLOWSHEET NOTE
[facility] - Outpatient Rehabilitation & Therapy  [address]. [place]  Phone: [phone]   Fax:     [phone]      Physical Therapy Treatment Note/ Progress Report:     Date:  2021    Patient Name:  Jessica Quiñonez    :  1955  MRN: [id number]    Pertinent Medical History:     Medical/Treatment Diagnosis Information:  Diagnosis: OA right knee M17.11, s/p R TKA Z96.651  Treatment Diagnosis: Gait and mobility disturbance s/p R TKA    Insurance/Certification information:  PT Insurance Information: Medicare  Physician Information:  Referring Practitioner: Brenda Wu MD  Plan of care signed (Y/N): routed 10/11/21    Date of Patient follow up with Physician:      Progress Report: []  Yes  [x]  No     Date Range for reporting period:  Beginnin2021  Ending:      Progress report due (10 Rx/or 30 days whichever is less):       Recertification due (POC duration/ or 90 days whichever is less):     Visit # POC/Insurance Allowable Auth Needed   13 BOMN []Yes   [x]No     Latex Allergy:  [x]NO      []YES  Preferred Language for Healthcare:   [x]English       []Other:    Functional Scale:      Date assessed: at eval  Test: LEFS  Score:    Pain level:  7/10     History of Injury:Patient stated complaint: h/o right knee pain ~10 years with resultant RKA, L TKA , lives with with her  in a 1 story ranch home, one step to enter, basement with washer/ dryer     SUBJECTIVE:    10/13/21 Pt reports her right leg has been aggravated by the swelling over the last day. 10/15/21 Pt reports having experienced diarrhea  the last 2 days she is not feeling as well today. 10/18/21: Pt reports that she had a rough day on Saturday and did not do any exercises. She took meds today prior to therapy. Notes edema in R calf and ankle. 10/20/21: Pt reports that she did not sleep well last night and still has swelling.  She did her HEP with greater ease yesterday. 10/22/21: Pt reports that she had a rough morning due to constipation, bad night last night, and swelling is down though. 10/25/21 Pt reports no change with her pain from last session but c/o increased stiffness today. 10/27/21 Patient reports knee feels stiff and sore,still having difficulty sleeping at night. 10/29/21 Patient reports knee is doing ok,sleeping a little better at night. 11/1/21 Pt reports feeling sore and stiff today. 11/03/21 Patient reports knee feels sore and swollen. States still not sleeping well at night. 11/05/21 Patient reports knee soreness is about the same,pulling on top of the knee. 11/8/21: Pt states she is doing good, still having pain and trouble sleeping at night    OBJECTIVE:   Observation:    Test measurements:    ROM:  Date           Knee Flexion       Knee Extension    Active Passive Active Passive    Eval       10/18/21 98  0    10/22 94  -5    10/29  100 -5               Strength:  Date Hip flexion Hip abduction Quad Hamstring Ankle DF Ankle PF   Eval                                                 RESTRICTIONS/PRECAUTIONS:     Exercises/Interventions:     Therapeutic Ex (26525)   Min: Reps/Resistance Notes/CUES   Nu step L4 x 5 min To work on knee flexion   Leg press Bilat. , 65# 10 x 3  RLE 45# 10 x 3  C/o medial right knee pain    QSS/HSS 5\" 3 min     Incline 1 min x 2 @20% with right quad setting     minisquats 15X                   Mat ex     Strap assist knee   flex  Quad setting  5\" 25 x   5\" 25 x     TKE     LAQ R 0# 30 x 11/05/21 Decreased weight per patients request   Hams curls - standing       SLR  2 x 10             Manual Intervention (52871)  Min: 15 min      Knee mobs/PROM Knee flexion and ext. And tibial femoral IR/ER     Tib/Fem Mobs     Patella Mobs Sup. And inf. Gr 3 ea    Ankle mobs     stretch To right rectus fem. , hams and   gastroc.          NMR re-education (50543)  Min:  CUES NEEDED   SLS ADD    Wobble Step ups fwd/ lat  4\" 10 x                    Therapeutic Activity (31834)  Min:15     Gait  Advised pt to continue use of walker         Modalities  Min:     Game ready  Medium compression 15 min     CP after exercises     MH after exercises            Other Therapeutic Activities: Pt was educated on PT POC, Diagnosis, Prognosis, pathomechanics as well as frequency and duration of scheduling future physical therapy appointments. Time was also taken on this day to answer all patient questions and participation in PT. Reviewed appointment policy in detail with patient and patient verbalized understanding. Home Exercise Program:   Access Code: [email or web address]: [employer]/Date: 10/11/2021Prepared by: Devon Chavira   Long Sitting Quad Set - 1-2 x daily - 7 x weekly - 1-2 sets - 20 reps - 5 hold   Supine Active Straight Leg Raise - 1-2 x daily - 7 x weekly - 1-2 sets - 10 reps - 1 hold   Sitting Heel Slide with Towel - 1-2 x daily - 7 x weekly - 1 sets - 12 reps - 10 hold   Seated Long Arc Quad - 1-2 x daily - 7 x weekly - 1-2 sets - 15 reps - 3 hold   Long Sitting Calf Stretch with Strap - 1-2 x daily - 7 x weekly - 1 sets - 12 reps - 10 hold   Ankle Pumps in Elevation - 1-2 x daily - 7 x weekly - 1-2 sets - 30 reps - 1 hold   Seated Table Hamstring Stretch - 1-2 x daily - 7 x weekly - 1 sets - 4 reps - 30 hold  Access Code: [email or web address]: [employer]/Date: 10/15/2021Prepared by: Christy Samayoa   Hooklying Active Hamstring Stretch - 2 x daily - 7 x weekly - 1-2 sets - 10 reps - 5 hold   Seated Table Hamstring Stretch - 2 x daily - 7 x weekly - 1 sets - 2-4 reps - 30 hold    Patient was instructed in the following for HEP:     . Patient verbalized/demonstrated understanding and was issued written handout. Access Code: [email or web address]  URL: [employer]/  Date: 10/20/2021  Prepared by:  Sridhar Delacruz    Exercises  Standing Knee Flexion AROM with Chair Support - 1 x daily - 7 x weekly - 3 sets - 10 reps    Therapeutic Exercise and NMR EXR  [x] (54324) Provided verbal/tactile cueing for activities related to strengthening, flexibility, endurance, ROM for improvements in LE, proximal hip, and core control with self care, mobility, lifting, ambulation.  [] (59768) Provided verbal/tactile cueing for activities related to improving balance, coordination, kinesthetic sense, posture, motor skill, proprioception  to assist with LE, proximal hip, and core control in self care, mobility, lifting, ambulation and eccentric single leg control.      NMR and Therapeutic Activities:    [] (32671 or 80725) Provided verbal/tactile cueing for activities related to improving balance, coordination, kinesthetic sense, posture, motor skill, proprioception and motor activation to allow for proper function of core, proximal hip and LE with self care and ADLs and functional mobility.   [] (52611) Gait Re-education- Provided training and instruction to the patient for proper LE, core and proximal hip recruitment and positioning and eccentric body weight control with ambulation re-education including up and down stairs     Home Exercise Program:    [x] (26683) Reviewed/Progressed HEP activities related to strengthening, flexibility, endurance, ROM of core, proximal hip and LE for functional self-care, mobility, lifting and ambulation/stair navigation   [] (51503)Reviewed/Progressed HEP activities related to improving balance, coordination, kinesthetic sense, posture, motor skill, proprioception of core, proximal hip and LE for self care, mobility, lifting, and ambulation/stair navigation      Manual Treatments:  PROM / STM / Oscillations-Mobs:  G-I, II, III, IV (PA's, Inf., Post.)  [x] (21751) Provided manual therapy to mobilize LE, proximal hip and/or LS spine soft tissue/joints for the purpose of modulating pain, promoting relaxation,  increasing ROM, reducing/eliminating soft tissue swelling/inflammation/restriction, improving soft tissue extensibility and allowing for proper ROM for normal function with self care, mobility, lifting and ambulation. Charges:  Timed Code Treatment Minutes: 45   Total Treatment Minutes: 60    10/25/21: More time available today  [] EVAL (LOW) 98098 (typically 20 minutes face-to-face)  [] EVAL (MOD) 67042 (typically 30 minutes face-to-face)  [] EVAL (HIGH) 06138 (typically 45 minutes face-to-face)  [] RE-EVAL     [x] PZ(44732) x 2    [] Dry needle 1 or 2 Muscles (17639)  [] NMR (67440) x     [] Dry needle 3+ Muscles (64859)  [x] Manual (43391) x  1   [] Ultrasound (26745) x  [] TA (78687) x     [] Mech Traction (40807)  [] ES(attended) (98208)     [] ES (un) (32699):   [x] Vasopump (21138)  [] Ionto (30631)   [] Other:    Ashley Stewart stated goal: Decrease pain, improve strength and normal walking  []? Progressing: []? Met: []? Not Met: []? Adjusted     Therapist goals for Patient:   Short Term Goals: To be achieved in: 2 weeks  1. Independent in HEP and progression per patient tolerance, in order to prevent re-injury. []? Progressing: []? Met: []? Not Met: []? Adjusted        Long Term Goals: To be achieved in: 4-6 weeks  1. score of 50 or betterfor the LEFS to assist with reaching prior level of function. []? Progressing: []? Met: []? Not Met: []? Adjusted  2. Patient will demonstrate increased AROM to 2-115 or better at right knee  to allow for proper joint functioning as indicated by patients Functional Deficits. []? Progressing: []? Met: []? Not Met: []? Adjusted  3. Patient will demonstrate an increase in Strength to good proximal hip strength and control in LE to allow for proper functional mobility as indicated by patients Functional Deficits. []? Progressing: []? Met: []? Not Met: []? Adjusted  4. Patient will return to normal walking, stair climbing  and household functional activities without increased symptoms or restriction.    []? Progressing: []? Met: []? Not Met: []? Adjusted  5. Patient will have a decrease in pain 0-3/10 to facilitate improvement in movement, function, and ADLs as indicated by Functional Deficits. []? Progressing: []? Met: []? Not Met: []? Adjusted    ASSESSMENT:  KX modifier applied 10/18/21, as patient had a right TKR on 10/8/21 and requires skilled therapy to maximize outcome of surgery and achieve maximum benefit. PT reviewed HEP that patient had been given previously. Treatment/Activity Tolerance:  [x] Patient tolerated treatment well [] Patient limited by fatique  [] Patient limited by pain  [] Patient limited by other medical complications  [] Other:     Overall Progression Towards Functional goals/ Treatment Progress Update:  [] Patient is progressing as expected towards functional goals listed. [] Progression is slowed due to complexities/Impairments listed. [] Progression has been slowed due to co-morbidities. [x] Plan just implemented, too soon to assess goals progression <30days   [] Goals require adjustment due to lack of progress  [] Patient is not progressing as expected and requires additional follow up with physician  [] Other    Prognosis for POC: [x] Good [] Fair  [] Poor    Patient requires continued skilled intervention: [x] Yes  [] No        PLAN: Reassess   Gait, balance training, proprioception, rom, strengthening, decrease swelling/ pain  [x] Continue per plan of care [] Alter current plan (see comments)  [] Plan of care initiated [] Hold pending MD visit [] Discharge    Electronically signed by: Orly Coronel PT    Note: If patient does not return for scheduled/recommended follow up visits, this note will serve as a discharge from care along with the most recent update on progress.

## 2021-11-10 ENCOUNTER — HOSPITAL ENCOUNTER (OUTPATIENT)
Dept: PHYSICAL THERAPY | Age: 66
Setting detail: THERAPIES SERIES
Discharge: HOME | End: 2021-11-10
Payer: MEDICARE

## 2021-11-10 PROCEDURE — 97140 MANUAL THERAPY 1/> REGIONS: CPT

## 2021-11-10 PROCEDURE — 97016 VASOPNEUMATIC DEVICE THERAPY: CPT

## 2021-11-10 PROCEDURE — 97110 THERAPEUTIC EXERCISES: CPT

## 2021-11-10 NOTE — FLOWSHEET NOTE
[facility] - Outpatient Rehabilitation & Therapy  [address]. [place]  Phone: [phone]   Fax:     [phone]      Physical Therapy Treatment Note/ Progress Report:     Date:  11/10/2021    Patient Name:  Asmita Fagan    :  1955  MRN: [id number]    Pertinent Medical History:     Medical/Treatment Diagnosis Information:  Diagnosis: OA right knee M17.11, s/p R TKA Z96.651  Treatment Diagnosis: Gait and mobility disturbance s/p R TKA    Insurance/Certification information:  PT Insurance Information: Medicare  Physician Information:  Referring Practitioner: Kriss Cohn MD  Plan of care signed (Y/N): routed 10/11/21    Date of Patient follow up with Physician:      Progress Report: []  Yes  [x]  No     Date Range for reporting period:  Beginnin2021  Ending:      Progress report due (10 Rx/or 30 days whichever is less):       Recertification due (POC duration/ or 90 days whichever is less):     Visit # POC/Insurance Allowable Auth Needed   14 BOMN []Yes   [x]No     Latex Allergy:  [x]NO      []YES  Preferred Language for Healthcare:   [x]English       []Other:    Functional Scale:      Date assessed: at eval  Test: LEFS  Score:    Pain level:  6/10     History of Injury:Patient stated complaint: h/o right knee pain ~10 years with resultant RKA, L TKA , lives with with her  in a 1 story ranch home, one step to enter, basement with washer/ dryer     SUBJECTIVE:    10/13/21 Pt reports her right leg has been aggravated by the swelling over the last day. 10/15/21 Pt reports having experienced diarrhea  the last 2 days she is not feeling as well today. 10/18/21: Pt reports that she had a rough day on Saturday and did not do any exercises. She took meds today prior to therapy. Notes edema in R calf and ankle. 10/20/21: Pt reports that she did not sleep well last night and still has swelling.  She did her HEP with greater ease yesterday. 10/22/21: Pt reports that she had a rough morning due to constipation, bad night last night, and swelling is down though. 10/25/21 Pt reports no change with her pain from last session but c/o increased stiffness today. 10/27/21 Patient reports knee feels stiff and sore,still having difficulty sleeping at night. 10/29/21 Patient reports knee is doing ok,sleeping a little better at night. 11/1/21 Pt reports feeling sore and stiff today. 11/03/21 Patient reports knee feels sore and swollen. States still not sleeping well at night. 11/05/21 Patient reports knee soreness is about the same,pulling on top of the knee. 11/8/21: Pt states she is doing good, still having pain and trouble sleeping at night  11/10/21: Patient reports knee has been feeling ok,still some stiffness and she is not sleeping threw the night yet. OBJECTIVE:   Observation:    Test measurements:    ROM:  Date           Knee Flexion       Knee Extension    Active Passive Active Passive    Eval       10/18/21 98  0    10/22 94  -5    10/29  100 -5               Strength:  Date Hip flexion Hip abduction Quad Hamstring Ankle DF Ankle PF   Eval                                                 RESTRICTIONS/PRECAUTIONS:     Exercises/Interventions:     Therapeutic Ex (76626)   Min: Reps/Resistance Notes/CUES   Nu step L4 x 5 min To work on knee flexion   Leg press Bilat. , 65# 10 x 3  RLE 45# 10 x 3  C/o medial right knee pain    QSS/HSS 5\" 3 min     Incline 1 min x 2 @20% with right quad setting     minisquats 15X                   Mat ex     Strap assist knee   flex  Quad setting  5\" 25 x   5\" 25 x     TKE     LAQ R 0# 30 x 11/05/21 Decreased weight per patients request   Hams curls - standing       SLR  2 x 10             Manual Intervention (96227)  Min: 15 min      Knee mobs/PROM Knee flexion and ext. And tibial femoral IR/ER     Tib/Fem Mobs     Patella Mobs Sup. And inf.  Gr 3 ea    Ankle mobs stretch To right rectus fem. , hams and   gastroc. NMR re-education (80530)  Min:  CUES NEEDED   SLS ADD    Wobble     Step ups fwd/ lat  resume                  Therapeutic Activity (86463)  Min:15     Gait  Advised pt to continue use of walker         Modalities  Min:     Game ready  Medium compression 15 min     CP after exercises     MH after exercises            Other Therapeutic Activities: Pt was educated on PT POC, Diagnosis, Prognosis, pathomechanics as well as frequency and duration of scheduling future physical therapy appointments. Time was also taken on this day to answer all patient questions and participation in PT. Reviewed appointment policy in detail with patient and patient verbalized understanding. Home Exercise Program:   Access Code: [email or web address]: [email or web address]. com/Date: 10/11/2021Prepared by: Fabi Weber   Long Sitting Quad Set - 1-2 x daily - 7 x weekly - 1-2 sets - 20 reps - 5 hold   Supine Active Straight Leg Raise - 1-2 x daily - 7 x weekly - 1-2 sets - 10 reps - 1 hold   Sitting Heel Slide with Towel - 1-2 x daily - 7 x weekly - 1 sets - 12 reps - 10 hold   Seated Long Arc Quad - 1-2 x daily - 7 x weekly - 1-2 sets - 15 reps - 3 hold   Long Sitting Calf Stretch with Strap - 1-2 x daily - 7 x weekly - 1 sets - 12 reps - 10 hold   Ankle Pumps in Elevation - 1-2 x daily - 7 x weekly - 1-2 sets - 30 reps - 1 hold   Seated Table Hamstring Stretch - 1-2 x daily - 7 x weekly - 1 sets - 4 reps - 30 hold  Access Code: [email or web address]: [employer]/Date: 10/15/2021Prepared by: Solitario Samayoa   Hooklying Active Hamstring Stretch - 2 x daily - 7 x weekly - 1-2 sets - 10 reps - 5 hold   Seated Table Hamstring Stretch - 2 x daily - 7 x weekly - 1 sets - 2-4 reps - 30 hold    Patient was instructed in the following for HEP:     . Patient verbalized/demonstrated understanding and was issued written handout.     Access Code: [email or web address]  URL: [email or web address]  Date: 10/20/2021  Prepared by: Mateusz Prado    Exercises  Standing Knee Flexion AROM with Chair Support - 1 x daily - 7 x weekly - 3 sets - 10 reps    Therapeutic Exercise and NMR EXR  [x] (70602) Provided verbal/tactile cueing for activities related to strengthening, flexibility, endurance, ROM for improvements in LE, proximal hip, and core control with self care, mobility, lifting, ambulation.  [] (03421) Provided verbal/tactile cueing for activities related to improving balance, coordination, kinesthetic sense, posture, motor skill, proprioception  to assist with LE, proximal hip, and core control in self care, mobility, lifting, ambulation and eccentric single leg control.      NMR and Therapeutic Activities:    [] (21582 or 74458) Provided verbal/tactile cueing for activities related to improving balance, coordination, kinesthetic sense, posture, motor skill, proprioception and motor activation to allow for proper function of core, proximal hip and LE with self care and ADLs and functional mobility.   [] (12244) Gait Re-education- Provided training and instruction to the patient for proper LE, core and proximal hip recruitment and positioning and eccentric body weight control with ambulation re-education including up and down stairs     Home Exercise Program:    [x] (82530) Reviewed/Progressed HEP activities related to strengthening, flexibility, endurance, ROM of core, proximal hip and LE for functional self-care, mobility, lifting and ambulation/stair navigation   [] (22021)Reviewed/Progressed HEP activities related to improving balance, coordination, kinesthetic sense, posture, motor skill, proprioception of core, proximal hip and LE for self care, mobility, lifting, and ambulation/stair navigation      Manual Treatments:  PROM / STM / Oscillations-Mobs:  G-I, II, III, IV (PA's, Inf., Post.)  [x] (05616) Provided manual therapy to mobilize LE, proximal hip and/or LS spine soft tissue/joints for the purpose of modulating pain, promoting relaxation,  increasing ROM, reducing/eliminating soft tissue swelling/inflammation/restriction, improving soft tissue extensibility and allowing for proper ROM for normal function with self care, mobility, lifting and ambulation. Charges:  Timed Code Treatment Minutes: 45   Total Treatment Minutes: 60    10/25/21: More time available today  [] EVAL (LOW) 68717 (typically 20 minutes face-to-face)  [] EVAL (MOD) 58692 (typically 30 minutes face-to-face)  [] EVAL (HIGH) 66010 (typically 45 minutes face-to-face)  [] RE-EVAL     [x] DI(67099) x 2    [] Dry needle 1 or 2 Muscles (49694)  [] NMR (86955) x     [] Dry needle 3+ Muscles (22976)  [x] Manual (53407) x  1   [] Ultrasound (34090) x  [] TA (22980) x     [] Mech Traction (53994)  [] ES(attended) (60540)     [] ES (un) (67226):   [x] Vasopump (75143)  [] Ionto (20009)   [] Other:    Dk Mason stated goal: Decrease pain, improve strength and normal walking  []? Progressing: []? Met: []? Not Met: []? Adjusted     Therapist goals for Patient:   Short Term Goals: To be achieved in: 2 weeks  1. Independent in HEP and progression per patient tolerance, in order to prevent re-injury. []? Progressing: []? Met: []? Not Met: []? Adjusted        Long Term Goals: To be achieved in: 4-6 weeks  1. score of 50 or betterfor the LEFS to assist with reaching prior level of function. []? Progressing: []? Met: []? Not Met: []? Adjusted  2. Patient will demonstrate increased AROM to 2-115 or better at right knee  to allow for proper joint functioning as indicated by patients Functional Deficits. []? Progressing: []? Met: []? Not Met: []? Adjusted  3. Patient will demonstrate an increase in Strength to good proximal hip strength and control in LE to allow for proper functional mobility as indicated by patients Functional Deficits. []? Progressing: []? Met: []? Not Met: []? Adjusted  4.  Patient will return

## 2021-11-12 ENCOUNTER — HOSPITAL ENCOUNTER (OUTPATIENT)
Dept: PHYSICAL THERAPY | Age: 66
Setting detail: THERAPIES SERIES
Discharge: HOME | End: 2021-11-12
Payer: MEDICARE

## 2021-11-12 PROCEDURE — 97016 VASOPNEUMATIC DEVICE THERAPY: CPT

## 2021-11-12 PROCEDURE — 97140 MANUAL THERAPY 1/> REGIONS: CPT

## 2021-11-12 PROCEDURE — 97110 THERAPEUTIC EXERCISES: CPT

## 2021-11-12 NOTE — FLOWSHEET NOTE
-    Physical Therapy Re-Certification Plan of Care/MD UPDATE      Dear   Ca Lan MD,    We had the pleasure of treating the following patient for physical therapy services at [facility]. A summary of our findings can be found in the updated assessment below. This includes our plan of care. If you have any questions or concerns regarding these findings, please do not hesitate to contact me at the office phone number checked above. Thank you for the referral.     Physician Signature:________________________________Date:__________________  By signing above (or electronic signature), therapists plan is approved by physician    Date Range Of Visits: 10/11/21 to 21  Total Visits to Date:15  Overall Response to Treatment:   [x]Patient is responding  to treatment and improvement is noted with regards to goals   []Patient should continue to improve in reasonable time if they continue HEP   []Patient has plateaued and is no longer responding to skilled PT intervention    []Patient is getting worse and would benefit from return to referring MD   []Patient unable to adhere to initial POC   []Other:       Recommendation:    [x]Continue PT 2-3x / wk for 4-6 weeks.     []Hold PT, pending MD visit                Physical Therapy Treatment Note/ Progress Report:     Date:  2021    Patient Name:  Lola Wilson    :  1955  MRN: [id number]    Pertinent Medical History:     Medical/Treatment Diagnosis Information:  Diagnosis: OA right knee M17.11, s/p R TKA Z96.651  Treatment Diagnosis: Gait and mobility disturbance s/p R TKA    Insurance/Certification information:  PT Insurance Information: Medicare  Physician Information:  Referring Practitioner: Ca Lan MD  Plan of care signed (Y/N): routed 10/11/21    Date of Patient follow up with Physician:      Progress Report: []  Yes  [x]  No     Date Range for reporting period:  Beginnin2021  Ending:      Progress report due (10 Rx/or 30 days whichever is less): 72/46/63      Recertification due (POC duration/ or 90 days whichever is less):     Visit # POC/Insurance Allowable Auth Needed   15 BOMN []Yes   [x]No     Latex Allergy:  [x]NO      []YES  Preferred Language for Healthcare:   [x]English       []Other:    Functional Scale:      Date assessed: at eval  Test: LEFS  Score:    Pain level:  6/10     History of Injury:Patient stated complaint: h/o right knee pain ~10 years with resultant RKA, L TKA 0/16/21, lives with with her  in a 1 story ranch home, one step to enter, basement with washer/ dryer     SUBJECTIVE:    10/13/21 Pt reports her right leg has been aggravated by the swelling over the last day. 10/15/21 Pt reports having experienced diarrhea  the last 2 days she is not feeling as well today. 10/18/21: Pt reports that she had a rough day on Saturday and did not do any exercises. She took meds today prior to therapy. Notes edema in R calf and ankle. 10/20/21: Pt reports that she did not sleep well last night and still has swelling. She did her HEP with greater ease yesterday. 10/22/21: Pt reports that she had a rough morning due to constipation, bad night last night, and swelling is down though. 10/25/21 Pt reports no change with her pain from last session but c/o increased stiffness today. 10/27/21 Patient reports knee feels stiff and sore,still having difficulty sleeping at night. 10/29/21 Patient reports knee is doing ok,sleeping a little better at night. 11/1/21 Pt reports feeling sore and stiff today. 11/03/21 Patient reports knee feels sore and swollen. States still not sleeping well at night. 11/05/21 Patient reports knee soreness is about the same,pulling on top of the knee. 11/8/21: Pt states she is doing good, still having pain and trouble sleeping at night  11/10/21: Patient reports knee has been feeling ok,still some stiffness and she is not sleeping threw the night yet.   11/12/21 Pt reports knee pain is about the same with intensity  , she continues to have difficulty at night. OBJECTIVE:   Observation:    Test measurements:    ROM: Right knee   (* increased pain)   Date           Knee Flexion       Knee Extension    Active Passive Active Passive    Eval       10/18/21 98  0    10/22 94  -5    10/29  100 -5    11/12 96 100* -5 -3              Strength:  Date Hip flexion Hip abduction Quad Hamstring Ankle DF Ankle PF   Eval                                                 RESTRICTIONS/PRECAUTIONS:     Exercises/Interventions:     Therapeutic Ex (24924)   Min: Reps/Resistance Notes/CUES   Nu step  To work on knee flexion   Leg press Bilat. , 65# 10 x , 70# 10 x 2 10 x 2   RLE 45# 10 x 3  C/o medial right knee pain    QSS/HSS 5\" 3 min     Incline 1 min x 2 @20% with right quad setting     minisquats 15X                   Mat ex     Strap assist knee   flex  Quad setting      TKE R  3.0# 30 x     LAQ R 0# 30 x 11/05/21 Decreased weight per patients request   Hams curls - standing       SLR               Manual Intervention (72412)  Min: 15 min      Knee mobs/PROM Knee flexion and ext. And tibial femoral IR/ER     Tib/Fem Mobs     Patella Mobs Sup. And inf. Gr 3 ea    Ankle mobs     stretch To right rectus fem. , hams and   gastroc. NMR re-education (36383)  Min:  CUES NEEDED   SLS Floor 10\" alt x 3 min     Wobble     Step ups fwd/ lat > Progress to 6\"    fwd 4\" 15 x                    Therapeutic Activity (13625)  Min:15     Gait 11/12/21 With spc 2 point step through, cues for tech. Posture and sequence, pt demonstrated improved tech. And good balance Advised pt to increase use of spc as she feels confident in doing so and to use walker only as needed. Pt verbalized understanding.          Modalities  Min:     Game ready  Medium compression 15 min     CP after exercises     MH after exercises            Other Therapeutic Activities: Pt was educated on PT POC, Diagnosis, Prognosis, pathomechanics as well as frequency and duration of scheduling future physical therapy appointments. Time was also taken on this day to answer all patient questions and participation in PT. Reviewed appointment policy in detail with patient and patient verbalized understanding. Home Exercise Program:   Access Code: [email or web address]: [email or web address]. [employer]/Date: 10/11/2021Prepared by: Renny Marking   Long Sitting Quad Set - 1-2 x daily - 7 x weekly - 1-2 sets - 20 reps - 5 hold   Supine Active Straight Leg Raise - 1-2 x daily - 7 x weekly - 1-2 sets - 10 reps - 1 hold   Sitting Heel Slide with Towel - 1-2 x daily - 7 x weekly - 1 sets - 12 reps - 10 hold   Seated Long Arc Quad - 1-2 x daily - 7 x weekly - 1-2 sets - 15 reps - 3 hold   Long Sitting Calf Stretch with Strap - 1-2 x daily - 7 x weekly - 1 sets - 12 reps - 10 hold   Ankle Pumps in Elevation - 1-2 x daily - 7 x weekly - 1-2 sets - 30 reps - 1 hold   Seated Table Hamstring Stretch - 1-2 x daily - 7 x weekly - 1 sets - 4 reps - 30 hold  Access Code: [email or web address]: [email or web address]/Date: 10/15/2021Prepared by: Theo Samayoa   Hooklying Active Hamstring Stretch - 2 x daily - 7 x weekly - 1-2 sets - 10 reps - 5 hold   Seated Table Hamstring Stretch - 2 x daily - 7 x weekly - 1 sets - 2-4 reps - 30 hold    Patient was instructed in the following for HEP:     . Patient verbalized/demonstrated understanding and was issued written handout. Access Code: [email or web address]  URL: [email or web address]/  Date: 10/20/2021  Prepared by:  Eva Burns    Exercises  Standing Knee Flexion AROM with Chair Support - 1 x daily - 7 x weekly - 3 sets - 10 reps    Therapeutic Exercise and NMR EXR  [x] (32972) Provided verbal/tactile cueing for activities related to strengthening, flexibility, endurance, ROM for improvements in LE, proximal hip, and core control with self care, mobility, lifting, ambulation.  [] (27705) Provided verbal/tactile cueing for activities related to improving balance, coordination, kinesthetic sense, posture, motor skill, proprioception  to assist with LE, proximal hip, and core control in self care, mobility, lifting, ambulation and eccentric single leg control. NMR and Therapeutic Activities:    [] (68359 or 34720) Provided verbal/tactile cueing for activities related to improving balance, coordination, kinesthetic sense, posture, motor skill, proprioception and motor activation to allow for proper function of core, proximal hip and LE with self care and ADLs and functional mobility.   [] (55522) Gait Re-education- Provided training and instruction to the patient for proper LE, core and proximal hip recruitment and positioning and eccentric body weight control with ambulation re-education including up and down stairs     Home Exercise Program:    [x] (60128) Reviewed/Progressed HEP activities related to strengthening, flexibility, endurance, ROM of core, proximal hip and LE for functional self-care, mobility, lifting and ambulation/stair navigation   [] (13924)Reviewed/Progressed HEP activities related to improving balance, coordination, kinesthetic sense, posture, motor skill, proprioception of core, proximal hip and LE for self care, mobility, lifting, and ambulation/stair navigation      Manual Treatments:  PROM / STM / Oscillations-Mobs:  G-I, II, III, IV (PA's, Inf., Post.)  [x] (45260) Provided manual therapy to mobilize LE, proximal hip and/or LS spine soft tissue/joints for the purpose of modulating pain, promoting relaxation,  increasing ROM, reducing/eliminating soft tissue swelling/inflammation/restriction, improving soft tissue extensibility and allowing for proper ROM for normal function with self care, mobility, lifting and ambulation.        Charges:  Timed Code Treatment Minutes: 45   Total Treatment Minutes: 65    10/25/21: More time available today  [] EVAL (LOW) 82695 (typically 20 minutes face-to-face)  [] EVAL (MOD) 16690 (typically 30 minutes face-to-face)  [] EVAL (HIGH) 84151 (typically 45 minutes face-to-face)  [] RE-EVAL     [x] YT(94961) x 2    [] Dry needle 1 or 2 Muscles (46049)  [] NMR (78316) x     [] Dry needle 3+ Muscles (93824)  [x] Manual (12450) x  1   [] Ultrasound (63479) x  [] TA (92705) x     [] Mech Traction (76864)  [] ES(attended) (93861)     [] ES (un) (19918):   [x] Vasopump (64241)  [] Ionto (77726)   [] Other:    Haleigh Brumfield stated goal: Decrease pain, improve strength and normal walking  []? Progressing: []? Met: []? Not Met: []? Adjusted     Therapist goals for Patient:   Short Term Goals: To be achieved in: 2 weeks  1. Independent in HEP and progression per patient tolerance, in order to prevent re-injury. []? Progressing: []? Met: []? Not Met: []? Adjusted        Long Term Goals: To be achieved in: 4-6 weeks  1. score of 50 or betterfor the LEFS to assist with reaching prior level of function. []? Progressing: []? Met: []? Not Met: []? Adjusted  2. Patient will demonstrate increased AROM to 2-115 or better at right knee  to allow for proper joint functioning as indicated by patients Functional Deficits. []? Progressing: []? Met: []? Not Met: []? Adjusted  3. Patient will demonstrate an increase in Strength to good proximal hip strength and control in LE to allow for proper functional mobility as indicated by patients Functional Deficits. []? Progressing: []? Met: []? Not Met: []? Adjusted  4. Patient will return to normal walking, stair climbing  and household functional activities without increased symptoms or restriction. []? Progressing: []? Met: []? Not Met: []? Adjusted  5. Patient will have a decrease in pain 0-3/10 to facilitate improvement in movement, function, and ADLs as indicated by Functional Deficits. []? Progressing: []? Met: []? Not Met: []?  Adjusted    ASSESSMENT:  KX modifier applied 10/18/21, as patient had a right TKR on 10/8/21 and requires skilled therapy to maximize outcome of surgery and achieve maximum benefit. PT reviewed HEP that patient had been given previously. Treatment/Activity Tolerance:  [x] Patient tolerated treatment well [] Patient limited by fatique  [] Patient limited by pain  [x] Patient limited by other medical complications  [] Other:     Overall Progression Towards Functional goals/ Treatment Progress Update:  [] Patient is progressing as expected towards functional goals listed. [] Progression is slowed due to complexities/Impairments listed. [] Progression has been slowed due to co-morbidities. [x] Plan just implemented, too soon to assess goals progression <30days   [] Goals require adjustment due to lack of progress  [] Patient is not progressing as expected and requires additional follow up with physician  [] Other    Prognosis for POC: [x] Good [] Fair  [] Poor    Patient requires continued skilled intervention: [x] Yes  [] No        PLAN: Reassess strength   Gait, balance training, proprioception, rom, strengthening, decrease swelling/ pain  [x] Continue per plan of care [] Alter current plan (see comments)  [] Plan of care initiated [] Hold pending MD visit [] Discharge    Electronically signed by: Digna Jaquez PT    Note: If patient does not return for scheduled/recommended follow up visits, this note will serve as a discharge from care along with the most recent update on progress.

## 2021-11-15 ENCOUNTER — HOSPITAL ENCOUNTER (OUTPATIENT)
Dept: PHYSICAL THERAPY | Age: 66
Setting detail: THERAPIES SERIES
Discharge: HOME | End: 2021-11-15
Payer: MEDICARE

## 2021-11-15 PROCEDURE — 97140 MANUAL THERAPY 1/> REGIONS: CPT

## 2021-11-15 PROCEDURE — 97016 VASOPNEUMATIC DEVICE THERAPY: CPT

## 2021-11-15 PROCEDURE — 97110 THERAPEUTIC EXERCISES: CPT

## 2021-11-15 NOTE — FLOWSHEET NOTE
Physical Therapy Treatment Note/ Progress Report:     Date:  11/15/2021    Patient Name:  Javier De La Torre    :  1955  MRN: [id number]    Pertinent Medical History:     Medical/Treatment Diagnosis Information:  Diagnosis: OA right knee M17.11, s/p R TKA Z96.651  Treatment Diagnosis: Gait and mobility disturbance s/p R TKA    Insurance/Certification information:  PT Insurance Information: Medicare  Physician Information:  Referring Practitioner: Mark Briseno MD  Plan of care signed (Y/N): routed 10/11/21    Date of Patient follow up with Physician:      Progress Report: []  Yes  [x]  No     Date Range for reporting period:  Beginnin2021  Ending:      Progress report due (10 Rx/or 30 days whichever is less):       Recertification due (POC duration/ or 90 days whichever is less):     Visit # POC/Insurance Allowable Auth Needed   16 BOMN []Yes   [x]No     Latex Allergy:  [x]NO      []YES  Preferred Language for Healthcare:   [x]English       []Other:    Functional Scale:      Date assessed: at eval  Test: LEFS  Score:    Pain level:  6/10     History of Injury:Patient stated complaint: h/o right knee pain ~10 years with resultant RKA, L TKA , lives with with her  in a 1 story ranch home, one step to enter, basement with washer/ dryer     SUBJECTIVE:    10/13/21 Pt reports her right leg has been aggravated by the swelling over the last day. 10/15/21 Pt reports having experienced diarrhea  the last 2 days she is not feeling as well today. 10/18/21: Pt reports that she had a rough day on Saturday and did not do any exercises. She took meds today prior to therapy. Notes edema in R calf and ankle. 10/20/21: Pt reports that she did not sleep well last night and still has swelling. She did her HEP with greater ease yesterday. 10/22/21: Pt reports that she had a rough morning due to constipation, bad night last night, and swelling is down though.   10/25/21 Pt reports no change with her pain from last session but c/o increased stiffness today. 10/27/21 Patient reports knee feels stiff and sore,still having difficulty sleeping at night. 10/29/21 Patient reports knee is doing ok,sleeping a little better at night. 11/1/21 Pt reports feeling sore and stiff today. 11/03/21 Patient reports knee feels sore and swollen. States still not sleeping well at night. 11/05/21 Patient reports knee soreness is about the same,pulling on top of the knee. 11/8/21: Pt states she is doing good, still having pain and trouble sleeping at night  11/10/21: Patient reports knee has been feeling ok,still some stiffness and she is not sleeping threw the night yet. 11/12/21 Pt reports knee pain is about the same with intensity  , she continues to have difficulty at night. 11/15/21 Patient reports both her knees were sore over the weekend. States she still has difficulty sleeping at night. OBJECTIVE:   Observation:    Test measurements:    ROM: Right knee   (* increased pain)   Date           Knee Flexion       Knee Extension    Active Passive Active Passive    Eval       10/18/21 98  0    10/22 94  -5    10/29  100 -5    11/12 96 100* -5 -3              Strength:  Date Hip flexion Hip abduction Quad Hamstring Ankle DF Ankle PF   Eval                                                 RESTRICTIONS/PRECAUTIONS:     Exercises/Interventions:     Therapeutic Ex (03868)   Min: Reps/Resistance Notes/CUES   Nu step L4 x 5 min To work on knee flexion   Leg press Bilat. ,  ,70# 10 x 3    RLE 45# 10 x 3  C/o medial right knee pain    QSS/HSS 5\" 3 min     Incline 1 min x 2 @20% with right quad setting     minisquats 15X                   Mat ex     Strap assist knee   flex  Quad setting      TKE      LAQ R 2# 30 x 11/05/21 Decreased weight per patients request   Hams curls - standing       SLR               Manual Intervention (22549)  Min: 15 min      Knee mobs/PROM Knee flexion and ext.  And tibial femoral IR/ER     Tib/Fem Mobs     Patella Mobs Sup. And inf. Gr 3 ea    Ankle mobs     stretch To right rectus fem. , hams and   gastroc. NMR re-education (65053)  Min:  CUES NEEDED   SLS Floor 10\" alt x 3 min     Wobble     Step ups fwd/ lat  6\"  15 x                          Therapeutic Activity (99999)  Min:15     Gait  Advised pt to increase use of spc as she feels confident in doing so and to use walker only as needed. Pt verbalized understanding. Modalities  Min:     Game ready  Medium compression 15 min     CP after exercises     MH after exercises            Other Therapeutic Activities: Pt was educated on PT POC, Diagnosis, Prognosis, pathomechanics as well as frequency and duration of scheduling future physical therapy appointments. Time was also taken on this day to answer all patient questions and participation in PT. Reviewed appointment policy in detail with patient and patient verbalized understanding. Home Exercise Program:   Access Code: [email or web address]: [employer]/Date: 10/11/2021Prepared by: Clifford Roper   Long Sitting Quad Set - 1-2 x daily - 7 x weekly - 1-2 sets - 20 reps - 5 hold   Supine Active Straight Leg Raise - 1-2 x daily - 7 x weekly - 1-2 sets - 10 reps - 1 hold   Sitting Heel Slide with Towel - 1-2 x daily - 7 x weekly - 1 sets - 12 reps - 10 hold   Seated Long Arc Quad - 1-2 x daily - 7 x weekly - 1-2 sets - 15 reps - 3 hold   Long Sitting Calf Stretch with Strap - 1-2 x daily - 7 x weekly - 1 sets - 12 reps - 10 hold   Ankle Pumps in Elevation - 1-2 x daily - 7 x weekly - 1-2 sets - 30 reps - 1 hold   Seated Table Hamstring Stretch - 1-2 x daily - 7 x weekly - 1 sets - 4 reps - 30 hold  Access Code: [email or web address]: [employer]/Date: 10/15/2021Prepared by: Daphney Samayoa   Hooklying Active Hamstring Stretch - 2 x daily - 7 x weekly - 1-2 sets - 10 reps - 5 hold   Seated Table Hamstring Stretch - 2 x daily - 7 x weekly - 1 sets - 2-4 reps - 30 hold    Patient was instructed in the following for HEP:     . Patient verbalized/demonstrated understanding and was issued written handout. Access Code: [email or web address]  URL: [employer]/  Date: 10/20/2021  Prepared by: Becky Kincaid    Exercises  Standing Knee Flexion AROM with Chair Support - 1 x daily - 7 x weekly - 3 sets - 10 reps    Therapeutic Exercise and NMR EXR  [x] (48771) Provided verbal/tactile cueing for activities related to strengthening, flexibility, endurance, ROM for improvements in LE, proximal hip, and core control with self care, mobility, lifting, ambulation.  [] (14308) Provided verbal/tactile cueing for activities related to improving balance, coordination, kinesthetic sense, posture, motor skill, proprioception  to assist with LE, proximal hip, and core control in self care, mobility, lifting, ambulation and eccentric single leg control.      NMR and Therapeutic Activities:    [] (71453 or 20283) Provided verbal/tactile cueing for activities related to improving balance, coordination, kinesthetic sense, posture, motor skill, proprioception and motor activation to allow for proper function of core, proximal hip and LE with self care and ADLs and functional mobility.   [] (80750) Gait Re-education- Provided training and instruction to the patient for proper LE, core and proximal hip recruitment and positioning and eccentric body weight control with ambulation re-education including up and down stairs     Home Exercise Program:    [x] (19728) Reviewed/Progressed HEP activities related to strengthening, flexibility, endurance, ROM of core, proximal hip and LE for functional self-care, mobility, lifting and ambulation/stair navigation   [] (68311)Reviewed/Progressed HEP activities related to improving balance, coordination, kinesthetic sense, posture, motor skill, proprioception of core, proximal hip and LE for self care, mobility, lifting, and ambulation/stair navigation      Manual Treatments:  PROM / STM / Oscillations-Mobs:  G-I, II, III, IV (PA's, Inf., Post.)  [x] (80793) Provided manual therapy to mobilize LE, proximal hip and/or LS spine soft tissue/joints for the purpose of modulating pain, promoting relaxation,  increasing ROM, reducing/eliminating soft tissue swelling/inflammation/restriction, improving soft tissue extensibility and allowing for proper ROM for normal function with self care, mobility, lifting and ambulation. Charges:  Timed Code Treatment Minutes: 45   Total Treatment Minutes: 65    10/25/21: More time available today  [] EVAL (LOW) 02142 (typically 20 minutes face-to-face)  [] EVAL (MOD) 80721 (typically 30 minutes face-to-face)  [] EVAL (HIGH) 03135 (typically 45 minutes face-to-face)  [] RE-EVAL     [x] JW(23525) x 2    [] Dry needle 1 or 2 Muscles (97921)  [] NMR (46409) x     [] Dry needle 3+ Muscles (89876)  [x] Manual (62469) x  1   [] Ultrasound (14657) x  [] TA (95284) x     [] Mech Traction (57659)  [] ES(attended) (84241)     [] ES (un) (51081):   [x] Vasopump (41154)  [] Ionto (25502)   [] Other:    Alicia Vogel stated goal: Decrease pain, improve strength and normal walking  []? Progressing: []? Met: []? Not Met: []? Adjusted     Therapist goals for Patient:   Short Term Goals: To be achieved in: 2 weeks  1. Independent in HEP and progression per patient tolerance, in order to prevent re-injury. []? Progressing: []? Met: []? Not Met: []? Adjusted        Long Term Goals: To be achieved in: 4-6 weeks  1. score of 50 or betterfor the LEFS to assist with reaching prior level of function. []? Progressing: []? Met: []? Not Met: []? Adjusted  2. Patient will demonstrate increased AROM to 2-115 or better at right knee  to allow for proper joint functioning as indicated by patients Functional Deficits. []? Progressing: []? Met: []? Not Met: []? Adjusted  3.  Patient will demonstrate an increase in Strength to good proximal hip strength and control in LE to allow for proper functional mobility as indicated by patients Functional Deficits. []? Progressing: []? Met: []? Not Met: []? Adjusted  4. Patient will return to normal walking, stair climbing  and household functional activities without increased symptoms or restriction. []? Progressing: []? Met: []? Not Met: []? Adjusted  5. Patient will have a decrease in pain 0-3/10 to facilitate improvement in movement, function, and ADLs as indicated by Functional Deficits. []? Progressing: []? Met: []? Not Met: []? Adjusted    ASSESSMENT:  KX modifier applied 10/18/21, as patient had a right TKR on 10/8/21 and requires skilled therapy to maximize outcome of surgery and achieve maximum benefit. PT reviewed HEP that patient had been given previously. Treatment/Activity Tolerance:  [x] Patient tolerated treatment well [] Patient limited by fatique  [] Patient limited by pain  [x] Patient limited by other medical complications  [] Other:     Overall Progression Towards Functional goals/ Treatment Progress Update:  [] Patient is progressing as expected towards functional goals listed. [] Progression is slowed due to complexities/Impairments listed. [] Progression has been slowed due to co-morbidities.   [x] Plan just implemented, too soon to assess goals progression <30days   [] Goals require adjustment due to lack of progress  [] Patient is not progressing as expected and requires additional follow up with physician  [] Other    Prognosis for POC: [x] Good [] Fair  [] Poor    Patient requires continued skilled intervention: [x] Yes  [] No        PLAN: Reassess strength   Gait, balance training, proprioception, rom, strengthening, decrease swelling/ pain  [x] Continue per plan of care [] Alter current plan (see comments)  [] Plan of care initiated [] Hold pending MD visit [] Discharge    Electronically signed by: Samm Todd PTA    Note: If patient does not return for scheduled/recommended follow up visits, this note will serve as a discharge from care along with the most recent update on progress.

## 2021-11-17 ENCOUNTER — HOSPITAL ENCOUNTER (OUTPATIENT)
Dept: PHYSICAL THERAPY | Age: 66
Setting detail: THERAPIES SERIES
Discharge: HOME | End: 2021-11-17
Payer: MEDICARE

## 2021-11-17 PROCEDURE — 97110 THERAPEUTIC EXERCISES: CPT

## 2021-11-17 PROCEDURE — 97140 MANUAL THERAPY 1/> REGIONS: CPT

## 2021-11-17 PROCEDURE — 97016 VASOPNEUMATIC DEVICE THERAPY: CPT

## 2021-11-17 NOTE — FLOWSHEET NOTE
Physical Therapy Treatment Note/ Progress Report:     Date:  2021    Patient Name:  Marisa Dubois    :  1955  MRN: [id number]    Pertinent Medical History:     Medical/Treatment Diagnosis Information:  Diagnosis: OA right knee M17.11, s/p R TKA Z96.651  Treatment Diagnosis: Gait and mobility disturbance s/p R TKA    Insurance/Certification information:  PT Insurance Information: Medicare  Physician Information:  Referring Practitioner: Russell Lizarraga MD  Plan of care signed (Y/N): routed 10/11/21    Date of Patient follow up with Physician:      Progress Report: []  Yes  [x]  No     Date Range for reporting period:  Beginnin2021  Ending:      Progress report due (10 Rx/or 30 days whichever is less):       Recertification due (POC duration/ or 90 days whichever is less):     Visit # POC/Insurance Allowable Auth Needed   17 BOMN []Yes   [x]No     Latex Allergy:  [x]NO      []YES  Preferred Language for Healthcare:   [x]English       []Other:    Functional Scale:      Date assessed: at eval  Test: LEFS  Score:    Pain level:  6/10     History of Injury:Patient stated complaint: h/o right knee pain ~10 years with resultant RKA, L TKA , lives with with her  in a 1 story ranch home, one step to enter, basement with washer/ dryer     SUBJECTIVE:    10/13/21 Pt reports her right leg has been aggravated by the swelling over the last day. 10/15/21 Pt reports having experienced diarrhea  the last 2 days she is not feeling as well today. 10/18/21: Pt reports that she had a rough day on Saturday and did not do any exercises. She took meds today prior to therapy. Notes edema in R calf and ankle. 10/20/21: Pt reports that she did not sleep well last night and still has swelling. She did her HEP with greater ease yesterday. 10/22/21: Pt reports that she had a rough morning due to constipation, bad night last night, and swelling is down though.   10/25/21 Pt reports no change with her pain from last session but c/o increased stiffness today. 10/27/21 Patient reports knee feels stiff and sore,still having difficulty sleeping at night. 10/29/21 Patient reports knee is doing ok,sleeping a little better at night. 11/1/21 Pt reports feeling sore and stiff today. 11/03/21 Patient reports knee feels sore and swollen. States still not sleeping well at night. 11/05/21 Patient reports knee soreness is about the same,pulling on top of the knee. 11/8/21: Pt states she is doing good, still having pain and trouble sleeping at night  11/10/21: Patient reports knee has been feeling ok,still some stiffness and she is not sleeping threw the night yet. 11/12/21 Pt reports knee pain is about the same with intensity  , she continues to have difficulty at night. 11/15/21 Patient reports both her knees were sore over the weekend. States she still has difficulty sleeping at night. 11/17/21 Patient reports knee still stiff and sore. MD was pleased with progress.     OBJECTIVE:   Observation:    Test measurements:    ROM: Right knee   (* increased pain)   Date           Knee Flexion       Knee Extension    Active Passive Active Passive    Eval       10/18/21 98  0    10/22 94  -5    10/29  100 -5    11/12 96 100* -5 -3   11/17  104*         Strength:  Date Hip flexion Hip abduction Quad Hamstring Ankle DF Ankle PF   Eval                                                 RESTRICTIONS/PRECAUTIONS:     Exercises/Interventions:     Therapeutic Ex (20207)   Min: Reps/Resistance Notes/CUES   Nu step L4 x 5 min To work on knee flexion   Leg press Bilat. ,  ,70# 10 x 3    RLE 45# 10 x 3  C/o medial right knee pain    QSS/HSS 5\" 3 min     Incline 1 min x 2 @20% with right quad setting     minisquats 15X                   Mat ex     Strap assist knee   flex  Quad setting      TKE      LAQ R 2# 30 x 11/05/21 Decreased weight per patients request   Hams curls - standing       SLR               Manual Intervention (57349)  Min: 15 min      Knee mobs/PROM Knee flexion and ext. And tibial femoral IR/ER     Tib/Fem Mobs     Patella Mobs Sup. And inf. Gr 3 ea    Ankle mobs     stretch To right rectus fem. , hams and   gastroc. NMR re-education (71235)  Min:  CUES NEEDED   SLS Floor 10\" alt x 3 min     Wobble     Step ups fwd/ lat  6\"  15 x                          Therapeutic Activity (60928)  Min:15     Gait  Advised pt to increase use of spc as she feels confident in doing so and to use walker only as needed. Pt verbalized understanding. Modalities  Min:     Game ready  Medium compression 15 min     CP after exercises     MH after exercises            Other Therapeutic Activities: Pt was educated on PT POC, Diagnosis, Prognosis, pathomechanics as well as frequency and duration of scheduling future physical therapy appointments. Time was also taken on this day to answer all patient questions and participation in PT. Reviewed appointment policy in detail with patient and patient verbalized understanding. Home Exercise Program:   Access Code: [email or web address]: [employer]/Date: 10/11/2021Prepared by: Gm Ramires   Long Sitting Quad Set - 1-2 x daily - 7 x weekly - 1-2 sets - 20 reps - 5 hold   Supine Active Straight Leg Raise - 1-2 x daily - 7 x weekly - 1-2 sets - 10 reps - 1 hold   Sitting Heel Slide with Towel - 1-2 x daily - 7 x weekly - 1 sets - 12 reps - 10 hold   Seated Long Arc Quad - 1-2 x daily - 7 x weekly - 1-2 sets - 15 reps - 3 hold   Long Sitting Calf Stretch with Strap - 1-2 x daily - 7 x weekly - 1 sets - 12 reps - 10 hold   Ankle Pumps in Elevation - 1-2 x daily - 7 x weekly - 1-2 sets - 30 reps - 1 hold   Seated Table Hamstring Stretch - 1-2 x daily - 7 x weekly - 1 sets - 4 reps - 30 hold  Access Code: [email or web address]: [employer]/Date: 10/15/2021Prepared by: Lucinda Meigs BloomExmaryam   Hooklying Active Hamstring Stretch - 2 x daily - 7 x weekly - 1-2 sets - 10 reps - 5 hold   Seated Table Hamstring Stretch - 2 x daily - 7 x weekly - 1 sets - 2-4 reps - 30 hold    Patient was instructed in the following for HEP:     . Patient verbalized/demonstrated understanding and was issued written handout. Access Code: [email or web address]  URL: [employer]/  Date: 10/20/2021  Prepared by: Santy Pierce    Exercises  Standing Knee Flexion AROM with Chair Support - 1 x daily - 7 x weekly - 3 sets - 10 reps    Therapeutic Exercise and NMR EXR  [x] (22723) Provided verbal/tactile cueing for activities related to strengthening, flexibility, endurance, ROM for improvements in LE, proximal hip, and core control with self care, mobility, lifting, ambulation.  [] (46914) Provided verbal/tactile cueing for activities related to improving balance, coordination, kinesthetic sense, posture, motor skill, proprioception  to assist with LE, proximal hip, and core control in self care, mobility, lifting, ambulation and eccentric single leg control.      NMR and Therapeutic Activities:    [] (68456 or 20749) Provided verbal/tactile cueing for activities related to improving balance, coordination, kinesthetic sense, posture, motor skill, proprioception and motor activation to allow for proper function of core, proximal hip and LE with self care and ADLs and functional mobility.   [] (58962) Gait Re-education- Provided training and instruction to the patient for proper LE, core and proximal hip recruitment and positioning and eccentric body weight control with ambulation re-education including up and down stairs     Home Exercise Program:    [x] (00843) Reviewed/Progressed HEP activities related to strengthening, flexibility, endurance, ROM of core, proximal hip and LE for functional self-care, mobility, lifting and ambulation/stair navigation   [] (79263)Reviewed/Progressed HEP activities related to improving balance, coordination, kinesthetic sense, posture, motor skill, proprioception of core, proximal hip and LE for self care, mobility, lifting, and ambulation/stair navigation      Manual Treatments:  PROM / STM / Oscillations-Mobs:  G-I, II, III, IV (PA's, Inf., Post.)  [x] (65800) Provided manual therapy to mobilize LE, proximal hip and/or LS spine soft tissue/joints for the purpose of modulating pain, promoting relaxation,  increasing ROM, reducing/eliminating soft tissue swelling/inflammation/restriction, improving soft tissue extensibility and allowing for proper ROM for normal function with self care, mobility, lifting and ambulation. Charges:  Timed Code Treatment Minutes: 45   Total Treatment Minutes: 65    10/25/21: More time available today  [] EVAL (LOW) 72436 (typically 20 minutes face-to-face)  [] EVAL (MOD) 22460 (typically 30 minutes face-to-face)  [] EVAL (HIGH) 59793 (typically 45 minutes face-to-face)  [] RE-EVAL     [x] LP(16608) x 2    [] Dry needle 1 or 2 Muscles (26508)  [] NMR (59732) x     [] Dry needle 3+ Muscles (07737)  [x] Manual (60732) x  1   [] Ultrasound (34923) x  [] TA (72836) x     [] Mech Traction (25677)  [] ES(attended) (12922)     [] ES (un) (94450):   [x] Vasopump (37061)  [] Ionto (87041)   [] Other:    [place] Fl[place] stated goal: Decrease pain, improve strength and normal walking  []? Progressing: []? Met: []? Not Met: []? Adjusted     Therapist goals for Patient:   Short Term Goals: To be achieved in: 2 weeks  1. Independent in HEP and progression per patient tolerance, in order to prevent re-injury. []? Progressing: []? Met: []? Not Met: []? Adjusted        Long Term Goals: To be achieved in: 4-6 weeks  1. score of 50 or betterfor the LEFS to assist with reaching prior level of function. []? Progressing: []? Met: []? Not Met: []? Adjusted  2. Patient will demonstrate increased AROM to 2-115 or better at right knee  to allow for proper joint functioning as indicated by patients Functional Deficits. []? Progressing: []?  Met: []? Discharge    Electronically signed by: Meliton Russ PTA    Note: If patient does not return for scheduled/recommended follow up visits, this note will serve as a discharge from care along with the most recent update on progress.

## 2021-11-19 ENCOUNTER — HOSPITAL ENCOUNTER (OUTPATIENT)
Dept: PHYSICAL THERAPY | Age: 66
Setting detail: THERAPIES SERIES
Discharge: HOME | End: 2021-11-19
Payer: MEDICARE

## 2021-11-19 PROCEDURE — 97140 MANUAL THERAPY 1/> REGIONS: CPT

## 2021-11-19 PROCEDURE — 97110 THERAPEUTIC EXERCISES: CPT

## 2021-11-19 PROCEDURE — 97016 VASOPNEUMATIC DEVICE THERAPY: CPT

## 2021-11-19 NOTE — FLOWSHEET NOTE
Physical Therapy Treatment Note/ Progress Report:     Date:  2021    Patient Name:  Javier De La Torre    :  1955  MRN: [id number]    Pertinent Medical History:     Medical/Treatment Diagnosis Information:  Diagnosis: OA right knee M17.11, s/p R TKA Z96.651  Treatment Diagnosis: Gait and mobility disturbance s/p R TKA    Insurance/Certification information:  PT Insurance Information: Medicare  Physician Information:  Referring Practitioner: Mark Briseno MD  Plan of care signed (Y/N): routed 10/11/21    Date of Patient follow up with Physician:      Progress Report: []  Yes  [x]  No     Date Range for reporting period:  Beginnin2021  Ending:      Progress report due (10 Rx/or 30 days whichever is less):       Recertification due (POC duration/ or 90 days whichever is less):     Visit # POC/Insurance Allowable Auth Needed   18 BOMN []Yes   [x]No     Latex Allergy:  [x]NO      []YES  Preferred Language for Healthcare:   [x]English       []Other:    Functional Scale:      Date assessed: at eval  Test: LEFS  Score:    Pain level:  5/10     History of Injury:Patient stated complaint: h/o right knee pain ~10 years with resultant RKA, L TKA , lives with with her  in a 1 story ranch home, one step to enter, basement with washer/ dryer     SUBJECTIVE:    10/13/21 Pt reports her right leg has been aggravated by the swelling over the last day. 10/15/21 Pt reports having experienced diarrhea  the last 2 days she is not feeling as well today. 10/18/21: Pt reports that she had a rough day on Saturday and did not do any exercises. She took meds today prior to therapy. Notes edema in R calf and ankle. 10/20/21: Pt reports that she did not sleep well last night and still has swelling. She did her HEP with greater ease yesterday. 10/22/21: Pt reports that she had a rough morning due to constipation, bad night last night, and swelling is down though.   10/25/21 Pt reports no change with her pain from last session but c/o increased stiffness today. 10/27/21 Patient reports knee feels stiff and sore,still having difficulty sleeping at night. 10/29/21 Patient reports knee is doing ok,sleeping a little better at night. 11/1/21 Pt reports feeling sore and stiff today. 11/03/21 Patient reports knee feels sore and swollen. States still not sleeping well at night. 11/05/21 Patient reports knee soreness is about the same,pulling on top of the knee. 11/8/21: Pt states she is doing good, still having pain and trouble sleeping at night  11/10/21: Patient reports knee has been feeling ok,still some stiffness and she is not sleeping threw the night yet. 11/12/21 Pt reports knee pain is about the same with intensity  , she continues to have difficulty at night. 11/15/21 Patient reports both her knees were sore over the weekend. States she still has difficulty sleeping at night. 11/17/21 Patient reports knee still stiff and sore. MD was pleased with progress. 11/19/21 Patient reports knee is doing ok soreness down the right shin area. States she has been walking with a cane more.     OBJECTIVE:   Observation:    Test measurements:    ROM: Right knee   (* increased pain)   Date           Knee Flexion       Knee Extension    Active Passive Active Passive    Eval       10/18/21 98  0    10/22 94  -5    10/29  100 -5    11/12 96 100* -5 -3   11/17  104*         Strength:  Date Hip flexion Hip abduction Quad Hamstring Ankle DF Ankle PF   Eval                                                 RESTRICTIONS/PRECAUTIONS:     Exercises/Interventions:     Therapeutic Ex (51651)   Min: Reps/Resistance Notes/CUES   Nu step L4 x 5 min To work on knee flexion   Leg press Bilat. ,  ,75# 10 x 3    RLE 45# 10 x 3  C/o medial right knee pain    QSS/HSS 5\" 3 min     Incline 1 min x 2 @20% with right quad setting     minisquats 15X                   Mat ex     Strap assist knee   flex  Quad setting      [email or web address] com/Date: 10/15/2021Prepared by: Cb YiExercquinn   Hooklying Active Hamstring Stretch - 2 x daily - 7 x weekly - 1-2 sets - 10 reps - 5 hold   Seated Table Hamstring Stretch - 2 x daily - 7 x weekly - 1 sets - 2-4 reps - 30 hold    Patient was instructed in the following for HEP:     . Patient verbalized/demonstrated understanding and was issued written handout. Access Code: [email or web address]  URL: [email or web address]  Date: 10/20/2021  Prepared by: Ricardo Estrada    Exercises  Standing Knee Flexion AROM with Chair Support - 1 x daily - 7 x weekly - 3 sets - 10 reps    Therapeutic Exercise and NMR EXR  [x] (98116) Provided verbal/tactile cueing for activities related to strengthening, flexibility, endurance, ROM for improvements in LE, proximal hip, and core control with self care, mobility, lifting, ambulation.  [] (51101) Provided verbal/tactile cueing for activities related to improving balance, coordination, kinesthetic sense, posture, motor skill, proprioception  to assist with LE, proximal hip, and core control in self care, mobility, lifting, ambulation and eccentric single leg control.      NMR and Therapeutic Activities:    [] (30752 or 80906) Provided verbal/tactile cueing for activities related to improving balance, coordination, kinesthetic sense, posture, motor skill, proprioception and motor activation to allow for proper function of core, proximal hip and LE with self care and ADLs and functional mobility.   [] (91763) Gait Re-education- Provided training and instruction to the patient for proper LE, core and proximal hip recruitment and positioning and eccentric body weight control with ambulation re-education including up and down stairs     Home Exercise Program:    [x] (65950) Reviewed/Progressed HEP activities related to strengthening, flexibility, endurance, ROM of core, proximal hip and LE for functional self-care, mobility, lifting and ambulation/stair navigation   [] (35534)Reviewed/Progressed HEP activities related to improving balance, coordination, kinesthetic sense, posture, motor skill, proprioception of core, proximal hip and LE for self care, mobility, lifting, and ambulation/stair navigation      Manual Treatments:  PROM / STM / Oscillations-Mobs:  G-I, II, III, IV (PA's, Inf., Post.)  [x] (47547) Provided manual therapy to mobilize LE, proximal hip and/or LS spine soft tissue/joints for the purpose of modulating pain, promoting relaxation,  increasing ROM, reducing/eliminating soft tissue swelling/inflammation/restriction, improving soft tissue extensibility and allowing for proper ROM for normal function with self care, mobility, lifting and ambulation. Charges:  Timed Code Treatment Minutes: 45   Total Treatment Minutes: 65    10/25/21: More time available today  [] EVAL (LOW) 33342 (typically 20 minutes face-to-face)  [] EVAL (MOD) 23159 (typically 30 minutes face-to-face)  [] EVAL (HIGH) 27499 (typically 45 minutes face-to-face)  [] RE-EVAL     [x] BG(35376) x 2    [] Dry needle 1 or 2 Muscles (59446)  [] NMR (23318) x     [] Dry needle 3+ Muscles (24040)  [x] Manual (72710) x  1   [] Ultrasound (91288) x  [] TA (58645) x     [] Mech Traction (54887)  [] ES(attended) (84140)     [] ES (un) (35913):   [x] Vasopump (49727)  [] Ionto (27698)   [] Other:    Dk Mason stated goal: Decrease pain, improve strength and normal walking  []? Progressing: []? Met: []? Not Met: []? Adjusted     Therapist goals for Patient:   Short Term Goals: To be achieved in: 2 weeks  1. Independent in HEP and progression per patient tolerance, in order to prevent re-injury. []? Progressing: []? Met: []? Not Met: []? Adjusted        Long Term Goals: To be achieved in: 4-6 weeks  1. score of 50 or betterfor the LEFS to assist with reaching prior level of function. []? Progressing: []? Met: []? Not Met: []? Adjusted  2.  Patient will demonstrate increased AROM to 2-115 or better at right knee  to allow for proper joint functioning as indicated by patients Functional Deficits. []? Progressing: []? Met: []? Not Met: []? Adjusted  3. Patient will demonstrate an increase in Strength to good proximal hip strength and control in LE to allow for proper functional mobility as indicated by patients Functional Deficits. []? Progressing: []? Met: []? Not Met: []? Adjusted  4. Patient will return to normal walking, stair climbing  and household functional activities without increased symptoms or restriction. []? Progressing: []? Met: []? Not Met: []? Adjusted  5. Patient will have a decrease in pain 0-3/10 to facilitate improvement in movement, function, and ADLs as indicated by Functional Deficits. []? Progressing: []? Met: []? Not Met: []? Adjusted    ASSESSMENT:  KX modifier applied 10/18/21, as patient had a right TKR on 10/8/21 and requires skilled therapy to maximize outcome of surgery and achieve maximum benefit. PT reviewed HEP that patient had been given previously. Treatment/Activity Tolerance:  [x] Patient tolerated treatment well [] Patient limited by fatique  [] Patient limited by pain  [x] Patient limited by other medical complications  [] Other:     Overall Progression Towards Functional goals/ Treatment Progress Update:  [] Patient is progressing as expected towards functional goals listed. [] Progression is slowed due to complexities/Impairments listed. [] Progression has been slowed due to co-morbidities.   [x] Plan just implemented, too soon to assess goals progression <30days   [] Goals require adjustment due to lack of progress  [] Patient is not progressing as expected and requires additional follow up with physician  [] Other    Prognosis for POC: [x] Good [] Fair  [] Poor    Patient requires continued skilled intervention: [x] Yes  [] No        PLAN: Reassess strength   Gait, balance training, proprioception, rom, strengthening, decrease swelling/ pain  [x] Continue per plan of care [] Alter current plan (see comments)  [] Plan of care initiated [] Hold pending MD visit [] Discharge    Electronically signed by: Pratik Brown PTA    Note: If patient does not return for scheduled/recommended follow up visits, this note will serve as a discharge from care along with the most recent update on progress.

## 2021-11-22 ENCOUNTER — HOSPITAL ENCOUNTER (OUTPATIENT)
Dept: PHYSICAL THERAPY | Age: 66
Setting detail: THERAPIES SERIES
Discharge: HOME | End: 2021-11-22
Payer: MEDICARE

## 2021-11-22 PROCEDURE — 97110 THERAPEUTIC EXERCISES: CPT

## 2021-11-22 PROCEDURE — 97140 MANUAL THERAPY 1/> REGIONS: CPT

## 2021-11-22 PROCEDURE — 97112 NEUROMUSCULAR REEDUCATION: CPT

## 2021-11-22 PROCEDURE — 97016 VASOPNEUMATIC DEVICE THERAPY: CPT

## 2021-11-22 NOTE — FLOWSHEET NOTE
Physical Therapy Treatment Note/ Progress Report:     Date:  2021    Patient Name:  Ludin Bender    :  1955  MRN: [id number]    Pertinent Medical History:     Medical/Treatment Diagnosis Information:  Diagnosis: OA right knee M17.11, s/p R TKA Z96.651  Treatment Diagnosis: Gait and mobility disturbance s/p R TKA    Insurance/Certification information:  PT Insurance Information: Medicare  Physician Information:  Referring Practitioner: Akiko Young MD  Plan of care signed (Y/N): routed 10/11/21    Date of Patient follow up with Physician:      Progress Report: []  Yes  [x]  No     Date Range for reporting period:  Beginnin2021  Ending:      Progress report due (10 Rx/or 30 days whichever is less):       Recertification due (POC duration/ or 90 days whichever is less):     Visit # POC/Insurance Allowable Auth Needed   23 BOMN []Yes   [x]No     Latex Allergy:  [x]NO      []YES  Preferred Language for Healthcare:   [x]English       []Other:    Functional Scale:      Date assessed: at eval  Test: LEFS  Score:    Pain level:  6/10     History of Injury:Patient stated complaint: h/o right knee pain ~10 years with resultant RKA, L TKA , lives with with her  in a 1 story ranch home, one step to enter, basement with washer/ dryer     SUBJECTIVE:    10/13/21 Pt reports her right leg has been aggravated by the swelling over the last day. 10/15/21 Pt reports having experienced diarrhea  the last 2 days she is not feeling as well today. 10/18/21: Pt reports that she had a rough day on Saturday and did not do any exercises. She took meds today prior to therapy. Notes edema in R calf and ankle. 10/20/21: Pt reports that she did not sleep well last night and still has swelling. She did her HEP with greater ease yesterday. 10/22/21: Pt reports that she had a rough morning due to constipation, bad night last night, and swelling is down though.   10/25/21 Pt reports no change with her pain from last session but c/o increased stiffness today. 10/27/21 Patient reports knee feels stiff and sore,still having difficulty sleeping at night. 10/29/21 Patient reports knee is doing ok,sleeping a little better at night. 11/1/21 Pt reports feeling sore and stiff today. 11/03/21 Patient reports knee feels sore and swollen. States still not sleeping well at night. 11/05/21 Patient reports knee soreness is about the same,pulling on top of the knee. 11/8/21: Pt states she is doing good, still having pain and trouble sleeping at night  11/10/21: Patient reports knee has been feeling ok,still some stiffness and she is not sleeping threw the night yet. 11/12/21 Pt reports knee pain is about the same with intensity  , she continues to have difficulty at night. 11/15/21 Patient reports both her knees were sore over the weekend. States she still has difficulty sleeping at night. 11/17/21 Patient reports knee still stiff and sore. MD was pleased with progress. 11/19/21 Patient reports knee is doing ok soreness down the right shin area. States she has been walking with a cane more. 11/22/21 Pt reports feeling cold today, pain is about the same.      OBJECTIVE:   Observation:    Test measurements:    ROM: Right knee   (* increased pain)   Date           Knee Flexion       Knee Extension    Active Passive Active Passive    Eval       10/18/21 98  0    10/22 94  -5    10/29  100 -5    11/12 96 100* -5 -3   11/17  104*                       Strength:  Date Hip flexion Hip abduction Quad Hamstring Ankle DF Ankle PF   Eval                                                 RESTRICTIONS/PRECAUTIONS:     Exercises/Interventions:     Therapeutic Ex (32448)   Min: Reps/Resistance Notes/CUES   Nu step  To work on knee flexion   Leg press Bilat. ,  ,75# 10 x 3    RLE 45# 10 x 3  C/o medial right knee pain    QSS/HSS 5\" 3 min     Incline 1 min x 2 @20% with right quad setting     minisquats 5\" 15X Mat ex     Strap assist knee   flex  Quad setting      TKE      LAQ R 2# 30 x 11/05/21 Decreased weight per patients request   Hams curls - standing       SLR               Manual Intervention (77569)  Min: 15 min      Knee mobs/PROM Knee flexion and ext. And tibial femoral IR/ER     Tib/Fem Mobs     Patella Mobs Sup. And inf. Gr 3 ea    Ankle mobs     stretch To right rectus fem. , hams and   gastroc. NMR re-education (47377)  Min:10   CUES NEEDED   SLS Floor 10\" alt x 4 min     Wobble     Step ups fwd/ lat Fwd  6\"  15 x      Lat. 4\" 15 x        Chair squats                Therapeutic Activity (05341)  Min:15     Gait  Advised pt to increase use of spc as she feels confident in doing so and to use walker only as needed. Pt verbalized understanding. Modalities  Min:     Game ready  Medium compression 15 min     CP after exercises     MH after exercises            Other Therapeutic Activities: Pt was educated on PT POC, Diagnosis, Prognosis, pathomechanics as well as frequency and duration of scheduling future physical therapy appointments. Time was also taken on this day to answer all patient questions and participation in PT. Reviewed appointment policy in detail with patient and patient verbalized understanding. Home Exercise Program:   Access Code: [email or web address]: [email or web address]/Date: 10/11/2021Prepared by: Belkis Chaparro   Long Sitting Quad Set - 1-2 x daily - 7 x weekly - 1-2 sets - 20 reps - 5 hold   Supine Active Straight Leg Raise - 1-2 x daily - 7 x weekly - 1-2 sets - 10 reps - 1 hold   Sitting Heel Slide with Towel - 1-2 x daily - 7 x weekly - 1 sets - 12 reps - 10 hold   Seated Long Arc Quad - 1-2 x daily - 7 x weekly - 1-2 sets - 15 reps - 3 hold   Long Sitting Calf Stretch with Strap - 1-2 x daily - 7 x weekly - 1 sets - 12 reps - 10 hold   Ankle Pumps in Elevation - 1-2 x daily - 7 x weekly - 1-2 sets - 30 reps - 1 hold   Seated Table Hamstring Stretch proximal hip and LE for functional self-care, mobility, lifting and ambulation/stair navigation   [] (83915)Reviewed/Progressed HEP activities related to improving balance, coordination, kinesthetic sense, posture, motor skill, proprioception of core, proximal hip and LE for self care, mobility, lifting, and ambulation/stair navigation      Manual Treatments:  PROM / STM / Oscillations-Mobs:  G-I, II, III, IV (PA's, Inf., Post.)  [x] (06841) Provided manual therapy to mobilize LE, proximal hip and/or LS spine soft tissue/joints for the purpose of modulating pain, promoting relaxation,  increasing ROM, reducing/eliminating soft tissue swelling/inflammation/restriction, improving soft tissue extensibility and allowing for proper ROM for normal function with self care, mobility, lifting and ambulation. Charges:  Timed Code Treatment Minutes: 45   Total Treatment Minutes: 65    10/25/21: More time available today  [] EVAL (LOW) 15700 (typically 20 minutes face-to-face)  [] EVAL (MOD) 23793 (typically 30 minutes face-to-face)  [] EVAL (HIGH) 23042 (typically 45 minutes face-to-face)  [] RE-EVAL     [x] EV(51039) x 2    [] Dry needle 1 or 2 Muscles (20157)  [] NMR (71775) x     [] Dry needle 3+ Muscles (07756)  [x] Manual (73714) x  1   [] Ultrasound (13604) x  [] TA (93743) x     [] Mech Traction (64180)  [] ES(attended) (02255)     [] ES (un) (12804):   [x] Vasopump (97346)  [] Ionto (66832)   [] Other:    Kamille Contreras stated goal: Decrease pain, improve strength and normal walking  [x]? Progressing: []? Met: []? Not Met: []? Adjusted     Therapist goals for Patient:   Short Term Goals: To be achieved in: 2 weeks  1. Independent in HEP and progression per patient tolerance, in order to prevent re-injury. [x]? Progressing: []? Met: []? Not Met: []? Adjusted        Long Term Goals: To be achieved in: 4-6 weeks  1. score of 50 or betterfor the LEFS to assist with reaching prior level of function. [x]? LEFS due 11/24/21                             Reassess strength   Gait, balance training, proprioception, rom, strengthening, decrease swelling/ pain  [x] Continue per plan of care [] Alter current plan (see comments)  [] Plan of care initiated [] Hold pending MD visit [] Discharge    Electronically signed by: Patria Ramos PT    Note: If patient does not return for scheduled/recommended follow up visits, this note will serve as a discharge from care along with the most recent update on progress.

## 2021-11-24 ENCOUNTER — HOSPITAL ENCOUNTER (OUTPATIENT)
Dept: PHYSICAL THERAPY | Age: 66
Setting detail: THERAPIES SERIES
Discharge: HOME | End: 2021-11-24
Payer: MEDICARE

## 2021-11-24 PROCEDURE — 97016 VASOPNEUMATIC DEVICE THERAPY: CPT

## 2021-11-24 PROCEDURE — 97112 NEUROMUSCULAR REEDUCATION: CPT

## 2021-11-24 PROCEDURE — 97140 MANUAL THERAPY 1/> REGIONS: CPT

## 2021-11-24 PROCEDURE — 97110 THERAPEUTIC EXERCISES: CPT

## 2021-11-24 NOTE — FLOWSHEET NOTE
Physical Therapy Treatment Note/ Progress Report:     Date:  2021    Patient Name:  Jared Duran    :  1955  MRN: [id number]    Pertinent Medical History:     Medical/Treatment Diagnosis Information:  Diagnosis: OA right knee M17.11, s/p R TKA Z96.651  Treatment Diagnosis: Gait and mobility disturbance s/p R TKA    Insurance/Certification information:  PT Insurance Information: Medicare  Physician Information:  Referring Practitioner: Dina Carlos MD  Plan of care signed (Y/N): routed 10/11/21    Date of Patient follow up with Physician:      Progress Report: []  Yes  [x]  No     Date Range for reporting period:  Beginnin2021  Ending:      Progress report due (10 Rx/or 30 days whichever is less): 99      Recertification due (POC duration/ or 90 days whichever is less):     Visit # POC/Insurance Allowable Auth Needed   20 BOMN []Yes   [x]No     Latex Allergy:  [x]NO      []YES  Preferred Language for Healthcare:   [x]English       []Other:    Functional Scale:      Date assessed: at eval  Test: LEFS  Score:    Pain level:  6-7/10     History of Injury:Patient stated complaint: h/o right knee pain ~10 years with resultant RKA, L TKA , lives with with her  in a 1 story ranch home, one step to enter, basement with washer/ dryer     SUBJECTIVE:    10/13/21 Pt reports her right leg has been aggravated by the swelling over the last day. 10/15/21 Pt reports having experienced diarrhea  the last 2 days she is not feeling as well today. 10/18/21: Pt reports that she had a rough day on Saturday and did not do any exercises. She took meds today prior to therapy. Notes edema in R calf and ankle. 10/20/21: Pt reports that she did not sleep well last night and still has swelling. She did her HEP with greater ease yesterday. 10/22/21: Pt reports that she had a rough morning due to constipation, bad night last night, and swelling is down though.   10/25/21 Pt reports no change with her pain from last session but c/o increased stiffness today. 10/27/21 Patient reports knee feels stiff and sore,still having difficulty sleeping at night. 10/29/21 Patient reports knee is doing ok,sleeping a little better at night. 11/1/21 Pt reports feeling sore and stiff today. 11/03/21 Patient reports knee feels sore and swollen. States still not sleeping well at night. 11/05/21 Patient reports knee soreness is about the same,pulling on top of the knee. 11/8/21: Pt states she is doing good, still having pain and trouble sleeping at night  11/10/21: Patient reports knee has been feeling ok,still some stiffness and she is not sleeping threw the night yet. 11/12/21 Pt reports knee pain is about the same with intensity  , she continues to have difficulty at night. 11/15/21 Patient reports both her knees were sore over the weekend. States she still has difficulty sleeping at night. 11/17/21 Patient reports knee still stiff and sore. MD was pleased with progress. 11/19/21 Patient reports knee is doing ok soreness down the right shin area. States she has been walking with a cane more. 11/22/21 Pt reports feeling cold today, pain is about the same. 11/24/21 Pt reports an increase in knee pain due in part to decrease in her pain med prescription.      OBJECTIVE:   Observation:    Test measurements:    ROM: Right knee   (* increased pain)   Date           Knee Flexion       Knee Extension    Active Passive Active Passive    Eval       10/18/21 98  0    10/22 94  -5    10/29  100 -5    11/12 96 100* -5 -3   11/17  104*                       Strength:  Date Hip flexion Hip abduction Quad Hamstring Ankle DF Ankle PF   Eval                                                 RESTRICTIONS/PRECAUTIONS:     Exercises/Interventions:     Therapeutic Ex (19945)   Min: Reps/Resistance Notes/CUES   Nu step  To work on knee flexion   Leg press Bilat. ,  ,75# 10 x 3    RLE 45# 10 x 3  C/o medial right knee pain QSS/HSS 5\" 3 min     Incline 1 min x 2 @20% with right quad setting     minisquats 5\" 15X                   Mat ex     Strap assist knee   flex  Quad setting      TKE      LAQ R 2# 30 x 11/05/21 Decreased weight per patients request   Hams curls - standing       SLR               Manual Intervention (22225)  Min: 15 min      Knee mobs/PROM Knee flexion and ext. And tibial femoral IR/ER     Tib/Fem Mobs     Patella Mobs Sup. And inf. Gr 3 ea    Ankle mobs     stretch To right rectus fem. , hams and   gastroc. NMR re-education (64541)  Min:10   CUES NEEDED   SLS Floor 10\" alt x 4 min     Wobble     Step ups fwd/ lat Fwd  6\"  15 x      Lat. 4\" 15 x        Chair squats                Therapeutic Activity (91216)  Min:15     Gait 11/24/21 Without a spc  On level surfaces , good balance observed Advised pt to walk without spc in her home and to use spc as needed out of home, pt verbalized agreement         Modalities  Min:     Game ready  Medium compression 15 min     CP after exercises     MH after exercises            Other Therapeutic Activities: Pt was educated on PT POC, Diagnosis, Prognosis, pathomechanics as well as frequency and duration of scheduling future physical therapy appointments. Time was also taken on this day to answer all patient questions and participation in PT. Reviewed appointment policy in detail with patient and patient verbalized understanding. Home Exercise Program:   Access Code: [email or web address]: [email or web address] [employer]/Date: 10/11/2021Prepared by: Ever Celis   Long Sitting Quad Set - 1-2 x daily - 7 x weekly - 1-2 sets - 20 reps - 5 hold   Supine Active Straight Leg Raise - 1-2 x daily - 7 x weekly - 1-2 sets - 10 reps - 1 hold   Sitting Heel Slide with Towel - 1-2 x daily - 7 x weekly - 1 sets - 12 reps - 10 hold   Seated Long Arc Quad - 1-2 x daily - 7 x weekly - 1-2 sets - 15 reps - 3 hold   Long Sitting Calf Stretch with Strap - 1-2 x daily - 7 x weekly - 1 sets - 12 reps - 10 hold   Ankle Pumps in Elevation - 1-2 x daily - 7 x weekly - 1-2 sets - 30 reps - 1 hold   Seated Table Hamstring Stretch - 1-2 x daily - 7 x weekly - 1 sets - 4 reps - 30 hold  Access Code: [email or web address]: [email or web address]. com/Date: 10/15/2021Prepared by: Glendy Cloud BloomExercises   Hooklying Active Hamstring Stretch - 2 x daily - 7 x weekly - 1-2 sets - 10 reps - 5 hold   Seated Table Hamstring Stretch - 2 x daily - 7 x weekly - 1 sets - 2-4 reps - 30 hold    Patient was instructed in the following for HEP:     . Patient verbalized/demonstrated understanding and was issued written handout. Access Code: [email or web address]  URL: [email or web address]/  Date: 10/20/2021  Prepared by: Natale Collet    Exercises  Standing Knee Flexion AROM with Chair Support - 1 x daily - 7 x weekly - 3 sets - 10 reps    Therapeutic Exercise and NMR EXR  [x] (77840) Provided verbal/tactile cueing for activities related to strengthening, flexibility, endurance, ROM for improvements in LE, proximal hip, and core control with self care, mobility, lifting, ambulation.  [] (58226) Provided verbal/tactile cueing for activities related to improving balance, coordination, kinesthetic sense, posture, motor skill, proprioception  to assist with LE, proximal hip, and core control in self care, mobility, lifting, ambulation and eccentric single leg control.      NMR and Therapeutic Activities:    [] (23497 or 08689) Provided verbal/tactile cueing for activities related to improving balance, coordination, kinesthetic sense, posture, motor skill, proprioception and motor activation to allow for proper function of core, proximal hip and LE with self care and ADLs and functional mobility.   [] (85087) Gait Re-education- Provided training and instruction to the patient for proper LE, core and proximal hip recruitment and positioning and eccentric body weight control with ambulation re-education including up and down stairs     Home Exercise Program:    [x] (45084) Reviewed/Progressed HEP activities related to strengthening, flexibility, endurance, ROM of core, proximal hip and LE for functional self-care, mobility, lifting and ambulation/stair navigation   [] (05021)Reviewed/Progressed HEP activities related to improving balance, coordination, kinesthetic sense, posture, motor skill, proprioception of core, proximal hip and LE for self care, mobility, lifting, and ambulation/stair navigation      Manual Treatments:  PROM / STM / Oscillations-Mobs:  G-I, II, III, IV (PA's, Inf., Post.)  [x] (79901) Provided manual therapy to mobilize LE, proximal hip and/or LS spine soft tissue/joints for the purpose of modulating pain, promoting relaxation,  increasing ROM, reducing/eliminating soft tissue swelling/inflammation/restriction, improving soft tissue extensibility and allowing for proper ROM for normal function with self care, mobility, lifting and ambulation. Charges:  Timed Code Treatment Minutes: 45   Total Treatment Minutes: 65    10/25/21: More time available today  [] EVAL (LOW) 94737 (typically 20 minutes face-to-face)  [] EVAL (MOD) 06415 (typically 30 minutes face-to-face)  [] EVAL (HIGH) 41924 (typically 45 minutes face-to-face)  [] RE-EVAL     [x] DQ(22517) x 2    [] Dry needle 1 or 2 Muscles (01949)  [] NMR (35450) x     [] Dry needle 3+ Muscles (16833)  [x] Manual (96935) x  1   [] Ultrasound (40123) x  [] TA (03525) x     [] Mech Traction (28231)  [] ES(attended) (87062)     [] ES (un) (72304):   [x] Vasopump (38921)  [] Ionto (76908)   [] Other:    Doug Gitelman stated goal: Decrease pain, improve strength and normal walking  [x]? Progressing: []? Met: []? Not Met: []? Adjusted     Therapist goals for Patient:   Short Term Goals: To be achieved in: 2 weeks  1. Independent in HEP and progression per patient tolerance, in order to prevent re-injury. [x]? Progressing: []? Met: []? Not Met: []? Adjusted        Long Term Goals:  To be achieved in: 4-6 weeks  1. score of 50 or betterfor the LEFS to assist with reaching prior level of function. [x]? Progressing: []? Met: []? Not Met: []? Adjusted  2. Patient will demonstrate increased AROM to 2-115 or better at right knee  to allow for proper joint functioning as indicated by patients Functional Deficits. [x]? Progressing: []? Met: []? Not Met: []? Adjusted  3. Patient will demonstrate an increase in Strength to good proximal hip strength and control in LE to allow for proper functional mobility as indicated by patients Functional Deficits. [x]? Progressing: []? Met: []? Not Met: []? Adjusted  4. Patient will return to normal walking, stair climbing  and household functional activities without increased symptoms or restriction. [x]? Progressing: []? Met: []? Not Met: []? Adjusted  5. Patient will have a decrease in pain 0-3/10 to facilitate improvement in movement, function, and ADLs as indicated by Functional Deficits. [x]? Progressing: []? Met: []? Not Met: []? Adjusted    ASSESSMENT:  KX modifier applied 10/18/21, as patient had a right TKR on 10/8/21 and requires skilled therapy to maximize outcome of surgery and achieve maximum benefit. PT reviewed HEP that patient had been given previously. Treatment/Activity Tolerance:  [x] Patient tolerated treatment well [] Patient limited by fatique  [] Patient limited by pain  [x] Patient limited by other medical complications  [] Other:     Overall Progression Towards Functional goals/ Treatment Progress Update:  [] Patient is progressing as expected towards functional goals listed. [x] Progression is slowed due to complexities/Impairments listed. [] Progression has been slowed due to co-morbidities.   [] Plan just implemented, too soon to assess goals progression <30days   [] Goals require adjustment due to lack of progress  [] Patient is not progressing as expected and requires additional follow up with physician  [] Other    Prognosis for POC: [x] Good [] Fair  [] Poor    Patient requires continued skilled intervention: [x] Yes  [] No        PLAN:             LEFS due 12/1/21                             Reassess Stair training   Gait, balance training, proprioception, rom, strengthening, decrease swelling/ pain  [x] Continue per plan of care [] Alter current plan (see comments)  [] Plan of care initiated [] Hold pending MD visit [] Discharge    Electronically signed by: Prashant Thorpe PT    Note: If patient does not return for scheduled/recommended follow up visits, this note will serve as a discharge from care along with the most recent update on progress.

## 2021-11-29 ENCOUNTER — HOSPITAL ENCOUNTER (OUTPATIENT)
Dept: PHYSICAL THERAPY | Age: 66
Setting detail: THERAPIES SERIES
Discharge: HOME | End: 2021-11-29
Payer: MEDICARE

## 2021-11-29 PROCEDURE — 97110 THERAPEUTIC EXERCISES: CPT

## 2021-11-29 PROCEDURE — 97140 MANUAL THERAPY 1/> REGIONS: CPT

## 2021-11-29 PROCEDURE — 97016 VASOPNEUMATIC DEVICE THERAPY: CPT

## 2021-11-29 NOTE — FLOWSHEET NOTE
Physical Therapy Treatment Note/ Progress Report:     Date:  2021    Patient Name:  Lola Wilson    :  1955  MRN: [id number]    Pertinent Medical History:     Medical/Treatment Diagnosis Information:  Diagnosis: OA right knee M17.11, s/p R TKA Z96.651  Treatment Diagnosis: Gait and mobility disturbance s/p R TKA    Insurance/Certification information:  PT Insurance Information: Medicare  Physician Information:  Referring Practitioner: Ca Lan MD  Plan of care signed (Y/N): routed 10/11/21    Date of Patient follow up with Physician:      Progress Report: []  Yes  [x]  No     Date Range for reporting period:  Beginnin2021  Ending:      Progress report due (10 Rx/or 30 days whichever is less):       Recertification due (POC duration/ or 90 days whichever is less):     Visit # POC/Insurance Allowable Auth Needed   21 BOMN []Yes   [x]No     Latex Allergy:  [x]NO      []YES  Preferred Language for Healthcare:   [x]English       []Other:    Functional Scale:      Date assessed: at eval  Test: LEFS  Score:    Pain level:  6-7/10     History of Injury:Patient stated complaint: h/o right knee pain ~10 years with resultant RKA, L TKA , lives with with her  in a 1 story ranch home, one step to enter, basement with washer/ dryer     SUBJECTIVE:    10/13/21 Pt reports her right leg has been aggravated by the swelling over the last day. 10/15/21 Pt reports having experienced diarrhea  the last 2 days she is not feeling as well today. 10/18/21: Pt reports that she had a rough day on Saturday and did not do any exercises. She took meds today prior to therapy. Notes edema in R calf and ankle. 10/20/21: Pt reports that she did not sleep well last night and still has swelling. She did her HEP with greater ease yesterday. 10/22/21: Pt reports that she had a rough morning due to constipation, bad night last night, and swelling is down though.   10/25/21 Pt reports no change with her pain from last session but c/o increased stiffness today. 10/27/21 Patient reports knee feels stiff and sore,still having difficulty sleeping at night. 10/29/21 Patient reports knee is doing ok,sleeping a little better at night. 11/1/21 Pt reports feeling sore and stiff today. 11/03/21 Patient reports knee feels sore and swollen. States still not sleeping well at night. 11/05/21 Patient reports knee soreness is about the same,pulling on top of the knee. 11/8/21: Pt states she is doing good, still having pain and trouble sleeping at night  11/10/21: Patient reports knee has been feeling ok,still some stiffness and she is not sleeping threw the night yet. 11/12/21 Pt reports knee pain is about the same with intensity  , she continues to have difficulty at night. 11/15/21 Patient reports both her knees were sore over the weekend. States she still has difficulty sleeping at night. 11/17/21 Patient reports knee still stiff and sore. MD was pleased with progress. 11/19/21 Patient reports knee is doing ok soreness down the right shin area. States she has been walking with a cane more. 11/22/21 Pt reports feeling cold today, pain is about the same. 11/24/21 Pt reports an increase in knee pain due in part to decrease in her pain med prescription. 11/29/21 Patient reports knee has been sore,has been walking without cane some at home.     OBJECTIVE:   Observation:    Test measurements:    ROM: Right knee   (* increased pain)   Date           Knee Flexion       Knee Extension    Active Passive Active Passive    Eval       10/18/21 98  0    10/22 94  -5    10/29  100 -5    11/12 96 100* -5 -3   11/17  104*                       Strength:  Date Hip flexion Hip abduction Quad Hamstring Ankle DF Ankle PF   Eval                                                 RESTRICTIONS/PRECAUTIONS:     Exercises/Interventions:     Therapeutic Ex (62623)   Min: Reps/Resistance Notes/CUES   Nu step L4 x 5 min To work on knee flexion   Leg press Bilat. ,  ,75# 10 x 3    RLE 45# 10 x 2  C/o medial right knee pain    QSS/HSS 5\" 3 min     Incline 1 min x 2 @20% with right quad setting     minisquats 5\" 10X C/o posterior leg pain                  Mat ex     Strap assist knee   flex  Quad setting      TKE      LAQ R 2# 30 x 11/05/21 Decreased weight per patients request   Hams curls - standing       SLR               Manual Intervention (61913)  Min: 15 min      Knee mobs/PROM Knee flexion and ext. And tibial femoral IR/ER     Tib/Fem Mobs     Patella Mobs Sup. And inf. Gr 3 ea    Ankle mobs     stretch To right rectus fem. , hams and   gastroc. NMR re-education (58129)  Min:10   CUES NEEDED   SLS Floor 10\" alt x 4 min     Wobble     Step ups fwd/ lat Fwd  6\"  15 x      Lat. 4\" 15 x        Chair squats                Therapeutic Activity (63741)  Min:15     Gait 11/24/21 Without a spc  On level surfaces , good balance observed Advised pt to walk without spc in her home and to use spc as needed out of home, pt verbalized agreement         Modalities  Min:     Game ready  Medium compression 15 min     CP after exercises     MH after exercises            Other Therapeutic Activities: Pt was educated on PT POC, Diagnosis, Prognosis, pathomechanics as well as frequency and duration of scheduling future physical therapy appointments. Time was also taken on this day to answer all patient questions and participation in PT. Reviewed appointment policy in detail with patient and patient verbalized understanding. Home Exercise Program:   Access Code: [email or web address]: [email or web address]/Date: 10/11/2021Prepared by: Octavio Tinsley   Long Sitting Quad Set - 1-2 x daily - 7 x weekly - 1-2 sets - 20 reps - 5 hold   Supine Active Straight Leg Raise - 1-2 x daily - 7 x weekly - 1-2 sets - 10 reps - 1 hold   Sitting Heel Slide with Towel - 1-2 x daily - 7 x weekly - 1 sets - 12 reps - 10 hold   Seated Long Arc Quad - 1-2 x daily - 7 x weekly - 1-2 sets - 15 reps - 3 hold   Long Sitting Calf Stretch with Strap - 1-2 x daily - 7 x weekly - 1 sets - 12 reps - 10 hold   Ankle Pumps in Elevation - 1-2 x daily - 7 x weekly - 1-2 sets - 30 reps - 1 hold   Seated Table Hamstring Stretch - 1-2 x daily - 7 x weekly - 1 sets - 4 reps - 30 hold  Access Code: [email or web address]: [employer]/Date: 10/15/2021Prepared by: Aleksandra John BloomExercises   Hooklying Active Hamstring Stretch - 2 x daily - 7 x weekly - 1-2 sets - 10 reps - 5 hold   Seated Table Hamstring Stretch - 2 x daily - 7 x weekly - 1 sets - 2-4 reps - 30 hold    Patient was instructed in the following for HEP:     . Patient verbalized/demonstrated understanding and was issued written handout. Access Code: [email or web address]  URL: [employer]/  Date: 10/20/2021  Prepared by: Brandon Bran    Exercises  Standing Knee Flexion AROM with Chair Support - 1 x daily - 7 x weekly - 3 sets - 10 reps    Therapeutic Exercise and NMR EXR  [x] (19820) Provided verbal/tactile cueing for activities related to strengthening, flexibility, endurance, ROM for improvements in LE, proximal hip, and core control with self care, mobility, lifting, ambulation.  [] (93391) Provided verbal/tactile cueing for activities related to improving balance, coordination, kinesthetic sense, posture, motor skill, proprioception  to assist with LE, proximal hip, and core control in self care, mobility, lifting, ambulation and eccentric single leg control.      NMR and Therapeutic Activities:    [] (16425 or 57579) Provided verbal/tactile cueing for activities related to improving balance, coordination, kinesthetic sense, posture, motor skill, proprioception and motor activation to allow for proper function of core, proximal hip and LE with self care and ADLs and functional mobility.   [] (59298) Gait Re-education- Provided training and instruction to the patient for proper LE, core and proximal hip recruitment and positioning and eccentric body weight control with ambulation re-education including up and down stairs     Home Exercise Program:    [x] (82899) Reviewed/Progressed HEP activities related to strengthening, flexibility, endurance, ROM of core, proximal hip and LE for functional self-care, mobility, lifting and ambulation/stair navigation   [] (81835)Reviewed/Progressed HEP activities related to improving balance, coordination, kinesthetic sense, posture, motor skill, proprioception of core, proximal hip and LE for self care, mobility, lifting, and ambulation/stair navigation      Manual Treatments:  PROM / STM / Oscillations-Mobs:  G-I, II, III, IV (PA's, Inf., Post.)  [x] (06075) Provided manual therapy to mobilize LE, proximal hip and/or LS spine soft tissue/joints for the purpose of modulating pain, promoting relaxation,  increasing ROM, reducing/eliminating soft tissue swelling/inflammation/restriction, improving soft tissue extensibility and allowing for proper ROM for normal function with self care, mobility, lifting and ambulation. Charges:  Timed Code Treatment Minutes: 45   Total Treatment Minutes: 65    10/25/21: More time available today  [] EVAL (LOW) 84347 (typically 20 minutes face-to-face)  [] EVAL (MOD) 41876 (typically 30 minutes face-to-face)  [] EVAL (HIGH) 29385 (typically 45 minutes face-to-face)  [] RE-EVAL     [x] HL(90987) x 2    [] Dry needle 1 or 2 Muscles (12050)  [] NMR (65591) x     [] Dry needle 3+ Muscles (22403)  [x] Manual (26498) x  1   [] Ultrasound (90096) x  [] TA (28823) x     [] [facility] (72055)  [] ES(attended) (27125)     [] ES (un) (37752):   [x] Vasopump (10037)  [] Ionto (98180)   [] Other:    Nikia Marroquin stated goal: Decrease pain, improve strength and normal walking  [x]? Progressing: []? Met: []? Not Met: []? Adjusted     Therapist goals for Patient:   Short Term Goals: To be achieved in: 2 weeks  1.  Independent in HEP and progression per patient tolerance, in order to prevent re-injury. [x]? Progressing: []? Met: []? Not Met: []? Adjusted        Long Term Goals: To be achieved in: 4-6 weeks  1. score of 50 or betterfor the LEFS to assist with reaching prior level of function. [x]? Progressing: []? Met: []? Not Met: []? Adjusted  2. Patient will demonstrate increased AROM to 2-115 or better at right knee  to allow for proper joint functioning as indicated by patients Functional Deficits. [x]? Progressing: []? Met: []? Not Met: []? Adjusted  3. Patient will demonstrate an increase in Strength to good proximal hip strength and control in LE to allow for proper functional mobility as indicated by patients Functional Deficits. [x]? Progressing: []? Met: []? Not Met: []? Adjusted  4. Patient will return to normal walking, stair climbing  and household functional activities without increased symptoms or restriction. [x]? Progressing: []? Met: []? Not Met: []? Adjusted  5. Patient will have a decrease in pain 0-3/10 to facilitate improvement in movement, function, and ADLs as indicated by Functional Deficits. [x]? Progressing: []? Met: []? Not Met: []? Adjusted    ASSESSMENT:  KX modifier applied 10/18/21, as patient had a right TKR on 10/8/21 and requires skilled therapy to maximize outcome of surgery and achieve maximum benefit. PT reviewed HEP that patient had been given previously. Treatment/Activity Tolerance:  [x] Patient tolerated treatment well [] Patient limited by fatique  [] Patient limited by pain  [x] Patient limited by other medical complications  [] Other:     Overall Progression Towards Functional goals/ Treatment Progress Update:  [] Patient is progressing as expected towards functional goals listed. [x] Progression is slowed due to complexities/Impairments listed. [] Progression has been slowed due to co-morbidities.   [] Plan just implemented, too soon to assess goals progression <30days   [] Goals require adjustment due to lack of progress  [] Patient is not progressing as expected and requires additional follow up with physician  [] Other    Prognosis for POC: [x] Good [] Fair  [] Poor    Patient requires continued skilled intervention: [x] Yes  [] No        PLAN:             LEFS due 12/1/21                             Reassess Stair training   Gait, balance training, proprioception, rom, strengthening, decrease swelling/ pain  [x] Continue per plan of care [] Alter current plan (see comments)  [] Plan of care initiated [] Hold pending MD visit [] Discharge    Electronically signed by: Reyna Dover PTA    Note: If patient does not return for scheduled/recommended follow up visits, this note will serve as a discharge from care along with the most recent update on progress.

## 2021-12-01 ENCOUNTER — HOSPITAL ENCOUNTER (OUTPATIENT)
Dept: PHYSICAL THERAPY | Age: 66
Setting detail: THERAPIES SERIES
Discharge: HOME | End: 2021-12-01
Payer: MEDICARE

## 2021-12-01 PROCEDURE — 97140 MANUAL THERAPY 1/> REGIONS: CPT

## 2021-12-01 PROCEDURE — 97110 THERAPEUTIC EXERCISES: CPT

## 2021-12-01 PROCEDURE — 97016 VASOPNEUMATIC DEVICE THERAPY: CPT

## 2021-12-01 NOTE — FLOWSHEET NOTE
Physical Therapy Treatment Note/ Progress Report:     Date:  2021    Patient Name:  Tatyana Lopez    :  1955  MRN: [id number]    Pertinent Medical History:     Medical/Treatment Diagnosis Information:  Diagnosis: OA right knee M17.11, s/p R TKA Z96.651  Treatment Diagnosis: Gait and mobility disturbance s/p R TKA    Insurance/Certification information:  PT Insurance Information: Medicare  Physician Information:  Referring Practitioner: Michael Pacheco MD  Plan of care signed (Y/N): routed 10/11/21    Date of Patient follow up with Physician:      Progress Report: []  Yes  [x]  No     Date Range for reporting period:  Beginnin2021  Ending:      Progress report due (10 Rx/or 30 days whichever is less):       Recertification due (POC duration/ or 90 days whichever is less):     Visit # POC/Insurance Allowable Auth Needed   25 BOMN []Yes   [x]No     Latex Allergy:  [x]NO      []YES  Preferred Language for Healthcare:   [x]English       []Other:    Functional Scale:      Date assessed: at eval  Test: LEFS  Score:    Pain level:  6/10     History of Injury:Patient stated complaint: h/o right knee pain ~10 years with resultant RKA, L TKA , lives with with her  in a 1 story ranch home, one step to enter, basement with washer/ dryer     SUBJECTIVE:    10/13/21 Pt reports her right leg has been aggravated by the swelling over the last day. 10/15/21 Pt reports having experienced diarrhea  the last 2 days she is not feeling as well today. 10/18/21: Pt reports that she had a rough day on Saturday and did not do any exercises. She took meds today prior to therapy. Notes edema in R calf and ankle. 10/20/21: Pt reports that she did not sleep well last night and still has swelling. She did her HEP with greater ease yesterday. 10/22/21: Pt reports that she had a rough morning due to constipation, bad night last night, and swelling is down though.   10/25/21 Pt reports no change with her pain from last session but c/o increased stiffness today. 10/27/21 Patient reports knee feels stiff and sore,still having difficulty sleeping at night. 10/29/21 Patient reports knee is doing ok,sleeping a little better at night. 11/1/21 Pt reports feeling sore and stiff today. 11/03/21 Patient reports knee feels sore and swollen. States still not sleeping well at night. 11/05/21 Patient reports knee soreness is about the same,pulling on top of the knee. 11/8/21: Pt states she is doing good, still having pain and trouble sleeping at night  11/10/21: Patient reports knee has been feeling ok,still some stiffness and she is not sleeping threw the night yet. 11/12/21 Pt reports knee pain is about the same with intensity  , she continues to have difficulty at night. 11/15/21 Patient reports both her knees were sore over the weekend. States she still has difficulty sleeping at night. 11/17/21 Patient reports knee still stiff and sore. MD was pleased with progress. 11/19/21 Patient reports knee is doing ok soreness down the right shin area. States she has been walking with a cane more. 11/22/21 Pt reports feeling cold today, pain is about the same. 11/24/21 Pt reports an increase in knee pain due in part to decrease in her pain med prescription. 11/29/21 Patient reports knee has been sore,has been walking without cane some at home. 12/01/21 Patient reports knee is doing ok,soreness on medial side of the knee. States she still not confident going down the steps.     OBJECTIVE:   Observation:    Test measurements:    ROM: Right knee   (* increased pain)   Date           Knee Flexion       Knee Extension    Active Passive Active Passive    Eval       10/18/21 98  0    10/22 94  -5    10/29  100 -5    11/12 96 100* -5 -3   11/17  104*                       Strength:  Date Hip flexion Hip abduction Quad Hamstring Ankle DF Ankle PF   Eval RESTRICTIONS/PRECAUTIONS:     Exercises/Interventions:     Therapeutic Ex (01936)   Min: Reps/Resistance Notes/CUES   Nu step seat 10 L4 x 5 min To work on knee flexion   Leg press Bilat. ,  ,75# 10 x 3    RLE 45# 10 x 2     QSS/HSS 5\" 3 min     Incline 1 min x 2 @20% with right quad setting     minisquats 5\" 10X C/o posterior leg pain                  Mat ex     Strap assist knee   flex  Quad setting      TKE      LAQ R 3# 30 x 11/05/21 Decreased weight per patients request   Hams curls - standing       SLR               Manual Intervention (13013)  Min: 15 min      Knee mobs/PROM Knee flexion and ext. And tibial femoral IR/ER     Tib/Fem Mobs     Patella Mobs Sup. And inf. Gr 3 ea    Ankle mobs     stretch To right rectus fem. , hams and   gastroc. NMR re-education (94685)  Min:10   CUES NEEDED   SLS Floor 10\" alt x 4 min     Wobble     Step ups fwd/ lat     resume   Chair squats                Therapeutic Activity (10387)  Min:15     Gait 11/24/21 Without a spc  On level surfaces , good balance observed Advised pt to walk without spc in her home and to use spc as needed out of home, pt verbalized agreement    Stairs 12/01/21  2 x     Modalities  Min:     Game ready  Medium compression 15 min     CP after exercises     MH after exercises            Other Therapeutic Activities: Pt was educated on PT POC, Diagnosis, Prognosis, pathomechanics as well as frequency and duration of scheduling future physical therapy appointments. Time was also taken on this day to answer all patient questions and participation in PT. Reviewed appointment policy in detail with patient and patient verbalized understanding. Home Exercise Program:   Access Code: [email or web address]: [employer]. com/Date: 10/11/2021Prepared by: Emry Side BloomExercises   Long Sitting Quad Set - 1-2 x daily - 7 x weekly - 1-2 sets - 20 reps - 5 hold   Supine Active Straight Leg Raise - 1-2 x daily - 7 x weekly - 1-2 sets - 10 reps - 1 hold Sitting Heel Slide with Towel - 1-2 x daily - 7 x weekly - 1 sets - 12 reps - 10 hold   Seated Long Arc Quad - 1-2 x daily - 7 x weekly - 1-2 sets - 15 reps - 3 hold   Long Sitting Calf Stretch with Strap - 1-2 x daily - 7 x weekly - 1 sets - 12 reps - 10 hold   Ankle Pumps in Elevation - 1-2 x daily - 7 x weekly - 1-2 sets - 30 reps - 1 hold   Seated Table Hamstring Stretch - 1-2 x daily - 7 x weekly - 1 sets - 4 reps - 30 hold  Access Code: [email or web address]: [facility]/Date: 10/15/2021Prepared by: Tello YiExmaryam   Hooklying Active Hamstring Stretch - 2 x daily - 7 x weekly - 1-2 sets - 10 reps - 5 hold   Seated Table Hamstring Stretch - 2 x daily - 7 x weekly - 1 sets - 2-4 reps - 30 hold    Patient was instructed in the following for HEP:     . Patient verbalized/demonstrated understanding and was issued written handout. Access Code: [email or web address]  URL: [facility]/  Date: 10/20/2021  Prepared by: Leta Panda    Exercises  Standing Knee Flexion AROM with Chair Support - 1 x daily - 7 x weekly - 3 sets - 10 reps    Therapeutic Exercise and NMR EXR  [x] (30965) Provided verbal/tactile cueing for activities related to strengthening, flexibility, endurance, ROM for improvements in LE, proximal hip, and core control with self care, mobility, lifting, ambulation.  [] (87354) Provided verbal/tactile cueing for activities related to improving balance, coordination, kinesthetic sense, posture, motor skill, proprioception  to assist with LE, proximal hip, and core control in self care, mobility, lifting, ambulation and eccentric single leg control. NMR and Therapeutic Activities:    [] (24306 or 21200) Provided verbal/tactile cueing for activities related to improving balance, coordination, kinesthetic sense, posture, motor skill, proprioception and motor activation to allow for proper function of core, proximal hip and LE with self care and ADLs and functional mobility.    [] (45284) Gait Re-education- Provided training and instruction to the patient for proper LE, core and proximal hip recruitment and positioning and eccentric body weight control with ambulation re-education including up and down stairs     Home Exercise Program:    [x] (24881) Reviewed/Progressed HEP activities related to strengthening, flexibility, endurance, ROM of core, proximal hip and LE for functional self-care, mobility, lifting and ambulation/stair navigation   [] (71925)Reviewed/Progressed HEP activities related to improving balance, coordination, kinesthetic sense, posture, motor skill, proprioception of core, proximal hip and LE for self care, mobility, lifting, and ambulation/stair navigation      Manual Treatments:  PROM / STM / Oscillations-Mobs:  G-I, II, III, IV (PA's, Inf., Post.)  [x] (91541) Provided manual therapy to mobilize LE, proximal hip and/or LS spine soft tissue/joints for the purpose of modulating pain, promoting relaxation,  increasing ROM, reducing/eliminating soft tissue swelling/inflammation/restriction, improving soft tissue extensibility and allowing for proper ROM for normal function with self care, mobility, lifting and ambulation. Charges:  Timed Code Treatment Minutes: 45   Total Treatment Minutes: 65    10/25/21: More time available today  [] EVAL (LOW) 35042 (typically 20 minutes face-to-face)  [] EVAL (MOD) 18960 (typically 30 minutes face-to-face)  [] EVAL (HIGH) 14992 (typically 45 minutes face-to-face)  [] RE-EVAL     [x] LI(29676) x 2    [] Dry needle 1 or 2 Muscles (04213)  [] NMR (48897) x     [] Dry needle 3+ Muscles (69575)  [x] Manual (28712) x  1   [] Ultrasound (09465) x  [] TA (48638) x     [] Mech Traction (57843)  [] ES(attended) (63939)     [] ES (un) (51645):   [x] Vasopump (80960)  [] Ionto (88043)   [] Other:    Vero Qureshi stated goal: Decrease pain, improve strength and normal walking  [x]? Progressing: []? Met: []?  Not Met: []? Adjusted     Therapist goals for Patient:   Short Term Goals: To be achieved in: 2 weeks  1. Independent in HEP and progression per patient tolerance, in order to prevent re-injury. [x]? Progressing: []? Met: []? Not Met: []? Adjusted        Long Term Goals: To be achieved in: 4-6 weeks  1. score of 50 or betterfor the LEFS to assist with reaching prior level of function. [x]? Progressing: []? Met: []? Not Met: []? Adjusted  2. Patient will demonstrate increased AROM to 2-115 or better at right knee  to allow for proper joint functioning as indicated by patients Functional Deficits. [x]? Progressing: []? Met: []? Not Met: []? Adjusted  3. Patient will demonstrate an increase in Strength to good proximal hip strength and control in LE to allow for proper functional mobility as indicated by patients Functional Deficits. [x]? Progressing: []? Met: []? Not Met: []? Adjusted  4. Patient will return to normal walking, stair climbing  and household functional activities without increased symptoms or restriction. [x]? Progressing: []? Met: []? Not Met: []? Adjusted  5. Patient will have a decrease in pain 0-3/10 to facilitate improvement in movement, function, and ADLs as indicated by Functional Deficits. [x]? Progressing: []? Met: []? Not Met: []? Adjusted    ASSESSMENT:  KX modifier applied 10/18/21, as patient had a right TKR on 10/8/21 and requires skilled therapy to maximize outcome of surgery and achieve maximum benefit. PT reviewed HEP that patient had been given previously. Treatment/Activity Tolerance:  [x] Patient tolerated treatment well [] Patient limited by fatique  [] Patient limited by pain  [x] Patient limited by other medical complications  [] Other:     Overall Progression Towards Functional goals/ Treatment Progress Update:  [] Patient is progressing as expected towards functional goals listed. [x] Progression is slowed due to complexities/Impairments listed.   [] Progression has been slowed due to co-morbidities. [] Plan just implemented, too soon to assess goals progression <30days   [] Goals require adjustment due to lack of progress  [] Patient is not progressing as expected and requires additional follow up with physician  [] Other    Prognosis for POC: [x] Good [] Fair  [] Poor    Patient requires continued skilled intervention: [x] Yes  [] No        PLAN:             LEFS due 12/1/21                             Reassess Stair training   Gait, balance training, proprioception, rom, strengthening, decrease swelling/ pain  [x] Continue per plan of care [] Alter current plan (see comments)  [] Plan of care initiated [] Hold pending MD visit [] Discharge    Electronically signed by: Marybeth Jorgensen PTA    Note: If patient does not return for scheduled/recommended follow up visits, this note will serve as a discharge from care along with the most recent update on progress.

## 2021-12-03 ENCOUNTER — HOSPITAL ENCOUNTER (OUTPATIENT)
Dept: PHYSICAL THERAPY | Age: 66
Setting detail: THERAPIES SERIES
Discharge: HOME | End: 2021-12-03
Payer: MEDICARE

## 2021-12-03 PROCEDURE — 97016 VASOPNEUMATIC DEVICE THERAPY: CPT

## 2021-12-03 PROCEDURE — 97140 MANUAL THERAPY 1/> REGIONS: CPT

## 2021-12-03 PROCEDURE — 97112 NEUROMUSCULAR REEDUCATION: CPT

## 2021-12-03 PROCEDURE — 97110 THERAPEUTIC EXERCISES: CPT

## 2021-12-03 NOTE — FLOWSHEET NOTE
Physical Therapy Re-Certification Plan of Care/MD UPDATE      Dear   Farhad Carr MD,    We had the pleasure of treating the following patient for physical therapy services at [facility]. A summary of our findings can be found in the updated assessment below. This includes our plan of care. If you have any questions or concerns regarding these findings, please do not hesitate to contact me at the office phone number checked above. Thank you for the referral.     Physician Signature:________________________________Date:__________________  By signing above (or electronic signature), therapists plan is approved by physician    Date Range Of Visits: 10/25/21 to 12/3/21  Total Visits to Date:   Overall Response to Treatment:   [x]Patient is responding gradually l to treatment and improvement is noted with regards  to goals   []Patient should continue to improve in reasonable time if they continue HEP   []Patient has plateaued and is no longer responding to skilled PT intervention    []Patient is getting worse and would benefit from return to referring MD   []Patient unable to adhere to initial POC   []Other:       Recommendation:    [x]Continue PT 2-3x / wk for 2-4 weeks.     []Hold PT, pending MD visit                  Physical Therapy Treatment Note/ Progress Report:     Date:  12/3/2021    Patient Name:  Rajani Farr    :  1955  MRN: [id number]    Pertinent Medical History:     Medical/Treatment Diagnosis Information:  Diagnosis: OA right knee M17.11, s/p R TKA Z96.651  Treatment Diagnosis: Gait and mobility disturbance s/p R TKA    Insurance/Certification information:  PT Insurance Information: Medicare  Physician Information:  Referring Practitioner: Farhad Carr MD  Plan of care signed (Y/N): routed 10/11/21    Date of Patient follow up with Physician:      Progress Report: []  Yes  [x]  No     Date Range for reporting period:  Beginning: 11/8/2021  Ending:      Progress report due (10 Rx/or 30 days whichever is less): 20/0/17      Recertification due (POC duration/ or 90 days whichever is less):     Visit # POC/Insurance Allowable Auth Needed   23 BOMN []Yes   [x]No     Latex Allergy:  [x]NO      []YES  Preferred Language for Healthcare:   [x]English       []Other:    Functional Scale:      Date assessed: at eval  Test: LEFS 12/3/21 = 33  Score:    Pain level:  6/10     History of Injury:Patient stated complaint: h/o right knee pain ~10 years with resultant RKA, L TKA 0/16/21, lives with with her  in a 1 story ranch home, one step to enter, basement with washer/ dryer     SUBJECTIVE:    10/13/21 Pt reports her right leg has been aggravated by the swelling over the last day. 10/15/21 Pt reports having experienced diarrhea  the last 2 days she is not feeling as well today. 10/18/21: Pt reports that she had a rough day on Saturday and did not do any exercises. She took meds today prior to therapy. Notes edema in R calf and ankle. 10/20/21: Pt reports that she did not sleep well last night and still has swelling. She did her HEP with greater ease yesterday. 10/22/21: Pt reports that she had a rough morning due to constipation, bad night last night, and swelling is down though. 10/25/21 Pt reports no change with her pain from last session but c/o increased stiffness today. 10/27/21 Patient reports knee feels stiff and sore,still having difficulty sleeping at night. 10/29/21 Patient reports knee is doing ok,sleeping a little better at night. 11/1/21 Pt reports feeling sore and stiff today. 11/03/21 Patient reports knee feels sore and swollen. States still not sleeping well at night. 11/05/21 Patient reports knee soreness is about the same,pulling on top of the knee.   11/8/21: Pt states she is doing good, still having pain and trouble sleeping at night  11/10/21: Patient reports knee has been feeling ok,still some stiffness and she is not sleeping threw the night yet. 11/12/21 Pt reports knee pain is about the same with intensity  , she continues to have difficulty at night. 11/15/21 Patient reports both her knees were sore over the weekend. States she still has difficulty sleeping at night. 11/17/21 Patient reports knee still stiff and sore. MD was pleased with progress. 11/19/21 Patient reports knee is doing ok soreness down the right shin area. States she has been walking with a cane more. 11/22/21 Pt reports feeling cold today, pain is about the same. 11/24/21 Pt reports an increase in knee pain due in part to decrease in her pain med prescription. 11/29/21 Patient reports knee has been sore,has been walking without cane some at home. 12/01/21 Patient reports knee is doing ok,soreness on medial side of the knee. States she still not confident going down the steps. 12/3/21 Pt reports continued pain and discomfort at and behind knee making sleep difficult.      OBJECTIVE:   Observation:    Test measurements:    ROM: Right knee   (* increased pain)   Date           Knee Flexion       Knee Extension    Active Passive Active Passive    Eval       10/18/21 98  0    10/22 94  -5    10/29  100 -5    11/12 96 100* -5 -3   11/17  104*     12/3 101 105* -5 -3              Strength:  Date Hip flexion Hip abduction Quad Hamstring Ankle DF Ankle PF   Eval         12/3/21   4-/5 4/5                                    RESTRICTIONS/PRECAUTIONS:     Exercises/Interventions:     Therapeutic Ex (85540)   Min: Reps/Resistance Notes/CUES   Nu step seat 10 L4 x 5 min To work on knee flexion   Leg press Bilat. ,  ,75# 10 x 3    RLE 45# 10 x 2     QSS/HSS 5\" 3 min     Incline 1 min x 2 @20% with right quad setting     Wall gastroc / soleus stretch  30\" x 2 ea                   Mat ex     Strap assist knee   flex  Quad setting      TKE      LAQ R 3# 30 x 11/05/21 Decreased weight per patients request   Hams curls - standing       SLR               Manual Intervention (58921)  Min: 15 min      Knee mobs/PROM Knee flexion and ext. And tibial femoral IR/ER     Tib/Fem Mobs     Patella Mobs Sup. And inf. Gr 3 ea    Ankle mobs     stretch To right rectus fem. , hams and   gastroc. NMR re-education (62237)  Min:10   CUES NEEDED   SLS Floor 10\" alt x 4 min  Posture/ balance    Wobble     Step ups fwd/ lat Fwd  6\"  15 x      Lat. 4\" 15 x     Eccentric control    Chair squats  Partial rom 15 x For balance/ weight shifting   Step up/ down 4\"  ADD         Therapeutic Activity (90549)  Min:15     Gait 11/24/21 Without a spc  On level surfaces , good balance observed Advised pt to walk without spc in her home and to use spc as needed out of home, pt verbalized agreement    Stairs 12/03/21   Ascend taking reciprocal steps  Descend RLE first , pt able to descend with LLE 4 \"    Modalities  Min:     Game ready  Medium compression 15 min     CP after exercises     MH after exercises            Other Therapeutic Activities: Pt was educated on PT POC, Diagnosis, Prognosis, pathomechanics as well as frequency and duration of scheduling future physical therapy appointments. Time was also taken on this day to answer all patient questions and participation in PT. Reviewed appointment policy in detail with patient and patient verbalized understanding. Home Exercise Program:   Access Code: [email or web address]: [employer]/Date: 10/11/2021Prepared by: Mode Souza   Long Sitting Quad Set - 1-2 x daily - 7 x weekly - 1-2 sets - 20 reps - 5 hold   Supine Active Straight Leg Raise - 1-2 x daily - 7 x weekly - 1-2 sets - 10 reps - 1 hold   Sitting Heel Slide with Towel - 1-2 x daily - 7 x weekly - 1 sets - 12 reps - 10 hold   Seated Long Arc Quad - 1-2 x daily - 7 x weekly - 1-2 sets - 15 reps - 3 hold   Long Sitting Calf Stretch with Strap - 1-2 x daily - 7 x weekly - 1 sets - 12 reps - 10 hold   Ankle Pumps in Elevation - 1-2 x daily - 7 x weekly - 1-2 sets - 30 reps - 1 hold   Seated Table Hamstring Stretch - 1-2 x daily - 7 x weekly - 1 sets - 4 reps - 30 hold  Access Code: [email or web address]: [employer]/Date: 10/15/2021Prepared by: Paula Joseph BloomExmaryam   Hooklying Active Hamstring Stretch - 2 x daily - 7 x weekly - 1-2 sets - 10 reps - 5 hold   Seated Table Hamstring Stretch - 2 x daily - 7 x weekly - 1 sets - 2-4 reps - 30 hold    Patient was instructed in the following for HEP:     . Patient verbalized/demonstrated understanding and was issued written handout. Access Code: [email or web address]  URL: [employer]/  Date: 10/20/2021  Prepared by: Lang Rodriguez    Exercises  Standing Knee Flexion AROM with Chair Support - 1 x daily - 7 x weekly - 3 sets - 10 reps    Therapeutic Exercise and NMR EXR  [x] (38958) Provided verbal/tactile cueing for activities related to strengthening, flexibility, endurance, ROM for improvements in LE, proximal hip, and core control with self care, mobility, lifting, ambulation.  [] (63382) Provided verbal/tactile cueing for activities related to improving balance, coordination, kinesthetic sense, posture, motor skill, proprioception  to assist with LE, proximal hip, and core control in self care, mobility, lifting, ambulation and eccentric single leg control.      NMR and Therapeutic Activities:    [] (33519 or 91702) Provided verbal/tactile cueing for activities related to improving balance, coordination, kinesthetic sense, posture, motor skill, proprioception and motor activation to allow for proper function of core, proximal hip and LE with self care and ADLs and functional mobility.   [] (82711) Gait Re-education- Provided training and instruction to the patient for proper LE, core and proximal hip recruitment and positioning and eccentric body weight control with ambulation re-education including up and down stairs     Home Exercise Program:    [x] (92108) Reviewed/Progressed HEP activities related to strengthening, flexibility, endurance, ROM of core, proximal hip and LE for functional self-care, mobility, lifting and ambulation/stair navigation   [] (20884)Reviewed/Progressed HEP activities related to improving balance, coordination, kinesthetic sense, posture, motor skill, proprioception of core, proximal hip and LE for self care, mobility, lifting, and ambulation/stair navigation      Manual Treatments:  PROM / STM / Oscillations-Mobs:  G-I, II, III, IV (PA's, Inf., Post.)  [x] (77383) Provided manual therapy to mobilize LE, proximal hip and/or LS spine soft tissue/joints for the purpose of modulating pain, promoting relaxation,  increasing ROM, reducing/eliminating soft tissue swelling/inflammation/restriction, improving soft tissue extensibility and allowing for proper ROM for normal function with self care, mobility, lifting and ambulation. Charges:  Timed Code Treatment Minutes: 65   Total Treatment Minutes: 80    12/3/21: More time available today  [] EVAL (LOW) 05889 (typically 20 minutes face-to-face)  [] EVAL (MOD) 85449 (typically 30 minutes face-to-face)  [] EVAL (HIGH) 17034 (typically 45 minutes face-to-face)  [] RE-EVAL     [x] XL(14013) x     [] Dry needle 1 or 2 Muscles (90320)  [x] NMR (74089) x     [] Dry needle 3+ Muscles (93352)  [x] Manual (34258) x  1   [] Ultrasound (27460) x  [] TA (72393) x     [] Mech Traction (45115)  [] ES(attended) (42434)     [] ES (un) (25648):   [x] Vasopump (58139)  [] Ionto (17672)   [] Other:    [place] Flatten stated goal: Decrease pain, improve strength and normal walking  [x]? Progressing: []? Met: []? Not Met: []? Adjusted     Therapist goals for Patient:   Short Term Goals: To be achieved in: 2 weeks  1. Independent in HEP and progression per patient tolerance, in order to prevent re-injury. [x]? Progressing: []? Met: []? Not Met: []? Adjusted        Long Term Goals:  To be achieved in: 4-6 weeks  1. score of 50 or betterfor the LEFS to assist with intervention: [x] Yes  [] No        PLAN:             LEFS due 12/1/21                            ADD Step downs ADD wall stretch to gastroc/ soleus at home   Gait, balance training, proprioception, rom, strengthening, decrease swelling/ pain  [x] Continue per plan of care [] Alter current plan (see comments)  [] Plan of care initiated [] Hold pending MD visit [] Discharge    Electronically signed by: Digna Jaquez PT    Note: If patient does not return for scheduled/recommended follow up visits, this note will serve as a discharge from care along with the most recent update on progress.

## 2021-12-06 ENCOUNTER — HOSPITAL ENCOUNTER (OUTPATIENT)
Dept: PHYSICAL THERAPY | Age: 66
Setting detail: THERAPIES SERIES
Discharge: HOME | End: 2021-12-06
Payer: MEDICARE

## 2021-12-06 PROCEDURE — 97110 THERAPEUTIC EXERCISES: CPT

## 2021-12-06 PROCEDURE — 97140 MANUAL THERAPY 1/> REGIONS: CPT

## 2021-12-06 PROCEDURE — 97016 VASOPNEUMATIC DEVICE THERAPY: CPT

## 2021-12-06 PROCEDURE — 97112 NEUROMUSCULAR REEDUCATION: CPT

## 2021-12-06 NOTE — FLOWSHEET NOTE
Physical Therapy Treatment Note/ Progress Report:     Date:  2021    Patient Name:  Asmita Fagan    :  1955  MRN: [id number]    Pertinent Medical History:     Medical/Treatment Diagnosis Information:  Diagnosis: OA right knee M17.11, s/p R TKA Z96.651  Treatment Diagnosis: Gait and mobility disturbance s/p R TKA    Insurance/Certification information:  PT Insurance Information: Medicare  Physician Information:  Referring Practitioner: Kriss Cohn MD  Plan of care signed (Y/N): routed 10/11/21    Date of Patient follow up with Physician:      Progress Report: []  Yes  [x]  No     Date Range for reporting period:  Beginnin2021  Ending:      Progress report due (10 Rx/or 30 days whichever is less): 20      Recertification due (POC duration/ or 90 days whichever is less):     Visit # POC/Insurance Allowable Auth Needed   24 BOMN []Yes   [x]No     Latex Allergy:  [x]NO      []YES  Preferred Language for Healthcare:   [x]English       []Other:    Functional Scale:      Date assessed: at eval  Test: LEFS 12/3/21 = 33  Score:    Pain level:  6/10     History of Injury:Patient stated complaint: h/o right knee pain ~10 years with resultant RKA, L TKA , lives with with her  in a 1 story ranch home, one step to enter, basement with washer/ dryer     SUBJECTIVE:    10/13/21 Pt reports her right leg has been aggravated by the swelling over the last day. 10/15/21 Pt reports having experienced diarrhea  the last 2 days she is not feeling as well today. 10/18/21: Pt reports that she had a rough day on Saturday and did not do any exercises. She took meds today prior to therapy. Notes edema in R calf and ankle. 10/20/21: Pt reports that she did not sleep well last night and still has swelling. She did her HEP with greater ease yesterday.    10/22/21: Pt reports that she had a rough morning due to constipation, bad night last night, and swelling is down though. 10/25/21 Pt reports no change with her pain from last session but c/o increased stiffness today. 10/27/21 Patient reports knee feels stiff and sore,still having difficulty sleeping at night. 10/29/21 Patient reports knee is doing ok,sleeping a little better at night. 11/1/21 Pt reports feeling sore and stiff today. 11/03/21 Patient reports knee feels sore and swollen. States still not sleeping well at night. 11/05/21 Patient reports knee soreness is about the same,pulling on top of the knee. 11/8/21: Pt states she is doing good, still having pain and trouble sleeping at night  11/10/21: Patient reports knee has been feeling ok,still some stiffness and she is not sleeping threw the night yet. 11/12/21 Pt reports knee pain is about the same with intensity  , she continues to have difficulty at night. 11/15/21 Patient reports both her knees were sore over the weekend. States she still has difficulty sleeping at night. 11/17/21 Patient reports knee still stiff and sore. MD was pleased with progress. 11/19/21 Patient reports knee is doing ok soreness down the right shin area. States she has been walking with a cane more. 11/22/21 Pt reports feeling cold today, pain is about the same. 11/24/21 Pt reports an increase in knee pain due in part to decrease in her pain med prescription. 11/29/21 Patient reports knee has been sore,has been walking without cane some at home. 12/01/21 Patient reports knee is doing ok,soreness on medial side of the knee. States she still not confident going down the steps. 12/3/21 Pt reports continued pain and discomfort at and behind knee making sleep difficult. 12/06/21 Patient reports soreness in the back of the knee. States walking without the cane more around the house.     OBJECTIVE:   Observation:    Test measurements:    ROM: Right knee   (* increased pain)   Date           Knee Flexion       Knee Extension    Active Passive Active Passive    Eval       10/18/21 98  0    10/22 94  -5    10/29  100 -5    11/12 96 100* -5 -3   11/17  104*     12/3 101 105* -5 -3              Strength:  Date Hip flexion Hip abduction Quad Hamstring Ankle DF Ankle PF   Eval         12/3/21   4-/5 4/5                                    RESTRICTIONS/PRECAUTIONS:     Exercises/Interventions:     Therapeutic Ex (81078)   Min: Reps/Resistance Notes/CUES   Nu step seat 10 L4 x 5 min To work on knee flexion   Leg press Bilat. ,  ,75# 10 x 3    RLE 45# 10 x 2     QSS/HSS 5\" 3 min     Incline 1 min x 2 @20% with right quad setting     Wall gastroc / soleus stretch                     Mat ex     Strap assist knee   flex  Quad setting      TKE      LAQ R 3# 30 x 11/05/21 Decreased weight per patients request   Hams curls - standing       SLR               Manual Intervention (78291)  Min: 15 min      Knee mobs/PROM Knee flexion and ext. And tibial femoral IR/ER     Tib/Fem Mobs     Patella Mobs Sup. And inf. Gr 3 ea    Ankle mobs     stretch To right rectus fem. , hams and   gastroc. NMR re-education (88931)  Min:10   CUES NEEDED   SLS Floor 10\" alt x 4 min  Posture/ balance    Wobble     Step ups fwd/ lat Fwd  6\"  15 x      Lat. 4\" 15 x     Eccentric control    Chair squats  Partial rom 15 x For balance/ weight shifting   Step up/ down 4\"  4\" 10 x  donna well        Therapeutic Activity (64716)  Min:15     Gait 11/24/21 Without a spc  On level surfaces , good balance observed Advised pt to walk without spc in her home and to use spc as needed out of home, pt verbalized agreement    Stairs 12/03/21   Ascend taking reciprocal steps  Descend RLE first , pt able to descend with LLE 4 \"    Modalities  Min:     Game ready  Medium compression 15 min     CP after exercises     MH after exercises            Other Therapeutic Activities: Pt was educated on PT POC, Diagnosis, Prognosis, pathomechanics as well as frequency and duration of scheduling future physical therapy appointments.  Time was also taken on this day to answer all patient questions and participation in PT. Reviewed appointment policy in detail with patient and patient verbalized understanding. Home Exercise Program:   Access Code: [email or web address]: [employer]/Date: 10/11/2021Prepared by: Angelika Helm   Long Sitting Quad Set - 1-2 x daily - 7 x weekly - 1-2 sets - 20 reps - 5 hold   Supine Active Straight Leg Raise - 1-2 x daily - 7 x weekly - 1-2 sets - 10 reps - 1 hold   Sitting Heel Slide with Towel - 1-2 x daily - 7 x weekly - 1 sets - 12 reps - 10 hold   Seated Long Arc Quad - 1-2 x daily - 7 x weekly - 1-2 sets - 15 reps - 3 hold   Long Sitting Calf Stretch with Strap - 1-2 x daily - 7 x weekly - 1 sets - 12 reps - 10 hold   Ankle Pumps in Elevation - 1-2 x daily - 7 x weekly - 1-2 sets - 30 reps - 1 hold   Seated Table Hamstring Stretch - 1-2 x daily - 7 x weekly - 1 sets - 4 reps - 30 hold  Access Code: [email or web address]: [email or web address]/Date: 10/15/2021Prepared by: Cb Samayoa   Hooklying Active Hamstring Stretch - 2 x daily - 7 x weekly - 1-2 sets - 10 reps - 5 hold   Seated Table Hamstring Stretch - 2 x daily - 7 x weekly - 1 sets - 2-4 reps - 30 hold    Patient was instructed in the following for HEP:     . Patient verbalized/demonstrated understanding and was issued written handout. Access Code: [email or web address]  URL: [employer]/  Date: 10/20/2021  Prepared by:  Ricardo Estrada    Exercises  Standing Knee Flexion AROM with Chair Support - 1 x daily - 7 x weekly - 3 sets - 10 reps    Therapeutic Exercise and NMR EXR  [x] (75492) Provided verbal/tactile cueing for activities related to strengthening, flexibility, endurance, ROM for improvements in LE, proximal hip, and core control with self care, mobility, lifting, ambulation.  [] (47087) Provided verbal/tactile cueing for activities related to improving balance, coordination, kinesthetic sense, posture, motor skill, proprioception  to assist with LE, proximal hip, and core control in self care, mobility, lifting, ambulation and eccentric single leg control. NMR and Therapeutic Activities:    [] (03544 or 88314) Provided verbal/tactile cueing for activities related to improving balance, coordination, kinesthetic sense, posture, motor skill, proprioception and motor activation to allow for proper function of core, proximal hip and LE with self care and ADLs and functional mobility.   [] (08492) Gait Re-education- Provided training and instruction to the patient for proper LE, core and proximal hip recruitment and positioning and eccentric body weight control with ambulation re-education including up and down stairs     Home Exercise Program:    [x] (72440) Reviewed/Progressed HEP activities related to strengthening, flexibility, endurance, ROM of core, proximal hip and LE for functional self-care, mobility, lifting and ambulation/stair navigation   [] (00664)Reviewed/Progressed HEP activities related to improving balance, coordination, kinesthetic sense, posture, motor skill, proprioception of core, proximal hip and LE for self care, mobility, lifting, and ambulation/stair navigation      Manual Treatments:  PROM / STM / Oscillations-Mobs:  G-I, II, III, IV (PA's, Inf., Post.)  [x] (37096) Provided manual therapy to mobilize LE, proximal hip and/or LS spine soft tissue/joints for the purpose of modulating pain, promoting relaxation,  increasing ROM, reducing/eliminating soft tissue swelling/inflammation/restriction, improving soft tissue extensibility and allowing for proper ROM for normal function with self care, mobility, lifting and ambulation.        Charges:  Timed Code Treatment Minutes: 65   Total Treatment Minutes: 80    12/3/21: More time available today  [] EVAL (LOW) 98533 (typically 20 minutes face-to-face)  [] EVAL (MOD) 14165 (typically 30 minutes face-to-face)  [] EVAL (HIGH) 48372 (typically 45 minutes face-to-face)  [] RE-EVAL [x] NU(48917) x     [] Dry needle 1 or 2 Muscles (06625)  [x] NMR (14484) x     [] Dry needle 3+ Muscles (63273)  [x] Manual (24620) x  1   [] Ultrasound (34788) x  [] TA (17909) x     [] Mech Traction (10266)  [] ES(attended) (97216)     [] ES (un) (45587):   [x] Vasopump (98707)  [] Ionto (40485)   [] Other:    Janalee Males stated goal: Decrease pain, improve strength and normal walking  [x]? Progressing: []? Met: []? Not Met: []? Adjusted     Therapist goals for Patient:   Short Term Goals: To be achieved in: 2 weeks  1. Independent in HEP and progression per patient tolerance, in order to prevent re-injury. [x]? Progressing: []? Met: []? Not Met: []? Adjusted        Long Term Goals: To be achieved in: 4-6 weeks  1. score of 50 or betterfor the LEFS to assist with reaching prior level of function. [x]? Progressing: []? Met: []? Not Met: []? Adjusted  2. Patient will demonstrate increased AROM to 2-115 or better at right knee  to allow for proper joint functioning as indicated by patients Functional Deficits. [x]? Progressing: []? Met: []? Not Met: []? Adjusted  3. Patient will demonstrate an increase in Strength to good proximal hip strength and control in LE to allow for proper functional mobility as indicated by patients Functional Deficits. [x]? Progressing: []? Met: []? Not Met: []? Adjusted  4. Patient will return to normal walking, stair climbing  and household functional activities without increased symptoms or restriction. [x]? Progressing: []? Met: []? Not Met: []? Adjusted  5. Patient will have a decrease in pain 0-3/10 to facilitate improvement in movement, function, and ADLs as indicated by Functional Deficits. [x]? Progressing: []? Met: []? Not Met: []? Adjusted    ASSESSMENT:  KX modifier applied 10/18/21, as patient had a right TKR on 10/8/21 and requires skilled therapy to maximize outcome of surgery and achieve maximum benefit.  PT reviewed HEP that patient had been given previously. Treatment/Activity Tolerance:  [x] Patient tolerated treatment well [] Patient limited by fatique  [] Patient limited by pain  [x] Patient limited by other medical complications  [] Other:     Overall Progression Towards Functional goals/ Treatment Progress Update:  [] Patient is progressing as expected towards functional goals listed. [x] Progression is slowed due to complexities/Impairments listed. [] Progression has been slowed due to co-morbidities. [] Plan just implemented, too soon to assess goals progression <30days   [] Goals require adjustment due to lack of progress  [] Patient is not progressing as expected and requires additional follow up with physician  [] Other    Prognosis for POC: [x] Good [] Fair  [] Poor    Patient requires continued skilled intervention: [x] Yes  [] No        PLAN:             LEFS due 12/1/21                            ADD Step downs ADD wall stretch to gastroc/ soleus at home   Gait, balance training, proprioception, rom, strengthening, decrease swelling/ pain  [x] Continue per plan of care [] Alter current plan (see comments)  [] Plan of care initiated [] Hold pending MD visit [] Discharge    Electronically signed by: Samm Todd PTA    Note: If patient does not return for scheduled/recommended follow up visits, this note will serve as a discharge from care along with the most recent update on progress.

## 2021-12-08 ENCOUNTER — HOSPITAL ENCOUNTER (OUTPATIENT)
Dept: PHYSICAL THERAPY | Age: 66
Setting detail: THERAPIES SERIES
Discharge: HOME | End: 2021-12-08
Payer: MEDICARE

## 2021-12-08 PROCEDURE — 97016 VASOPNEUMATIC DEVICE THERAPY: CPT

## 2021-12-08 PROCEDURE — 97112 NEUROMUSCULAR REEDUCATION: CPT

## 2021-12-08 PROCEDURE — 97110 THERAPEUTIC EXERCISES: CPT

## 2021-12-08 PROCEDURE — 97140 MANUAL THERAPY 1/> REGIONS: CPT

## 2021-12-08 NOTE — FLOWSHEET NOTE
Physical Therapy Treatment Note/ Progress Report:     Date:  2021    Patient Name:  Christiane Vaca    :  1955  MRN: [id number]    Pertinent Medical History:     Medical/Treatment Diagnosis Information:  Diagnosis: OA right knee M17.11, s/p R TKA Z96.651  Treatment Diagnosis: Gait and mobility disturbance s/p R TKA    Insurance/Certification information:  PT Insurance Information: Medicare  Physician Information:  Referring Practitioner: Emma Kaur MD  Plan of care signed (Y/N): routed 10/11/21    Date of Patient follow up with Physician:      Progress Report: []  Yes  [x]  No     Date Range for reporting period:  Beginnin2021  Ending:      Progress report due (10 Rx/or 30 days whichever is less): 22      Recertification due (POC duration/ or 90 days whichever is less):     Visit # POC/Insurance Allowable Auth Needed   25 BOMN []Yes   [x]No     Latex Allergy:  [x]NO      []YES  Preferred Language for Healthcare:   [x]English       []Other:    Functional Scale:      Date assessed: at eval  Test: LEFS 12/3/21 = 33  Score:    Pain level:  5/10     History of Injury:Patient stated complaint: h/o right knee pain ~10 years with resultant RKA, L TKA , lives with with her  in a 1 story ranch home, one step to enter, basement with washer/ dryer     SUBJECTIVE:    10/13/21 Pt reports her right leg has been aggravated by the swelling over the last day. 10/15/21 Pt reports having experienced diarrhea  the last 2 days she is not feeling as well today. 10/18/21: Pt reports that she had a rough day on Saturday and did not do any exercises. She took meds today prior to therapy. Notes edema in R calf and ankle. 10/20/21: Pt reports that she did not sleep well last night and still has swelling. She did her HEP with greater ease yesterday.    10/22/21: Pt reports that she had a rough morning due to constipation, bad night last night, and swelling is down though. 10/25/21 Pt reports no change with her pain from last session but c/o increased stiffness today. 10/27/21 Patient reports knee feels stiff and sore,still having difficulty sleeping at night. 10/29/21 Patient reports knee is doing ok,sleeping a little better at night. 11/1/21 Pt reports feeling sore and stiff today. 11/03/21 Patient reports knee feels sore and swollen. States still not sleeping well at night. 11/05/21 Patient reports knee soreness is about the same,pulling on top of the knee. 11/8/21: Pt states she is doing good, still having pain and trouble sleeping at night  11/10/21: Patient reports knee has been feeling ok,still some stiffness and she is not sleeping threw the night yet. 11/12/21 Pt reports knee pain is about the same with intensity  , she continues to have difficulty at night. 11/15/21 Patient reports both her knees were sore over the weekend. States she still has difficulty sleeping at night. 11/17/21 Patient reports knee still stiff and sore. MD was pleased with progress. 11/19/21 Patient reports knee is doing ok soreness down the right shin area. States she has been walking with a cane more. 11/22/21 Pt reports feeling cold today, pain is about the same. 11/24/21 Pt reports an increase in knee pain due in part to decrease in her pain med prescription. 11/29/21 Patient reports knee has been sore,has been walking without cane some at home. 12/01/21 Patient reports knee is doing ok,soreness on medial side of the knee. States she still not confident going down the steps. 12/3/21 Pt reports continued pain and discomfort at and behind knee making sleep difficult. 12/06/21 Patient reports soreness in the back of the knee. States walking without the cane more around the house. 12/08/21 Patient reports she is not using her cane as much. States she is sleeping a little better at night.     OBJECTIVE:   Observation:    Test measurements:    ROM: Right knee   (* increased pain)   Date           Knee Flexion       Knee Extension    Active Passive Active Passive    Eval       10/18/21 98  0    10/22 94  -5    10/29  100 -5    11/12 96 100* -5 -3   11/17  104*     12/3 101 105* -5 -3              Strength:  Date Hip flexion Hip abduction Quad Hamstring Ankle DF Ankle PF   Eval         12/3/21   4-/5 4/5                                    RESTRICTIONS/PRECAUTIONS:     Exercises/Interventions:     Therapeutic Ex (37223)   Min: Reps/Resistance Notes/CUES   Nu step seat 10 L4 x 5 min To work on knee flexion   Leg press Bilat. ,  ,75# 10 x 3    RLE 45# 10 x 2     QSS/HSS 5\" 3 min     Incline 1 min x 2 @20% with right quad setting     Wall gastroc / soleus stretch                     Mat ex     Strap assist knee   flex  Quad setting      TKE      LAQ R 3# 30 x 11/05/21 Decreased weight per patients request   Hams curls  Seated  2 K x 30      SLR               Manual Intervention (26034)  Min: 15 min      Knee mobs/PROM Knee flexion and ext. And tibial femoral IR/ER     Tib/Fem Mobs     Patella Mobs Sup. And inf. Gr 3 ea    Ankle mobs     stretch To right rectus fem. , hams and   gastroc. NMR re-education (22079)  Min:10   CUES NEEDED   SLS Floor 10\" alt x 4 min  Posture/ balance    Wobble     Step ups fwd/ lat Fwd  6\"  15 x      Lat.  4\" 15 x     Eccentric control    Chair squats  Partial rom 5 x C/o pain in the back of the knee stopped after 5   Step up/ down 4\"  4\" 10 x  donna well        Therapeutic Activity (21087)  Min:15     Gait 11/24/21 Without a spc  On level surfaces , good balance observed Advised pt to walk without spc in her home and to use spc as needed out of home, pt verbalized agreement    Stairs 12/03/21   Ascend taking reciprocal steps  Descend RLE first , pt able to descend with LLE 4 \"    Modalities  Min:     Game ready  Medium compression 15 min     CP after exercises     MH after exercises            Other Therapeutic Activities: Pt was educated on PT POC, Diagnosis, Prognosis, pathomechanics as well as frequency and duration of scheduling future physical therapy appointments. Time was also taken on this day to answer all patient questions and participation in PT. Reviewed appointment policy in detail with patient and patient verbalized understanding. Home Exercise Program:   Access Code: [email or web address]: [employer]/Date: 10/11/2021Prepared by: Kirby Desouza   Long Sitting Quad Set - 1-2 x daily - 7 x weekly - 1-2 sets - 20 reps - 5 hold   Supine Active Straight Leg Raise - 1-2 x daily - 7 x weekly - 1-2 sets - 10 reps - 1 hold   Sitting Heel Slide with Towel - 1-2 x daily - 7 x weekly - 1 sets - 12 reps - 10 hold   Seated Long Arc Quad - 1-2 x daily - 7 x weekly - 1-2 sets - 15 reps - 3 hold   Long Sitting Calf Stretch with Strap - 1-2 x daily - 7 x weekly - 1 sets - 12 reps - 10 hold   Ankle Pumps in Elevation - 1-2 x daily - 7 x weekly - 1-2 sets - 30 reps - 1 hold   Seated Table Hamstring Stretch - 1-2 x daily - 7 x weekly - 1 sets - 4 reps - 30 hold  Access Code: [email or web address]: [employer]/Date: 10/15/2021Prepared by: Paula Samayoa   Hooklying Active Hamstring Stretch - 2 x daily - 7 x weekly - 1-2 sets - 10 reps - 5 hold   Seated Table Hamstring Stretch - 2 x daily - 7 x weekly - 1 sets - 2-4 reps - 30 hold    Patient was instructed in the following for HEP:     . Patient verbalized/demonstrated understanding and was issued written handout. Access Code: [email or web address]  URL: [email or web address]/  Date: 10/20/2021  Prepared by:  Lang Rodriguez    Exercises  Standing Knee Flexion AROM with Chair Support - 1 x daily - 7 x weekly - 3 sets - 10 reps    Therapeutic Exercise and NMR EXR  [x] (86739) Provided verbal/tactile cueing for activities related to strengthening, flexibility, endurance, ROM for improvements in LE, proximal hip, and core control with self care, mobility, lifting, ambulation.  [] (69632) Provided verbal/tactile cueing for activities related to improving balance, coordination, kinesthetic sense, posture, motor skill, proprioception  to assist with LE, proximal hip, and core control in self care, mobility, lifting, ambulation and eccentric single leg control. NMR and Therapeutic Activities:    [] (14085 or 68760) Provided verbal/tactile cueing for activities related to improving balance, coordination, kinesthetic sense, posture, motor skill, proprioception and motor activation to allow for proper function of core, proximal hip and LE with self care and ADLs and functional mobility.   [] (25850) Gait Re-education- Provided training and instruction to the patient for proper LE, core and proximal hip recruitment and positioning and eccentric body weight control with ambulation re-education including up and down stairs     Home Exercise Program:    [x] (59412) Reviewed/Progressed HEP activities related to strengthening, flexibility, endurance, ROM of core, proximal hip and LE for functional self-care, mobility, lifting and ambulation/stair navigation   [] (32245)Reviewed/Progressed HEP activities related to improving balance, coordination, kinesthetic sense, posture, motor skill, proprioception of core, proximal hip and LE for self care, mobility, lifting, and ambulation/stair navigation      Manual Treatments:  PROM / STM / Oscillations-Mobs:  G-I, II, III, IV (PA's, Inf., Post.)  [x] (59452) Provided manual therapy to mobilize LE, proximal hip and/or LS spine soft tissue/joints for the purpose of modulating pain, promoting relaxation,  increasing ROM, reducing/eliminating soft tissue swelling/inflammation/restriction, improving soft tissue extensibility and allowing for proper ROM for normal function with self care, mobility, lifting and ambulation.        Charges:  Timed Code Treatment Minutes: 65   Total Treatment Minutes: 80    12/3/21: More time available today  [] LEONARD (LOW) [id number] (typically 20 minutes face-to-face)  [] EVAL (MOD) 23521 (typically 30 minutes face-to-face)  [] EVAL (HIGH) 06178 (typically 45 minutes face-to-face)  [] RE-EVAL     [x] LQ(42411) x     [] Dry needle 1 or 2 Muscles (13825)  [x] NMR (63499) x     [] Dry needle 3+ Muscles (52362)  [x] Manual (97023) x  1   [] Ultrasound (05525) x  [] TA (08186) x     [] Mech Traction (38044)  [] ES(attended) (92442)     [] ES (un) (40140):   [x] Vasopump (90172)  [] Ionto (50574)   [] Other:    Melba Luna stated goal: Decrease pain, improve strength and normal walking  [x]? Progressing: []? Met: []? Not Met: []? Adjusted     Therapist goals for Patient:   Short Term Goals: To be achieved in: 2 weeks  1. Independent in HEP and progression per patient tolerance, in order to prevent re-injury. [x]? Progressing: []? Met: []? Not Met: []? Adjusted        Long Term Goals: To be achieved in: 4-6 weeks  1. score of 50 or betterfor the LEFS to assist with reaching prior level of function. [x]? Progressing: []? Met: []? Not Met: []? Adjusted  2. Patient will demonstrate increased AROM to 2-115 or better at right knee  to allow for proper joint functioning as indicated by patients Functional Deficits. [x]? Progressing: []? Met: []? Not Met: []? Adjusted  3. Patient will demonstrate an increase in Strength to good proximal hip strength and control in LE to allow for proper functional mobility as indicated by patients Functional Deficits. [x]? Progressing: []? Met: []? Not Met: []? Adjusted  4. Patient will return to normal walking, stair climbing  and household functional activities without increased symptoms or restriction. [x]? Progressing: []? Met: []? Not Met: []? Adjusted  5. Patient will have a decrease in pain 0-3/10 to facilitate improvement in movement, function, and ADLs as indicated by Functional Deficits. [x]? Progressing: []? Met: []? Not Met: []?  Adjusted    ASSESSMENT:  KX modifier applied 10/18/21, as patient had a right TKR on 10/8/21 and requires skilled therapy to maximize outcome of surgery and achieve maximum benefit. PT reviewed HEP that patient had been given previously. Treatment/Activity Tolerance:  [x] Patient tolerated treatment well [] Patient limited by fatique  [] Patient limited by pain  [x] Patient limited by other medical complications  [] Other:     Overall Progression Towards Functional goals/ Treatment Progress Update:  [] Patient is progressing as expected towards functional goals listed. [x] Progression is slowed due to complexities/Impairments listed. [] Progression has been slowed due to co-morbidities. [] Plan just implemented, too soon to assess goals progression <30days   [] Goals require adjustment due to lack of progress  [] Patient is not progressing as expected and requires additional follow up with physician  [] Other    Prognosis for POC: [x] Good [] Fair  [] Poor    Patient requires continued skilled intervention: [x] Yes  [] No        PLAN:             LEFS due 12/1/21                            ADD Step downs ADD wall stretch to gastroc/ soleus at home   Gait, balance training, proprioception, rom, strengthening, decrease swelling/ pain  [x] Continue per plan of care [] Alter current plan (see comments)  [] Plan of care initiated [] Hold pending MD visit [] Discharge    Electronically signed by: Samm Todd PTA    Note: If patient does not return for scheduled/recommended follow up visits, this note will serve as a discharge from care along with the most recent update on progress.

## 2021-12-10 ENCOUNTER — HOSPITAL ENCOUNTER (OUTPATIENT)
Dept: PHYSICAL THERAPY | Age: 66
Setting detail: THERAPIES SERIES
Discharge: HOME | End: 2021-12-10
Payer: MEDICARE

## 2021-12-10 PROCEDURE — 97112 NEUROMUSCULAR REEDUCATION: CPT

## 2021-12-10 PROCEDURE — 97140 MANUAL THERAPY 1/> REGIONS: CPT

## 2021-12-10 PROCEDURE — 97110 THERAPEUTIC EXERCISES: CPT

## 2021-12-10 NOTE — FLOWSHEET NOTE
Physical Therapy Treatment Note/ Progress Report:     Date:  12/10/2021    Patient Name:  Christiane Vaca    :  1955  MRN: [id number]    Pertinent Medical History:     Medical/Treatment Diagnosis Information:  Diagnosis: OA right knee M17.11, s/p R TKA Z96.651  Treatment Diagnosis: Gait and mobility disturbance s/p R TKA    Insurance/Certification information:  PT Insurance Information: Medicare  Physician Information:  Referring Practitioner: Emma Kaur MD  Plan of care signed (Y/N): routed 10/11/21    Date of Patient follow up with Physician:      Progress Report: []  Yes  [x]  No     Date Range for reporting period:  Beginnin2021  Ending:      Progress report due (10 Rx/or 30 days whichever is less): 24      Recertification due (POC duration/ or 90 days whichever is less):     Visit # POC/Insurance Allowable Auth Needed   26 BOMN []Yes   [x]No     Latex Allergy:  [x]NO      []YES  Preferred Language for Healthcare:   [x]English       []Other:    Functional Scale:      Date assessed: at eval  Test: LEFS 12/3/21 = 33  Score:    Pain level:  4-5/10     History of Injury:Patient stated complaint: h/o right knee pain ~10 years with resultant RKA, L TKA , lives with with her  in a 1 story ranch home, one step to enter, basement with washer/ dryer     SUBJECTIVE:    10/13/21 Pt reports her right leg has been aggravated by the swelling over the last day. 10/15/21 Pt reports having experienced diarrhea  the last 2 days she is not feeling as well today. 10/18/21: Pt reports that she had a rough day on Saturday and did not do any exercises. She took meds today prior to therapy. Notes edema in R calf and ankle. 10/20/21: Pt reports that she did not sleep well last night and still has swelling. She did her HEP with greater ease yesterday.    10/22/21: Pt reports that she had a rough morning due to constipation, bad night last night, and swelling is down though. 10/25/21 Pt reports no change with her pain from last session but c/o increased stiffness today. 10/27/21 Patient reports knee feels stiff and sore,still having difficulty sleeping at night. 10/29/21 Patient reports knee is doing ok,sleeping a little better at night. 11/1/21 Pt reports feeling sore and stiff today. 11/03/21 Patient reports knee feels sore and swollen. States still not sleeping well at night. 11/05/21 Patient reports knee soreness is about the same,pulling on top of the knee. 11/8/21: Pt states she is doing good, still having pain and trouble sleeping at night  11/10/21: Patient reports knee has been feeling ok,still some stiffness and she is not sleeping threw the night yet. 11/12/21 Pt reports knee pain is about the same with intensity  , she continues to have difficulty at night. 11/15/21 Patient reports both her knees were sore over the weekend. States she still has difficulty sleeping at night. 11/17/21 Patient reports knee still stiff and sore. MD was pleased with progress. 11/19/21 Patient reports knee is doing ok soreness down the right shin area. States she has been walking with a cane more. 11/22/21 Pt reports feeling cold today, pain is about the same. 11/24/21 Pt reports an increase in knee pain due in part to decrease in her pain med prescription. 11/29/21 Patient reports knee has been sore,has been walking without cane some at home. 12/01/21 Patient reports knee is doing ok,soreness on medial side of the knee. States she still not confident going down the steps. 12/3/21 Pt reports continued pain and discomfort at and behind knee making sleep difficult. 12/06/21 Patient reports soreness in the back of the knee. States walking without the cane more around the house. 12/08/21 Patient reports she is not using her cane as much. States she is sleeping a little better at night.   12/10/21 Patient reports knee is feeling a little better,using her cane less,sleeping a little better at night. OBJECTIVE:   Observation:    Test measurements:    ROM: Right knee   (* increased pain)   Date           Knee Flexion       Knee Extension    Active Passive Active Passive    Eval       10/18/21 98  0    10/22 94  -5    10/29  100 -5    11/12 96 100* -5 -3   11/17  104*     12/3 101 105* -5 -3              Strength:  Date Hip flexion Hip abduction Quad Hamstring Ankle DF Ankle PF   Eval         12/3/21   4-/5 4/5                                    RESTRICTIONS/PRECAUTIONS:     Exercises/Interventions:     Therapeutic Ex (95317)   Min: Reps/Resistance Notes/CUES   Nu step seat 10 L4 x 5 min To work on knee flexion   Leg press Bilat. ,  ,75# 10 x 3    RLE 45# 10 x 2     QSS/HSS 5\" 3 min     Incline 1 min x 2 @20% with right quad setting     Wall gastroc / soleus stretch                     Mat ex     Strap assist knee   flex  Quad setting      TKE      LAQ R 3# 30 x 11/05/21 Decreased weight per patients request   Hams curls  Seated  2 K x 30      SLR               Manual Intervention (88166)  Min: 15 min      Knee mobs/PROM Knee flexion and ext. And tibial femoral IR/ER     Tib/Fem Mobs     Patella Mobs Sup. And inf. Gr 3 ea    Ankle mobs     stretch To right rectus fem. , hams and   gastroc. NMR re-education (70293)  Min:10   CUES NEEDED   SLS Floor 10\" alt x 4 min  Posture/ balance    Wobble 2 min    Step ups fwd/ lat Fwd  6\"  15 x      Lat.  4\" 15 x     Eccentric control    Chair squats  Partial rom 5 x C/o pain in the back of the knee stopped after 5   Step up/ down 4\"  4\" 10 x  donna well        Therapeutic Activity (56469)  Min:15     Gait 11/24/21 Without a spc  On level surfaces , good balance observed Advised pt to walk without spc in her home and to use spc as needed out of home, pt verbalized agreement    Stairs 12/03/21   Ascend taking reciprocal steps  Descend RLE first , pt able to descend with LLE 4 \"    Modalities  Min:     Game ready       CP after exercises 15 min [facility] after exercises            Other Therapeutic Activities: Pt was educated on PT POC, Diagnosis, Prognosis, pathomechanics as well as frequency and duration of scheduling future physical therapy appointments. Time was also taken on this day to answer all patient questions and participation in PT. Reviewed appointment policy in detail with patient and patient verbalized understanding. Home Exercise Program:   Access Code: [email or web address]: [employer]/Date: 10/11/2021Prepared by: Jose Galarza   Long Sitting Quad Set - 1-2 x daily - 7 x weekly - 1-2 sets - 20 reps - 5 hold   Supine Active Straight Leg Raise - 1-2 x daily - 7 x weekly - 1-2 sets - 10 reps - 1 hold   Sitting Heel Slide with Towel - 1-2 x daily - 7 x weekly - 1 sets - 12 reps - 10 hold   Seated Long Arc Quad - 1-2 x daily - 7 x weekly - 1-2 sets - 15 reps - 3 hold   Long Sitting Calf Stretch with Strap - 1-2 x daily - 7 x weekly - 1 sets - 12 reps - 10 hold   Ankle Pumps in Elevation - 1-2 x daily - 7 x weekly - 1-2 sets - 30 reps - 1 hold   Seated Table Hamstring Stretch - 1-2 x daily - 7 x weekly - 1 sets - 4 reps - 30 hold  Access Code: [email or web address]: [employer]/Date: 10/15/2021Prepared by: Christie Craig BloomExmaryam   Hooklying Active Hamstring Stretch - 2 x daily - 7 x weekly - 1-2 sets - 10 reps - 5 hold   Seated Table Hamstring Stretch - 2 x daily - 7 x weekly - 1 sets - 2-4 reps - 30 hold    Patient was instructed in the following for HEP:     . Patient verbalized/demonstrated understanding and was issued written handout. Access Code: [email or web address]  URL: [employer]/  Date: 10/20/2021  Prepared by:  Judy Iyer    Exercises  Standing Knee Flexion AROM with Chair Support - 1 x daily - 7 x weekly - 3 sets - 10 reps    Therapeutic Exercise and NMR EXR  [x] (11713) Provided verbal/tactile cueing for activities related to strengthening, flexibility, endurance, ROM for improvements in LE, proximal hip, and core control with self care, mobility, lifting, ambulation.  [] (75596) Provided verbal/tactile cueing for activities related to improving balance, coordination, kinesthetic sense, posture, motor skill, proprioception  to assist with LE, proximal hip, and core control in self care, mobility, lifting, ambulation and eccentric single leg control. NMR and Therapeutic Activities:    [] (62281 or 58105) Provided verbal/tactile cueing for activities related to improving balance, coordination, kinesthetic sense, posture, motor skill, proprioception and motor activation to allow for proper function of core, proximal hip and LE with self care and ADLs and functional mobility.   [] (69069) Gait Re-education- Provided training and instruction to the patient for proper LE, core and proximal hip recruitment and positioning and eccentric body weight control with ambulation re-education including up and down stairs     Home Exercise Program:    [x] (07396) Reviewed/Progressed HEP activities related to strengthening, flexibility, endurance, ROM of core, proximal hip and LE for functional self-care, mobility, lifting and ambulation/stair navigation   [] (25242)Reviewed/Progressed HEP activities related to improving balance, coordination, kinesthetic sense, posture, motor skill, proprioception of core, proximal hip and LE for self care, mobility, lifting, and ambulation/stair navigation      Manual Treatments:  PROM / STM / Oscillations-Mobs:  G-I, II, III, IV (PA's, Inf., Post.)  [x] (27445) Provided manual therapy to mobilize LE, proximal hip and/or LS spine soft tissue/joints for the purpose of modulating pain, promoting relaxation,  increasing ROM, reducing/eliminating soft tissue swelling/inflammation/restriction, improving soft tissue extensibility and allowing for proper ROM for normal function with self care, mobility, lifting and ambulation.        Charges:  Timed Code Treatment Minutes: 65   Total Treatment Minutes: 65    12/3/21: More time available today  [] EVAL (LOW) 33071 (typically 20 minutes face-to-face)  [] EVAL (MOD) 64678 (typically 30 minutes face-to-face)  [] EVAL (HIGH) 45145 (typically 45 minutes face-to-face)  [] RE-EVAL     [x] KT(63556) x 2    [] Dry needle 1 or 2 Muscles (99796)  [x] NMR (87222) x     [] Dry needle 3+ Muscles (81653)  [x] Manual (71625) x  1   [] Ultrasound (03809) x  [] TA (07776) x     [] Mech Traction (80116)  [] ES(attended) (42423)     [] ES (un) (84343):   [] Vasopump (60135)  [] Ionto (96544)   [] Other:    Shan Aditi stated goal: Decrease pain, improve strength and normal walking  [x]? Progressing: []? Met: []? Not Met: []? Adjusted     Therapist goals for Patient:   Short Term Goals: To be achieved in: 2 weeks  1. Independent in HEP and progression per patient tolerance, in order to prevent re-injury. [x]? Progressing: []? Met: []? Not Met: []? Adjusted        Long Term Goals: To be achieved in: 4-6 weeks  1. score of 50 or betterfor the LEFS to assist with reaching prior level of function. [x]? Progressing: []? Met: []? Not Met: []? Adjusted  2. Patient will demonstrate increased AROM to 2-115 or better at right knee  to allow for proper joint functioning as indicated by patients Functional Deficits. [x]? Progressing: []? Met: []? Not Met: []? Adjusted  3. Patient will demonstrate an increase in Strength to good proximal hip strength and control in LE to allow for proper functional mobility as indicated by patients Functional Deficits. [x]? Progressing: []? Met: []? Not Met: []? Adjusted  4. Patient will return to normal walking, stair climbing  and household functional activities without increased symptoms or restriction. [x]? Progressing: []? Met: []? Not Met: []? Adjusted  5. Patient will have a decrease in pain 0-3/10 to facilitate improvement in movement, function, and ADLs as indicated by Functional Deficits. [x]? Progressing: []? Met: []?  Not Met: []? Adjusted    ASSESSMENT:  KX modifier applied 10/18/21, as patient had a right TKR on 10/8/21 and requires skilled therapy to maximize outcome of surgery and achieve maximum benefit. PT reviewed HEP that patient had been given previously. Treatment/Activity Tolerance:  [x] Patient tolerated treatment well [] Patient limited by fatique  [] Patient limited by pain  [x] Patient limited by other medical complications  [] Other:     Overall Progression Towards Functional goals/ Treatment Progress Update:  [] Patient is progressing as expected towards functional goals listed. [x] Progression is slowed due to complexities/Impairments listed. [] Progression has been slowed due to co-morbidities. [] Plan just implemented, too soon to assess goals progression <30days   [] Goals require adjustment due to lack of progress  [] Patient is not progressing as expected and requires additional follow up with physician  [] Other    Prognosis for POC: [x] Good [] Fair  [] Poor    Patient requires continued skilled intervention: [x] Yes  [] No        PLAN:             LEFS due 12/1/21                            ADD Step downs ADD wall stretch to gastroc/ soleus at home   Gait, balance training, proprioception, rom, strengthening, decrease swelling/ pain  [x] Continue per plan of care [] Alter current plan (see comments)  [] Plan of care initiated [] Hold pending MD visit [] Discharge    Electronically signed by: Yovani Lopez PTA    Note: If patient does not return for scheduled/recommended follow up visits, this note will serve as a discharge from care along with the most recent update on progress.

## 2021-12-13 ENCOUNTER — APPOINTMENT (OUTPATIENT)
Dept: PHYSICAL THERAPY | Age: 66
End: 2021-12-13
Payer: MEDICARE

## 2021-12-15 ENCOUNTER — HOSPITAL ENCOUNTER (OUTPATIENT)
Dept: PHYSICAL THERAPY | Age: 66
Setting detail: THERAPIES SERIES
Discharge: HOME | End: 2021-12-15
Payer: MEDICARE

## 2021-12-15 PROCEDURE — 97112 NEUROMUSCULAR REEDUCATION: CPT

## 2021-12-15 PROCEDURE — 97110 THERAPEUTIC EXERCISES: CPT

## 2021-12-15 PROCEDURE — 97140 MANUAL THERAPY 1/> REGIONS: CPT

## 2021-12-15 NOTE — FLOWSHEET NOTE
Physical Therapy Treatment Note/ Progress Report:     Date:  12/15/2021    Patient Name:  Yasmany Banegas    :  1955  MRN: [id number]    Pertinent Medical History:     Medical/Treatment Diagnosis Information:  Diagnosis: OA right knee M17.11, s/p R TKA Z96.651  Treatment Diagnosis: Gait and mobility disturbance s/p R TKA    Insurance/Certification information:  PT Insurance Information: Medicare  Physician Information:  Referring Practitioner: Maria De Jesus Durham MD  Plan of care signed (Y/N): routed 10/11/21    Date of Patient follow up with Physician:      Progress Report: []  Yes  [x]  No     Date Range for reporting period:  Beginnin2021  Ending:      Progress report due (10 Rx/or 30 days whichever is less): 73/3/09      Recertification due (POC duration/ or 90 days whichever is less):     Visit # POC/Insurance Allowable Auth Needed   32 BOMN []Yes   [x]No     Latex Allergy:  [x]NO      []YES  Preferred Language for Healthcare:   [x]English       []Other:    Functional Scale:      Date assessed: at eval  Test: LEFS 12/3/21 = 33  Score:    Pain level:  4-5/10     History of Injury:Patient stated complaint: h/o right knee pain ~10 years with resultant RKA, L TKA , lives with with her  in a 1 story ranch home, one step to enter, basement with washer/ dryer     SUBJECTIVE:    10/13/21 Pt reports her right leg has been aggravated by the swelling over the last day. 10/15/21 Pt reports having experienced diarrhea  the last 2 days she is not feeling as well today. 10/18/21: Pt reports that she had a rough day on Saturday and did not do any exercises. She took meds today prior to therapy. Notes edema in R calf and ankle. 10/20/21: Pt reports that she did not sleep well last night and still has swelling. She did her HEP with greater ease yesterday.    10/22/21: Pt reports that she had a rough morning due to constipation, bad night last night, and swelling is down though. 10/25/21 Pt reports no change with her pain from last session but c/o increased stiffness today. 10/27/21 Patient reports knee feels stiff and sore,still having difficulty sleeping at night. 10/29/21 Patient reports knee is doing ok,sleeping a little better at night. 11/1/21 Pt reports feeling sore and stiff today. 11/03/21 Patient reports knee feels sore and swollen. States still not sleeping well at night. 11/05/21 Patient reports knee soreness is about the same,pulling on top of the knee. 11/8/21: Pt states she is doing good, still having pain and trouble sleeping at night  11/10/21: Patient reports knee has been feeling ok,still some stiffness and she is not sleeping threw the night yet. 11/12/21 Pt reports knee pain is about the same with intensity  , she continues to have difficulty at night. 11/15/21 Patient reports both her knees were sore over the weekend. States she still has difficulty sleeping at night. 11/17/21 Patient reports knee still stiff and sore. MD was pleased with progress. 11/19/21 Patient reports knee is doing ok soreness down the right shin area. States she has been walking with a cane more. 11/22/21 Pt reports feeling cold today, pain is about the same. 11/24/21 Pt reports an increase in knee pain due in part to decrease in her pain med prescription. 11/29/21 Patient reports knee has been sore,has been walking without cane some at home. 12/01/21 Patient reports knee is doing ok,soreness on medial side of the knee. States she still not confident going down the steps. 12/3/21 Pt reports continued pain and discomfort at and behind knee making sleep difficult. 12/06/21 Patient reports soreness in the back of the knee. States walking without the cane more around the house. 12/08/21 Patient reports she is not using her cane as much. States she is sleeping a little better at night.   12/10/21 Patient reports knee is feeling a little better,using her cane less,sleeping a Ascend taking reciprocal steps  Descend quad control    Modalities  Min:     Game ready       CP after exercises 15 min    MH after exercises            Other Therapeutic Activities: Pt was educated on PT POC, Diagnosis, Prognosis, pathomechanics as well as frequency and duration of scheduling future physical therapy appointments. Time was also taken on this day to answer all patient questions and participation in PT. Reviewed appointment policy in detail with patient and patient verbalized understanding. Home Exercise Program:   Access Code: [email or web address]: [employer]/Date: 10/11/2021Prepared by: Gm Ramires   Long Sitting Quad Set - 1-2 x daily - 7 x weekly - 1-2 sets - 20 reps - 5 hold   Supine Active Straight Leg Raise - 1-2 x daily - 7 x weekly - 1-2 sets - 10 reps - 1 hold   Sitting Heel Slide with Towel - 1-2 x daily - 7 x weekly - 1 sets - 12 reps - 10 hold   Seated Long Arc Quad - 1-2 x daily - 7 x weekly - 1-2 sets - 15 reps - 3 hold   Long Sitting Calf Stretch with Strap - 1-2 x daily - 7 x weekly - 1 sets - 12 reps - 10 hold   Ankle Pumps in Elevation - 1-2 x daily - 7 x weekly - 1-2 sets - 30 reps - 1 hold   Seated Table Hamstring Stretch - 1-2 x daily - 7 x weekly - 1 sets - 4 reps - 30 hold  Access Code: [email or web address]: [email or web address]: 10/15/2021Prepared by: Lucinda Meigs BloomExmaryam   Hooklying Active Hamstring Stretch - 2 x daily - 7 x weekly - 1-2 sets - 10 reps - 5 hold   Seated Table Hamstring Stretch - 2 x daily - 7 x weekly - 1 sets - 2-4 reps - 30 hold    Patient was instructed in the following for HEP:     . Patient verbalized/demonstrated understanding and was issued written handout. Access Code: [email or web address]  URL: [email or web address]/  Date: 10/20/2021  Prepared by:  [facility]    Exercises  Standing Knee Flexion AROM with Chair Support - 1 x daily - 7 x weekly - 3 sets - 10 reps    Therapeutic Exercise and NMR EXR  [x] (56446) Provided verbal/tactile cueing for activities related to strengthening, flexibility, endurance, ROM for improvements in LE, proximal hip, and core control with self care, mobility, lifting, ambulation.  [] (29077) Provided verbal/tactile cueing for activities related to improving balance, coordination, kinesthetic sense, posture, motor skill, proprioception  to assist with LE, proximal hip, and core control in self care, mobility, lifting, ambulation and eccentric single leg control.      NMR and Therapeutic Activities:    [] (55951 or 18078) Provided verbal/tactile cueing for activities related to improving balance, coordination, kinesthetic sense, posture, motor skill, proprioception and motor activation to allow for proper function of core, proximal hip and LE with self care and ADLs and functional mobility.   [] (74070) Gait Re-education- Provided training and instruction to the patient for proper LE, core and proximal hip recruitment and positioning and eccentric body weight control with ambulation re-education including up and down stairs     Home Exercise Program:    [x] (68752) Reviewed/Progressed HEP activities related to strengthening, flexibility, endurance, ROM of core, proximal hip and LE for functional self-care, mobility, lifting and ambulation/stair navigation   [] (59173)Reviewed/Progressed HEP activities related to improving balance, coordination, kinesthetic sense, posture, motor skill, proprioception of core, proximal hip and LE for self care, mobility, lifting, and ambulation/stair navigation      Manual Treatments:  PROM / STM / Oscillations-Mobs:  G-I, II, III, IV (PA's, Inf., Post.)  [x] (98605) Provided manual therapy to mobilize LE, proximal hip and/or LS spine soft tissue/joints for the purpose of modulating pain, promoting relaxation,  increasing ROM, reducing/eliminating soft tissue swelling/inflammation/restriction, improving soft tissue extensibility and allowing for proper ROM for normal function with self care, mobility, lifting and ambulation. Charges:  Timed Code Treatment Minutes: 65   Total Treatment Minutes: 65    12/3/21: More time available today  [] EVAL (LOW) 68640 (typically 20 minutes face-to-face)  [] EVAL (MOD) 85283 (typically 30 minutes face-to-face)  [] EVAL (HIGH) 00500 (typically 45 minutes face-to-face)  [] RE-EVAL     [x] ID(72141) x 2    [] Dry needle 1 or 2 Muscles (59926)  [x] NMR (47827) x     [] Dry needle 3+ Muscles (18018)  [x] Manual (06873) x  1   [] Ultrasound (45374) x  [] TA (20316) x     [] Mech Traction (67833)  [] ES(attended) (18486)     [] ES (un) (94640):   [] Vasopump (96345)  [] Ionto (97833)   [] Other:    Dk Mason stated goal: Decrease pain, improve strength and normal walking  [x]? Progressing: []? Met: []? Not Met: []? Adjusted     Therapist goals for Patient:   Short Term Goals: To be achieved in: 2 weeks  1. Independent in HEP and progression per patient tolerance, in order to prevent re-injury. [x]? Progressing: []? Met: []? Not Met: []? Adjusted        Long Term Goals: To be achieved in: 4-6 weeks  1. score of 50 or betterfor the LEFS to assist with reaching prior level of function. [x]? Progressing: []? Met: []? Not Met: []? Adjusted  2. Patient will demonstrate increased AROM to 2-115 or better at right knee  to allow for proper joint functioning as indicated by patients Functional Deficits. [x]? Progressing: []? Met: []? Not Met: []? Adjusted  3. Patient will demonstrate an increase in Strength to good proximal hip strength and control in LE to allow for proper functional mobility as indicated by patients Functional Deficits. [x]? Progressing: []? Met: []? Not Met: []? Adjusted  4. Patient will return to normal walking, stair climbing  and household functional activities without increased symptoms or restriction. [x]? Progressing: []? Met: []? Not Met: []? Adjusted  5.   Patient will have a decrease in pain 0-3/10 to facilitate improvement in movement, function, and ADLs as indicated by Functional Deficits. [x]? Progressing: []? Met: []? Not Met: []? Adjusted    ASSESSMENT:  KX modifier applied 10/18/21, as patient had a right TKR on 10/8/21 and requires skilled therapy to maximize outcome of surgery and achieve maximum benefit. PT reviewed HEP that patient had been given previously. Better quad control on the stairs. Treatment/Activity Tolerance:  [x] Patient tolerated treatment well [] Patient limited by fatique  [] Patient limited by pain  [x] Patient limited by other medical complications  [] Other:     Overall Progression Towards Functional goals/ Treatment Progress Update:  [] Patient is progressing as expected towards functional goals listed. [x] Progression is slowed due to complexities/Impairments listed. [] Progression has been slowed due to co-morbidities. [] Plan just implemented, too soon to assess goals progression <30days   [] Goals require adjustment due to lack of progress  [] Patient is not progressing as expected and requires additional follow up with physician  [] Other    Prognosis for POC: [x] Good [] Fair  [] Poor    Patient requires continued skilled intervention: [x] Yes  [] No        PLAN:             LEFS due 12/1/21                            ADD Step downs ADD wall stretch to gastroc/ soleus at home   Gait, balance training, proprioception, rom, strengthening, decrease swelling/ pain  [x] Continue per plan of care [] Alter current plan (see comments)  [] Plan of care initiated [] Hold pending MD visit [] Discharge    Electronically signed by: Laisha Anne PTA    Note: If patient does not return for scheduled/recommended follow up visits, this note will serve as a discharge from care along with the most recent update on progress.

## 2021-12-17 ENCOUNTER — HOSPITAL ENCOUNTER (OUTPATIENT)
Dept: PHYSICAL THERAPY | Age: 66
Setting detail: THERAPIES SERIES
Discharge: HOME | End: 2021-12-17
Payer: MEDICARE

## 2021-12-17 PROCEDURE — 97016 VASOPNEUMATIC DEVICE THERAPY: CPT

## 2021-12-17 PROCEDURE — 97110 THERAPEUTIC EXERCISES: CPT

## 2021-12-17 PROCEDURE — 97140 MANUAL THERAPY 1/> REGIONS: CPT

## 2021-12-17 PROCEDURE — 97112 NEUROMUSCULAR REEDUCATION: CPT

## 2021-12-17 NOTE — FLOWSHEET NOTE
Physical Therapy Treatment Note/ Progress Report:     Date:  2021    Patient Name:  Marisa Dubois    :  1955  MRN: [id number]    Pertinent Medical History:     Medical/Treatment Diagnosis Information:  Diagnosis: OA right knee M17.11, s/p R TKA Z96.651  Treatment Diagnosis: Gait and mobility disturbance s/p R TKA    Insurance/Certification information:  PT Insurance Information: Medicare  Physician Information:  Referring Practitioner: Russell Lizarraga MD  Plan of care signed (Y/N): routed 10/11/21    Date of Patient follow up with Physician:      Progress Report: []  Yes  [x]  No     Date Range for reporting period:  Beginnin2021  Ending:      Progress report due (10 Rx/or 30 days whichever is less):       Recertification due (POC duration/ or 90 days whichever is less):     Visit # POC/Insurance Allowable Auth Needed   28 BOMN []Yes   [x]No     Latex Allergy:  [x]NO      []YES  Preferred Language for Healthcare:   [x]English       []Other:    Functional Scale:      Date assessed: at eval  Test: LEFS 12/3/21 = 33  Score:    Pain level:  4-5/10     History of Injury:Patient stated complaint: h/o right knee pain ~10 years with resultant RKA, L TKA , lives with with her  in a 1 story ranch home, one step to enter, basement with washer/ dryer     SUBJECTIVE:    10/13/21 Pt reports her right leg has been aggravated by the swelling over the last day. 10/15/21 Pt reports having experienced diarrhea  the last 2 days she is not feeling as well today. 10/18/21: Pt reports that she had a rough day on Saturday and did not do any exercises. She took meds today prior to therapy. Notes edema in R calf and ankle. 10/20/21: Pt reports that she did not sleep well last night and still has swelling. She did her HEP with greater ease yesterday.    10/22/21: Pt reports that she had a rough morning due to constipation, bad night last night, and swelling is down though. 10/25/21 Pt reports no change with her pain from last session but c/o increased stiffness today. 10/27/21 Patient reports knee feels stiff and sore,still having difficulty sleeping at night. 10/29/21 Patient reports knee is doing ok,sleeping a little better at night. 11/1/21 Pt reports feeling sore and stiff today. 11/03/21 Patient reports knee feels sore and swollen. States still not sleeping well at night. 11/05/21 Patient reports knee soreness is about the same,pulling on top of the knee. 11/8/21: Pt states she is doing good, still having pain and trouble sleeping at night  11/10/21: Patient reports knee has been feeling ok,still some stiffness and she is not sleeping threw the night yet. 11/12/21 Pt reports knee pain is about the same with intensity  , she continues to have difficulty at night. 11/15/21 Patient reports both her knees were sore over the weekend. States she still has difficulty sleeping at night. 11/17/21 Patient reports knee still stiff and sore. MD was pleased with progress. 11/19/21 Patient reports knee is doing ok soreness down the right shin area. States she has been walking with a cane more. 11/22/21 Pt reports feeling cold today, pain is about the same. 11/24/21 Pt reports an increase in knee pain due in part to decrease in her pain med prescription. 11/29/21 Patient reports knee has been sore,has been walking without cane some at home. 12/01/21 Patient reports knee is doing ok,soreness on medial side of the knee. States she still not confident going down the steps. 12/3/21 Pt reports continued pain and discomfort at and behind knee making sleep difficult. 12/06/21 Patient reports soreness in the back of the knee. States walking without the cane more around the house. 12/08/21 Patient reports she is not using her cane as much. States she is sleeping a little better at night.   12/10/21 Patient reports knee is feeling a little better,using her cane less,sleeping a home and to use spc as needed out of home, pt verbalized agreement    Stairs 12/15/21   Ascend taking reciprocal steps  Descend quad control    Modalities  Min:     Game ready       CP after exercises 15 min    MH after exercises            Other Therapeutic Activities: Pt was educated on PT POC, Diagnosis, Prognosis, pathomechanics as well as frequency and duration of scheduling future physical therapy appointments. Time was also taken on this day to answer all patient questions and participation in PT. Reviewed appointment policy in detail with patient and patient verbalized understanding. Home Exercise Program:   Access Code: [email or web address]: [email or web address]: 10/11/2021Prepared by: Israel Pulling   Long Sitting Quad Set - 1-2 x daily - 7 x weekly - 1-2 sets - 20 reps - 5 hold   Supine Active Straight Leg Raise - 1-2 x daily - 7 x weekly - 1-2 sets - 10 reps - 1 hold   Sitting Heel Slide with Towel - 1-2 x daily - 7 x weekly - 1 sets - 12 reps - 10 hold   Seated Long Arc Quad - 1-2 x daily - 7 x weekly - 1-2 sets - 15 reps - 3 hold   Long Sitting Calf Stretch with Strap - 1-2 x daily - 7 x weekly - 1 sets - 12 reps - 10 hold   Ankle Pumps in Elevation - 1-2 x daily - 7 x weekly - 1-2 sets - 30 reps - 1 hold   Seated Table Hamstring Stretch - 1-2 x daily - 7 x weekly - 1 sets - 4 reps - 30 hold  Access Code: [email or web address]: [email or web address]: 10/15/2021Prepared by: Marilou Samayoa   Hooklying Active Hamstring Stretch - 2 x daily - 7 x weekly - 1-2 sets - 10 reps - 5 hold   Seated Table Hamstring Stretch - 2 x daily - 7 x weekly - 1 sets - 2-4 reps - 30 hold    Patient was instructed in the following for HEP:     . Patient verbalized/demonstrated understanding and was issued written handout. Access Code: [email or web address]  URL: [email or web address]  Date: 10/20/2021  Prepared by:  Adrianne Wu    Exercises  Standing Knee Flexion AROM with Chair Support - 1 x daily - 7 x weekly - 3 sets - 10 reps    Therapeutic Exercise and NMR EXR  [x] (34985) Provided verbal/tactile cueing for activities related to strengthening, flexibility, endurance, ROM for improvements in LE, proximal hip, and core control with self care, mobility, lifting, ambulation.  [] (40119) Provided verbal/tactile cueing for activities related to improving balance, coordination, kinesthetic sense, posture, motor skill, proprioception  to assist with LE, proximal hip, and core control in self care, mobility, lifting, ambulation and eccentric single leg control.      NMR and Therapeutic Activities:    [] (87424 or 75187) Provided verbal/tactile cueing for activities related to improving balance, coordination, kinesthetic sense, posture, motor skill, proprioception and motor activation to allow for proper function of core, proximal hip and LE with self care and ADLs and functional mobility.   [] (75713) Gait Re-education- Provided training and instruction to the patient for proper LE, core and proximal hip recruitment and positioning and eccentric body weight control with ambulation re-education including up and down stairs     Home Exercise Program:    [x] (78963) Reviewed/Progressed HEP activities related to strengthening, flexibility, endurance, ROM of core, proximal hip and LE for functional self-care, mobility, lifting and ambulation/stair navigation   [] (16299)Reviewed/Progressed HEP activities related to improving balance, coordination, kinesthetic sense, posture, motor skill, proprioception of core, proximal hip and LE for self care, mobility, lifting, and ambulation/stair navigation      Manual Treatments:  PROM / STM / Oscillations-Mobs:  G-I, II, III, IV (PA's, Inf., Post.)  [x] (28367) Provided manual therapy to mobilize LE, proximal hip and/or LS spine soft tissue/joints for the purpose of modulating pain, promoting relaxation,  increasing ROM, reducing/eliminating soft tissue Progressing: []? Met: []? Not Met: []? Adjusted  5. Patient will have a decrease in pain 0-3/10 to facilitate improvement in movement, function, and ADLs as indicated by Functional Deficits. [x]? Progressing: []? Met: []? Not Met: []? Adjusted    ASSESSMENT:  KX modifier applied 10/18/21, as patient had a right TKR on 10/8/21 and requires skilled therapy to maximize outcome of surgery and achieve maximum benefit. PT reviewed HEP that patient had been given previously. Better quad control on the stairs. Treatment/Activity Tolerance:  [x] Patient tolerated treatment well [] Patient limited by fatique  [] Patient limited by pain  [x] Patient limited by other medical complications  [] Other:     Overall Progression Towards Functional goals/ Treatment Progress Update:  [] Patient is progressing as expected towards functional goals listed. [x] Progression is slowed due to complexities/Impairments listed. [] Progression has been slowed due to co-morbidities. [] Plan just implemented, too soon to assess goals progression <30days   [] Goals require adjustment due to lack of progress  [] Patient is not progressing as expected and requires additional follow up with physician  [] Other    Prognosis for POC: [x] Good [] Fair  [] Poor    Patient requires continued skilled intervention: [x] Yes  [] No        PLAN:                       Plan to DC to HEP                            ADD Step up/downs, lateral step ups, SLS, chair squats   ADD wall stretch to gastroc/ soleus to HEP   Gait, balance training, proprioception, rom, strengthening, decrease swelling/ pain  [x] Continue per plan of care [] Alter current plan (see comments)  [] Plan of care initiated [] Hold pending MD visit [] Discharge    Electronically signed by: Katina Cristina, PT    Note: If patient does not return for scheduled/recommended follow up visits, this note will serve as a discharge from care along with the most recent update on progress.

## 2021-12-20 ENCOUNTER — HOSPITAL ENCOUNTER (OUTPATIENT)
Dept: PHYSICAL THERAPY | Age: 66
Setting detail: THERAPIES SERIES
End: 2021-12-20
Payer: MEDICARE

## 2021-12-29 ENCOUNTER — HOSPITAL ENCOUNTER (OUTPATIENT)
Dept: PHYSICAL THERAPY | Age: 66
Setting detail: THERAPIES SERIES
Discharge: HOME | End: 2021-12-29
Payer: MEDICARE

## 2021-12-29 PROCEDURE — 97016 VASOPNEUMATIC DEVICE THERAPY: CPT

## 2021-12-29 PROCEDURE — 97110 THERAPEUTIC EXERCISES: CPT

## 2021-12-29 PROCEDURE — 97530 THERAPEUTIC ACTIVITIES: CPT

## 2021-12-29 NOTE — FLOWSHEET NOTE
[address] [facility]. Yoni Carter [address]  Phone: [phone]   Fax:     [phone]     Physical Therapy Discharge Summary    Dear  Dr Trina Navarro,    We had the pleasure of treating the following patient for physical therapy services at [address] [place] [address]. A summary of our findings can be found in the discharge summary below. If you have any questions or concerns regarding these findings, please do not hesitate to contact me at the office phone number above.   Thank you for the referral.     Physician Signature:________________________________Date:__________________  By signing above (or electronic signature), therapists plan is approved by physician      Overall Response to Treatment:   []Patient is responding well to treatment and improvement is noted with regards  to goals   []Patient should continue to improve in reasonable time if they continue HEP   [x]Patient has plateaued and is no longer responding to skilled PT intervention    []Patient is getting worse and would benefit from return to referring MD   []Patient unable to adhere to initial POC   []Other:     Date range of Visits: 10/11/21 to 21  Total Visits: 29        Physical Therapy Treatment Note/ Progress Report:     Date:  2021    Patient Name:  Tatyana Lopez    :  1955  MRN: [id number]    Pertinent Medical History:     Medical/Treatment Diagnosis Information:  Diagnosis: OA right knee M17.11, s/p R TKA Z96.651  Treatment Diagnosis: Gait and mobility disturbance s/p R TKA    Insurance/Certification information:  PT Insurance Information: Medicare  Physician Information:  Referring Practitioner: Michael Pacheco MD  Plan of care signed (Y/N): routed 10/11/21    Date of Patient follow up with Physician:      Progress Report: []  Yes  [x]  No     Date Range for reporting period:  Beginnin2021  Ending:      Progress report due (10 Rx/or 30 days whichever is less): 36/1/62      Recertification due (POC duration/ or 90 days whichever is less):     Visit # POC/Insurance Allowable Auth Needed   29 BOMN []Yes   [x]No     Latex Allergy:  [x]NO      []YES  Preferred Language for Healthcare:   [x]English       []Other:    Functional Scale:      Date assessed: at eval  Test: LEFS 12/3/21 = 33, 12/29/21 =53  Score:    Pain level:  3/10     History of Injury:Patient stated complaint: h/o right knee pain ~10 years with resultant RKA, L TKA 0/16/21, lives with with her  in a 1 story ranch home, one step to enter, basement with washer/ dryer     SUBJECTIVE:    10/13/21 Pt reports her right leg has been aggravated by the swelling over the last day. 10/15/21 Pt reports having experienced diarrhea  the last 2 days she is not feeling as well today. 10/18/21: Pt reports that she had a rough day on Saturday and did not do any exercises. She took meds today prior to therapy. Notes edema in R calf and ankle. 10/20/21: Pt reports that she did not sleep well last night and still has swelling. She did her HEP with greater ease yesterday. 10/22/21: Pt reports that she had a rough morning due to constipation, bad night last night, and swelling is down though. 10/25/21 Pt reports no change with her pain from last session but c/o increased stiffness today. 10/27/21 Patient reports knee feels stiff and sore,still having difficulty sleeping at night. 10/29/21 Patient reports knee is doing ok,sleeping a little better at night. 11/1/21 Pt reports feeling sore and stiff today. 11/03/21 Patient reports knee feels sore and swollen. States still not sleeping well at night. 11/05/21 Patient reports knee soreness is about the same,pulling on top of the knee.   11/8/21: Pt states she is doing good, still having pain and trouble sleeping at night  11/10/21: Patient reports knee has been feeling ok,still some stiffness and she is not sleeping threw the night yet.  11/12/21 Pt reports knee pain is about the same with intensity  , she continues to have difficulty at night. 11/15/21 Patient reports both her knees were sore over the weekend. States she still has difficulty sleeping at night. 11/17/21 Patient reports knee still stiff and sore. MD was pleased with progress. 11/19/21 Patient reports knee is doing ok soreness down the right shin area. States she has been walking with a cane more. 11/22/21 Pt reports feeling cold today, pain is about the same. 11/24/21 Pt reports an increase in knee pain due in part to decrease in her pain med prescription. 11/29/21 Patient reports knee has been sore,has been walking without cane some at home. 12/01/21 Patient reports knee is doing ok,soreness on medial side of the knee. States she still not confident going down the steps. 12/3/21 Pt reports continued pain and discomfort at and behind knee making sleep difficult. 12/06/21 Patient reports soreness in the back of the knee. States walking without the cane more around the house. 12/08/21 Patient reports she is not using her cane as much. States she is sleeping a little better at night. 12/10/21 Patient reports knee is feeling a little better,using her cane less,sleeping a little better at night. 12/15/21 Patient reports she was able to get up from lower toilet easier. States she was able to go up and down the steps a little easier. 12/17/21 Patient reports driving for the first time to her Doctors appointment. 12/29/21 Patient reports improved confidence walking in public without a cane and climbing stairs     OBJECTIVE:   Observation: 12/29 pt ascends/ descends stairs taking reciprocal steps while using railings and walks at a community level without a device.     Test measurements:    ROM: Right knee   (* increased pain)   Date           Knee Flexion       Knee Extension    Active Passive Active Passive    Eval       10/18/21 98  0    10/22 94  -5    10/29  100 -5 11/12 96 100* -5 -3   11/17  104*     12/3 101 105* -5 -3   12/17 107 113* -5 -3   12/29 105 110* -3 -2       Strength:  Date Hip flexion Hip abduction Quad Hamstring Ankle DF Ankle PF   Eval         12/3/21   4-/5 4/5     12/17/21   4/5 4+/5     12/29   4+/5 4+/5                  RESTRICTIONS/PRECAUTIONS:     Exercises/Interventions:     Therapeutic Ex (24332)   Min: Reps/Resistance Notes/CUES   Nu step seat 10/bike  L4 x 5 min/3min To work on knee flexion   Leg press Bilat. ,  75# 10 x 3    RLE 45# 10 x 2     QSS/HSS 5\" 3 min     Incline 1 min x 2 @20% with right quad setting     Wall gastroc / soleus stretch                     Mat ex     Strap assist knee   flex  Quad setting      TKE      LAQ  11/05/21 Decreased weight per patients request   Hams curls  Seated        SLR               Manual Intervention (54726)  Min:      Knee mobs/PROM    Tib/Fem Mobs    Patella Mobs    Ankle mobs    stretch         NMR re-education (16666)  Min:10   CUES NEEDED   SLS Floor 10\" alt x 4 min  Posture/ balance    Wobble     Step ups fwd/ lat Up/ over Fwd  6\"  10 x 2      Lat. 6\" 10 x     Eccentric control    Chair squats  Partial rom 10 x 2 C/o pain in the back of the knee stopped after 5             Therapeutic Activity (18437)  Min:15     Gait 11/24/21 Without a spc  On level surfaces , good balance observed Advised pt to walk without spc in her home and to use spc as needed out of home, pt verbalized agreement    HEP review from previous therapy see Plan of care from 6/23/21 12/29/21 Advised pt that all exercises from previous PT were still appropriate and she can continue to do them but not to exceed any one exercise more than 3 days a week.  Pt verbalized understanding     Stairs 12/29/21   Ascend/ Descend  taking reciprocal steps  Descends with good quad control bilateral     Modalities  Min:     Game ready  Medium compression 15 min     CP after exercises     MH after exercises            Other Therapeutic Activities: Pt was educated on PT POC, Diagnosis, Prognosis, pathomechanics as well as frequency and duration of scheduling future physical therapy appointments. Time was also taken on this day to answer all patient questions and participation in PT. Reviewed appointment policy in detail with patient and patient verbalized understanding. Home Exercise Program:   Access Code: [email or web address]: [employer]/Date: 10/11/2021Prepared by: Clifford Roper   Long Sitting Quad Set - 1-2 x daily - 7 x weekly - 1-2 sets - 20 reps - 5 hold   Supine Active Straight Leg Raise - 1-2 x daily - 7 x weekly - 1-2 sets - 10 reps - 1 hold   Sitting Heel Slide with Towel - 1-2 x daily - 7 x weekly - 1 sets - 12 reps - 10 hold   Seated Long Arc Quad - 1-2 x daily - 7 x weekly - 1-2 sets - 15 reps - 3 hold   Long Sitting Calf Stretch with Strap - 1-2 x daily - 7 x weekly - 1 sets - 12 reps - 10 hold   Ankle Pumps in Elevation - 1-2 x daily - 7 x weekly - 1-2 sets - 30 reps - 1 hold   Seated Table Hamstring Stretch - 1-2 x daily - 7 x weekly - 1 sets - 4 reps - 30 hold  Access Code: [email or web address]: [employer]/Date: 10/15/2021Prepared by: Daphney Samayoa   Hooklying Active Hamstring Stretch - 2 x daily - 7 x weekly - 1-2 sets - 10 reps - 5 hold   Seated Table Hamstring Stretch - 2 x daily - 7 x weekly - 1 sets - 2-4 reps - 30 hold    Patient was instructed in the following for HEP:     . Patient verbalized/demonstrated understanding and was issued written handout. Access Code: [email or web address]  URL: [employer]/  Date: 10/20/2021  Prepared by: [facility]    Exercises  Standing Knee Flexion AROM with Chair Support - 1 x daily - 7 x weekly - 3 sets - 10 reps  Access Code: [email or web address]: [email or web address]/Date: 12/29/2021Prepared by: Kyaw Broussard with Counter Support - 1 x daily - 3 x weekly - 2-3 sets - 15 reps - 3-5 hold   Single Leg Balance in March Position - 1 x daily - 3 x weekly - 1-2 sets - 10 reps - 10 hold   Side Stepping with Resistance at Thighs and Counter Support - 1 x daily - 3 x weekly - 1-2 sets - 5-10 reps - 1-2 hold   Gastroc Stretch on Wall - 1 x daily - 3 x weekly - 1 sets - 2-3 reps - 20-30 hold   Soleus Stretch on Wall - 1 x daily - 3 x weekly - 1 sets - 2-3 reps - 20-30 hold  The patient demonstrated good tolerance to and understanding of the HEP. Written instructions have been issued. Therapeutic Exercise and NMR EXR  [x] (86636) Provided verbal/tactile cueing for activities related to strengthening, flexibility, endurance, ROM for improvements in LE, proximal hip, and core control with self care, mobility, lifting, ambulation.  [] (11693) Provided verbal/tactile cueing for activities related to improving balance, coordination, kinesthetic sense, posture, motor skill, proprioception  to assist with LE, proximal hip, and core control in self care, mobility, lifting, ambulation and eccentric single leg control.      NMR and Therapeutic Activities:    [] (60971 or 68014) Provided verbal/tactile cueing for activities related to improving balance, coordination, kinesthetic sense, posture, motor skill, proprioception and motor activation to allow for proper function of core, proximal hip and LE with self care and ADLs and functional mobility.   [] (30332) Gait Re-education- Provided training and instruction to the patient for proper LE, core and proximal hip recruitment and positioning and eccentric body weight control with ambulation re-education including up and down stairs     Home Exercise Program:    [x] (45065) Reviewed/Progressed HEP activities related to strengthening, flexibility, endurance, ROM of core, proximal hip and LE for functional self-care, mobility, lifting and ambulation/stair navigation   [] (53346)Reviewed/Progressed HEP activities related to improving balance, coordination, kinesthetic sense, posture, motor skill, proprioception of core, Stright cath?; notify provider proximal hip and LE for self care, mobility, lifting, and ambulation/stair navigation      Manual Treatments:  PROM / STM / Oscillations-Mobs:  G-I, II, III, IV (PA's, Inf., Post.)  [x] (58504) Provided manual therapy to mobilize LE, proximal hip and/or LS spine soft tissue/joints for the purpose of modulating pain, promoting relaxation,  increasing ROM, reducing/eliminating soft tissue swelling/inflammation/restriction, improving soft tissue extensibility and allowing for proper ROM for normal function with self care, mobility, lifting and ambulation. Charges:  Timed Code Treatment Minutes: 65   Total Treatment Minutes: 70    12/3/21: More time available today  [] EVAL (LOW) 61583 (typically 20 minutes face-to-face)  [] EVAL (MOD) 31185 (typically 30 minutes face-to-face)  [] EVAL (HIGH) 50797 (typically 45 minutes face-to-face)  [] RE-EVAL     [x] YW(35883) x  2  [] Dry needle 1 or 2 Muscles (11021)  [x] NMR (18322) x     [] Dry needle 3+ Muscles (08091)  [] Manual (37712) x  1   [] Ultrasound (02882) x  [] TA (25776) x     [] Mech Traction (62088)  [] ES(attended) (32778)     [] ES (un) (64452):   [x] Vasopump (62756)  [] Ionto (24290)   [] Other:    Jeff Oreilly stated goal: Decrease pain, improve strength and normal walking  []? Progressing: [x]? Met: []? Not Met: []? Adjusted     Therapist goals for Patient:   Short Term Goals: To be achieved in: 2 weeks  1. Independent in HEP and progression per patient tolerance, in order to prevent re-injury. []? Progressing: [x]? Met: []? Not Met: []? Adjusted        Long Term Goals: To be achieved in: 4-6 weeks  1. score of 50 or betterfor the LEFS to assist with reaching prior level of function. []? Progressing: [x]? Met: []? Not Met: []? Adjusted  2. Patient will demonstrate increased AROM to 2-115 or better at right knee  to allow for proper joint functioning as indicated by patients Functional Deficits. []? Progressing: []? Met: [x]?  Not Met: []? Adjusted  3. Patient will demonstrate an increase in Strength to good proximal hip strength and control in LE to allow for proper functional mobility as indicated by patients Functional Deficits. []? Progressing: []? Met: []? Not Met: []? Adjusted  4. Patient will return to normal walking, stair climbing  and household functional activities without increased symptoms or restriction. []? Progressing: [x]? Met: []? Not Met: []? Adjusted  5. Patient will have a decrease in pain 0-3/10 to facilitate improvement in movement, function, and ADLs as indicated by Functional Deficits. [x]? Progressing: [x]? Met: []? Not Met: []? Adjusted    ASSESSMENT:  KX modifier applied 10/18/21, as patient had a right TKR on 10/8/21 and requires skilled therapy to maximize outcome of surgery and achieve maximum benefit. PT reviewed HEP that patient had been given previously. Better quad control on the stairs. Treatment/Activity Tolerance:  [x] Patient tolerated treatment well [] Patient limited by fatique  [] Patient limited by pain  [x] Patient limited by other medical complications  [] Other:     Overall Progression Towards Functional goals/ Treatment Progress Update:  [] Patient is progressing as expected towards functional goals listed. [x] Progression is slowed due to complexities/Impairments listed. [] Progression has been slowed due to co-morbidities.   [] Plan just implemented, too soon to assess goals progression <30days   [] Goals require adjustment due to lack of progress  [] Patient is not progressing as expected and requires additional follow up with physician  [] Other    Prognosis for POC: [x] Good [] Fair  [] Poor    Patient requires continued skilled intervention: [] Yes  [x] No        PLAN:                         [] Continue per plan of care [] Alter current plan (see comments)  [] Plan of care initiated [] Hold pending MD visit [x] Discharge    Electronically signed by: Gerald Prieto, PT    Note: If patient does not return for scheduled/recommended follow up visits, this note will serve as a discharge from care along with the most recent update on progress.

## 2023-03-27 ENCOUNTER — HOSPITAL ENCOUNTER (OUTPATIENT)
Age: 68
Setting detail: OUTPATIENT SURGERY
Discharge: HOME | End: 2023-03-27
Attending: INTERNAL MEDICINE | Admitting: INTERNAL MEDICINE
Payer: MEDICARE

## 2023-03-27 VITALS
RESPIRATION RATE: 16 BRPM | TEMPERATURE: 97.1 F | BODY MASS INDEX: 23.22 KG/M2 | HEIGHT: 69 IN | DIASTOLIC BLOOD PRESSURE: 62 MMHG | WEIGHT: 156.8 LBS | OXYGEN SATURATION: 100 % | SYSTOLIC BLOOD PRESSURE: 106 MMHG | HEART RATE: 57 BPM

## 2023-03-27 DIAGNOSIS — R13.19 INTERMITTENT DYSPHAGIA: ICD-10-CM

## 2023-03-27 PROCEDURE — 99152 MOD SED SAME PHYS/QHP 5/>YRS: CPT | Performed by: INTERNAL MEDICINE

## 2023-03-27 PROCEDURE — 2709999900 HC NON-CHARGEABLE SUPPLY: Performed by: INTERNAL MEDICINE

## 2023-03-27 PROCEDURE — 6370000000 HC RX 637 (ALT 250 FOR IP): Performed by: INTERNAL MEDICINE

## 2023-03-27 PROCEDURE — 6360000002 HC RX W HCPCS: Performed by: INTERNAL MEDICINE

## 2023-03-27 PROCEDURE — 7100000010 HC PHASE II RECOVERY - FIRST 15 MIN: Performed by: INTERNAL MEDICINE

## 2023-03-27 PROCEDURE — 88312 SPECIAL STAINS GROUP 1: CPT

## 2023-03-27 PROCEDURE — 3609012400 HC EGD TRANSORAL BIOPSY SINGLE/MULTIPLE: Performed by: INTERNAL MEDICINE

## 2023-03-27 PROCEDURE — 7100000011 HC PHASE II RECOVERY - ADDTL 15 MIN: Performed by: INTERNAL MEDICINE

## 2023-03-27 PROCEDURE — 88305 TISSUE EXAM BY PATHOLOGIST: CPT

## 2023-03-27 RX ORDER — MIDAZOLAM HYDROCHLORIDE 1 MG/ML
INJECTION INTRAMUSCULAR; INTRAVENOUS PRN
Status: DISCONTINUED | OUTPATIENT
Start: 2023-03-27 | End: 2023-03-27 | Stop reason: ALTCHOICE

## 2023-03-27 RX ORDER — ZINC GLUCONATE 50 MG
50 TABLET ORAL DAILY
COMMUNITY

## 2023-03-27 RX ORDER — DIPHENHYDRAMINE HYDROCHLORIDE 50 MG/ML
INJECTION INTRAMUSCULAR; INTRAVENOUS PRN
Status: DISCONTINUED | OUTPATIENT
Start: 2023-03-27 | End: 2023-03-27 | Stop reason: ALTCHOICE

## 2023-03-27 RX ORDER — BACILLUS COAGULANS/INULIN 1B-250 MG
CAPSULE ORAL
COMMUNITY

## 2023-03-27 RX ORDER — DOCUSATE SODIUM 100 MG/1
100 CAPSULE, LIQUID FILLED ORAL 2 TIMES DAILY
COMMUNITY

## 2023-03-27 RX ORDER — SODIUM CHLORIDE, SODIUM LACTATE, POTASSIUM CHLORIDE, CALCIUM CHLORIDE 600; 310; 30; 20 MG/100ML; MG/100ML; MG/100ML; MG/100ML
INJECTION, SOLUTION INTRAVENOUS ONCE
Status: DISCONTINUED | OUTPATIENT
Start: 2023-03-27 | End: 2023-03-27 | Stop reason: HOSPADM

## 2023-03-27 RX ORDER — EVENING PRIMROSE OIL 500 MG
500 CAPSULE ORAL DAILY
COMMUNITY

## 2023-03-27 RX ORDER — FENTANYL CITRATE 50 UG/ML
INJECTION, SOLUTION INTRAMUSCULAR; INTRAVENOUS PRN
Status: DISCONTINUED | OUTPATIENT
Start: 2023-03-27 | End: 2023-03-27 | Stop reason: ALTCHOICE

## 2023-03-27 ASSESSMENT — PAIN SCALES - GENERAL
PAINLEVEL_OUTOF10: 0

## 2023-03-27 ASSESSMENT — PAIN SCALES - WONG BAKER
WONGBAKER_NUMERICALRESPONSE: 0

## 2023-03-27 ASSESSMENT — PAIN - FUNCTIONAL ASSESSMENT: PAIN_FUNCTIONAL_ASSESSMENT: 0-10

## 2023-03-27 NOTE — H&P
SHOULDER ARTHROPLASTY Bilateral     2018 & 2019    REVISION SHOULDER ARTHROPLASTY      TOTAL THYROIDECTOMY  12/02/2011    Dr. [place] GASTROINTESTINAL ENDOSCOPY N/A 11/07/2018    EGD BIOPSY OF ESOPHAGUS RULE OUT MILAD performed by Salty Lemon MD at [facility] N/A 12/23/2019    EGD BIOPSY performed by Salty Lemon MD at [facility] Hx:    Social History     Socioeconomic History    Marital status:      Spouse name: Elle Crocker    Number of children: 0    Years of education: Not on file    Highest education level: Not on file   Occupational History    Not on file   Tobacco Use    Smoking status: Former     Types: Cigarettes    Smokeless tobacco: Never    Tobacco comments:     quit smoking in [address] Use    Vaping Use: Never used   Substance and Sexual Activity    Alcohol use: Yes     Comment: social    Drug use: No    Sexual activity: Yes     Partners: Male     Comment:  - [address] [id number]   Other Topics Concern    Not on file   Social History Narrative         Past Medical History     Esophageal achalasia - 1969                               Last updated by Dayanara Bernardo MD on 10/09/2007                      Past Surgical History     Arthroscopic Shoulder Surgery: right - November 2001    myotomy of esophagus - 1969                                                     Last updated by Dayanara Bernardo MD on 10/09/2007                      Social History     Marital Status:  - 1983     Spouse: Yury    Children: none     Employment Status: employed full-time     Occupation: interior design    Exercise: not much    Alcohol Use: occasionally    2 glasses of wine/day    Drug Use: none    Tobacco Usage: prior smoker    Cigarettes - Year Started: 1972     Cigarettes - Year Quit: 1981     Cigarettes - Years smoked - 9     Cigarettes - Packs per Day - 2     Pack/Years - 18

## 2023-03-27 NOTE — DISCHARGE INSTRUCTIONS
for you. If esophagitis is caused by an infection, you may need to take antibiotics or other medicines to treat the infection. If you have esophagitis caused by a food allergy, your doctor may prescribe corticosteroids. Be safe with medicines. Take your medicines exactly as prescribed. Call your doctor if you think you are having a problem with your medicine. When should you call for help? Call 911  anytime you think you may need emergency care. For example, call if:    Food or something sharp is stuck in your esophagus and you can't swallow at all. Call your doctor now or seek immediate medical care if:    You have new or worse belly pain. You are vomiting. Watch closely for changes in your health, and be sure to contact your doctor if:    You have new or worse symptoms of reflux. You have any pain or trouble swallowing. You are losing weight. You do not get better as expected.

## 2023-03-27 NOTE — PROGRESS NOTES
Patients supplements and vitamins that she takes are listed in the reconciliation portion of patients home medications. Most are not in our system and cannot be pulled over into our med rec.   Electronically signed by Indira Puga RN on 3/27/2023 at 10:53 AM

## 2023-03-27 NOTE — PROGRESS NOTES
Ambulatory Surgery/Procedure Discharge Note    Vitals:    03/27/23 1138   BP: 106/62   Pulse: 57   Resp: 16   Temp:    SpO2: 100%       No intake/output data recorded. Restroom use offered before discharge. Yes, pt void per bathroom, assist x1. Pain assessment:  level of pain (1-10, 10 severe)  Pain Level: 0  Pt to Endoscopy recovery post EGD. Pt denies pain at this time. Pt denies nausea at this time, pt tolerating PO fluids well. Discharge instructions given to pt's  and he states understanding of these instructions. 1140: Pt given time to rest, pt told to use call light when ready to go. 1205: Pt and pt's  state that pt is \"ready to go. \"         Patient discharged to home/self care.  Patient discharged via wheel chair by transporter to waiting family/S.O.       3/27/2023 11:50 AM

## 2023-07-06 ENCOUNTER — APPOINTMENT (OUTPATIENT)
Dept: PHYSICAL THERAPY | Age: 68
End: 2023-07-06
Payer: MEDICARE

## 2023-07-13 ENCOUNTER — HOSPITAL ENCOUNTER (OUTPATIENT)
Dept: PHYSICAL THERAPY | Age: 68
Setting detail: THERAPIES SERIES
Discharge: HOME | End: 2023-07-13
Payer: MEDICARE

## 2023-07-13 PROCEDURE — 97161 PT EVAL LOW COMPLEX 20 MIN: CPT

## 2023-07-13 PROCEDURE — 97530 THERAPEUTIC ACTIVITIES: CPT

## 2023-07-13 PROCEDURE — 97110 THERAPEUTIC EXERCISES: CPT

## 2023-07-13 NOTE — FLOWSHEET NOTE
faxed for DN sent to DR Rae, decrease left calf strain, increase left ankle/ foot DF, build HEP accordingly   [] Continue per plan of care [] Alter current plan (see comments)  [x] Plan of care initiated [] Hold pending MD visit [] Discharge    Electronically signed by: Danielle Hamlin PT    Note: If patient does not return for scheduled/recommended follow up visits, this note will serve as a discharge from care along with the most recent update on progress.

## 2023-07-18 ENCOUNTER — HOSPITAL ENCOUNTER (OUTPATIENT)
Dept: PHYSICAL THERAPY | Age: 68
Setting detail: THERAPIES SERIES
Discharge: HOME | End: 2023-07-18
Payer: MEDICARE

## 2023-07-18 PROCEDURE — 97110 THERAPEUTIC EXERCISES: CPT

## 2023-07-18 PROCEDURE — 97140 MANUAL THERAPY 1/> REGIONS: CPT

## 2023-07-18 NOTE — FLOWSHEET NOTE
[address] [address]  Phone: [phone]   Fax:     [phone]      Date:  2023    Patient Name:  Rachel Arciniega    :  1955  MRN: [id number]    Pertinent Medical History:      Medical/Treatment Diagnosis Information:  Medical Diagnosis: Pain in left foot [M79.672]  Treatment Diagnosis: Gait and mobility dysfunction, limited left ankle/ foot DF    Insurance/Certification information:  PT Insurance Information: Medicare  Physician Information:  Tawanda Cottrell MD  Plan of care signed (Y/N): routed     Date of Patient follow up with Physician:      Progress Report: []  Yes  [x]  No     Date Range for reporting period:  Beginnin2023  Ending:     Progress report due (10 Rx/or 30 days whichever is less):      Recertification due (POC duration/ or 90 days whichever is less):      Visit # Insurance/POC Allowable Auth Needed   2 /15 Medicare []Yes    [x]No       Latex Allergy:  [x]NO      []YES  Preferred Language for Healthcare:   [x]English       []Other:    Functional Scale:        Test: [facility]  Date Assessed Score               Pain level:  0-7/10     History of Injury:Onset of symptoms: h/o left foot plantar fasciitis 2 months ago   , To date symptoms have been treated with:  orthotics most recent pair from a year ago and has been wearing from , ice and Volteran gel , Reported complaints of: Bilateral foot pain but greatest at left,  intermittent left heel and foot pain   ,Provocative factors:   standing and walking , Easing factors:  getting off of feet, ice    , Paresthesias:  none   , Sleep: is disturbed   , Occupation/School:  retired  , Activities: include      Imaging: from  23 visit with DR Zee Daniel  She has callus formation under her metatarsal heads as well as the heel.    Imaging: 3 views of the left foot show no significant osseous lesion through the

## 2023-07-20 ENCOUNTER — HOSPITAL ENCOUNTER (OUTPATIENT)
Dept: PHYSICAL THERAPY | Age: 68
Setting detail: THERAPIES SERIES
Discharge: HOME | End: 2023-07-20
Payer: MEDICARE

## 2023-07-20 PROCEDURE — 97110 THERAPEUTIC EXERCISES: CPT

## 2023-07-20 PROCEDURE — 97140 MANUAL THERAPY 1/> REGIONS: CPT

## 2023-07-20 NOTE — FLOWSHEET NOTE
[address] [address]  Phone: [phone]   Fax:     [phone]      Date:  2023    Patient Name:  David Maldonado    :  1955  MRN: [id number]    Pertinent Medical History:      Medical/Treatment Diagnosis Information:  Medical Diagnosis: Pain in left foot [M79.672]  Treatment Diagnosis: Gait and mobility dysfunction, limited left ankle/ foot DF    Insurance/Certification information:  PT Insurance Information: Medicare  Physician Information:  Rosa Johnson MD  Plan of care signed (Y/N): routed     Date of Patient follow up with Physician:      Progress Report: []  Yes  [x]  No     Date Range for reporting period:  Beginnin2023  Ending:     Progress report due (10 Rx/or 30 days whichever is less):      Recertification due (POC duration/ or 90 days whichever is less):      Visit # Insurance/POC Allowable Auth Needed   3 /15 Medicare []Yes    [x]No       Latex Allergy:  [x]NO      []YES  Preferred Language for Healthcare:   [x]English       []Other:    Functional Scale:        Test: [facility]  Date Assessed Score               Pain level:  0-7/10     History of Injury:Onset of symptoms: h/o left foot plantar fasciitis 2 months ago   , To date symptoms have been treated with:  orthotics most recent pair from a year ago and has been wearing from , ice and Volteran gel , Reported complaints of: Bilateral foot pain but greatest at left,  intermittent left heel and foot pain   ,Provocative factors:   standing and walking , Easing factors:  getting off of feet, ice    , Paresthesias:  none   , Sleep: is disturbed   , Occupation/School:  retired  , Activities: include      Imaging: from  23 visit with DR Blair Blount  She has callus formation under her metatarsal heads as well as the heel.    Imaging: 3 views of the left foot show no significant osseous lesion through the

## 2023-07-25 ENCOUNTER — HOSPITAL ENCOUNTER (OUTPATIENT)
Dept: PHYSICAL THERAPY | Age: 68
Setting detail: THERAPIES SERIES
Discharge: HOME | End: 2023-07-25
Payer: MEDICARE

## 2023-07-25 PROCEDURE — 20561 NDL INSJ W/O NJX 3+ MUSC: CPT

## 2023-07-25 PROCEDURE — 97140 MANUAL THERAPY 1/> REGIONS: CPT

## 2023-07-25 NOTE — FLOWSHEET NOTE
[address] [address]  Phone: [phone]   Fax:     [phone]      Date:  2023    Patient Name:  Evelyne Fernandez    :  1955  MRN: [id number]    Pertinent Medical History:      Medical/Treatment Diagnosis Information:  Medical Diagnosis: Pain in left foot [M79.672]  Treatment Diagnosis: Gait and mobility dysfunction, limited left ankle/ foot DF    Insurance/Certification information:  PT Insurance Information: Medicare  Physician Information:  Meng Baca MD  Plan of care signed (Y/N): routed     Date of Patient follow up with Physician:      Progress Report: []  Yes  [x]  No     Date Range for reporting period:  Beginnin2023  Ending:     Progress report due (10 Rx/or 30 days whichever is less):      Recertification due (POC duration/ or 90 days whichever is less):      Visit # Insurance/POC Allowable Auth Needed   4 /15 Medicare []Yes    [x]No       Latex Allergy:  [x]NO      []YES  Preferred Language for Healthcare:   [x]English       []Other:    Functional Scale:        Test: [facility]  Date Assessed Score               Pain level:  1-2/10     History of Injury:Onset of symptoms: h/o left foot plantar fasciitis 2 months ago   , To date symptoms have been treated with:  orthotics most recent pair from a year ago and has been wearing from , ice and Volteran gel , Reported complaints of: Bilateral foot pain but greatest at left,  intermittent left heel and foot pain   ,Provocative factors:   standing and walking , Easing factors:  getting off of feet, ice    , Paresthesias:  none   , Sleep: is disturbed   , Occupation/School:  retired  , Activities: include      Imaging: from  23 visit with DR Salty Jackman  She has callus formation under her metatarsal heads as well as the heel.    Imaging: 3 views of the left foot show no significant osseous lesion through the

## 2023-07-27 ENCOUNTER — HOSPITAL ENCOUNTER (OUTPATIENT)
Dept: PHYSICAL THERAPY | Age: 68
Setting detail: THERAPIES SERIES
Discharge: HOME | End: 2023-07-27
Payer: MEDICARE

## 2023-07-27 PROCEDURE — 97110 THERAPEUTIC EXERCISES: CPT

## 2023-07-27 PROCEDURE — 97140 MANUAL THERAPY 1/> REGIONS: CPT

## 2023-07-27 NOTE — FLOWSHEET NOTE
[address] [address]  Phone: [phone]   Fax:     [phone]      Date:  2023    Patient Name:  Ellen Braswell    :  1955  MRN: [id number]    Pertinent Medical History:      Medical/Treatment Diagnosis Information:  Medical Diagnosis: Pain in left foot [M79.672]  Treatment Diagnosis: Gait and mobility dysfunction, limited left ankle/ foot DF    Insurance/Certification information:  PT Insurance Information: Medicare  Physician Information:  John Shaffer MD  Plan of care signed (Y/N): routed     Date of Patient follow up with Physician:      Progress Report: []  Yes  [x]  No     Date Range for reporting period:  Beginnin2023  Ending:     Progress report due (10 Rx/or 30 days whichever is less):      Recertification due (POC duration/ or 90 days whichever is less):      Visit # Insurance/POC Allowable Auth Needed   5 /15 Medicare []Yes    [x]No       Latex Allergy:  [x]NO      []YES  Preferred Language for Healthcare:   [x]English       []Other:    Functional Scale:        Test: [facility]  Date Assessed Score               Pain level:  1-2/10     History of Injury:Onset of symptoms: h/o left foot plantar fasciitis 2 months ago   , To date symptoms have been treated with:  orthotics most recent pair from a year ago and has been wearing from , ice and Volteran gel , Reported complaints of: Bilateral foot pain but greatest at left,  intermittent left heel and foot pain   ,Provocative factors:   standing and walking , Easing factors:  getting off of feet, ice    , Paresthesias:  none   , Sleep: is disturbed   , Occupation/School:  retired  , Activities: include      Imaging: from  23 visit with DR Paresh Pena  She has callus formation under her metatarsal heads as well as the heel.    Imaging: 3 views of the left foot show no significant osseous lesion through the unknown

## 2023-08-01 ENCOUNTER — HOSPITAL ENCOUNTER (OUTPATIENT)
Dept: PHYSICAL THERAPY | Age: 68
Setting detail: THERAPIES SERIES
Discharge: HOME | End: 2023-08-01
Payer: MEDICARE

## 2023-08-01 PROCEDURE — 97110 THERAPEUTIC EXERCISES: CPT

## 2023-08-01 PROCEDURE — 97140 MANUAL THERAPY 1/> REGIONS: CPT

## 2023-08-01 NOTE — FLOWSHEET NOTE
decrease in pain 0-3/10 to facilitate improvement in movement, function, and ADLs as indicated by Functional Deficits. [] Progressing: [] Met: [] Not Met: [] Adjusted         ASSESSMENT:    Tolerated treatment well. Decreased left calf tightness. Improving ankle ROM. Treatment/Activity Tolerance:  [x] Patient tolerated treatment well [] Patient limited by fatique  [] Patient limited by pain  [] Patient limited by other medical complications  [] Other:     Overall Progression Towards Functional goals/ Treatment Progress Update:  [] Patient is progressing as expected towards functional goals listed. [x] Progression is slowed due to complexities/Impairments listed. [] Progression has been slowed due to co-morbidities. [] Plan just implemented, too soon to assess goals progression <30days   [] Goals require adjustment due to lack of progress  [] Patient is not progressing as expected and requires additional follow up with physician  [] Other    Prognosis for POC: [x] Good [] Fair  [] Poor    Patient requires continued skilled intervention: [x] Yes  [] No        PLAN: Reassess effectiveness of Dry Needling / Review HEP and add as above    decrease left calf strain, increase left ankle/ foot DF, build HEP accordingly   [x] Continue per plan of care [] Alter current plan (see comments)  [] Plan of care initiated [] Hold pending MD visit [] Discharge    Electronically signed by: Rafaela Purcell PTA    Note: If patient does not return for scheduled/recommended follow up visits, this note will serve as a discharge from care along with the most recent update on progress.

## 2023-08-03 ENCOUNTER — HOSPITAL ENCOUNTER (OUTPATIENT)
Dept: PHYSICAL THERAPY | Age: 68
Setting detail: THERAPIES SERIES
Discharge: HOME | End: 2023-08-03
Payer: MEDICARE

## 2023-08-03 PROCEDURE — 97110 THERAPEUTIC EXERCISES: CPT

## 2023-08-03 PROCEDURE — 97140 MANUAL THERAPY 1/> REGIONS: CPT

## 2023-08-03 PROCEDURE — 20561 NDL INSJ W/O NJX 3+ MUSC: CPT

## 2023-08-03 NOTE — FLOWSHEET NOTE
(08798) Gait Re-education- Provided training and instruction to the patient for proper LE, core and proximal hip recruitment and positioning and eccentric body weight control with ambulation re-education including up and down stairs     Home Exercise Program:    [x] (57053) Reviewed/Progressed HEP activities related to strengthening, flexibility, endurance, ROM of core, proximal hip and LE for functional self-care, mobility, lifting and ambulation/stair navigation   [x] (74493)Reviewed/Progressed HEP activities related to improving balance, coordination, kinesthetic sense, posture, motor skill, proprioception of core, proximal hip and LE for self care, mobility, lifting, and ambulation/stair navigation      Manual Treatments:  PROM / STM / Oscillations-Mobs:  G-I, II, III, IV (PA's, Inf., Post.)  [x] (86166) Provided manual therapy to mobilize LE, proximal hip and/or LS spine soft tissue/joints for the purpose of modulating pain, promoting relaxation,  increasing ROM, reducing/eliminating soft tissue swelling/inflammation/restriction, improving soft tissue extensibility and allowing for proper ROM for normal function with self care, mobility, lifting and ambulation. Modalities:  [] (71337) Vasopneumatic compression: Utilized vasopneumatic compression to decrease edema / swelling for the purpose of improving mobility and quad tone / recruitment which will allow for increased overall function including but not limited to self-care, transfers, ambulation, and ascending / descending stairs.      If [facility] Please Indicate Time In/Out  CPT Code Time in Time out                         Approval Dates:  CPT Code Units Approved Units Used  Date Updated:                     Charges:  Timed Code Treatment Minutes: 65   Total Treatment Minutes: 70      [] EVAL (LOW) 58627 (typically 20 minutes face-to-face)  [] EVAL (MOD) 92328 (typically 30 minutes face-to-face)  [] EVAL (HIGH) 54500 (typically 45 minutes face-to-face)  []

## 2023-08-08 ENCOUNTER — HOSPITAL ENCOUNTER (OUTPATIENT)
Dept: PHYSICAL THERAPY | Age: 68
Setting detail: THERAPIES SERIES
Discharge: HOME | End: 2023-08-08
Payer: MEDICARE

## 2023-08-08 PROCEDURE — 97140 MANUAL THERAPY 1/> REGIONS: CPT

## 2023-08-08 PROCEDURE — 97110 THERAPEUTIC EXERCISES: CPT

## 2023-08-08 NOTE — FLOWSHEET NOTE
[address] [address]  Phone: [phone]   Fax:     [phone]      Date:  2023    Patient Name:  Shwetha Elliott    :  1955  MRN: [id number]    Pertinent Medical History:      Medical/Treatment Diagnosis Information:  Medical Diagnosis: Pain in left foot [M79.672]  Treatment Diagnosis: Gait and mobility dysfunction, limited left ankle/ foot DF    Insurance/Certification information:  PT Insurance Information: Medicare  Physician Information:  Don German MD  Plan of care signed (Y/N): routed     Date of Patient follow up with Physician:      Progress Report: []  Yes  [x]  No     Date Range for reporting period:  Beginnin2023  Ending:     Progress report due (10 Rx/or 30 days whichever is less):      Recertification due (POC duration/ or 90 days whichever is less):      Visit # Insurance/POC Allowable Auth Needed   8 /15 Medicare []Yes    [x]No       Latex Allergy:  [x]NO      []YES  Preferred Language for Healthcare:   [x]English       []Other:    Functional Scale:        Test: [facility]  Date Assessed Score               Pain level:  1-2/10     History of Injury:Onset of symptoms: h/o left foot plantar fasciitis 2 months ago   , To date symptoms have been treated with:  orthotics most recent pair from a year ago and has been wearing from , ice and Volteran gel , Reported complaints of: Bilateral foot pain but greatest at left,  intermittent left heel and foot pain   ,Provocative factors:   standing and walking , Easing factors:  getting off of feet, ice    , Paresthesias:  none   , Sleep: is disturbed   , Occupation/School:  retired  , Activities: include      Imaging: from  23 visit with DR Radha Valle  She has callus formation under her metatarsal heads as well as the heel.    Imaging: 3 views of the left foot show no significant osseous lesion through the

## 2023-08-10 ENCOUNTER — HOSPITAL ENCOUNTER (OUTPATIENT)
Dept: PHYSICAL THERAPY | Age: 68
Setting detail: THERAPIES SERIES
Discharge: HOME | End: 2023-08-10
Payer: MEDICARE

## 2023-08-10 PROCEDURE — 97110 THERAPEUTIC EXERCISES: CPT

## 2023-08-10 PROCEDURE — 97140 MANUAL THERAPY 1/> REGIONS: CPT

## 2023-08-10 NOTE — FLOWSHEET NOTE
with physician  [] Other    Prognosis for POC: [x] Good [] Fair  [] Poor    Patient requires continued skilled intervention: [x] Yes  [] No        PLAN:   [] Continue per plan of care [] Alter current plan (see comments)  [] Plan of care initiated [] Hold pending MD visit [x] Discharge    Electronically signed by: Dora Abbott PT    Note: If patient does not return for scheduled/recommended follow up visits, this note will serve as a discharge from care along with the most recent update on progress.

## 2023-10-03 NOTE — TELEPHONE ENCOUNTER
DONE Ms Borrero is a 75-year-old woman with a history of C. difficile diarrhea and multiple second-degree relatives with colorectal cancer presents for follow-up regarding elevated liver enzymes secondary to NAFLD and diarrhea predominant change in bowel habits.  She reports that her  passed away few months ago.  Since that time she has been having 4-5 bowel movements daily with some fecal urgency but no incontinence.  She denies any nocturnal bowel movements.  No antecedent antibiotics, no fever, no blood per rectum or weight loss.  She has had some waxing waning, mild epigastric discomfort for about 1 month.  She otherwise reports no abdominal pain.  No nausea, vomiting, heartburn or dysphagia.Labs on 8/3/2023 WBC 5.5, hemoglobin 14.3, MCV 93, platelets 277, normal basic metabolic panel, normal liver function test.Colonoscopy (4/26/2022) removal of a 5 mm ascending colon tubular adenoma and sigmoid colon diverticulosis.

## (undated) DEVICE — CANNULA SAMP CO2 AD GRN 7FT CO2 AND 7FT O2 TBNG UNIV CONN

## (undated) DEVICE — FORCEPS BX L240CM JAW DIA2.4MM ORNG L CAP W/ NDL DISP RAD

## (undated) DEVICE — FORCEPS BX L240CM DIA2.4MM L NDL RAD JAW 4 133340